# Patient Record
Sex: MALE | Race: WHITE | NOT HISPANIC OR LATINO | Employment: PART TIME | URBAN - METROPOLITAN AREA
[De-identification: names, ages, dates, MRNs, and addresses within clinical notes are randomized per-mention and may not be internally consistent; named-entity substitution may affect disease eponyms.]

---

## 2023-06-28 ENCOUNTER — ANESTHESIA (OUTPATIENT)
Dept: PERIOP | Facility: HOSPITAL | Age: 63
DRG: 853 | End: 2023-06-28
Payer: COMMERCIAL

## 2023-06-28 ENCOUNTER — ANESTHESIA EVENT (OUTPATIENT)
Dept: PERIOP | Facility: HOSPITAL | Age: 63
DRG: 853 | End: 2023-06-28
Payer: COMMERCIAL

## 2023-06-28 ENCOUNTER — HOSPITAL ENCOUNTER (INPATIENT)
Facility: HOSPITAL | Age: 63
LOS: 1 days | DRG: 853 | End: 2023-06-29
Attending: EMERGENCY MEDICINE | Admitting: STUDENT IN AN ORGANIZED HEALTH CARE EDUCATION/TRAINING PROGRAM
Payer: COMMERCIAL

## 2023-06-28 ENCOUNTER — APPOINTMENT (EMERGENCY)
Dept: CT IMAGING | Facility: HOSPITAL | Age: 63
DRG: 853 | End: 2023-06-28
Payer: COMMERCIAL

## 2023-06-28 ENCOUNTER — APPOINTMENT (EMERGENCY)
Dept: ULTRASOUND IMAGING | Facility: HOSPITAL | Age: 63
DRG: 853 | End: 2023-06-28
Payer: COMMERCIAL

## 2023-06-28 DIAGNOSIS — N49.3 FOURNIER'S GANGRENE: Primary | ICD-10-CM

## 2023-06-28 DIAGNOSIS — N49.3 FOURNIER'S GANGRENE OF SCROTUM: ICD-10-CM

## 2023-06-28 DIAGNOSIS — R65.20 SEVERE SEPSIS (HCC): ICD-10-CM

## 2023-06-28 DIAGNOSIS — A41.9 SEVERE SEPSIS (HCC): ICD-10-CM

## 2023-06-28 LAB
ALBUMIN SERPL BCP-MCNC: 3.6 G/DL (ref 3.5–5)
ALP SERPL-CCNC: 83 U/L (ref 34–104)
ALT SERPL W P-5'-P-CCNC: 18 U/L (ref 7–52)
ANION GAP SERPL CALCULATED.3IONS-SCNC: 16 MMOL/L
APTT PPP: 33 SECONDS (ref 23–37)
AST SERPL W P-5'-P-CCNC: 19 U/L (ref 13–39)
BASE EX.OXY STD BLDV CALC-SCNC: 77 % (ref 60–80)
BASE EXCESS BLDV CALC-SCNC: -3.6 MMOL/L
BASOPHILS # BLD MANUAL: 0 THOUSAND/UL (ref 0–0.1)
BASOPHILS NFR MAR MANUAL: 0 % (ref 0–1)
BETA-HYDROXYBUTYRATE: 1.1 MMOL/L
BILIRUB SERPL-MCNC: 1.21 MG/DL (ref 0.2–1)
BUN SERPL-MCNC: 27 MG/DL (ref 5–25)
CALCIUM SERPL-MCNC: 10.4 MG/DL (ref 8.4–10.2)
CHLORIDE SERPL-SCNC: 91 MMOL/L (ref 96–108)
CO2 SERPL-SCNC: 22 MMOL/L (ref 21–32)
CREAT SERPL-MCNC: 1.16 MG/DL (ref 0.6–1.3)
DIFFERENTIAL COMMENT: ABNORMAL
DOHLE BOD BLD QL SMEAR: PRESENT
EOSINOPHIL # BLD MANUAL: 0 THOUSAND/UL (ref 0–0.4)
EOSINOPHIL NFR BLD MANUAL: 0 % (ref 0–6)
ERYTHROCYTE [DISTWIDTH] IN BLOOD BY AUTOMATED COUNT: 12.5 % (ref 11.6–15.1)
GFR SERPL CREATININE-BSD FRML MDRD: 66 ML/MIN/1.73SQ M
GLUCOSE SERPL-MCNC: 256 MG/DL (ref 65–140)
GLUCOSE SERPL-MCNC: 292 MG/DL (ref 65–140)
GLUCOSE SERPL-MCNC: 424 MG/DL (ref 65–140)
GLUCOSE SERPL-MCNC: 441 MG/DL (ref 65–140)
HCO3 BLDV-SCNC: 20.4 MMOL/L (ref 24–30)
HCT VFR BLD AUTO: 40.3 % (ref 36.5–49.3)
HGB BLD-MCNC: 14.1 G/DL (ref 12–17)
INR PPP: 1.03 (ref 0.84–1.19)
LACTATE SERPL-SCNC: 4.8 MMOL/L (ref 0.5–2)
LYMPHOCYTES # BLD AUTO: 0.42 THOUSAND/UL (ref 0.6–4.47)
LYMPHOCYTES # BLD AUTO: 3 % (ref 14–44)
MCH RBC QN AUTO: 34.4 PG (ref 26.8–34.3)
MCHC RBC AUTO-ENTMCNC: 35 G/DL (ref 31.4–37.4)
MCV RBC AUTO: 98 FL (ref 82–98)
METAMYELOCYTES NFR BLD MANUAL: 5 % (ref 0–1)
MONOCYTES # BLD AUTO: 0.97 THOUSAND/UL (ref 0–1.22)
MONOCYTES NFR BLD: 7 % (ref 4–12)
MYELOCYTES NFR BLD MANUAL: 5 % (ref 0–1)
NEUTROPHILS # BLD MANUAL: 11.07 THOUSAND/UL (ref 1.85–7.62)
NEUTS BAND NFR BLD MANUAL: 20 % (ref 0–8)
NEUTS SEG NFR BLD AUTO: 60 % (ref 43–75)
O2 CT BLDV-SCNC: 14.6 ML/DL
PATHOLOGY REVIEW: YES
PCO2 BLDV: 33.8 MM HG (ref 42–50)
PH BLDV: 7.4 [PH] (ref 7.3–7.4)
PLATELET # BLD AUTO: 176 THOUSANDS/UL (ref 149–390)
PLATELET BLD QL SMEAR: ADEQUATE
PMV BLD AUTO: 9.7 FL (ref 8.9–12.7)
PO2 BLDV: 43.6 MM HG (ref 35–45)
POTASSIUM SERPL-SCNC: 4.1 MMOL/L (ref 3.5–5.3)
PROCALCITONIN SERPL-MCNC: 28.29 NG/ML
PROT SERPL-MCNC: 8.1 G/DL (ref 6.4–8.4)
PROTHROMBIN TIME: 13.3 SECONDS (ref 11.6–14.5)
RBC # BLD AUTO: 4.1 MILLION/UL (ref 3.88–5.62)
RBC MORPH BLD: NORMAL
SODIUM SERPL-SCNC: 129 MMOL/L (ref 135–147)
TOXIC GRANULES BLD QL SMEAR: PRESENT
WBC # BLD AUTO: 13.84 THOUSAND/UL (ref 4.31–10.16)
WBC TOXIC VACUOLES BLD QL SMEAR: PRESENT

## 2023-06-28 PROCEDURE — 93005 ELECTROCARDIOGRAM TRACING: CPT

## 2023-06-28 PROCEDURE — 99285 EMERGENCY DEPT VISIT HI MDM: CPT | Performed by: EMERGENCY MEDICINE

## 2023-06-28 PROCEDURE — 82948 REAGENT STRIP/BLOOD GLUCOSE: CPT

## 2023-06-28 PROCEDURE — 82805 BLOOD GASES W/O2 SATURATION: CPT | Performed by: EMERGENCY MEDICINE

## 2023-06-28 PROCEDURE — 87070 CULTURE OTHR SPECIMN AEROBIC: CPT | Performed by: UROLOGY

## 2023-06-28 PROCEDURE — 96365 THER/PROPH/DIAG IV INF INIT: CPT

## 2023-06-28 PROCEDURE — G1004 CDSM NDSC: HCPCS

## 2023-06-28 PROCEDURE — 94762 N-INVAS EAR/PLS OXIMTRY CONT: CPT

## 2023-06-28 PROCEDURE — 85610 PROTHROMBIN TIME: CPT | Performed by: EMERGENCY MEDICINE

## 2023-06-28 PROCEDURE — 99222 1ST HOSP IP/OBS MODERATE 55: CPT | Performed by: SURGERY

## 2023-06-28 PROCEDURE — 85007 BL SMEAR W/DIFF WBC COUNT: CPT | Performed by: EMERGENCY MEDICINE

## 2023-06-28 PROCEDURE — 82010 KETONE BODYS QUAN: CPT | Performed by: EMERGENCY MEDICINE

## 2023-06-28 PROCEDURE — 76870 US EXAM SCROTUM: CPT

## 2023-06-28 PROCEDURE — 96375 TX/PRO/DX INJ NEW DRUG ADDON: CPT

## 2023-06-28 PROCEDURE — 0JBB0ZZ EXCISION OF PERINEUM SUBCUTANEOUS TISSUE AND FASCIA, OPEN APPROACH: ICD-10-PCS | Performed by: UROLOGY

## 2023-06-28 PROCEDURE — 87147 CULTURE TYPE IMMUNOLOGIC: CPT | Performed by: UROLOGY

## 2023-06-28 PROCEDURE — 84145 PROCALCITONIN (PCT): CPT | Performed by: EMERGENCY MEDICINE

## 2023-06-28 PROCEDURE — 87075 CULTR BACTERIA EXCEPT BLOOD: CPT | Performed by: UROLOGY

## 2023-06-28 PROCEDURE — 83605 ASSAY OF LACTIC ACID: CPT | Performed by: UROLOGY

## 2023-06-28 PROCEDURE — 99285 EMERGENCY DEPT VISIT HI MDM: CPT

## 2023-06-28 PROCEDURE — 72192 CT PELVIS W/O DYE: CPT

## 2023-06-28 PROCEDURE — 87040 BLOOD CULTURE FOR BACTERIA: CPT | Performed by: EMERGENCY MEDICINE

## 2023-06-28 PROCEDURE — 83605 ASSAY OF LACTIC ACID: CPT | Performed by: EMERGENCY MEDICINE

## 2023-06-28 PROCEDURE — 85027 COMPLETE CBC AUTOMATED: CPT | Performed by: EMERGENCY MEDICINE

## 2023-06-28 PROCEDURE — 87205 SMEAR GRAM STAIN: CPT | Performed by: UROLOGY

## 2023-06-28 PROCEDURE — 36415 COLL VENOUS BLD VENIPUNCTURE: CPT | Performed by: EMERGENCY MEDICINE

## 2023-06-28 PROCEDURE — 87185 SC STD ENZYME DETCJ PER NZM: CPT | Performed by: UROLOGY

## 2023-06-28 PROCEDURE — 99222 1ST HOSP IP/OBS MODERATE 55: CPT | Performed by: UROLOGY

## 2023-06-28 PROCEDURE — 11004 DBRDMT SKIN XTRNL GENT&PER: CPT | Performed by: SURGERY

## 2023-06-28 PROCEDURE — 96361 HYDRATE IV INFUSION ADD-ON: CPT

## 2023-06-28 PROCEDURE — 80053 COMPREHEN METABOLIC PANEL: CPT | Performed by: EMERGENCY MEDICINE

## 2023-06-28 PROCEDURE — 85049 AUTOMATED PLATELET COUNT: CPT | Performed by: UROLOGY

## 2023-06-28 PROCEDURE — 85730 THROMBOPLASTIN TIME PARTIAL: CPT | Performed by: EMERGENCY MEDICINE

## 2023-06-28 PROCEDURE — 11004 DBRDMT SKIN XTRNL GENT&PER: CPT | Performed by: UROLOGY

## 2023-06-28 RX ORDER — MIDAZOLAM HYDROCHLORIDE 2 MG/2ML
INJECTION, SOLUTION INTRAMUSCULAR; INTRAVENOUS AS NEEDED
Status: DISCONTINUED | OUTPATIENT
Start: 2023-06-28 | End: 2023-06-28

## 2023-06-28 RX ORDER — SODIUM CHLORIDE, SODIUM LACTATE, POTASSIUM CHLORIDE, CALCIUM CHLORIDE 600; 310; 30; 20 MG/100ML; MG/100ML; MG/100ML; MG/100ML
INJECTION, SOLUTION INTRAVENOUS CONTINUOUS PRN
Status: DISCONTINUED | OUTPATIENT
Start: 2023-06-28 | End: 2023-06-28

## 2023-06-28 RX ORDER — ONDANSETRON 2 MG/ML
INJECTION INTRAMUSCULAR; INTRAVENOUS AS NEEDED
Status: DISCONTINUED | OUTPATIENT
Start: 2023-06-28 | End: 2023-06-28

## 2023-06-28 RX ORDER — FENTANYL CITRATE 50 UG/ML
INJECTION, SOLUTION INTRAMUSCULAR; INTRAVENOUS AS NEEDED
Status: DISCONTINUED | OUTPATIENT
Start: 2023-06-28 | End: 2023-06-28

## 2023-06-28 RX ORDER — SODIUM CHLORIDE, SODIUM LACTATE, POTASSIUM CHLORIDE, CALCIUM CHLORIDE 600; 310; 30; 20 MG/100ML; MG/100ML; MG/100ML; MG/100ML
125 INJECTION, SOLUTION INTRAVENOUS CONTINUOUS
Status: DISCONTINUED | OUTPATIENT
Start: 2023-06-28 | End: 2023-06-29 | Stop reason: HOSPADM

## 2023-06-28 RX ORDER — LIDOCAINE HYDROCHLORIDE 10 MG/ML
INJECTION, SOLUTION EPIDURAL; INFILTRATION; INTRACAUDAL; PERINEURAL AS NEEDED
Status: DISCONTINUED | OUTPATIENT
Start: 2023-06-28 | End: 2023-06-28

## 2023-06-28 RX ORDER — ALBUMIN, HUMAN INJ 5% 5 %
SOLUTION INTRAVENOUS CONTINUOUS PRN
Status: DISCONTINUED | OUTPATIENT
Start: 2023-06-28 | End: 2023-06-28

## 2023-06-28 RX ORDER — SODIUM CHLORIDE 9 MG/ML
INJECTION, SOLUTION INTRAVENOUS CONTINUOUS PRN
Status: DISCONTINUED | OUTPATIENT
Start: 2023-06-28 | End: 2023-06-28

## 2023-06-28 RX ORDER — CEFTRIAXONE 2 G/50ML
2000 INJECTION, SOLUTION INTRAVENOUS ONCE
Status: COMPLETED | OUTPATIENT
Start: 2023-06-28 | End: 2023-06-28

## 2023-06-28 RX ORDER — SUCCINYLCHOLINE/SOD CL,ISO/PF 100 MG/5ML
SYRINGE (ML) INTRAVENOUS AS NEEDED
Status: DISCONTINUED | OUTPATIENT
Start: 2023-06-28 | End: 2023-06-28

## 2023-06-28 RX ORDER — ASPIRIN 81 MG/1
81 TABLET ORAL DAILY
COMMUNITY

## 2023-06-28 RX ORDER — GLYCOPYRROLATE 0.2 MG/ML
INJECTION INTRAMUSCULAR; INTRAVENOUS AS NEEDED
Status: DISCONTINUED | OUTPATIENT
Start: 2023-06-28 | End: 2023-06-28

## 2023-06-28 RX ORDER — ONDANSETRON 2 MG/ML
4 INJECTION INTRAMUSCULAR; INTRAVENOUS ONCE AS NEEDED
Status: DISCONTINUED | OUTPATIENT
Start: 2023-06-28 | End: 2023-06-28 | Stop reason: HOSPADM

## 2023-06-28 RX ORDER — HYDROMORPHONE HCL/PF 1 MG/ML
0.2 SYRINGE (ML) INJECTION
Status: DISCONTINUED | OUTPATIENT
Start: 2023-06-28 | End: 2023-06-28 | Stop reason: HOSPADM

## 2023-06-28 RX ORDER — HYDROMORPHONE HCL/PF 1 MG/ML
0.4 SYRINGE (ML) INJECTION
Status: DISCONTINUED | OUTPATIENT
Start: 2023-06-28 | End: 2023-06-28 | Stop reason: HOSPADM

## 2023-06-28 RX ORDER — FENTANYL CITRATE/PF 50 MCG/ML
50 SYRINGE (ML) INJECTION
Status: DISCONTINUED | OUTPATIENT
Start: 2023-06-28 | End: 2023-06-28 | Stop reason: HOSPADM

## 2023-06-28 RX ORDER — ATORVASTATIN CALCIUM 20 MG/1
TABLET, FILM COATED ORAL
COMMUNITY
Start: 2023-05-10

## 2023-06-28 RX ORDER — LISINOPRIL 2.5 MG/1
TABLET ORAL
COMMUNITY
Start: 2023-05-10

## 2023-06-28 RX ORDER — DULAGLUTIDE 0.75 MG/.5ML
INJECTION, SOLUTION SUBCUTANEOUS
COMMUNITY
Start: 2023-05-16

## 2023-06-28 RX ORDER — MAGNESIUM HYDROXIDE 1200 MG/15ML
LIQUID ORAL AS NEEDED
Status: DISCONTINUED | OUTPATIENT
Start: 2023-06-28 | End: 2023-06-28 | Stop reason: HOSPADM

## 2023-06-28 RX ORDER — KETOROLAC TROMETHAMINE 30 MG/ML
15 INJECTION, SOLUTION INTRAMUSCULAR; INTRAVENOUS ONCE
Status: COMPLETED | OUTPATIENT
Start: 2023-06-28 | End: 2023-06-28

## 2023-06-28 RX ORDER — PHENYLEPHRINE HCL IN 0.9% NACL 1 MG/10 ML
SYRINGE (ML) INTRAVENOUS AS NEEDED
Status: DISCONTINUED | OUTPATIENT
Start: 2023-06-28 | End: 2023-06-28

## 2023-06-28 RX ORDER — ROCURONIUM BROMIDE 10 MG/ML
INJECTION, SOLUTION INTRAVENOUS AS NEEDED
Status: DISCONTINUED | OUTPATIENT
Start: 2023-06-28 | End: 2023-06-28

## 2023-06-28 RX ORDER — HEPARIN SODIUM 5000 [USP'U]/ML
5000 INJECTION, SOLUTION INTRAVENOUS; SUBCUTANEOUS EVERY 8 HOURS SCHEDULED
Status: DISCONTINUED | OUTPATIENT
Start: 2023-06-28 | End: 2023-06-29 | Stop reason: HOSPADM

## 2023-06-28 RX ORDER — HYDROMORPHONE HYDROCHLORIDE 2 MG/ML
INJECTION, SOLUTION INTRAMUSCULAR; INTRAVENOUS; SUBCUTANEOUS AS NEEDED
Status: DISCONTINUED | OUTPATIENT
Start: 2023-06-28 | End: 2023-06-28

## 2023-06-28 RX ORDER — PROPOFOL 10 MG/ML
INJECTION, EMULSION INTRAVENOUS AS NEEDED
Status: DISCONTINUED | OUTPATIENT
Start: 2023-06-28 | End: 2023-06-28

## 2023-06-28 RX ORDER — FENTANYL CITRATE/PF 50 MCG/ML
25 SYRINGE (ML) INJECTION
Status: DISCONTINUED | OUTPATIENT
Start: 2023-06-28 | End: 2023-06-28 | Stop reason: HOSPADM

## 2023-06-28 RX ADMIN — SUGAMMADEX 200 MG: 100 INJECTION, SOLUTION INTRAVENOUS at 21:45

## 2023-06-28 RX ADMIN — FENTANYL CITRATE 100 MCG: 50 INJECTION, SOLUTION INTRAMUSCULAR; INTRAVENOUS at 20:09

## 2023-06-28 RX ADMIN — FENTANYL CITRATE 50 MCG: 50 INJECTION INTRAMUSCULAR; INTRAVENOUS at 22:10

## 2023-06-28 RX ADMIN — SODIUM CHLORIDE: 0.9 INJECTION, SOLUTION INTRAVENOUS at 20:31

## 2023-06-28 RX ADMIN — CEFTRIAXONE 2000 MG: 2 INJECTION, SOLUTION INTRAVENOUS at 17:38

## 2023-06-28 RX ADMIN — HEPARIN SODIUM 5000 UNITS: 5000 INJECTION INTRAVENOUS; SUBCUTANEOUS at 23:46

## 2023-06-28 RX ADMIN — ROCURONIUM BROMIDE 20 MG: 10 INJECTION, SOLUTION INTRAVENOUS at 20:26

## 2023-06-28 RX ADMIN — HYDROMORPHONE HYDROCHLORIDE 0.2 MG: 2 INJECTION, SOLUTION INTRAMUSCULAR; INTRAVENOUS; SUBCUTANEOUS at 20:28

## 2023-06-28 RX ADMIN — SODIUM CHLORIDE: 9 INJECTION INTRAMUSCULAR; INTRAVENOUS; SUBCUTANEOUS at 22:58

## 2023-06-28 RX ADMIN — ROCURONIUM BROMIDE 10 MG: 10 INJECTION, SOLUTION INTRAVENOUS at 21:04

## 2023-06-28 RX ADMIN — ONDANSETRON 4 MG: 2 INJECTION INTRAMUSCULAR; INTRAVENOUS at 21:45

## 2023-06-28 RX ADMIN — INSULIN HUMAN 8 UNITS: 100 INJECTION, SOLUTION PARENTERAL at 17:59

## 2023-06-28 RX ADMIN — Medication 100 MG: at 20:09

## 2023-06-28 RX ADMIN — NOREPINEPHRINE BITARTRATE 3 MCG/MIN: 1 INJECTION INTRAVENOUS at 21:13

## 2023-06-28 RX ADMIN — SODIUM CHLORIDE 1000 ML: 0.9 INJECTION, SOLUTION INTRAVENOUS at 17:38

## 2023-06-28 RX ADMIN — MIDAZOLAM HYDROCHLORIDE 1 MG: 1 INJECTION, SOLUTION INTRAMUSCULAR; INTRAVENOUS at 20:06

## 2023-06-28 RX ADMIN — MIDAZOLAM HYDROCHLORIDE 1 MG: 1 INJECTION, SOLUTION INTRAMUSCULAR; INTRAVENOUS at 20:02

## 2023-06-28 RX ADMIN — PIPERACILLIN AND TAZOBACTAM 3.38 G: 36; 4.5 INJECTION, POWDER, FOR SOLUTION INTRAVENOUS at 20:23

## 2023-06-28 RX ADMIN — VANCOMYCIN HYDROCHLORIDE 2000 MG: 1 INJECTION, POWDER, LYOPHILIZED, FOR SOLUTION INTRAVENOUS at 19:56

## 2023-06-28 RX ADMIN — FENTANYL CITRATE 50 MCG: 50 INJECTION INTRAMUSCULAR; INTRAVENOUS at 22:17

## 2023-06-28 RX ADMIN — GLYCOPYRROLATE 0.1 MG: 0.2 INJECTION, SOLUTION INTRAMUSCULAR; INTRAVENOUS at 20:29

## 2023-06-28 RX ADMIN — HYDROMORPHONE HYDROCHLORIDE 0.2 MG: 1 INJECTION, SOLUTION INTRAMUSCULAR; INTRAVENOUS; SUBCUTANEOUS at 22:23

## 2023-06-28 RX ADMIN — HYDROMORPHONE HYDROCHLORIDE 0.2 MG: 2 INJECTION, SOLUTION INTRAMUSCULAR; INTRAVENOUS; SUBCUTANEOUS at 21:07

## 2023-06-28 RX ADMIN — HYDROMORPHONE HYDROCHLORIDE 0.2 MG: 2 INJECTION, SOLUTION INTRAMUSCULAR; INTRAVENOUS; SUBCUTANEOUS at 21:01

## 2023-06-28 RX ADMIN — ROCURONIUM BROMIDE 10 MG: 10 INJECTION, SOLUTION INTRAVENOUS at 21:21

## 2023-06-28 RX ADMIN — SODIUM CHLORIDE 1000 ML: 0.9 INJECTION, SOLUTION INTRAVENOUS at 18:36

## 2023-06-28 RX ADMIN — HYDROMORPHONE HYDROCHLORIDE 0.2 MG: 2 INJECTION, SOLUTION INTRAMUSCULAR; INTRAVENOUS; SUBCUTANEOUS at 20:56

## 2023-06-28 RX ADMIN — HYDROMORPHONE HYDROCHLORIDE 0.2 MG: 2 INJECTION, SOLUTION INTRAMUSCULAR; INTRAVENOUS; SUBCUTANEOUS at 20:40

## 2023-06-28 RX ADMIN — Medication 100 MCG: at 20:18

## 2023-06-28 RX ADMIN — Medication 100 MCG: at 20:09

## 2023-06-28 RX ADMIN — KETOROLAC TROMETHAMINE 15 MG: 30 INJECTION, SOLUTION INTRAMUSCULAR at 17:38

## 2023-06-28 RX ADMIN — PROPOFOL 150 MG: 10 INJECTION, EMULSION INTRAVENOUS at 20:09

## 2023-06-28 RX ADMIN — SODIUM CHLORIDE, SODIUM LACTATE, POTASSIUM CHLORIDE, AND CALCIUM CHLORIDE: .6; .31; .03; .02 INJECTION, SOLUTION INTRAVENOUS at 21:03

## 2023-06-28 RX ADMIN — SODIUM CHLORIDE, SODIUM LACTATE, POTASSIUM CHLORIDE, AND CALCIUM CHLORIDE 125 ML/HR: .6; .31; .03; .02 INJECTION, SOLUTION INTRAVENOUS at 23:46

## 2023-06-28 RX ADMIN — SODIUM CHLORIDE: 0.9 INJECTION, SOLUTION INTRAVENOUS at 21:10

## 2023-06-28 RX ADMIN — LIDOCAINE HYDROCHLORIDE 50 MG: 10 INJECTION, SOLUTION EPIDURAL; INFILTRATION; INTRACAUDAL; PERINEURAL at 20:09

## 2023-06-28 RX ADMIN — ALBUMIN (HUMAN): 12.5 INJECTION, SOLUTION INTRAVENOUS at 21:42

## 2023-06-28 RX ADMIN — Medication 100 MCG: at 20:24

## 2023-06-28 RX ADMIN — SODIUM CHLORIDE, SODIUM LACTATE, POTASSIUM CHLORIDE, AND CALCIUM CHLORIDE: .6; .31; .03; .02 INJECTION, SOLUTION INTRAVENOUS at 19:55

## 2023-06-28 NOTE — ANESTHESIA PREPROCEDURE EVALUATION
Procedure:  INCISION AND DRAINAGE (I&D) SCROTUM (Scrotum)    Relevant Problems   No relevant active problems      No prior anesthetic complications  Denies smoking and illicit drug use  Npo since yesterday  Sips of water at 5pm  denies cardiac hx, METS > 4     treated with 8 U insulin at 6pm  BS now 290  Will recheck and treat as appropriate  Physical Exam    Airway    Mallampati score: III  TM Distance: >3 FB  Neck ROM: full     Dental       Cardiovascular  Rhythm: regular, Rate: normal,     Pulmonary  Breath sounds clear to auscultation,     Other Findings       Lab Results   Component Value Date    WBC 13 84 (H) 06/28/2023    HGB 14 1 06/28/2023    HCT 40 3 06/28/2023    MCV 98 06/28/2023     06/28/2023     Lab Results   Component Value Date    SODIUM 129 (L) 06/28/2023    K 4 1 06/28/2023    CL 91 (L) 06/28/2023    CO2 22 06/28/2023    BUN 27 (H) 06/28/2023    CREATININE 1 16 06/28/2023    GLUC 441 (H) 06/28/2023    CALCIUM 10 4 (H) 06/28/2023         Anesthesia Plan  ASA Score- 2 Emergent    Anesthesia Type- general with ASA Monitors  Additional Monitors: arterial line  Airway Plan: ETT  Comment: Risks/benefits and alternatives discussed with patient including possible PONV, sore throat, damage to teeth/lips/gums/esophagus, and possibility of rare anesthetic and surgical emergencies including but not limited to heart attack, stroke, and/or death  All questions were answered          Plan Factors-Exercise tolerance (METS): >4 METS  Chart reviewed  Existing labs reviewed  Patient summary reviewed  Patient is not a current smoker  Induction- rapid sequence induction  Postoperative Plan- Plan for postoperative opioid use  Planned trial extubation    Informed Consent- Anesthetic plan and risks discussed with patient and spouse  I personally reviewed this patient with the CRNA  Discussed and agreed on the Anesthesia Plan with the CRNA  Joe Eubanks

## 2023-06-28 NOTE — CONSULTS
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 Physical exam demonstrates an eschar, focal area of tissue loss in the right hemiscrotum by the base. CT confirms necrotizing soft tissue infection involving the scrotum, extending posteriorly into the gluteal folds, into the deep pelvis as well as the anterior superior iliac spine. I personally met with Valerie Ling and his pema wife at the bedside. We reviewed the severity of his clinical situation. We discussed that surgical intervention is requisite. We discussed that the goals of surgery are for wide local debridement. I discussed the risk benefits and alternatives. I discussed the possibility requiring extended wound care postoperatively. We discussed the possibility requiring multiple interventions. patient understands that his wound will be left open and will not be able to be closed. He understands the possibility of requiring multiple operations, we discussed possibility requiring transfer to [facility] for additional interventions in the future, we discussed possibility of skin grafting        PLAN:     We will proceed to the operating room urgently for wide debridement of Rohan's gangrene involving the scrotum    Patient received Rocephin and vancomycin from the emergency department, blood cultures have been collected    I have contacted my colleague from general surgery asking them to be available for potential intraoperative assistance if needed given the extension to the posterior compartments. My preoperative physical exam suggest it may be possible to perform a sufficient debridement transscrotally.       History of Present Illness:     Brooklynn Banegas is a 57 y.o. old with a history of diabetes who reports initially developing a folliculitis involving the gluteal area 3 days ago, progressed to a red hot scrotum prompting presentation to the emergency department. Past Medical, Past Surgical History:     Past Medical History:   Diagnosis Date   • Diabetes mellitus ([address])    :    Past Surgical History:   Procedure Laterality Date   • ROTATOR CUFF REPAIR     :    Medications, Allergies:     Current Facility-Administered Medications:   •  fentaNYL (SUBLIMAZE) injection 50 mcg, 50 mcg, Intravenous, Q3 min PRN, Delfin Najjar, CRNA  •  HYDROmorphone (DILAUDID) injection 0.2 mg, 0.2 mg, Intravenous, Q10 Min PRN, Delfin Najjar, CRNA  •  ondansetron (ZOFRAN) injection 4 mg, 4 mg, Intravenous, Once PRN, Delfin Najjar, CRNA  •  piperacillin-tazobactam (ZOSYN) 3.375 g in sodium chloride 0.9 % 100 mL IVPB, 3.375 g, Intravenous, Once, Duy Austin MD  •  [COMPLETED] sodium chloride 0.9 % bolus 1,000 mL, 1,000 mL, Intravenous, Once, Stopped at 06/28/23 1836 **FOLLOWED BY** [COMPLETED] sodium chloride 0.9 % bolus 1,000 mL, 1,000 mL, Intravenous, Once, Last Rate: 2,000 mL/hr at 06/28/23 1836, 1,000 mL at 06/28/23 1836 **FOLLOWED BY** sodium chloride 0.9 % bolus 1,000 mL, 1,000 mL, Intravenous, Once, Duy Austin MD  •  vancomycin (VANCOCIN) 2,000 mg in sodium chloride 0.9 % 500 mL IVPB, 2,000 mg, Intravenous, Once, Duy German MD    Allergies:  No Known Allergies:    Social and Family History:   Social History:   Social History     Tobacco Use   • Smoking status: Never   • Smokeless tobacco: Never   Vaping Use   • Vaping Use: Every day   • Substances: Nicotine   Substance Use Topics   • Alcohol use: Yes     Comment: socially   • Drug use: Never   . Social History     Tobacco Use   Smoking Status Never   Smokeless Tobacco Never       Family History:  History reviewed. No pertinent family history.:     Review of Systems:     General: Fever, chills, or night sweats: Positive  Cardiac: Negative for chest pain. Pulmonary: Negative for shortness of breath.   Gastrointestinal: Abdominal pain negative  Nausea, vomiting, or diarrhea negative  Genitourinary: See HPI above. Patient does nothave hematuria. All other systems queried were negative. Scrotum is globally erythematous and indurated and enlarged. Erythema is extending to the suprapubic skin. There is an eschar present at the base of the right hemiscrotum with focal tissue loss here. I carefully examined the gluteal area and I do not visualize any extension of the internal infection to the level of the skin    Physical Exam:   General: Patient is pleasant and in NAD. Awake and alert  /70   Pulse (!) 118   Temp 98 °F (36.7 °C) (Temporal)   Resp 16   Wt 87.1 kg (192 lb 0.3 oz)   SpO2 96%   HEENT:  Normocephalic atraumatic  Cardiac:  Regular rate and rhythm, Peripheral edema: negative  Pulmonary: Non-labored breathing, CTAB  Abdomen: Soft, non-tender, non-distended. No surgical scars. No masses, tenderness, hernias noted. Genitourinary: negative CVA tenderness, neg suprapubic tenderness. Extremities: normal movement in all 4       Labs:     Lab Results   Component Value Date    HGB 14.1 06/28/2023    HCT 40.3 06/28/2023    WBC 13.84 (H) 06/28/2023     06/28/2023   ]    Lab Results   Component Value Date    K 4.1 06/28/2023    CL 91 (L) 06/28/2023    CO2 22 06/28/2023    BUN 27 (H) 06/28/2023    CREATININE 1.16 06/28/2023    CALCIUM 10.4 (H) 06/28/2023   ]    Imaging:   I personally reviewed the images and report of the following studies, and reviewed them with the patient:    Procedure: CT pelvis wo contrast    Result Date: 6/28/2023  Narrative: CT PELVIS WITHOUT IV CONTRAST INDICATION:   Scrotal swelling or edema. Please evaluate scrotum for r/o gas. COMPARISON:  None. TECHNIQUE: CT examination of the pelvis was performed without intravenous contrast. Multiplanar 2D reformatted images were created from the source data.  This examination, like all CT scans performed in the [facility], was performed utilizing techniques to minimize radiation dose exposure, including the use of iterative reconstruction and automated exposure control. Radiation dose length product (DLP) for this visit:  553 mGy-cm . Enteric contrast was not administered. FINDINGS: SCROTUM/PERINEAL REGION: There is edema of the subcutaneous tissues of the scrotum. There is extensive subcutaneous emphysema throughout the scrotum, extending into the perineal region, the right ischioanal fossa and the right aspect of the gluteal cleft  inferiorly. Some of the foci of air are seen in the right obturator internus muscle and in the right levator ani muscles. Some of the air also is seen extending into the presacral space. There are also several droplets of air within the subcutaneous tissues of the right thigh medially. There is a left-sided hydrocele. VISUALIZED KIDNEYS/URETERS:  No significant abnormality identified in the partially imaged kidneys and ureters. REPRODUCTIVE ORGANS:  The prostate gland is not enlarged. URINARY BLADDER: The urinary bladder is within normal limits. APPENDIX:  Normal appendix. VISUALIZED BOWEL:  No obstruction or convincing inflammation in the visualized bowel. ABDOMINOPELVIC CAVITY:  No ascites or pneumoperitoneum in the visualized pelvis. There are several mildly enlarged right-sided inguinal lymph nodes measuring up to 1.5 cm in short axis. There are several enlarged pelvic lymph nodes, most pronounced in the right external iliac chain. For instance there is a right external iliac lymph node measuring 1.7 cm in short axis (series 2 image 58). A distal right external iliac lymph node measures 2.7 x 2.3 cm (series 2 image 67). There is a distal left external iliac lymph node measuring 1.3 cm in short axis (series 2 image 69). VISUALIZED VESSELS:  Unremarkable for patient's age.  ABDOMINOPELVIC WALL/INGUINAL REGIONS: Subcutaneous air from the aforementioned process in the scrotum and perineum also extends into both inguinal regions and anterior abdominal wall more cephalad, mostly on the left. There is a small fat-containing umbilical hernia. There is also a small fat-containing right inguinal hernia. OSSEOUS STRUCTURES:  No acute fracture or destructive osseous lesion. Impression: Subcutaneous tissue edema in the scrotum and extensive subcutaneous emphysema throughout the scrotum, extending into the inguinal regions, anterior abdominal wall and the perineal region, compatible with Rohan's gangrene. Some of the aforementioned air extends into the presacral space, and is seen in the right levator ani muscle and the right obturator internus muscle there is also some air in the subcutaneous fat of the proximal right thigh medially. Small left-sided hydrocele. Mostly right-sided pelvic and inguinal lymphadenopathy measuring up to 2.7 x 2.3 cm, likely reactive. I personally discussed this study with JOYCE Gonzalez on 6/28/2023 at 7:36 PM who was already aware of the findings. Workstation performed: [id number]         Thank you for allowing me to participate in this patients’ care. Please do not hesitate to call with any additional questions.   Ashutosh Kenny MD

## 2023-06-28 NOTE — ED PROVIDER NOTES
History  Chief Complaint   Patient presents with   • Groin Swelling     Pt reports his scrotum has been swollen since Saturday, has an abscess on his buttock that recently burst, and was sick with chills and nausea since Friday. PMhx: DM  Ex smoker as of 6 months ago, + vapes , some ETOH    Initially patient had a gluteal abscess, that spontaneously drained. No fever or chills documented but felt achy and weak. Then over last 2-3 days has developed a constant and severe swelling of the scrotum. Tender to touch, warm. History provided by:  Patient   used: No        None       Past Medical History:   Diagnosis Date   • Diabetes mellitus ([address])        Past Surgical History:   Procedure Laterality Date   • ROTATOR CUFF REPAIR         History reviewed. No pertinent family history. I have reviewed and agree with the history as documented. E-Cigarette/Vaping   • E-Cigarette Use Current Every Day User      E-Cigarette/Vaping Substances   • Nicotine Yes      Social History     Tobacco Use   • Smoking status: Never   • Smokeless tobacco: Never   Vaping Use   • Vaping Use: Every day   • Substances: Nicotine   Substance Use Topics   • Alcohol use: Yes     Comment: socially   • Drug use: Never       Review of Systems   Constitutional: Negative for chills and fever. HENT: Negative for ear pain and sore throat. Eyes: Negative for pain and visual disturbance. Respiratory: Negative for cough and shortness of breath. Cardiovascular: Negative for chest pain and palpitations. Gastrointestinal: Negative for abdominal pain and vomiting. Genitourinary: Positive for scrotal swelling and testicular pain. Negative for decreased urine volume, difficulty urinating, dysuria, enuresis, flank pain, frequency, genital sores, hematuria, penile pain, penile swelling and urgency. Musculoskeletal: Negative for arthralgias and back pain. Skin: Positive for color change and wound.  Negative for rash.   Neurological: Negative for seizures and syncope. All other systems reviewed and are negative. Physical Exam  Physical Exam  Vitals (tach 116 ) and nursing note reviewed. Constitutional:       General: He is not in acute distress. Appearance: He is well-developed and normal weight. HENT:      Head: Normocephalic and atraumatic. Right Ear: External ear normal.      Left Ear: External ear normal.      Nose: Nose normal.      Mouth/Throat:      Mouth: Mucous membranes are moist.   Eyes:      Extraocular Movements: Extraocular movements intact. Conjunctiva/sclera: Conjunctivae normal.      Pupils: Pupils are equal, round, and reactive to light. Cardiovascular:      Rate and Rhythm: Normal rate and regular rhythm. Heart sounds: No murmur heard. Pulmonary:      Effort: Pulmonary effort is normal. No respiratory distress. Breath sounds: Normal breath sounds. Abdominal:      Palpations: Abdomen is soft. Tenderness: There is no abdominal tenderness. Musculoskeletal:         General: No swelling. Cervical back: Neck supple. Skin:     General: Skin is warm and dry. Capillary Refill: Capillary refill takes less than 2 seconds. Neurological:      General: No focal deficit present. Mental Status: He is alert and oriented to person, place, and time. Psychiatric:         Mood and Affect: Mood normal.         Thought Content:  Thought content normal.         Judgment: Judgment normal.         Vital Signs  ED Triage Vitals [06/28/23 1636]   Temperature Pulse Respirations Blood Pressure SpO2   (!) 97.3 °F (36.3 °C) (!) 116 18 119/70 96 %      Temp src Heart Rate Source Patient Position - Orthostatic VS BP Location FiO2 (%)   -- -- -- -- --      Pain Score       --           Vitals:    06/28/23 1636 06/28/23 1800 06/28/23 1845 06/28/23 1900   BP: 119/70 120/73 120/71 118/71   Pulse: (!) 116 101 103 100         Visual Acuity      ED Medications  Medications sodium chloride 0.9 % bolus 1,000 mL (0 mL Intravenous Stopped 6/28/23 1836)     Followed by   sodium chloride 0.9 % bolus 1,000 mL (1,000 mL Intravenous New Bag 6/28/23 1836)     Followed by   sodium chloride 0.9 % bolus 1,000 mL (has no administration in time range)   cefTRIAXone (ROCEPHIN) IVPB (premix in dextrose) 2,000 mg 50 mL (0 mg Intravenous Stopped 6/28/23 1831)   ketorolac (TORADOL) injection 15 mg (15 mg Intravenous Given 6/28/23 1738)   insulin regular (HumuLIN R,NovoLIN R) injection 8 Units (8 Units Intravenous Given 6/28/23 1759)       Diagnostic Studies  Results Reviewed     Procedure Component Value Units Date/Time    CBC and differential [[id number]]  (Abnormal) Collected: 06/28/23 1736    Lab Status: Final result Specimen: Blood from Arm, Right Updated: 06/28/23 1835     WBC 13.84 Thousand/uL      RBC 4.10 Million/uL      Hemoglobin 14.1 g/dL      Hematocrit 40.3 %      MCV 98 fL      MCH 34.4 pg      MCHC 35.0 g/dL      RDW 12.5 %      MPV 9.7 fL      Platelets 785 Thousands/uL     Narrative: This is an appended report. These results have been appended to a previously verified report.     Manual Differential(PHLEBS Do Not Order) [[id number]]  (Abnormal) Collected: 06/28/23 1736    Lab Status: Final result Specimen: Blood from Arm, Right Updated: 06/28/23 1834     Segmented % 60 %      Bands % 20 %      Lymphocytes % 3 %      Monocytes % 7 %      Eosinophils, % 0 %      Basophils % 0 %      Metamyelocytes% 5 %      Myelocytes % 5 %      Absolute Neutrophils 11.07 Thousand/uL      Lymphocytes Absolute 0.42 Thousand/uL      Monocytes Absolute 0.97 Thousand/uL      Eosinophils Absolute 0.00 Thousand/uL      Basophils Absolute 0.00 Thousand/uL      Total Counted --     Dohle Bodies Present     Toxic Granulation Present     Toxic Vacuolated Neutrophils Present     RBC Morphology Normal     Platelet Estimate Adequate     Pathology Review Yes     Differential Comment see note    Lactic acid [[id number]]  (Abnormal) Collected: 06/28/23 1736    Lab Status: Final result Specimen: Blood from Arm, Right Updated: 06/28/23 1826     LACTIC ACID 4.8 mmol/L     Narrative:      Result may be elevated if tourniquet was used during collection. Lactic acid 2 Hours [[id number]]     Lab Status: No result Specimen: Blood     Path Slide Review [[id number]] Collected: 06/28/23 1736    Lab Status:  In process Specimen: Blood from Arm, Right Updated: 06/28/23 1823    Procalcitonin [[id number]]  (Abnormal) Collected: 06/28/23 1736    Lab Status: Final result Specimen: Blood from Arm, Right Updated: 06/28/23 1820     Procalcitonin 28.29 ng/ml     Blood gas, Venous [[id number]]  (Abnormal) Collected: 06/28/23 1759    Lab Status: Final result Specimen: Blood from Arm, Right Updated: 06/28/23 1809     pH, Miguel Angel 7.398     pCO2, Miguel Angel 33.8 mm Hg      pO2, Miguel Angel 43.6 mm Hg      HCO3, Miguel Angel 20.4 mmol/L      Base Excess, Miguel Angel -3.6 mmol/L      O2 Content, Miguel Angel 14.6 ml/dL      O2 HGB, VENOUS 77.0 %     Comprehensive metabolic panel [[id number]]  (Abnormal) Collected: 06/28/23 1736    Lab Status: Final result Specimen: Blood from Arm, Right Updated: 06/28/23 1804     Sodium 129 mmol/L      Potassium 4.1 mmol/L      Chloride 91 mmol/L      CO2 22 mmol/L      ANION GAP 16 mmol/L      BUN 27 mg/dL      Creatinine 1.16 mg/dL      Glucose 441 mg/dL      Calcium 10.4 mg/dL      AST 19 U/L      ALT 18 U/L      Alkaline Phosphatase 83 U/L      Total Protein 8.1 g/dL      Albumin 3.6 g/dL      Total Bilirubin 1.21 mg/dL      eGFR 66 ml/min/1.73sq m     Narrative:      [place] guidelines for Chronic Kidney Disease (CKD):   •  Stage 1 with normal or high GFR (GFR > 90 mL/min/1.73 square meters)  •  Stage 2 Mild CKD (GFR = 60-89 mL/min/1.73 square meters)  •  Stage 3A Moderate CKD (GFR = 45-59 mL/min/1.73 square meters)  •  Stage 3B Moderate CKD (GFR = 30-44 mL/min/1.73 square meters)  •  Stage 4 Severe CKD (GFR = 15-29 mL/min/1.73 square meters)  •  Stage 5 End Stage CKD (GFR <15 mL/min/1.73 square meters)  Note: GFR calculation is accurate only with a steady state creatinine    Protime-INR [[id number]]  (Normal) Collected: 06/28/23 1736    Lab Status: Final result Specimen: Blood from Arm, Right Updated: 06/28/23 1757     Protime 13.3 seconds      INR 1.03    APTT [[id number]]  (Normal) Collected: 06/28/23 1736    Lab Status: Final result Specimen: Blood from Arm, Right Updated: 06/28/23 1757     PTT 33 seconds     Beta Hydroxybutyrate [[id number]]  (Abnormal) Collected: 06/28/23 1744    Lab Status: Final result Specimen: Blood from Arm, Right Updated: 06/28/23 1752     BETA-HYDROXYBUTYRATE 1.1 mmol/L     Blood culture #1 [[id number]] Collected: 06/28/23 1736    Lab Status: In process Specimen: Blood from Arm, Right Updated: 06/28/23 1741    Blood culture #2 [[id number]] Collected: 06/28/23 1736    Lab Status: In process Specimen: Blood from Arm, Right Updated: 06/28/23 1741    Fingerstick Glucose (POCT) [[id number]]  (Abnormal) Collected: 06/28/23 1736    Lab Status: Final result Updated: 06/28/23 1741     POC Glucose 424 mg/dl     UA w Reflex to Microscopic w Reflex to Culture [[id number]]     Lab Status: No result Specimen: Urine, Clean Catch                  US scrotum and testicles    (Results Pending)   CT pelvis wo contrast    (Results Pending)              Procedures  Procedures         ED Course  ED Course as of 06/28/23 1904 Wed Jun 28, 2023 1743 POC Glucose(!): 424   1800 WBC(!): 13.84   1800 BETA-HYDROXYBUTYRATE(!): 1.1                               SBIRT 22yo+    Flowsheet Row Most Recent Value   Initial Alcohol Screen: US AUDIT-C     1. How often do you have a drink containing alcohol? 0 Filed at: 06/28/2023 1709   2. How many drinks containing alcohol do you have on a typical day you are drinking? 0 Filed at: 06/28/2023 1709   3a. Male UNDER 65: How often do you have five or more drinks on one occasion?  0 Filed at: 06/28/2023 1709   3b. FEMALE Any Age, or MALE 65+: How often do you have 4 or more drinks on one occassion? 0 Filed at: 06/28/2023 1709   Audit-C Score 0 Filed at: 06/28/2023 1709   ROB: How many times in the past year have you. .. Used an illegal drug or used a prescription medication for non-medical reasons? Never Filed at: 06/28/2023 1709                    Medical Decision Making  Patient has presented to the Emergency Department with exacerbation of chronic condition / pt with new illness-injury that may poses a threat to life or body function. At least 3 different tests have been ordered on this patient and reviewed the results. Old laboratory data was reviewed from the medical records and compared to today's results. Discussion with patient and or family members of results (normal and abnormal) and the implications for immediate and long term treatment/management. 7:04 PM  I discussed the case with the hospitalist. We reviewed the HPI, pertinent PMH, ED course and workup. Hospitalist agreed with plan and will admit the patient to the hospital.          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 mild ketosis, high BS  Amount and/or Complexity of Data Reviewed  Labs: ordered. Decision-making details documented in ED Course. Radiology: ordered. Discussion of management or test interpretation with external provider(s): 40 minutes of critical care management, excluding procedures. Due to a high probability of clinically significant, life threatening deterioration, the patient required my highest level of preparedness to intervene emergently and I personally spent this critical care time directly and personally managing the patient.  This critical care time included obtaining a history; examining the patient; pulse oximetry; ordering and review of studies; arranging urgent treatment with development of a management plan; evaluation of patient's response to treatment; frequent reassessment; and, discussions with other providers. This critical care time was performed to assess and manage the high probability of imminent, life-threatening deterioration that could result in multi-organ failure. It was exclusive of separately billable procedures and treating other patients and teaching time. Consultation with urology. Aware of findings of the CT scan as well as blood work. Will take patient emergently to the operating room tonight. Risk  OTC drugs. Prescription drug management. Decision regarding hospitalization. Emergency major surgery. Disposition  Final diagnoses:   Rohan's gangrene of scrotum   Severe sepsis ([address])     Time reflects when diagnosis was documented in both MDM as applicable and the Disposition within this note     Time User Action Codes Description Comment    6/28/2023  6:36 PM Tan Ramsey Add [N49.3] Rohan's gangrene     6/28/2023  6:45 PM Duy Gramajo Add [N49.3] Rohan's gangrene of scrotum     6/28/2023  6:45 PM Duy German Add [A41.9,  R65.20] Severe sepsis [facility])       ED Disposition     ED Disposition   Admit    Condition   Stable    Date/Time   Wed Jun 28, 2023  7:04 PM    Comment   Case was discussed with ICU and the patient's admission status was agreed to be Admission Status: inpatient status to the service of Dr. Rebecca Camarillo . Follow-up Information    None         Patient's Medications    No medications on file       No discharge procedures on file.     PDMP Review     None          ED Provider  Electronically Signed by           Brenda Viveros MD  06/28/23 2804

## 2023-06-29 ENCOUNTER — ANESTHESIA EVENT (INPATIENT)
Dept: PERIOP | Facility: HOSPITAL | Age: 63
End: 2023-06-29
Payer: COMMERCIAL

## 2023-06-29 ENCOUNTER — HOSPITAL ENCOUNTER (INPATIENT)
Facility: HOSPITAL | Age: 63
LOS: 15 days | Discharge: HOME HEALTH | DRG: 709 | End: 2023-07-14
Attending: SURGERY | Admitting: SURGERY
Payer: COMMERCIAL

## 2023-06-29 ENCOUNTER — ANESTHESIA (INPATIENT)
Dept: PERIOP | Facility: HOSPITAL | Age: 63
End: 2023-06-29
Payer: COMMERCIAL

## 2023-06-29 VITALS
SYSTOLIC BLOOD PRESSURE: 102 MMHG | WEIGHT: 192.02 LBS | HEART RATE: 88 BPM | BODY MASS INDEX: 24.64 KG/M2 | HEIGHT: 74 IN | DIASTOLIC BLOOD PRESSURE: 65 MMHG | RESPIRATION RATE: 22 BRPM | OXYGEN SATURATION: 98 % | TEMPERATURE: 98.2 F

## 2023-06-29 DIAGNOSIS — N49.3 FOURNIER'S GANGRENE: Primary | ICD-10-CM

## 2023-06-29 PROBLEM — I10 HTN (HYPERTENSION): Status: ACTIVE | Noted: 2023-06-29

## 2023-06-29 PROBLEM — R65.21 SEPTIC SHOCK (HCC): Status: ACTIVE | Noted: 2023-06-29

## 2023-06-29 PROBLEM — A41.9 SEPTIC SHOCK (HCC): Status: ACTIVE | Noted: 2023-06-29

## 2023-06-29 PROBLEM — E11.9 DM (DIABETES MELLITUS) (HCC): Status: ACTIVE | Noted: 2023-06-29

## 2023-06-29 PROBLEM — R65.10 SIRS (SYSTEMIC INFLAMMATORY RESPONSE SYNDROME) (HCC): Status: ACTIVE | Noted: 2023-06-29

## 2023-06-29 PROBLEM — E87.20 LACTIC ACIDOSIS: Status: ACTIVE | Noted: 2023-06-29

## 2023-06-29 LAB
ABO GROUP BLD: NORMAL
ABO GROUP BLD: NORMAL
ALBUMIN SERPL BCP-MCNC: 1.9 G/DL (ref 3.5–5)
ALP SERPL-CCNC: 60 U/L (ref 46–116)
ALT SERPL W P-5'-P-CCNC: 19 U/L (ref 12–78)
AMORPH URATE CRY URNS QL MICRO: ABNORMAL
ANION GAP SERPL CALCULATED.3IONS-SCNC: 6 MMOL/L
ANION GAP SERPL CALCULATED.3IONS-SCNC: 8 MMOL/L
ARTERIAL PATENCY WRIST A: YES
AST SERPL W P-5'-P-CCNC: 23 U/L (ref 5–45)
ATRIAL RATE: 100 BPM
BACTERIA UR QL AUTO: ABNORMAL /HPF
BASE EXCESS BLDA CALC-SCNC: -1.2 MMOL/L
BILIRUB SERPL-MCNC: 0.51 MG/DL (ref 0.2–1)
BILIRUB UR QL STRIP: NEGATIVE
BLD GP AB SCN SERPL QL: NEGATIVE
BUN SERPL-MCNC: 23 MG/DL (ref 5–25)
BUN SERPL-MCNC: 29 MG/DL (ref 5–25)
CALCIUM ALBUM COR SERPL-MCNC: 9.9 MG/DL (ref 8.3–10.1)
CALCIUM SERPL-MCNC: 8.1 MG/DL (ref 8.3–10.1)
CALCIUM SERPL-MCNC: 8.2 MG/DL (ref 8.3–10.1)
CHLORIDE SERPL-SCNC: 109 MMOL/L (ref 96–108)
CHLORIDE SERPL-SCNC: 110 MMOL/L (ref 96–108)
CLARITY UR: ABNORMAL
CO2 SERPL-SCNC: 22 MMOL/L (ref 21–32)
CO2 SERPL-SCNC: 23 MMOL/L (ref 21–32)
COLOR UR: YELLOW
CREAT SERPL-MCNC: 0.69 MG/DL (ref 0.6–1.3)
CREAT SERPL-MCNC: 0.93 MG/DL (ref 0.6–1.3)
ERYTHROCYTE [DISTWIDTH] IN BLOOD BY AUTOMATED COUNT: 12.9 % (ref 11.6–15.1)
ERYTHROCYTE [DISTWIDTH] IN BLOOD BY AUTOMATED COUNT: 13.2 % (ref 11.6–15.1)
GFR SERPL CREATININE-BSD FRML MDRD: 100 ML/MIN/1.73SQ M
GFR SERPL CREATININE-BSD FRML MDRD: 86 ML/MIN/1.73SQ M
GLUCOSE SERPL-MCNC: 122 MG/DL (ref 65–140)
GLUCOSE SERPL-MCNC: 150 MG/DL (ref 65–140)
GLUCOSE SERPL-MCNC: 159 MG/DL (ref 65–140)
GLUCOSE SERPL-MCNC: 190 MG/DL (ref 65–140)
GLUCOSE SERPL-MCNC: 211 MG/DL (ref 65–140)
GLUCOSE SERPL-MCNC: 216 MG/DL (ref 65–140)
GLUCOSE SERPL-MCNC: 221 MG/DL (ref 65–140)
GLUCOSE SERPL-MCNC: 257 MG/DL (ref 65–140)
GLUCOSE SERPL-MCNC: 283 MG/DL (ref 65–140)
GLUCOSE UR STRIP-MCNC: ABNORMAL MG/DL
HCO3 BLDA-SCNC: 21.3 MMOL/L (ref 22–28)
HCT VFR BLD AUTO: 24.4 % (ref 36.5–49.3)
HCT VFR BLD AUTO: 30.1 % (ref 36.5–49.3)
HGB BLD-MCNC: 10.2 G/DL (ref 12–17)
HGB BLD-MCNC: 8.6 G/DL (ref 12–17)
HGB UR QL STRIP.AUTO: ABNORMAL
HYALINE CASTS #/AREA URNS LPF: ABNORMAL /LPF
KETONES UR STRIP-MCNC: ABNORMAL MG/DL
LACTATE SERPL-SCNC: 1.9 MMOL/L (ref 0.5–2)
LACTATE SERPL-SCNC: 2 MMOL/L (ref 0.5–2)
LACTATE SERPL-SCNC: 2.2 MMOL/L (ref 0.5–2)
LACTATE SERPL-SCNC: 2.4 MMOL/L (ref 0.5–2)
LEUKOCYTE ESTERASE UR QL STRIP: ABNORMAL
MAGNESIUM SERPL-MCNC: 1.9 MG/DL (ref 1.6–2.6)
MAGNESIUM SERPL-MCNC: 2.2 MG/DL (ref 1.6–2.6)
MCH RBC QN AUTO: 34.2 PG (ref 26.8–34.3)
MCH RBC QN AUTO: 35 PG (ref 26.8–34.3)
MCHC RBC AUTO-ENTMCNC: 33.9 G/DL (ref 31.4–37.4)
MCHC RBC AUTO-ENTMCNC: 35.2 G/DL (ref 31.4–37.4)
MCV RBC AUTO: 101 FL (ref 82–98)
MCV RBC AUTO: 99 FL (ref 82–98)
NASAL CANNULA: 2
NITRITE UR QL STRIP: NEGATIVE
NON-SQ EPI CELLS URNS QL MICRO: ABNORMAL /HPF
NRBC BLD AUTO-RTO: 0 /100 WBCS
O2 CT BLDA-SCNC: 13 ML/DL (ref 16–23)
OXYHGB MFR BLDA: 98 % (ref 94–97)
P AXIS: 64 DEGREES
PCO2 BLDA: 27.7 MM HG (ref 36–44)
PH BLDA: 7.5 [PH] (ref 7.35–7.45)
PH UR STRIP.AUTO: 6 [PH]
PHOSPHATE SERPL-MCNC: 2.7 MG/DL (ref 2.3–4.1)
PLATELET # BLD AUTO: 135 THOUSANDS/UL (ref 149–390)
PLATELET # BLD AUTO: 140 THOUSANDS/UL (ref 149–390)
PLATELET # BLD AUTO: 155 THOUSANDS/UL (ref 149–390)
PMV BLD AUTO: 9.6 FL (ref 8.9–12.7)
PMV BLD AUTO: 9.8 FL (ref 8.9–12.7)
PMV BLD AUTO: 9.9 FL (ref 8.9–12.7)
PO2 BLDA: 187.1 MM HG (ref 75–129)
POTASSIUM SERPL-SCNC: 3.5 MMOL/L (ref 3.5–5.3)
POTASSIUM SERPL-SCNC: 3.9 MMOL/L (ref 3.5–5.3)
PR INTERVAL: 144 MS
PROT SERPL-MCNC: 5.8 G/DL (ref 6.4–8.4)
PROT UR STRIP-MCNC: ABNORMAL MG/DL
QRS AXIS: 44 DEGREES
QRSD INTERVAL: 108 MS
QT INTERVAL: 344 MS
QTC INTERVAL: 443 MS
RBC # BLD AUTO: 2.46 MILLION/UL (ref 3.88–5.62)
RBC # BLD AUTO: 2.98 MILLION/UL (ref 3.88–5.62)
RBC #/AREA URNS AUTO: ABNORMAL /HPF
RH BLD: POSITIVE
RH BLD: POSITIVE
SODIUM SERPL-SCNC: 138 MMOL/L (ref 135–147)
SODIUM SERPL-SCNC: 140 MMOL/L (ref 135–147)
SP GR UR STRIP.AUTO: 1.03 (ref 1–1.03)
SPECIMEN EXPIRATION DATE: NORMAL
SPECIMEN SOURCE: ABNORMAL
T WAVE AXIS: 55 DEGREES
UROBILINOGEN UR STRIP-ACNC: 2 MG/DL
VENTRICULAR RATE: 100 BPM
WBC # BLD AUTO: 10.15 THOUSAND/UL (ref 4.31–10.16)
WBC # BLD AUTO: 11.41 THOUSAND/UL (ref 4.31–10.16)
WBC #/AREA URNS AUTO: ABNORMAL /HPF

## 2023-06-29 PROCEDURE — 93010 ELECTROCARDIOGRAM REPORT: CPT | Performed by: INTERNAL MEDICINE

## 2023-06-29 PROCEDURE — 99223 1ST HOSP IP/OBS HIGH 75: CPT | Performed by: SURGERY

## 2023-06-29 PROCEDURE — 99291 CRITICAL CARE FIRST HOUR: CPT | Performed by: STUDENT IN AN ORGANIZED HEALTH CARE EDUCATION/TRAINING PROGRAM

## 2023-06-29 PROCEDURE — 87077 CULTURE AEROBIC IDENTIFY: CPT | Performed by: SURGERY

## 2023-06-29 PROCEDURE — 85027 COMPLETE CBC AUTOMATED: CPT | Performed by: STUDENT IN AN ORGANIZED HEALTH CARE EDUCATION/TRAINING PROGRAM

## 2023-06-29 PROCEDURE — 87205 SMEAR GRAM STAIN: CPT | Performed by: SURGERY

## 2023-06-29 PROCEDURE — 99291 CRITICAL CARE FIRST HOUR: CPT | Performed by: SURGERY

## 2023-06-29 PROCEDURE — 84100 ASSAY OF PHOSPHORUS: CPT

## 2023-06-29 PROCEDURE — 86901 BLOOD TYPING SEROLOGIC RH(D): CPT

## 2023-06-29 PROCEDURE — 83735 ASSAY OF MAGNESIUM: CPT

## 2023-06-29 PROCEDURE — 86900 BLOOD TYPING SEROLOGIC ABO: CPT

## 2023-06-29 PROCEDURE — 82805 BLOOD GASES W/O2 SATURATION: CPT | Performed by: STUDENT IN AN ORGANIZED HEALTH CARE EDUCATION/TRAINING PROGRAM

## 2023-06-29 PROCEDURE — 0VBC0ZZ EXCISION OF BILATERAL TESTES, OPEN APPROACH: ICD-10-PCS | Performed by: SURGERY

## 2023-06-29 PROCEDURE — NC001 PR NO CHARGE: Performed by: STUDENT IN AN ORGANIZED HEALTH CARE EDUCATION/TRAINING PROGRAM

## 2023-06-29 PROCEDURE — 80053 COMPREHEN METABOLIC PANEL: CPT

## 2023-06-29 PROCEDURE — 82948 REAGENT STRIP/BLOOD GLUCOSE: CPT

## 2023-06-29 PROCEDURE — 85027 COMPLETE CBC AUTOMATED: CPT

## 2023-06-29 PROCEDURE — 83605 ASSAY OF LACTIC ACID: CPT | Performed by: STUDENT IN AN ORGANIZED HEALTH CARE EDUCATION/TRAINING PROGRAM

## 2023-06-29 PROCEDURE — 83605 ASSAY OF LACTIC ACID: CPT

## 2023-06-29 PROCEDURE — 87070 CULTURE OTHR SPECIMN AEROBIC: CPT | Performed by: SURGERY

## 2023-06-29 PROCEDURE — 11046 DBRDMT MUSC&/FSCA EA ADDL: CPT | Performed by: SURGERY

## 2023-06-29 PROCEDURE — 80048 BASIC METABOLIC PNL TOTAL CA: CPT | Performed by: SURGERY

## 2023-06-29 PROCEDURE — 11043 DBRDMT MUSC&/FSCA 1ST 20/<: CPT | Performed by: SURGERY

## 2023-06-29 PROCEDURE — 81001 URINALYSIS AUTO W/SCOPE: CPT

## 2023-06-29 PROCEDURE — 86850 RBC ANTIBODY SCREEN: CPT

## 2023-06-29 PROCEDURE — 83735 ASSAY OF MAGNESIUM: CPT | Performed by: SURGERY

## 2023-06-29 PROCEDURE — 0VB5XZZ EXCISION OF SCROTUM, EXTERNAL APPROACH: ICD-10-PCS | Performed by: SURGERY

## 2023-06-29 PROCEDURE — 0JBB0ZZ EXCISION OF PERINEUM SUBCUTANEOUS TISSUE AND FASCIA, OPEN APPROACH: ICD-10-PCS | Performed by: SURGERY

## 2023-06-29 PROCEDURE — 87176 TISSUE HOMOGENIZATION CULTR: CPT | Performed by: SURGERY

## 2023-06-29 PROCEDURE — 87147 CULTURE TYPE IMMUNOLOGIC: CPT | Performed by: SURGERY

## 2023-06-29 RX ORDER — SODIUM CHLORIDE, SODIUM LACTATE, POTASSIUM CHLORIDE, CALCIUM CHLORIDE 600; 310; 30; 20 MG/100ML; MG/100ML; MG/100ML; MG/100ML
75 INJECTION, SOLUTION INTRAVENOUS CONTINUOUS
Status: DISCONTINUED | OUTPATIENT
Start: 2023-06-29 | End: 2023-07-01

## 2023-06-29 RX ORDER — ACETAMINOPHEN 325 MG/1
650 TABLET ORAL EVERY 6 HOURS SCHEDULED
Status: DISCONTINUED | OUTPATIENT
Start: 2023-06-29 | End: 2023-07-04

## 2023-06-29 RX ORDER — LIDOCAINE HYDROCHLORIDE 10 MG/ML
INJECTION, SOLUTION EPIDURAL; INFILTRATION; INTRACAUDAL; PERINEURAL AS NEEDED
Status: DISCONTINUED | OUTPATIENT
Start: 2023-06-29 | End: 2023-06-29

## 2023-06-29 RX ORDER — ONDANSETRON 2 MG/ML
4 INJECTION INTRAMUSCULAR; INTRAVENOUS EVERY 6 HOURS PRN
Status: DISCONTINUED | OUTPATIENT
Start: 2023-06-29 | End: 2023-07-14 | Stop reason: HOSPADM

## 2023-06-29 RX ORDER — SODIUM CHLORIDE, SODIUM GLUCONATE, SODIUM ACETATE, POTASSIUM CHLORIDE, MAGNESIUM CHLORIDE, SODIUM PHOSPHATE, DIBASIC, AND POTASSIUM PHOSPHATE .53; .5; .37; .037; .03; .012; .00082 G/100ML; G/100ML; G/100ML; G/100ML; G/100ML; G/100ML; G/100ML
1000 INJECTION, SOLUTION INTRAVENOUS ONCE
Status: COMPLETED | OUTPATIENT
Start: 2023-06-29 | End: 2023-06-29

## 2023-06-29 RX ORDER — SODIUM CHLORIDE, SODIUM LACTATE, POTASSIUM CHLORIDE, CALCIUM CHLORIDE 600; 310; 30; 20 MG/100ML; MG/100ML; MG/100ML; MG/100ML
125 INJECTION, SOLUTION INTRAVENOUS CONTINUOUS
Status: CANCELLED | OUTPATIENT
Start: 2023-06-29

## 2023-06-29 RX ORDER — CLINDAMYCIN PHOSPHATE 600 MG/50ML
600 INJECTION INTRAVENOUS EVERY 8 HOURS
Status: DISCONTINUED | OUTPATIENT
Start: 2023-06-29 | End: 2023-06-30

## 2023-06-29 RX ORDER — HEPARIN SODIUM 5000 [USP'U]/ML
5000 INJECTION, SOLUTION INTRAVENOUS; SUBCUTANEOUS EVERY 8 HOURS SCHEDULED
Status: DISCONTINUED | OUTPATIENT
Start: 2023-06-29 | End: 2023-07-03

## 2023-06-29 RX ORDER — PROPOFOL 10 MG/ML
INJECTION, EMULSION INTRAVENOUS AS NEEDED
Status: DISCONTINUED | OUTPATIENT
Start: 2023-06-29 | End: 2023-06-29

## 2023-06-29 RX ORDER — OXYCODONE HYDROCHLORIDE 10 MG/1
10 TABLET ORAL EVERY 4 HOURS PRN
Status: DISCONTINUED | OUTPATIENT
Start: 2023-06-29 | End: 2023-07-14 | Stop reason: HOSPADM

## 2023-06-29 RX ORDER — INSULIN LISPRO 100 [IU]/ML
1-6 INJECTION, SOLUTION INTRAVENOUS; SUBCUTANEOUS EVERY 6 HOURS SCHEDULED
Status: DISCONTINUED | OUTPATIENT
Start: 2023-06-29 | End: 2023-06-29

## 2023-06-29 RX ORDER — HEPARIN SODIUM 5000 [USP'U]/ML
5000 INJECTION, SOLUTION INTRAVENOUS; SUBCUTANEOUS EVERY 8 HOURS SCHEDULED
Status: DISCONTINUED | OUTPATIENT
Start: 2023-06-29 | End: 2023-06-29

## 2023-06-29 RX ORDER — HYDROMORPHONE HCL/PF 1 MG/ML
0.5 SYRINGE (ML) INJECTION
Status: DISCONTINUED | OUTPATIENT
Start: 2023-06-29 | End: 2023-07-14 | Stop reason: HOSPADM

## 2023-06-29 RX ORDER — SODIUM CHLORIDE 9 MG/ML
INJECTION, SOLUTION INTRAVENOUS CONTINUOUS PRN
Status: DISCONTINUED | OUTPATIENT
Start: 2023-06-29 | End: 2023-06-29

## 2023-06-29 RX ORDER — SODIUM CHLORIDE, SODIUM GLUCONATE, SODIUM ACETATE, POTASSIUM CHLORIDE, MAGNESIUM CHLORIDE, SODIUM PHOSPHATE, DIBASIC, AND POTASSIUM PHOSPHATE .53; .5; .37; .037; .03; .012; .00082 G/100ML; G/100ML; G/100ML; G/100ML; G/100ML; G/100ML; G/100ML
125 INJECTION, SOLUTION INTRAVENOUS CONTINUOUS
Status: DISCONTINUED | OUTPATIENT
Start: 2023-06-29 | End: 2023-06-29

## 2023-06-29 RX ORDER — HEPARIN SODIUM 5000 [USP'U]/ML
5000 INJECTION, SOLUTION INTRAVENOUS; SUBCUTANEOUS EVERY 8 HOURS SCHEDULED
Status: CANCELLED | OUTPATIENT
Start: 2023-06-29

## 2023-06-29 RX ORDER — OXYCODONE HYDROCHLORIDE 5 MG/1
5 TABLET ORAL EVERY 4 HOURS PRN
Status: DISCONTINUED | OUTPATIENT
Start: 2023-06-29 | End: 2023-07-14 | Stop reason: HOSPADM

## 2023-06-29 RX ORDER — MAGNESIUM HYDROXIDE 1200 MG/15ML
LIQUID ORAL AS NEEDED
Status: DISCONTINUED | OUTPATIENT
Start: 2023-06-29 | End: 2023-06-29 | Stop reason: HOSPADM

## 2023-06-29 RX ORDER — INSULIN LISPRO 100 [IU]/ML
1-6 INJECTION, SOLUTION INTRAVENOUS; SUBCUTANEOUS EVERY 6 HOURS SCHEDULED
Status: CANCELLED | OUTPATIENT
Start: 2023-06-29

## 2023-06-29 RX ORDER — FENTANYL CITRATE 50 UG/ML
INJECTION, SOLUTION INTRAMUSCULAR; INTRAVENOUS AS NEEDED
Status: DISCONTINUED | OUTPATIENT
Start: 2023-06-29 | End: 2023-06-29

## 2023-06-29 RX ORDER — INSULIN LISPRO 100 [IU]/ML
1-6 INJECTION, SOLUTION INTRAVENOUS; SUBCUTANEOUS EVERY 6 HOURS SCHEDULED
Status: DISCONTINUED | OUTPATIENT
Start: 2023-06-29 | End: 2023-06-29 | Stop reason: HOSPADM

## 2023-06-29 RX ORDER — ROCURONIUM BROMIDE 10 MG/ML
INJECTION, SOLUTION INTRAVENOUS AS NEEDED
Status: DISCONTINUED | OUTPATIENT
Start: 2023-06-29 | End: 2023-06-29

## 2023-06-29 RX ORDER — ONDANSETRON 2 MG/ML
INJECTION INTRAMUSCULAR; INTRAVENOUS AS NEEDED
Status: DISCONTINUED | OUTPATIENT
Start: 2023-06-29 | End: 2023-06-29

## 2023-06-29 RX ADMIN — ONDANSETRON 4 MG: 2 INJECTION INTRAMUSCULAR; INTRAVENOUS at 09:46

## 2023-06-29 RX ADMIN — INSULIN LISPRO 3 UNITS: 100 INJECTION, SOLUTION INTRAVENOUS; SUBCUTANEOUS at 06:02

## 2023-06-29 RX ADMIN — SODIUM CHLORIDE, SODIUM LACTATE, POTASSIUM CHLORIDE, AND CALCIUM CHLORIDE 125 ML/HR: .6; .31; .03; .02 INJECTION, SOLUTION INTRAVENOUS at 03:08

## 2023-06-29 RX ADMIN — FENTANYL CITRATE 25 MCG: 50 INJECTION, SOLUTION INTRAMUSCULAR; INTRAVENOUS at 09:36

## 2023-06-29 RX ADMIN — PIPERACILLIN SODIUM AND TAZOBACTAM SODIUM 4.5 G: 36; 4.5 INJECTION, POWDER, LYOPHILIZED, FOR SOLUTION INTRAVENOUS at 04:07

## 2023-06-29 RX ADMIN — SODIUM CHLORIDE, SODIUM LACTATE, POTASSIUM CHLORIDE, AND CALCIUM CHLORIDE 1000 ML: .6; .31; .03; .02 INJECTION, SOLUTION INTRAVENOUS at 05:54

## 2023-06-29 RX ADMIN — PIPERACILLIN AND TAZOBACTAM 4.5 G: 4; .5 INJECTION, POWDER, FOR SOLUTION INTRAVENOUS at 09:35

## 2023-06-29 RX ADMIN — PIPERACILLIN SODIUM AND TAZOBACTAM SODIUM 4.5 G: 36; 4.5 INJECTION, POWDER, LYOPHILIZED, FOR SOLUTION INTRAVENOUS at 21:25

## 2023-06-29 RX ADMIN — PROPOFOL 100 MG: 10 INJECTION, EMULSION INTRAVENOUS at 08:39

## 2023-06-29 RX ADMIN — INSULIN LISPRO 2 UNITS: 100 INJECTION, SOLUTION INTRAVENOUS; SUBCUTANEOUS at 12:24

## 2023-06-29 RX ADMIN — SODIUM CHLORIDE 3 UNITS/HR: 9 INJECTION, SOLUTION INTRAVENOUS at 17:37

## 2023-06-29 RX ADMIN — ACETAMINOPHEN 650 MG: 325 TABLET, FILM COATED ORAL at 17:35

## 2023-06-29 RX ADMIN — FENTANYL CITRATE 50 MCG: 50 INJECTION, SOLUTION INTRAMUSCULAR; INTRAVENOUS at 08:41

## 2023-06-29 RX ADMIN — LIDOCAINE HYDROCHLORIDE 50 MG: 10 INJECTION, SOLUTION EPIDURAL; INFILTRATION; INTRACAUDAL; PERINEURAL at 08:39

## 2023-06-29 RX ADMIN — HEPARIN SODIUM 5000 UNITS: 5000 INJECTION INTRAVENOUS; SUBCUTANEOUS at 06:08

## 2023-06-29 RX ADMIN — CLINDAMYCIN IN 5 PERCENT DEXTROSE 600 MG: 12 INJECTION, SOLUTION INTRAVENOUS at 20:31

## 2023-06-29 RX ADMIN — SODIUM CHLORIDE, SODIUM LACTATE, POTASSIUM CHLORIDE, AND CALCIUM CHLORIDE 150 ML/HR: .6; .31; .03; .02 INJECTION, SOLUTION INTRAVENOUS at 17:35

## 2023-06-29 RX ADMIN — ACETAMINOPHEN 650 MG: 325 TABLET, FILM COATED ORAL at 06:05

## 2023-06-29 RX ADMIN — SODIUM CHLORIDE: 0.9 INJECTION, SOLUTION INTRAVENOUS at 08:44

## 2023-06-29 RX ADMIN — SODIUM CHLORIDE, SODIUM GLUCONATE, SODIUM ACETATE, POTASSIUM CHLORIDE AND MAGNESIUM CHLORIDE 1000 ML: 526; 502; 368; 37; 30 INJECTION, SOLUTION INTRAVENOUS at 16:31

## 2023-06-29 RX ADMIN — ACETAMINOPHEN 650 MG: 325 TABLET, FILM COATED ORAL at 12:11

## 2023-06-29 RX ADMIN — HEPARIN SODIUM 5000 UNITS: 5000 INJECTION INTRAVENOUS; SUBCUTANEOUS at 14:06

## 2023-06-29 RX ADMIN — VANCOMYCIN HYDROCHLORIDE 1500 MG: 1 INJECTION, POWDER, LYOPHILIZED, FOR SOLUTION INTRAVENOUS at 20:32

## 2023-06-29 RX ADMIN — PIPERACILLIN SODIUM AND TAZOBACTAM SODIUM 4.5 G: 36; 4.5 INJECTION, POWDER, LYOPHILIZED, FOR SOLUTION INTRAVENOUS at 15:25

## 2023-06-29 RX ADMIN — NOREPINEPHRINE BITARTRATE 2 MCG/MIN: 1 INJECTION INTRAVENOUS at 08:44

## 2023-06-29 RX ADMIN — HEPARIN SODIUM 5000 UNITS: 5000 INJECTION INTRAVENOUS; SUBCUTANEOUS at 21:25

## 2023-06-29 RX ADMIN — FENTANYL CITRATE 25 MCG: 50 INJECTION, SOLUTION INTRAMUSCULAR; INTRAVENOUS at 08:53

## 2023-06-29 RX ADMIN — CLINDAMYCIN IN 5 PERCENT DEXTROSE 600 MG: 12 INJECTION, SOLUTION INTRAVENOUS at 04:45

## 2023-06-29 RX ADMIN — ROCURONIUM BROMIDE 50 MG: 10 INJECTION, SOLUTION INTRAVENOUS at 08:41

## 2023-06-29 NOTE — CONSULTS
Consult - Critical Care   Katina Wu 57 y.o. male MRN: [id number]  Unit/Bed#: ICCU 208-01 Encounter: [id number]      -------------------------------------------------------------------------------------------------------------  Chief Complaint: Sasha's gangrene     History of Present Illness     Katina Wu is a 57 y.o. male with a history of diabetes who presented with sasha's gangrene. The patient initially presented to [facility] in septic shock  that was found to be secondary to sasha's gangrene. The patient was transferred to [facility] and taken to the OR where he had debridement of the perirectal, ischiorectal, and perineal necrotic tissue. History obtained from chart review. -------------------------------------------------------------------------------------------------------------  Assessment and Plan:    Neuro:   • Diagnosis: Pain  o Plan:   - Dilaudid 0.5 mg IV Q3H PRN for breakthrough pain  - Oxycodone 10 mg Q4H PRN for severe pain   - Oxycodone 5 mg Q4H PRN for moderate pain    - Tylenol 650mg Q6H scheduled for pain   • Diagnosis: Delirium precautions   o Plan:   - CAM-ICU  - Maintain sleep/wake cycle       CV:   • Diagnosis: Septic shock secondary to sasha's gangrene   o S/p 30cc/kg fluids in ED   o S/p debridement in OR on 6/29  o Lactate has cleared from 4.8  o Plan:   - Continue with Vancomycin, Zosyn, and Clindamycin  - Continue to monitor vitals   - Maintain MAP >65 mmHg  - Monitor fever/WBC trend         Pulm:  o No active issues   o Encourage IS       GI:   o No active issues         :   o Diagnosis: Sasha's Gangrene   o 6/28 CT pelvis: Subcutaneous tissue edema in the scrotum and extensive subcutaneous emphysema throughout the scrotum, extending into the inguinal regions, anterior abdominal wall and the perineal region, compatible with Sasha's gangrene.  Some of the aforementioned air extends into the presacral space, and is seen in the right levator ani muscle and the right obturator internus muscle there is also some air in the subcutaneous fat of the proximal right thigh medially. - S/p debridement 6/29 with 7 X-ray detectable kerlixes present   - Plan:   • Plan for OR tomorrow for debridement with general surgery   • Will plan to take back sooner if patient's hemodynamics start to decline  • Continue with Vancomycin, Zosyn, and Clindamycin   • Strict I's/O's   o Maintain ramírez   o Monitor UOP       F/E/N:   • Fluids:  mL/hr   • Electrolytes: Monitor and replete as needed   • Nutrition: Clear liquids      Heme/Onc:   o DVT ppx: Heparin Q8H       Endo:   o Diagnosis: DM  - Plan:   • SSI   • Maintain -180       ID:   • Diagnosis: Septic Shock secondary to sasha's gangrene   o Plan:   - S/p debridement in OR 6/29  - Continue with Zosyn and Vancomycin   - BCx and wound cx pending   - Monitor fever/WBC trend         MSK/Skin:   o PT/OT when able       Disposition: Admit to Stepdown Level 1  Code Status: Level 1 - Full Code  --------------------------------------------------------------------------------------------------------------  Review of Systems    A 12-point, complete review of systems was reviewed and negative except as stated above     Physical Exam  Vitals and nursing note reviewed. Constitutional:       General: He is not in acute distress. HENT:      Head: Normocephalic and atraumatic. Mouth/Throat:      Mouth: Mucous membranes are moist.   Eyes:      Extraocular Movements: Extraocular movements intact. Pupils: Pupils are equal, round, and reactive to light. Cardiovascular:      Rate and Rhythm: Normal rate and regular rhythm. Pulmonary:      Effort: Pulmonary effort is normal.      Breath sounds: Normal breath sounds. No wheezing. Abdominal:      Palpations: Abdomen is soft. Tenderness: There is no abdominal tenderness. Genitourinary:     Comments: Surgical site is clean, dry, and intact.   Musculoskeletal:      Right lower leg: No edema. Left lower leg: No edema. Skin:     General: Skin is warm and dry. Capillary Refill: Capillary refill takes less than 2 seconds. Neurological:      Mental Status: He is alert and oriented to person, place, and time. --------------------------------------------------------------------------------------------------------------  Vitals:   Vitals:    06/29/23 1120 06/29/23 1135 06/29/23 1205 06/29/23 1235   BP: 90/63 104/63 96/60 103/61   BP Location: Left arm Left arm Left arm Left arm   Pulse: 86 85 90 90   Resp: 19 (!) 24 16 20   Temp: 97.5 °F (36.4 °C) 97.9 °F (36.6 °C) 97.5 °F (36.4 °C) 98 °F (36.7 °C)   TempSrc: Oral Oral Oral Oral   SpO2: 97% 99%  100%   Weight:       Height:         Temp  Min: 97.3 °F (36.3 °C)  Max: 98.5 °F (36.9 °C)  IBW (Ideal Body Weight): 82.2 kg  Height: 6' 2" (188 cm)  Body mass index is 26.1 kg/m².   N/A    Laboratory and Diagnostics:  Results from last 7 days   Lab Units 06/29/23 0327 06/28/23  2347 06/28/23  1736   WBC Thousand/uL 11.41*  --  13.84*   HEMOGLOBIN g/dL 10.2*  --  14.1   HEMATOCRIT % 30.1*  --  40.3   PLATELETS Thousands/uL 155 135* 176   BANDS PCT %  --   --  20*   MONO PCT %  --   --  7   EOS PCT %  --   --  0     Results from last 7 days   Lab Units 06/29/23 0327 06/28/23  1736   SODIUM mmol/L 138 129*   POTASSIUM mmol/L 3.9 4.1   CHLORIDE mmol/L 109* 91*   CO2 mmol/L 23 22   ANION GAP mmol/L 6 16   BUN mg/dL 29* 27*   CREATININE mg/dL 0.93 1.16   CALCIUM mg/dL 8.2* 10.4*   GLUCOSE RANDOM mg/dL 283* 441*   ALT U/L 19 18   AST U/L 23 19   ALK PHOS U/L 60 83   ALBUMIN g/dL 1.9* 3.6   TOTAL BILIRUBIN mg/dL 0.51 1.21*     Results from last 7 days   Lab Units 06/29/23  0327   MAGNESIUM mg/dL 1.9   PHOSPHORUS mg/dL 2.7      Results from last 7 days   Lab Units 06/28/23  1736   INR  1.03   PTT seconds 33          Results from last 7 days   Lab Units 06/29/23  0600 06/29/23  0327 06/28/23  2347 06/28/23  1736   LACTIC ACID mmol/L 2.2* 2.4* 2.0 4.8*     ABG:    VBG:  Results from last 7 days   Lab Units 06/28/23  1759   PH REYNALDO  7.398   PCO2 REYNALDO mm Hg 33.8*   PO2 REYNALDO mm Hg 43.6   HCO3 REYNALDO mmol/L 20.4*   BASE EXC REYNALDO mmol/L -3.6     Results from last 7 days   Lab Units 06/28/23  1736   PROCALCITONIN ng/ml 28.29*       Micro:  Results from last 7 days   Lab Units 06/28/23  1736   BLOOD CULTURE  Received in Microbiology Lab. Culture in Progress. Received in Microbiology Lab. Culture in Progress. EKG:   Imaging: I have personally reviewed pertinent reports. Historical Information   Past Medical History:   Diagnosis Date   • Diabetes mellitus ([address])      Past Surgical History:   Procedure Laterality Date   • INCISION AND DRAINAGE OF WOUND N/A 6/28/2023    Procedure: 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 ;  Surgeon: Paco Pierre MD;  Location: [facility];  Service: Urology   • INCISION AND DRAINAGE OF WOUND N/A 6/28/2023    Procedure: 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;  Surgeon: Melisa Black MD;  Location: [facility];  Service: General   • ROTATOR CUFF REPAIR       Social History   Social History     Substance and Sexual Activity   Alcohol Use Yes    Comment: socially     Social History     Substance and Sexual Activity   Drug Use Never     Social History     Tobacco Use   Smoking Status Never   Smokeless Tobacco Never     Exercise History:   Family History:   History reviewed. No pertinent family history.   Family history unknown and I have reviewed this patient's family history and commented on sigificant items within the HPI      Medications:  Current Facility-Administered Medications   Medication Dose Route Frequency   • acetaminophen (TYLENOL) tablet 650 mg  650 mg Oral Q6H [address]   • clindamycin in dextrose 5% (Cleocin) IVPB (premix) 600 mg 50 mL  600 mg Intravenous Q8H   • heparin (porcine) subcutaneous injection 5,000 Units  5,000 Units Subcutaneous Q8H [address]   • HYDROmorphone (DILAUDID) injection 0.5 mg  0.5 mg Intravenous Q3H PRN   • insulin lispro (HumaLOG) 100 units/mL subcutaneous injection 1-6 Units  1-6 Units Subcutaneous Q6H [address]   • lactated ringers infusion  150 mL/hr Intravenous Continuous   • ondansetron (ZOFRAN) injection 4 mg  4 mg Intravenous Q6H PRN   • oxyCODONE (ROXICODONE) immediate release tablet 10 mg  10 mg Oral Q4H PRN   • oxyCODONE (ROXICODONE) IR tablet 5 mg  5 mg Oral Q4H PRN   • piperacillin-tazobactam (ZOSYN) 4.5 g in sodium chloride 0.9 % 100 mL IVPB  4.5 g Intravenous Q6H   • vancomycin (VANCOCIN) 1500 mg in sodium chloride 0.9% 250 mL IVPB  15 mg/kg Intravenous Q24H     Home medications:  Prior to Admission Medications   Prescriptions Last Dose Informant Patient Reported? Taking?   aspirin (ECOTRIN LOW STRENGTH) 81 mg EC tablet Past Week  Yes Yes   Sig: Take 81 mg by mouth daily   atorvastatin (LIPITOR) 20 mg tablet Past Week  Yes Yes   dulaglutide (Trulicity) 5.30 PN/2.4LP injection Past Week  Yes Yes   lisinopril (ZESTRIL) 2.5 mg tablet Past Week  Yes Yes   metFORMIN (GLUCOPHAGE) 500 mg tablet Past Week  Yes Yes      Facility-Administered Medications: None     Allergies:  No Known Allergies  ------------------------------------------------------------------------------------------------------------  Advance Directive and Living Will:      Power of :    POLST:    ------------------------------------------------------------------------------------------------------------  Anticipated Length of Stay is > 2 midnights    Care Time Delivered:   Upon my evaluation, this patient had a high probability of imminent or life-threatening deterioration due to Rohan's gangrene, which required my direct attention, intervention, and personal management. I have personally provided 30 minutes (1400 to 1430) of critical care time, exclusive of procedures, teaching, family meetings, and any prior time recorded by providers other than myself.        Jarrett García, MD        Portions of the record may have been created with voice recognition software. Occasional wrong word or "sound a like" substitutions may have occurred due to the inherent limitations of voice recognition software.   Read the chart carefully and recognize, using context, where substitutions have occurred

## 2023-06-29 NOTE — ANESTHESIA POSTPROCEDURE EVALUATION
Post-Op Assessment Note    CV Status:  Stable  Pain Score: 0    Pain management: adequate     Mental Status:  Alert and awake   Hydration Status:  Euvolemic   PONV Controlled:  Controlled   Airway Patency:  Patent      Post Op Vitals Reviewed: Yes      Staff: CRNA, Anesthesiologist         No notable events documented      BP   117/64   Temp   36C   Pulse 100   Resp   12   SpO2   100

## 2023-06-29 NOTE — ANESTHESIA PREPROCEDURE EVALUATION
Procedure:  DEBRIDEMENT WOUND [facility] OUT) (Groin)    Relevant Problems   CARDIO   (+) HTN (hypertension)   56yo M with sasha's gangrene, transferred from [facility] after index debridement     Afebrile, vitals stable on 2L NC  WBC 13 84  Lactic 2 (4 8)  Na 129  Glucose >250     6/28 CT pelvis: Subcutaneous tissue edema in the scrotum and extensive subcutaneous emphysema throughout the scrotum, extending into the inguinal regions, anterior abdominal wall and the perineal region, compatible with Sasha's gangrene  Some of the aforementioned air extends into the presacral space, and is seen in the right levator ani muscle and the right obturator internus muscle there is also some air in the subcutaneous fat of the proximal right thigh medially      Plan:  - admit to general surgery service, SD2  - NPO w/IVF  - cont vanc/zosyn, start clinda  - plan to narrow abx pending blood and OR cultures  - tentative plan for OR today for debridement and washout  - f/u labs  - glycemic control, currently on SSI, not on insulin at home  - maintain ramírez  - strict I/Os     No prior anesthetic complications  Denies smoking and illicit drug use  Npo since yesterday  Sips of water at 5pm  denies cardiac hx, METS > 4    Physical Exam    Airway    Mallampati score: III  TM Distance: >3 FB  Neck ROM: full     Dental       Cardiovascular  Rhythm: regular, Rate: normal,     Pulmonary  Breath sounds clear to auscultation,     Other Findings       Lab Results   Component Value Date    WBC 11 41 (H) 06/29/2023    HGB 10 2 (L) 06/29/2023    HCT 30 1 (L) 06/29/2023     (H) 06/29/2023     06/29/2023     Lab Results   Component Value Date    SODIUM 138 06/29/2023    K 3 9 06/29/2023     (H) 06/29/2023    CO2 23 06/29/2023    BUN 29 (H) 06/29/2023    CREATININE 0 93 06/29/2023    GLUC 283 (H) 06/29/2023    CALCIUM 8 2 (L) 06/29/2023         Anesthesia Plan  ASA Score- 3 Emergent    Anesthesia Type- general with ASA Monitors  Additional Monitors: arterial line  Airway Plan: ETT  Comment: Risks/benefits and alternatives discussed with patient including possible PONV, sore throat, damage to teeth/lips/gums/esophagus, and possibility of rare anesthetic and surgical emergencies including but not limited to heart attack, stroke, and/or death  All questions were answered          Plan Factors-    Chart reviewed  Existing labs reviewed  Patient summary reviewed  Patient is not a current smoker  Induction- intravenous  Postoperative Plan- Plan for postoperative opioid use  Planned trial extubation    Informed Consent- Anesthetic plan and risks discussed with patient and spouse  I personally reviewed this patient with the CRNA  Discussed and agreed on the Anesthesia Plan with the CRNA  Cristi Noguera

## 2023-06-29 NOTE — NURSING NOTE
Pt arrived to the floor from the PACU around 2300. Pt was A+Ox4, stating in the 90s on room air, VSS stable. Transport arrived to take pt to [facility] at [employer]. Per Critical Care AP, stop pt's PCA pump before transport. PCA pump stopped and unhooked from pt. Pt taken by EMS to [facility] at 0671. VSS before transport. Report called and given to ICU nurse at [facility].

## 2023-06-29 NOTE — ANESTHESIA PROCEDURE NOTES
"Arterial Line Insertion    Performed by: April Dacosta MD  Authorized by: April Dacosta MD  Consent: Verbal consent obtained  Risks and benefits: risks, benefits and alternatives were discussed  Consent given by: patient  Patient understanding: patient states understanding of the procedure being performed  Patient consent: the patient's understanding of the procedure matches consent given  Procedure consent: procedure consent matches procedure scheduled  Relevant documents: relevant documents present and verified  Test results: test results available and properly labeled  Required items: required blood products, implants, devices, and special equipment available  Patient identity confirmed: arm band  Time out: Immediately prior to procedure a \"time out\" was called to verify the correct patient, procedure, equipment, support staff and site/side marked as required  Preparation: Patient was prepped and draped in the usual sterile fashion    Indications: hemodynamic monitoring  Orientation:  Right  Location: radial artery  Sedation:  Patient sedated: GA     Procedure Details:  Needle gauge: 20  Number of attempts: 1    Post-procedure:  Waveform: good waveform          "

## 2023-06-29 NOTE — DISCHARGE SUMMARY
Discharge Summary - Tc Moss 57 y.o. 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 who presents with a past medical history of DM and HTN. He presents to the emergency department for evaluation of scrotal swelling and pain. He developed folliculitis 3 days prior and has progressive swelling and pain. In the emergency department, he met septic shock criteria secondary to tachycardia, tachypnea, lactemia > 4.0, and leukocytosis. CT of the abdomen and pelvis notable for extensive subcutaneous emphysema concerning for sasha's gangrene. Procedures Performed: No orders of the defined types were placed in this encounter. Summary of Hospital Course:  He was given 30 ml/kg of crystalloid resuscitation along with ceftriaxone and vancomycin. He was urgently taken to the OR with urology and general surgery for extensive debridement of the scrotum and perineum. He is stable post-operatively with adequate pain control. He is admitted to the medical surgical floor while awaiting immediate transfer to [facility] for further surgical intervention. Significant Findings, Care, Treatment and Services Provided:   US scrotum and testicles   Final Result      Extensive air throughout the scrotum as on the earlier CT. This obscures the testes and epididymides. Left testicle only partially imaged, however appears grossly within normal limits. 8.4 x 3.7 x 6.3 cm cystic structure in the left side of the scrotum adjacent to the left testicle, likely an epididymal cyst rather than a hydrocele.                Workstation performed: [id number] CT pelvis wo contrast   Final Result      Subcutaneous tissue edema in the scrotum and extensive subcutaneous emphysema throughout the scrotum, extending into the inguinal regions, anterior abdominal wall and the perineal region, compatible with Sasha's gangrene. Some of the aforementioned air extends into the presacral space, and is seen in the right levator ani muscle and the right obturator internus muscle there is also some air in the subcutaneous fat of the proximal right thigh medially. Small left-sided hydrocele. Mostly right-sided pelvic and inguinal lymphadenopathy measuring up to 2.7 x 2.3 cm, likely reactive. I personally discussed this study with JOYCE Gonzalez on 6/28/2023 at 7:36 PM who was already aware of the findings. Workstation performed: [id number]             Complications: none     Discharge Diagnosis: septic shock secondary to sasha's gangrene      Medical Problems        Condition at Discharge: fair         Discharge instructions/Information to patient and family:   See after visit summary for information provided to patient and family. Provisions for Follow-Up Care:  See after visit summary for information related to follow-up care and any pertinent home health orders. PCP: No primary care provider on file. Disposition: Transfer to [facility] for further surgical inteventions    Planned Readmission: Yes [facility] for further surgical interventions       Discharge Statement   I spent 25 minutes discharging the patient. This time was spent on the day of discharge. I had direct contact with the patient on the day of discharge. Additional documentation is required if more than 30 minutes were spent on discharge. Discharge Medications:  See after visit summary for reconciled discharge medications provided to patient and family.

## 2023-06-29 NOTE — UTILIZATION REVIEW
Initial Clinical Review    Admission: Date/Time/Statement:   Admission Orders (From admission, onward)     Ordered        06/28/23 [address]  Once                      Orders Placed This Encounter   Procedures   • INPATIENT ADMISSION     Standing Status:   Standing     Number of Occurrences:   1     Order Specific Question:   Level of Care     Answer:   Critical Care [15]     Order Specific Question:   Estimated length of stay     Answer:   More than 2 Midnights     Order Specific Question:   Certification     Answer:   I certify that inpatient services are medically necessary for this patient for a duration of greater than two midnights. See H&P and MD Progress Notes for additional information about the patient's course of treatment. ED Arrival Information     Expected   -    Arrival   6/28/2023 16:27    Acuity   Emergent            Means of arrival   Wheelchair    Escorted by   Spouse    Service   Hospitalist    Admission type   Emergency            Arrival complaint   Genital Swelling & Weakness             Chief Complaint   Patient presents with   • Groin Swelling     Pt reports his scrotum has been swollen since Saturday, has an abscess on his buttock that recently burst, and was sick with chills and nausea since Friday. Initial Presentation: 57 y.o. male for scrotal swelling and pain . He developed folliculitis 3 days prior and has progressive swelling and pain. In the emergency department, he met septic shock criteria secondary to tachycardia, tachypnea, lactemia > 4.0, and leukocytosis. CT of the abdomen and pelvis notable for extensive subcutaneous emphysema concerning for sasha's gangrene. Given IVF , IV abx and taken emergently to IR w/ urology and general surgery . Stable post operatively and plan for immediate transfer to B for surgical intervention . PMHX DM , HTN .  Admitted IP status to ICU plan for pain mngt w/ PCA , IV abx , ramírez , transfer to [facility]     6/28 OP Note Wide debridement of Rohan's gangrene    6/28 OP Note   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    6/28 Urology Consult   proceed to the operating room urgently for wide debridement of Rohan's gangrene involving the scrotum    6/28 Surgery Consult   Rohan's gangrene. recommended debridement of the perirectal, ischiorectal and perineal necrotic tissue.     ED Triage Vitals   Temperature Pulse Respirations Blood Pressure SpO2   06/28/23 1636 06/28/23 1636 06/28/23 1636 06/28/23 1636 06/28/23 1636   (!) 97.3 °F (36.3 °C) (!) 116 18 119/70 96 %      Temp Source Heart Rate Source Patient Position - Orthostatic VS BP Location FiO2 (%)   06/28/23 1943 06/28/23 1943 06/28/23 2323 06/28/23 2323 --   Temporal Monitor Lying Left arm       Pain Score       06/28/23 1943       No Pain          Wt Readings from Last 1 Encounters:   06/29/23 92.2 kg (203 lb 4.2 oz)     Additional Vital Signs:   06/28/23 2356 -- -- -- -- -- 96 % -- None (Room air) -- --   06/28/23 2340 -- -- -- 104/61 75 -- -- -- -- --   06/28/23 23:23:54 98 °F (36.7 °C) 94 22 102/58 73 96 % -- None (Room air) -- Lying   06/28/23 2300 -- 97 21 101/63 77 91 % -- None (Room air) WDL --   06/28/23 2258 -- -- 18 -- -- -- -- -- -- --   06/28/23 2245 -- 98 20 102/64 78 95 % -- None (Room air) -- --   06/28/23 2235 -- 97 20 103/64 78 95 % -- -- -- --   06/28/23 2230 -- 97 22 103/64 78 93 % -- None (Room air) -- --   06/28/23 2223 -- 97 16 -- -- 97 % -- None (Room air) -- --   06/28/23 2217 -- 98 21 105/64 79 94 % -- None (Room air) -- --   06/28/23 2215 -- 96 20 105/64 79 97 % -- None (Room air) -- --   06/28/23 2210 -- 97 22 117/64 85 97 % -- None (Room air) -- --   06/28/23 2200 97.8 °F (36.6 °C) 100 16 117/64 85 99 % 5 L/min Simple mask WDL --   06/28/23 1943 98 °F (36.7 °C) 118 Abnormal  16 118/70 -- 96 % -- None (Room air) -- --   06/28/23 19:29:55 98.3 °F (36.8 °C) 96 20 114/73 87 93 % -- -- -- --   06/28/23 1900 -- 100 17 118/71 90 96 % -- None (Room air) -- --   06/28/23 1845 -- 103 20 120/71 90 95 % -- None (Room air) -- --   06/28/23 1800 -- 101 26 Abnormal  120/73 92 97 % -- None (Room          Pertinent Labs/Diagnostic Test Results:   6/28 EKG Normal sinus rhythm  Normal ECG  US scrotum and testicles   Final Result by Michael Richter MD (06/28 1956)      Extensive air throughout the scrotum as on the earlier CT. This obscures the testes and epididymides. Left testicle only partially imaged, however appears grossly within normal limits. 8.4 x 3.7 x 6.3 cm cystic structure in the left side of the scrotum adjacent to the left testicle, likely an epididymal cyst rather than a hydrocele. Workstation performed: [id number]         CT pelvis wo contrast   Final Result by Michael Richter MD (06/28 1947)      Subcutaneous tissue edema in the scrotum and extensive subcutaneous emphysema throughout the scrotum, extending into the inguinal regions, anterior abdominal wall and the perineal region, compatible with Rohan's gangrene. Some of the aforementioned air extends into the presacral space, and is seen in the right levator ani muscle and the right obturator internus muscle there is also some air in the subcutaneous fat of the proximal right thigh medially. Small left-sided hydrocele. Mostly right-sided pelvic and inguinal lymphadenopathy measuring up to 2.7 x 2.3 cm, likely reactive. I personally discussed this study with JOYCE Gonzalez on 6/28/2023 at 7:36 PM who was already aware of the findings.                         Workstation performed: [id number]               Results from last 7 days   Lab Units 06/29/23  0327 06/28/23  2347 06/28/23  1736   WBC Thousand/uL 11.41*  --  13.84*   HEMOGLOBIN g/dL 10.2*  --  14.1   HEMATOCRIT % 30.1*  --  40.3   PLATELETS Thousands/uL 155 135* 176   BANDS PCT %  --   --  20*     Results from last 7 days   Lab Units 06/29/23  0327 06/28/23  1736   SODIUM mmol/L 138 129*   POTASSIUM mmol/L 3.9 4.1   CHLORIDE mmol/L 109* 91*   CO2 mmol/L 23 22   ANION GAP mmol/L 6 16   BUN mg/dL 29* 27*   CREATININE mg/dL 0.93 1.16   EGFR ml/min/1.73sq m 86 66   CALCIUM mg/dL 8.2* 10.4*   MAGNESIUM mg/dL 1.9  --    PHOSPHORUS mg/dL 2.7  --      Results from last 7 days   Lab Units 06/29/23  0327 06/28/23  1736   AST U/L 23 19   ALT U/L 19 18   ALK PHOS U/L 60 83   TOTAL PROTEIN g/dL 5.8* 8.1   ALBUMIN g/dL 1.9* 3.6   TOTAL BILIRUBIN mg/dL 0.51 1.21*     Results from last 7 days   Lab Units 06/29/23  0559 06/28/23  2206 06/28/23  1946 06/28/23  1736   POC GLUCOSE mg/dl 257* 256* 292* 424*     Results from last 7 days   Lab Units 06/29/23  0327 06/28/23  1736   GLUCOSE RANDOM mg/dL 283* 441*     BETA-HYDROXYBUTYRATE   Date Value Ref Range Status   06/28/2023 1.1 (H) <0.6 mmol/L Final      Results from last 7 days   Lab Units 06/28/23  1759   PH REYNALDO  7.398   PCO2 REYNALDO mm Hg 33.8*   PO2 REYNALDO mm Hg 43.6   HCO3 REYNALDO mmol/L 20.4*   BASE EXC REYNALDO mmol/L -3.6   O2 CONTENT REYNALDO ml/dL 14.6   O2 HGB, VENOUS % 77.0       Results from last 7 days   Lab Units 06/28/23  1736   PROTIME seconds 13.3   INR  1.03   PTT seconds 33     Results from last 7 days   Lab Units 06/28/23  1736   PROCALCITONIN ng/ml 28.29*     Results from last 7 days   Lab Units 06/29/23  0600 06/29/23  0327 06/28/23  2347 06/28/23  1736   LACTIC ACID mmol/L 2.2* 2.4* 2.0 4.8*       Results from last 7 days   Lab Units 06/29/23  0327   CLARITY UA  Turbid   COLOR UA  Yellow   SPEC GRAV UA  1.028   PH UA  6.0   GLUCOSE UA mg/dl >=1000 (1%)*   KETONES UA mg/dl Trace*   BLOOD UA  Large*   PROTEIN UA mg/dl 100 (2+)*   NITRITE UA  Negative   BILIRUBIN UA  Negative   UROBILINOGEN UA (BE) mg/dl 2.0*   LEUKOCYTES UA  Elevated glucose may cause decreased leukocyte values.  See urine microscopic for UWBC result*   WBC UA /hpf 2-4*   RBC UA /hpf 30-50*   BACTERIA UA /hpf Occasional   EPITHELIAL CELLS WET PREP /hpf None Seen     Results from last 7 days   Lab Units 06/28/23  1736   BLOOD CULTURE  Received in Microbiology Lab. Culture in Progress. Received in Microbiology Lab. Culture in Progress. ED Treatment:   Medication Administration from 06/28/2023 1626 to 06/28/2023 1928       Date/Time Order Dose Route Action     06/28/2023 1738 EDT sodium chloride 0.9 % bolus 1,000 mL 1,000 mL Intravenous New Bag     06/28/2023 1836 EDT sodium chloride 0.9 % bolus 1,000 mL 1,000 mL Intravenous New Bag     06/28/2023 1738 EDT cefTRIAXone (ROCEPHIN) IVPB (premix in dextrose) 2,000 mg 50 mL 2,000 mg Intravenous New Bag     06/28/2023 1738 EDT ketorolac (TORADOL) injection 15 mg 15 mg Intravenous Given     06/28/2023 1759 EDT insulin regular (HumuLIN R,NovoLIN R) injection 8 Units 8 Units Intravenous Given        Past Medical History:   Diagnosis Date   • Diabetes mellitus ([address])      Present on Admission:  **None**      Admitting Diagnosis: Rohan's gangrene [N49.3]  Weakness [R53.1]  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 male  Admission Orders:  Scheduled Medications:  Heparin sq 8 hr   Cont dilaudid   Albumin cont prn   Levophed cont prn   versed prn     Cont pulse ox  NPO         Network Utilization Review Department  ATTENTION: Please call with any questions or concerns to [phone] and carefully listen to the prompts so that you are directed to the right person. All voicemails are confidential.  Vidhya Simeon all requests for admission clinical reviews, approved or denied determinations and any other requests to dedicated fax number below belonging to the campus where the patient is receiving treatment.  List of dedicated fax numbers for the Facilities:  [place] DENIALS (Administrative/Medical Necessity) [phone] [address] [facility] (Maternity/NICU/Pediatrics) [phone]   [facility] [address] [phone] [facility] [address] [facility] [phone]   [address] [address] [facility] [address] [place] [address] [phone]

## 2023-06-29 NOTE — PROGRESS NOTES
Matt Fisher is a 57 y.o. male who is currently ordered Vancomycin IV with management by the Pharmacy Consult service. Relevant clinical data and objective / subjective history reviewed. Vancomycin Assessment:  Indication and Goal AUC/Trough: Soft tissue (goal -600, trough >10), -600, trough >10  Clinical Status:   Micro:   pending  Renal Function:  SCr: 1.16 mg/dL  CrCl: 73.9 mL/min  Renal replacement: Not on dialysis  Days of Therapy: 1  Current Dose: 2000mg initial dose followed by 1500mg iv q24  Vancomycin Plan:  New Dosing:    Estimated AUC: 445 mcg*hr/mL  Estimated Trough: 12.5 mcg/mL  Next Level: 07/01/23 0600 with AM labs  Renal Function Monitoring: Daily BMP and [place] will continue to follow closely for s/sx of nephrotoxicity, infusion reactions and appropriateness of therapy. BMP and CBC will be ordered per protocol. We will continue to follow the patient’s culture results and clinical progress daily.     Terrence Kaminski, Pharmacist

## 2023-06-29 NOTE — ASSESSMENT & PLAN NOTE
· Secondary to septic shock in the setting of sasha's gangrene   · Received 30 ml/kg crystalloid resuscitation   · Cleared post IVF

## 2023-06-29 NOTE — H&P
[address]  H&P  Name: Bonita Prieto 57 y.o. male I MRN: [id number]  Unit/Bed#: -01 I Date of Admission: 6/28/2023   Date of Service: 6/29/2023 I Hospital Day: 1      Assessment/Plan   * Sasha's gangrene  Assessment & Plan  · Presented for evaluation of inflammation and pain of this scrotum following three days of folliculitis  · CT of the abdomen and pelvis notable for "Subcutaneous tissue edema in the scrotum and extensive subcutaneous emphysema throughout the scrotum, extending into the inguinal regions, anterior abdominal wall and the perineal region, compatible with Sasha's gangrene. Some of the aforementioned air extends into the presacral space, and is seen in the right levator ani muscle and the right obturator internus muscle there is also some air in the subcutaneous fat of the proximal right thigh medially. Small left-sided hydrocele.   Mostly right-sided pelvic and inguinal lymphadenopathy measuring up to 2.7 x 2.3 cm, likely reactive."   · Taken to the operating room with urology and general surgery for extensive debridement of the scrotum and perineum  · Received ceftriaxone and vancomycin in the emergency department, will need continuation of broad spectrum abx post transfer   · Maintain ramírez catheter   · Post operatively, patient is hemodynamically stable    · Pain management with PCA   · Maintenance crystalloid   · Patient is pending immediate transfer to [address] [place] [address] for further surgical management    HTN (hypertension)  Assessment & Plan  · By history   · Hold all antihypertensives until hemodynamics declare themselves     Lactic acidosis  Assessment & Plan  · Secondary to septic shock in the setting of sasha's gangrene   · Received 30 ml/kg crystalloid resuscitation   · Cleared post IVF     Septic shock ([address])  Assessment & Plan  · Presented with tachycardia, tachypnea, leukocytosis and lactemia > 4.0, source sasha's gangre   · Received 30 ml/kg resuscitation in the emergency room  · Taken immediately to OR for debridement with urology and general surgery   · Lactate cleared with resuscitation effort  · Will need to continue with broad spectrum abx  · Immediate transfer to [address] [place] [address] for further surgical management      DM (diabetes mellitus) ([facility])  Assessment & Plan  No results found for: "HGBA1C"    Recent Labs     06/28/23  1736 06/28/23  1946 06/28/23  2206   POCGLU 424* 292* 256*       Blood Sugar Average: Last 72 hrs:  (P) 324     q 6 hour glucose checks            History of Present Illness     HPI: Kym Guerrero is a 57 y.o. who presents with a past medical history of DM and HTN. He presents to the emergency department for evaluation of scrotal swelling and pain. He developed folliculitis 3 days prior and has progressive swelling and pain. In the emergency department, he met septic shock criteria secondary to tachycardia, tachypnea, lactemia > 4.0, and leukocytosis. CT of the abdomen and pelvis notable for extensive subcutaneous emphysema concerning for sasha's gangrene. He was given 30 ml/kg of crystalloid resuscitation along with ceftriaxone and vancomycin. He was urgently taken to the OR with urology and general surgery for extensive debridement of the scrotum and perineum. He is stable post-operatively with adequate pain control. He is admitted to the medical surgical floor while awaiting immediate transfer to [address] [place] [address] for further surgical intervention. History obtained from chart review and the patient. Review of Systems   Genitourinary: Positive for scrotal swelling and testicular pain.          Historical Information   Past Medical History:  No date: Diabetes mellitus ([facility]) Past Surgical History:  No date: ROTATOR CUFF REPAIR   Current Outpatient Medications   Medication Instructions   • aspirin (ECOTRIN LOW STRENGTH) 81 mg, Oral, Daily   • atorvastatin (LIPITOR) 20 mg tablet No dose, route, or frequency recorded. • dulaglutide (Trulicity) 9.90 WW/2.1RZ injection No dose, route, or frequency recorded. • lisinopril (ZESTRIL) 2.5 mg tablet No dose, route, or frequency recorded. • metFORMIN (GLUCOPHAGE) 500 mg tablet No dose, route, or frequency recorded. No Known Allergies   Social History     Tobacco Use   • Smoking status: Never   • Smokeless tobacco: Never   Vaping Use   • Vaping Use: Every day   • Substances: Nicotine   Substance Use Topics   • Alcohol use: Yes     Comment: socially   • Drug use: Never    History reviewed. No pertinent family history. Objective                            Vitals I/O      Most Recent Min/Max in 24hrs   Temp 98 °F (36.7 °C) Temp  Min: 97.3 °F (36.3 °C)  Max: 98.3 °F (36.8 °C)   Pulse 94 Pulse  Min: 94  Max: 118   Resp 22 Resp  Min: 16  Max: 26   /58 BP  Min: 102/58  Max: 120/71   O2 Sat 96 % SpO2  Min: 93 %  Max: 99 %      Intake/Output Summary (Last 24 hours) at 6/29/2023 0044  Last data filed at 6/28/2023 2230  Gross per 24 hour   Intake 4450 ml   Output 900 ml   Net 3550 ml         Diet NPO     Invasive Monitoring Physical exam    Physical Exam  Constitutional:       General: He is awake. Appearance: He is well-developed and well-groomed. He is ill-appearing. Interventions: Nasal cannula in place. HENT:      Head: Normocephalic and atraumatic. Eyes:      General: Lids are normal.      Extraocular Movements: Extraocular movements intact. Conjunctiva/sclera: Conjunctivae normal.   Neck:      Trachea: Trachea normal.   Cardiovascular:      Rate and Rhythm: Normal rate. Pulses: Normal pulses. Pulmonary:      Effort: Pulmonary effort is normal.      Breath sounds: Decreased breath sounds present. Abdominal:      General: Abdomen is flat. Palpations: Abdomen is soft. Tenderness: There is no abdominal tenderness.    Genitourinary:     Comments: Surgical packing with serous drainage, surgical brief intact  Hodge catheter in place   Musculoskeletal:      Cervical back: Normal range of motion. Right lower leg: No edema. Left lower leg: No edema. Skin:     General: Skin is warm and dry. Neurological:      General: No focal deficit present. Mental Status: He is alert. GCS: GCS eye subscore is 4. GCS verbal subscore is 5. GCS motor subscore is 6. Sensory: Sensation is intact. Motor: Motor function is intact. Diagnostic Studies      EKG: NSR   Imaging:   US scrotum and testicles   Final Result      Extensive air throughout the scrotum as on the earlier CT. This obscures the testes and epididymides. Left testicle only partially imaged, however appears grossly within normal limits. 8.4 x 3.7 x 6.3 cm cystic structure in the left side of the scrotum adjacent to the left testicle, likely an epididymal cyst rather than a hydrocele. Workstation performed: [id number]         CT pelvis wo contrast   Final Result      Subcutaneous tissue edema in the scrotum and extensive subcutaneous emphysema throughout the scrotum, extending into the inguinal regions, anterior abdominal wall and the perineal region, compatible with Rohan's gangrene. Some of the aforementioned air extends into the presacral space, and is seen in the right levator ani muscle and the right obturator internus muscle there is also some air in the subcutaneous fat of the proximal right thigh medially. Small left-sided hydrocele. Mostly right-sided pelvic and inguinal lymphadenopathy measuring up to 2.7 x 2.3 cm, likely reactive. I personally discussed this study with JOYCE Gonzalez on 6/28/2023 at 7:36 PM who was already aware of the findings.                         Workstation performed: [id number]              Medications:  Scheduled PRN   heparin (porcine), 5,000 Units, Q8H [facility]  insulin lispro, 1-6 Units, Q6H [facility]  sodium chloride, 1,000 mL, Once          Continuous    HYDROmorphone,   lactated ringers, 125 mL/hr, Last Rate: 125 mL/hr (06/28/23 2346)         Labs:    CBC    Recent Labs     06/28/23  1736 06/28/23  2347   WBC 13.84*  --    HGB 14.1  --    HCT 40.3  --     135*   BANDSPCT 20*  --      BMP    Recent Labs     06/28/23  1736   SODIUM 129*   K 4.1   CL 91*   CO2 22   AGAP 16   BUN 27*   CREATININE 1.16   CALCIUM 10.4*       Coags    Recent Labs     06/28/23  1736   INR 1.03   PTT 33        Additional Electrolytes  No recent results       Blood Gas    No recent results  Recent Labs     06/28/23  1759   PHVEN 7.398   QLR3ISZ 33.8*   PO2VEN 43.6   BVQ5TGD 20.4*   BEVEN -3.6    LFTs  Recent Labs     06/28/23  1736   ALT 18   AST 19   ALKPHOS 83   ALB 3.6   TBILI 1.21*       Infectious  Recent Labs     06/28/23  1736   PROCALCITONI 28.29*     Glucose  Recent Labs     06/28/23  1736   GLUC 441*             Anticipated Length of Stay is > 2 midnights  Valentino Ditch, CRNP

## 2023-06-29 NOTE — OP NOTE
Operative Note     PATIENT:  Abdulaziz Carlson (MRN [id number])    DATE OF PROCEDURE:   6/28/2023    PRE-OP DIAGNOSIS:   1) Rohna's gangrene    POST-OP DIAGNOSIS:   1) Rohan's gangrene (sites of involvement: Scrotum, perineum, ischiorectal fossa)    PROCEDURES PERFORMED:  Wide debridement of Rohan's gangrene    SURGEON:  Jazz Sosa MD    ASSISTANTS:  None    NOTE:  There were no qualified teaching residents to assist with this case    Dr. Amilcar Graham of general surgery was consulted and performed the debridement of the perineum which extended deeply into the ischial rectal fossa on the right side. Please refer to his operative note    ANESTHESIA: General     COMPLICATIONS:   None    ANTIBIOTICS:  Rocephin, vancomycin    INTRAOPERATIVE THROMBOEMBOLISM PROPHYLAXIS:  Pneumatic compression stockings     INDICATIONS FOR PROCEDURE:  Abdulaziz Carlson is an 57 y.o. old male who presents acutely with Rohan's gangrene. Physical examination reveals an eschar at the base of the scrotum as well as in the perineum. CT scan demonstrates extensive necrotizing soft tissue infection involving the scrotum, perineum, gluteal region, extending into the pelvis, the ischial rectal fossa and towards the anterior superior iliac spine. I evaluated the patient and also consulted my colleague from general surgery. I recommended proceeding to the operating room urgently for debridement of Rohan's gangrene. I discussed the magnitude of the procedure with the patient and his wife and the likely requirement for total serial debridements and potential for extensive wound care and/or eventual complex plastics closure. We discussed the procedure in detail, the alternatives, and the risks, and they signed informed consent to proceed. PROCEDURE IN DETAIL:   The patient was identified and brought to the OR. Antibiotic prophylaxis and DVT prophylaxis were administered.   They were placed in the comfortable high lithotomy position with care to pad all pressure points. A surgical time out was performed with all in the room in agreement with the correct patient, procedure, indications, and laterality. I began with an exam under anesthesia. Patient was also examined prior to making incision by Dr. Ashley Kamara who later scrubbed in for the debridement of the perineum and ischial rectal fossa. Please refer to his separately dictated operative note    I began by placing a 20 Georgian Hodge catheter to marino the urethra. I began with an elliptical incision involving the right hemiscrotum. There was extensive "dishwater "liquefactive necrotizing findings involving the scrotal fat and the scrotal skin extensively. A wide debridement was performed down to viable tissue circumferentially. The infection extended into both the right and the left scrotal compartments and involve the median raphae as well. Both testicles were spared as is typically the case. There were some concerning fluctuant areas noted involving the corporal bodies of the penis I cannot determine if this was reactive or true. Necrotic changes and chose not to perform any debridement of this structure during this initial procedure    The debridement was carried out circumferentially down to viable tissue. Hemostasis was ensured    I next assisted Dr. Ashley Kamara by providing retraction during his portion of the case. Please refer to his separately dictated operative note    At the conclusion of the case together we packed the extensive wound with Betadine soaked Curlex and applied overlying compression dressing. Patient was extubated, stable and was ultimately transferred to the floor. All needle and instrument counts were correct. The patient was placed back supine, awakened from general anesthesia and brought to recovery room in stable condition. ESTIMATED BLOOD LOSS:  Minimal      DRAINS:   Urethral Catheter 20 Fr.  (Active)       SPECIMENS:   Order Name Source Comment Collection Info Order Time   CULTURE, TISSUE AND GRAM STAIN Skin, Other Scrotal Skin Culture Collected By: Boby Nichole MD 6/28/2023  8:45 PM     Release to patient through [facility]   Immediate        ANAEROBIC CULTURE AND GRAM STAIN Abscess Scrotal Abscess Collected By: Boby Nichole MD 6/28/2023  8:55 PM     Release to patient through [address] [place] [address]   Immediate        CULTURE, TISSUE AND GRAM STAIN Abscess Scrotal Abscess Collected By: Boby Nichole MD 6/28/2023  8:55 PM     Release to patient through [facility]   Immediate             COMPLICATIONS: None    DISPOSITION: PACU      PLAN:  Due to the extensive amount of tissue loss I expect the patient will require additional serial debridements and complex closure. As such I will contact the critical care service at [facility] for consideration of transfer to their facility for additional surgical management and complex wound care. Dr. Curtis Gaffney from general surgery agrees with this recommendation.

## 2023-06-29 NOTE — CONSULTS
Intraoperative Consultation - General Surgery   Tc Moss 57 y.o. male MRN: [id number]  Unit/Bed#: OR POOL Encounter: [id number]    Assessment/Plan     Assessment:  Rohan's gangrene  Plan:  In light of the above and the extensors of the disease I recommended debridement of the perirectal, ischiorectal and perineal necrotic tissue. History of Present Illness      HPI:  Tc Moss is a 57 y.o. male who presents to the emergency room because of a complaint of folliculitis of the gluteal area approximately 3 days ago, this progress to swelling and inflammation of the right scrotum causing pain, discomfort therefore he was brought to the emergency room. The patient was evaluated by the emergency room physicians and urology and recommended surgical intervention as soon as possible. I was called during the operation for the findings of perirectal necrotic areas on the right side.     Consults    Review of Systems  Unable to obtain since the patient is under general anesthesia    Historical Information   Past Medical History:   Diagnosis Date   • Diabetes mellitus ([address])      Past Surgical History:   Procedure Laterality Date   • ROTATOR CUFF REPAIR       Social History   Social History     Substance and Sexual Activity   Alcohol Use Yes    Comment: socially     Social History     Substance and Sexual Activity   Drug Use Never     E-Cigarette/Vaping   • E-Cigarette Use Current Every Day User      E-Cigarette/Vaping Substances   • Nicotine Yes      Social History     Tobacco Use   Smoking Status Never   Smokeless Tobacco Never     Family History: non-contributory    Meds/Allergies   all current active meds have been reviewed, current meds:   Current Facility-Administered Medications   Medication Dose Route Frequency   • fentaNYL (SUBLIMAZE) injection 50 mcg  50 mcg Intravenous Q3 min PRN   • HYDROmorphone (DILAUDID) injection 0.2 mg  0.2 mg Intravenous Q10 Min PRN   • ondansetron (ZOFRAN) injection 4 mg 4 mg Intravenous Once PRN   • [MAR Hold] sodium chloride 0.9 % bolus 1,000 mL  1,000 mL Intravenous Once     Facility-Administered Medications Ordered in Other Encounters   Medication Dose Route Frequency   • albumin human (FLEXBUMIN) 5 % injection   Intravenous Continuous PRN   • fentanyl citrate (PF) 100 MCG/2ML   Intravenous PRN   • glycopyrrolate (ROBINUL) injection   Intravenous PRN   • HYDROmorphone (DILAUDID) injection   Intravenous PRN   • lactated ringers infusion   Intravenous Continuous PRN   • lidocaine (PF) (XYLOCAINE-MPF) 1 % injection   Intravenous PRN   • midazolam (VERSED) injection   Intravenous PRN   • norepinephrine (LEVOPHED) 4 mg (STANDARD CONCENTRATION) IV in sodium chloride 0.9% 250 mL   Intravenous Continuous PRN   • ondansetron (ZOFRAN) injection   Intravenous PRN   • phenylephrine 1,000 mcg/10 mL prefilled syringe   Intravenous PRN   • phenylephrine bolus from bag   Intravenous PRN   • propofol (DIPRIVAN) 200 MG/20ML bolus injection   Intravenous PRN   • ROCuronium (ZEMURON) injection   Intravenous PRN   • sodium chloride 0.9 % infusion   Intravenous Continuous PRN   • Succinylcholine Chloride 100 mg/5 mL syringe   Intravenous PRN   • Sugammadex Sodium (BRIDION)   Intravenous PRN    and PTA meds:   Prior to Admission Medications   Prescriptions Last Dose Informant Patient Reported?  Taking?   aspirin (ECOTRIN LOW STRENGTH) 81 mg EC tablet 6/27/2023  Yes Yes   Sig: Take 81 mg by mouth daily   atorvastatin (LIPITOR) 20 mg tablet 6/27/2023  Yes Yes   dulaglutide (Trulicity) 9.53 HK/9.7RV injection 6/27/2023  Yes Yes   lisinopril (ZESTRIL) 2.5 mg tablet 6/27/2023  Yes Yes   metFORMIN (GLUCOPHAGE) 500 mg tablet 6/27/2023  Yes Yes      Facility-Administered Medications: None     No Known Allergies    Objective   First Vitals:   Blood Pressure: 119/70 (06/28/23 1636)  Pulse: (!) 116 (06/28/23 1636)  Temperature: (!) 97.3 °F (36.3 °C) (06/28/23 1636)  Temp Source: Temporal (06/28/23 1943)  Respirations: 18 (06/28/23 1636)  Height: 6' 2" (188 cm) (06/28/23 1943)  Weight - Scale: 87.1 kg (192 lb) (06/28/23 1636)  SpO2: 96 % (06/28/23 1636)    Current Vitals:   Blood Pressure: 118/70 (06/28/23 1943)  Pulse: (!) 118 (06/28/23 1943)  Temperature: 98 °F (36.7 °C) (06/28/23 1943)  Temp Source: Temporal (06/28/23 1943)  Respirations: 16 (06/28/23 1943)  Height: 6' 2" (188 cm) (06/28/23 1943)  Weight - Scale: 87.1 kg (192 lb 0.3 oz) (06/28/23 1943)  SpO2: 96 % (06/28/23 1943)      Intake/Output Summary (Last 24 hours) at 6/28/2023 2154  Last data filed at 6/28/2023 2153  Gross per 24 hour   Intake 3950 ml   Output 400 ml   Net 3550 ml       Invasive Devices     Peripheral Intravenous Line  Duration           Peripheral IV 06/28/23 Left;Dorsal (posterior) Hand <1 day    Peripheral IV 06/28/23 Right Antecubital <1 day          Drain  Duration           Urethral Catheter 20 Fr. <1 day                Physical Exam  Vitals and nursing note reviewed. Genitourinary:     Comments: Inspection of the perirectal area reveals 2 necrotic areas on the right side of the rectum, moderate amount of  fluid draining through those wounds. The openings were small. Crepitation throughout this area. Lab Results:   I have personally reviewed pertinent lab results.   , CBC:   Lab Results   Component Value Date    WBC 13.84 (H) 06/28/2023    HGB 14.1 06/28/2023    HCT 40.3 06/28/2023    MCV 98 06/28/2023     06/28/2023    RBC 4.10 06/28/2023    MCH 34.4 (H) 06/28/2023    MCHC 35.0 06/28/2023    RDW 12.5 06/28/2023    MPV 9.7 06/28/2023   , CMP:   Lab Results   Component Value Date    SODIUM 129 (L) 06/28/2023    K 4.1 06/28/2023    CL 91 (L) 06/28/2023    CO2 22 06/28/2023    BUN 27 (H) 06/28/2023    CREATININE 1.16 06/28/2023    CALCIUM 10.4 (H) 06/28/2023    AST 19 06/28/2023    ALT 18 06/28/2023    ALKPHOS 83 06/28/2023    EGFR 66 06/28/2023   , Coagulation:   Lab Results   Component Value Date    INR 1.03 06/28/2023   , Urinalysis: No results found for: "COLORU", "CLARITYU", "Yeison Fridge", "PHUR", "LEUKOCYTESUR", "NITRITE", "PROTEINUA", "GLUCOSEU", "Kam Arsalan", "BILIRUBINUR", "BLOODU", Amylase: No results found for: "AMYLASE", Lipase: No results found for: "LIPASE"  Imaging: I have personally reviewed pertinent reports.    and I have personally reviewed pertinent films in PACS

## 2023-06-29 NOTE — ASSESSMENT & PLAN NOTE
· Presented with tachycardia, tachypnea, leukocytosis and lactemia > 4.0, source sasha's gangre   · Received 30 ml/kg resuscitation in the emergency room  · Taken immediately to OR for debridement with urology and general surgery   · Lactate cleared with resuscitation effort  · Will need to continue with broad spectrum abx  · Immediate transfer to [facility] for further surgical management

## 2023-06-29 NOTE — ANESTHESIA POSTPROCEDURE EVALUATION
Post-Op Assessment Note    CV Status:  Stable  Pain Score: 0    Pain management: adequate     Mental Status:  Alert and awake   Hydration Status:  Euvolemic   PONV Controlled:  None   Airway Patency:  Patent      Post Op Vitals Reviewed: Yes      Staff: Anesthesiologist, CRNA   Comments: report given to RN; VSS; 2lmin nc        No notable events documented      /67 (06/29/23 1036)    Temp 97 7 °F (36 5 °C) (06/29/23 1034)    Pulse 86 (06/29/23 1036)   Resp   18   SpO2 97 % (06/29/23 1036)

## 2023-06-29 NOTE — OP NOTE
OPERATIVE REPORT  PATIENT NAME: Matt Fisher    :  1960  MRN: [id number]  Pt Location: [facility] OR ROOM 01    SURGERY DATE: 2023    Surgeon(s) and Role:  Panel 1:     * Prasad Roach MD - Primary  Panel 2:     * Mari Snow MD - Primary    Preop Diagnosis:  Rohan's gangrene [N49.3]    Post-Op Diagnosis Codes:     * Rohan's gangrene [N49.3]    Procedure(s):  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    Specimen(s):  ID Type Source Tests Collected by Time Destination   A : Scrotal Skin Culture Tissue Skin, Other CULTURE, TISSUE AND GRAM STAIN Prasad Roach MD 2023    B : Scrotal Abscess Tissue Abscess ANAEROBIC CULTURE AND GRAM STAIN, CULTURE, TISSUE AND GRAM STAIN Prasad Roach MD 2023        Estimated Blood Loss:   400 mL    Drains:  Urethral Catheter 20 Fr. (Active)   Site Assessment Clean;Skin intact 23   Collection Container Standard drainage bag 23   Securement Method Securing device (Describe) 23   Number of days: 0       Anesthesia Type:   General    Operative Indications:  Rohan's gangrene [N49.3]    Operative Findings:  Extensive gas gangrene in scrotal, perineum, perirectal and ischiorectal areas. Complications:   None    Procedure and Technique:  The patient was identified, then after Dr Khai Vega was done with the scrotal area I proceeded to identified 2 necrotic areas in the right perirectal areas, they were fluctuant and crepitant. A generous incision was made with cautery over the 2 necrotic areas, subcutaneous tissue was necrotic with dish water fluid and bubbles consistent with necrotizing soft tissue infection of the perineum.       Extensive debridement was carried out with cautery communicating the scrotal incision and the perineum with the perirectal area, with further dissection the necrotic area extended into the right ischiorectal and right pararectal areas. There was no communication to the left ischiorectal area. Extensive crepitation was noted in the suprapubic and flank areas as well but no evidence of necrotic tissue noted. Once most of the necrotic tissue was excise with cautery and blunt dissection the right ischiorectal and perirectal areas were packed open with Kerlix soak in betadine. The case was handed back to Dr Kevin Hanna   I was present for the entire procedure.     Patient Disposition:  Critical Care Unit and intubated and critically ill        SIGNATURE: Africa Taveras MD  DATE: June 28, 2023  TIME: 10:50 PM

## 2023-06-29 NOTE — ASSESSMENT & PLAN NOTE
· Presented for evaluation of inflammation and pain of this scrotum following three days of folliculitis  · CT of the abdomen and pelvis notable for "Subcutaneous tissue edema in the scrotum and extensive subcutaneous emphysema throughout the scrotum, extending into the inguinal regions, anterior abdominal wall and the perineal region, compatible with Rohan's gangrene. Some of the aforementioned air extends into the presacral space, and is seen in the right levator ani muscle and the right obturator internus muscle there is also some air in the subcutaneous fat of the proximal right thigh medially. Small left-sided hydrocele.   Mostly right-sided pelvic and inguinal lymphadenopathy measuring up to 2.7 x 2.3 cm, likely reactive."   · Taken to the operating room with urology and general surgery for extensive debridement of the scrotum and perineum  · Received ceftriaxone and vancomycin in the emergency department, will need continuation of broad spectrum abx post transfer   · Maintain ramírez catheter   · Post operatively, patient is hemodynamically stable    · Pain management with PCA   · Maintenance crystalloid   · Patient is pending immediate transfer to [facility] for further surgical management

## 2023-06-29 NOTE — UTILIZATION REVIEW
NOTIFICATION OF INPATIENT ADMISSION   AUTHORIZATION REQUEST   SERVICING FACILITY:   [address] [place] [address]  Address: [address] [facility], [address] [place] [address]  Tax ID: [id number]  NPI: [id number] ATTENDING PROVIDER:  Attending Name and NPI#: Faby Fletcher Md [[id number]]  Address: [address] [place] [address]  Phone: [phone]   ADMISSION INFORMATION:  [facility] [address] [facility]   Place of Service Code: 21  Inpatient Admission Date/Time: 6/29/23  2:00 AM  Discharge Date/Time: No discharge date for patient encounter. 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  Contact for approvals/pending authorizations, clinical reviews, and discharge. PHYSICIAN ADVISORY SERVICES:  Medical Necessity Denial & [employer] Review  Phone: [phone]  Fax: [phone]  Email: [email or web address]

## 2023-06-29 NOTE — PLAN OF CARE
Problem: MOBILITY - ADULT  Goal: Maintain or return to baseline ADL function  Description: INTERVENTIONS:  -  Assess patient's ability to carry out ADLs; assess patient's baseline for ADL function and identify physical deficits which impact ability to perform ADLs (bathing, care of mouth/teeth, toileting, grooming, dressing, etc.)  - Assess/evaluate cause of self-care deficits   - Assess range of motion  - Assess patient's mobility; develop plan if impaired  - Assess patient's need for assistive devices and provide as appropriate  - Encourage maximum independence but intervene and supervise when necessary  - Involve family in performance of ADLs  - Assess for home care needs following discharge   - Consider OT consult to assist with ADL evaluation and planning for discharge  - Provide patient education as appropriate  Outcome: Progressing  Goal: Maintains/Returns to pre admission functional level  Description: INTERVENTIONS:  - Perform BMAT or MOVE assessment daily.   - Set and communicate daily mobility goal to care team and patient/family/caregiver. - Collaborate with rehabilitation services on mobility goals if consulted  - Perform Range of Motion   times a day. - Reposition patient every   hours.   - Dangle patient   times a day  - Stand patient   times a day  - Ambulate patient   times a day  - Out of bed to chair   times a day   - Out of bed for meals   times a day  - Out of bed for toileting  - Record patient progress and toleration of activity level   Outcome: Progressing     Problem: SKIN/TISSUE INTEGRITY - ADULT  Goal: Skin Integrity remains intact(Skin Breakdown Prevention)  Description: Assess:  -Perform Vinny assessment every    -Clean and moisturize skin every    -Inspect skin when repositioning, toileting, and assisting with ADLS  -Assess under medical devices such as   every    -Assess extremities for adequate circulation and sensation     Bed Management:  -Have minimal linens on bed & keep smooth, unwrinkled  -Change linens as needed when moist or perspiring  -Avoid sitting or lying in one position for more than   hours while in bed  -Keep HOB at  degrees     Toileting:  -Offer bedside commode  -Assess for incontinence every    -Use incontinent care products after each incontinent episode such as      Activity:  -Mobilize patient   times a day  -Encourage activity and walks on unit  -Encourage or provide ROM exercises   -Turn and reposition patient every   Hours  -Use appropriate equipment to lift or move patient in bed  -Instruct/ Assist with weight shifting every   when out of bed in chair  -Consider limitation of chair time   hour intervals    Skin Care:  -Avoid use of baby powder, tape, friction and shearing, hot water or constrictive clothing  -Relieve pressure over bony prominences using    -Do not massage red bony areas    Next Steps:  -Teach patient strategies to minimize risks such as     -Consider consults to  interdisciplinary teams such as    Outcome: Progressing  Goal: Incision(s), wounds(s) or drain site(s) healing without S/S of infection  Description: INTERVENTIONS  - Assess and document dressing, incision, wound bed, drain sites and surrounding tissue  - Provide patient and family education  - Perform skin care/dressing changes every    Outcome: Progressing  Goal: Pressure injury heals and does not worsen  Description: Interventions:  - Implement low air loss mattress or specialty surface (Criteria met)  - Apply silicone foam dressing  - Instruct/assist with weight shifting every   minutes when in chair   - Limit chair time to   hour intervals  - Use special pressure reducing interventions such as   when in chair   - Apply fecal or urinary incontinence containment device   - Perform passive or active ROM every    - Turn and reposition patient & offload bony prominences every   hours   - Utilize friction reducing device or surface for transfers   - Consider consults to interdisciplinary teams such as    - Use incontinent care products after each incontinent episode such as      - Consider nutrition services referral as needed  Outcome: Progressing     Problem: Prexisting or High Potential for Compromised Skin Integrity  Goal: Skin integrity is maintained or improved  Description: INTERVENTIONS:  - Identify patients at risk for skin breakdown  - Assess and monitor skin integrity  - Assess and monitor nutrition and hydration status  - Monitor labs   - Assess for incontinence   - Turn and reposition patient  - Assist with mobility/ambulation  - Relieve pressure over bony prominences  - Avoid friction and shearing  - Provide appropriate hygiene as needed including keeping skin clean and dry  - Evaluate need for skin moisturizer/barrier cream  - Collaborate with interdisciplinary team   - Patient/family teaching  - Consider wound care consult   Outcome: Progressing

## 2023-06-29 NOTE — ANESTHESIA POSTPROCEDURE EVALUATION
Post-Op Assessment Note    CV Status:  Stable  Pain Score: 0    Pain management: adequate     Mental Status:  Alert   Hydration Status:  Stable   PONV Controlled:  None   Airway Patency:  Patent      Post Op Vitals Reviewed: Yes      Staff: CRNA         No notable events documented      /64 (06/28/23 2200)    Temp 97 8 °F (36 6 °C) (06/28/23 2200)    Pulse 100 (06/28/23 2200)   Resp 16 (06/28/23 2200)    SpO2 99 % (06/28/23 2200)

## 2023-06-29 NOTE — ASSESSMENT & PLAN NOTE
No results found for: "HGBA1C"    Recent Labs     06/28/23  1736 06/28/23  1946 06/28/23  2206   POCGLU 424* 292* 256*       Blood Sugar Average: Last 72 hrs:  (P) 324     q 6 hour glucose checks

## 2023-06-29 NOTE — NURSING NOTE
Per Critical Care AP, d/c PCA pump for transport to [facility]. Pt had 0 attempts with PCA pump and 0 doses were given. Waste completed with hospital supervisor Lakeisha Moon and charge RN Lu Terry. 49 mL of dilaudid accounted for and wasted. Per hospital supervisor, document waste via note.

## 2023-06-29 NOTE — PLAN OF CARE
Problem: SKIN/TISSUE INTEGRITY - ADULT  Goal: Skin Integrity remains intact(Skin Breakdown Prevention)  Description: Assess:  -Perform Vinny assessment every 2 hrs  -Clean and moisturize skin every 2 hrs  -Inspect skin when repositioning, toileting, and assisting with ADLS  -Assess under medical devices such as ramírez every 2 hrs  -Assess extremities for adequate circulation and sensation     Bed Management:  -Have minimal linens on bed & keep smooth, unwrinkled  -Change linens as needed when moist or perspiring  -Avoid sitting or lying in one position for more than 2 hours while in bed  -Keep HOB at 30 degrees or for comfort    Toileting:  -Offer bedside commode  -Assess for incontinence every 2 hrs  -Use incontinent care products after each incontinent episode such as moisture barrier    Activity:  -Mobilize patient 4 times a day  -Encourage activity and walks on unit  -Encourage or provide ROM exercises   -Turn and reposition patient every 2 Hours  -Use appropriate equipment to lift or move patient in bed  -Instruct/ Assist with weight shifting every 2 when out of bed in chair  -Consider limitation of chair time 2  hour intervals    Skin Care:  -Avoid use of baby powder, tape, friction and shearing, hot water or constrictive clothing  -Relieve pressure over bony prominences using wedges  -Do not massage red bony areas    Outcome: Progressing

## 2023-06-29 NOTE — QUICK NOTE
Postop Note - General Surgery  Nathalie Ro 57 y.o. male MRN: [id number]  Unit/Bed#: [facility] 208-01 Encounter: [id number]    Assessment:  57 y.o. male with Rohan's gangrene, is now s/p second Procedure(s) (LRB):  DEBRIDEMENT WOUND ([address] OUT) (N/A). Patient's condition is significantly improved, however he is mildly hypotensive    Plan:  - Diet Surgical; Diabetic CL Liquids  Diet NPO; Sips with meds  - Pain and Nausea control PRN  - Incentive spirometry  - OOB, ambulate  -Recommend fluid bolus  - Hodge catheter  - Strict I's and O's  - Plan for third takeback and readmit tomorrow 6/30  - Continue IV antibiotics  - Additional care per ICU    Subjective/Objective     Subjective: Patient feels comfortable and states that his pain is well controlled at this time. Objective:   Vitals: Blood pressure (!) 97/48, pulse 90, temperature 98.1 °F (36.7 °C), temperature source Oral, resp. rate 21, height 6' 2" (1.88 m), weight 92.2 kg (203 lb 4.2 oz), SpO2 100 %. ,Body mass index is 26.1 kg/m². I/O       06/27 0701  06/28 0700 06/28 0701 06/29 0700 06/29 0701  06/30 0700    P. O.   222    I.V. (mL/kg)   600 (6.5)    IV Piggyback  1100     Total Intake(mL/kg)  1100 (11.9) 822 (8.9)    Urine (mL/kg/hr)  450 625 (0.8)    Blood   100    Total Output  450 725    Net  +650 +97                 Physical Exam:  Gen: NAD, Aox3, Comfortable in bed  Chest: Normal work of breathing, no respiratory distress  Abd: Soft, ND, lower abdomen and mons pubis tenderness  : Remaining scrotal tissue is mildly hyperemic. No foul odor or drainage appreciated on dressings. Area is appropriately tender. Ext: No Edema  Skin: Warm, Dry, Intact      Lab, Imaging and other studies:   I have personally reviewed pertinent reports.   , CBC with diff:   Lab Results   Component Value Date    WBC 10.15 06/29/2023    HGB 8.6 (L) 06/29/2023    HCT 24.4 (L) 06/29/2023    MCV 99 (H) 06/29/2023     (L) 06/29/2023    RBC 2.46 (L) 06/29/2023    MCH 35.0 (H) 06/29/2023    MCHC 35.2 06/29/2023    RDW 13.2 06/29/2023    MPV 9.8 06/29/2023    NRBC 0 06/29/2023   , BMP/CMP:   Lab Results   Component Value Date    SODIUM 138 06/29/2023    K 3.9 06/29/2023     (H) 06/29/2023    CO2 23 06/29/2023    BUN 29 (H) 06/29/2023    CREATININE 0.93 06/29/2023    CALCIUM 8.2 (L) 06/29/2023    AST 23 06/29/2023    ALT 19 06/29/2023    ALKPHOS 60 06/29/2023    EGFR 86 06/29/2023     VTE Pharmacologic Prophylaxis: Heparin  VTE Mechanical Prophylaxis: sequential compression device        Jessa Palacios MD  6/29/2023  3:45 PM

## 2023-06-29 NOTE — UTILIZATION REVIEW
NOTIFICATION OF INPATIENT ADMISSION   AUTHORIZATION REQUEST   SERVICING FACILITY:   [facility] [address]  Tax ID: [id number]  NPI: [id number] ATTENDING PROVIDER:  Attending Name and NPI#: Matthias Cunha [[id number]]  Address: Marcell Fragoso[address]  Phone: [phone]     ADMISSION INFORMATION:  Place of Service: [address] [place] [address] of Service Code: 21  Inpatient Admission Date/Time: 6/28/23  7:54 PM  Discharge Date/Time: 6/29/2023  1:46 AM  Admitting Diagnosis Code/Description:  Rohan's gangrene [N49.3]  Weakness [R53.1]  Rohan's gangrene of scrotum [N49.3]  Severe sepsis ([address] [facility]) [A41.9, R65.20]     UTILIZATION REVIEW CONTACT:  Venu Duarte, Utilization   Network Utilization Review Department  Phone: [phone]  Fax [phone]  Email: [email or web address]  Contact for approvals/pending authorizations, clinical reviews, and discharge. PHYSICIAN ADVISORY SERVICES:  Medical Necessity Denial & [employer] Review  Phone: [phone]  Fax: [phone]  Email: [email or web address]

## 2023-06-30 ENCOUNTER — ANESTHESIA EVENT (INPATIENT)
Dept: PERIOP | Facility: HOSPITAL | Age: 63
End: 2023-06-30
Payer: COMMERCIAL

## 2023-06-30 ENCOUNTER — ANESTHESIA (INPATIENT)
Dept: PERIOP | Facility: HOSPITAL | Age: 63
End: 2023-06-30
Payer: COMMERCIAL

## 2023-06-30 LAB
ANION GAP SERPL CALCULATED.3IONS-SCNC: 7 MMOL/L
ANION GAP SERPL CALCULATED.3IONS-SCNC: 8 MMOL/L
BASE EXCESS BLDA CALC-SCNC: -0.5 MMOL/L
BUN SERPL-MCNC: 13 MG/DL (ref 5–25)
BUN SERPL-MCNC: 17 MG/DL (ref 5–25)
CALCIUM SERPL-MCNC: 7.5 MG/DL (ref 8.3–10.1)
CALCIUM SERPL-MCNC: 7.8 MG/DL (ref 8.3–10.1)
CHLORIDE SERPL-SCNC: 109 MMOL/L (ref 96–108)
CHLORIDE SERPL-SCNC: 109 MMOL/L (ref 96–108)
CO2 SERPL-SCNC: 23 MMOL/L (ref 21–32)
CO2 SERPL-SCNC: 23 MMOL/L (ref 21–32)
CREAT SERPL-MCNC: 0.53 MG/DL (ref 0.6–1.3)
CREAT SERPL-MCNC: 0.59 MG/DL (ref 0.6–1.3)
ERYTHROCYTE [DISTWIDTH] IN BLOOD BY AUTOMATED COUNT: 13.2 % (ref 11.6–15.1)
ERYTHROCYTE [DISTWIDTH] IN BLOOD BY AUTOMATED COUNT: 13.4 % (ref 11.6–15.1)
EST. AVERAGE GLUCOSE BLD GHB EST-MCNC: 163 MG/DL
GFR SERPL CREATININE-BSD FRML MDRD: 106 ML/MIN/1.73SQ M
GFR SERPL CREATININE-BSD FRML MDRD: 111 ML/MIN/1.73SQ M
GLUCOSE SERPL-MCNC: 101 MG/DL (ref 65–140)
GLUCOSE SERPL-MCNC: 111 MG/DL (ref 65–140)
GLUCOSE SERPL-MCNC: 117 MG/DL (ref 65–140)
GLUCOSE SERPL-MCNC: 130 MG/DL (ref 65–140)
GLUCOSE SERPL-MCNC: 138 MG/DL (ref 65–140)
GLUCOSE SERPL-MCNC: 145 MG/DL (ref 65–140)
GLUCOSE SERPL-MCNC: 156 MG/DL (ref 65–140)
GLUCOSE SERPL-MCNC: 156 MG/DL (ref 65–140)
GLUCOSE SERPL-MCNC: 163 MG/DL (ref 65–140)
GLUCOSE SERPL-MCNC: 167 MG/DL (ref 65–140)
GLUCOSE SERPL-MCNC: 168 MG/DL (ref 65–140)
GLUCOSE SERPL-MCNC: 168 MG/DL (ref 65–140)
HBA1C MFR BLD: 7.3 %
HCO3 BLDA-SCNC: 22.1 MMOL/L (ref 22–28)
HCT VFR BLD AUTO: 21.9 % (ref 36.5–49.3)
HCT VFR BLD AUTO: 23.4 % (ref 36.5–49.3)
HGB BLD-MCNC: 7.7 G/DL (ref 12–17)
HGB BLD-MCNC: 8 G/DL (ref 12–17)
LACTATE SERPL-SCNC: 1.2 MMOL/L (ref 0.5–2)
LACTATE SERPL-SCNC: 1.3 MMOL/L (ref 0.5–2)
MAGNESIUM SERPL-MCNC: 2.1 MG/DL (ref 1.6–2.6)
MCH RBC QN AUTO: 33.3 PG (ref 26.8–34.3)
MCH RBC QN AUTO: 35 PG (ref 26.8–34.3)
MCHC RBC AUTO-ENTMCNC: 34.2 G/DL (ref 31.4–37.4)
MCHC RBC AUTO-ENTMCNC: 35.2 G/DL (ref 31.4–37.4)
MCV RBC AUTO: 100 FL (ref 82–98)
MCV RBC AUTO: 98 FL (ref 82–98)
O2 CT BLDA-SCNC: 10.7 ML/DL (ref 16–23)
OXYHGB MFR BLDA: 93.4 % (ref 94–97)
PCO2 BLDA: 28.4 MM HG (ref 36–44)
PH BLDA: 7.51 [PH] (ref 7.35–7.45)
PHOSPHATE SERPL-MCNC: 1 MG/DL (ref 2.3–4.1)
PLATELET # BLD AUTO: 134 THOUSANDS/UL (ref 149–390)
PLATELET # BLD AUTO: 191 THOUSANDS/UL (ref 149–390)
PMV BLD AUTO: 9.8 FL (ref 8.9–12.7)
PMV BLD AUTO: 9.9 FL (ref 8.9–12.7)
PO2 BLDA: 68.5 MM HG (ref 75–129)
POTASSIUM SERPL-SCNC: 3.3 MMOL/L (ref 3.5–5.3)
POTASSIUM SERPL-SCNC: 4.1 MMOL/L (ref 3.5–5.3)
RBC # BLD AUTO: 2.2 MILLION/UL (ref 3.88–5.62)
RBC # BLD AUTO: 2.4 MILLION/UL (ref 3.88–5.62)
SODIUM SERPL-SCNC: 139 MMOL/L (ref 135–147)
SODIUM SERPL-SCNC: 140 MMOL/L (ref 135–147)
SPECIMEN SOURCE: ABNORMAL
VANCOMYCIN SERPL-MCNC: 8.4 UG/ML (ref 10–20)
WBC # BLD AUTO: 15.75 THOUSAND/UL (ref 4.31–10.16)
WBC # BLD AUTO: 8.99 THOUSAND/UL (ref 4.31–10.16)

## 2023-06-30 PROCEDURE — 83605 ASSAY OF LACTIC ACID: CPT

## 2023-06-30 PROCEDURE — 83605 ASSAY OF LACTIC ACID: CPT | Performed by: SURGERY

## 2023-06-30 PROCEDURE — 11004 DBRDMT SKIN XTRNL GENT&PER: CPT | Performed by: SURGERY

## 2023-06-30 PROCEDURE — 85027 COMPLETE CBC AUTOMATED: CPT | Performed by: SURGERY

## 2023-06-30 PROCEDURE — 99232 SBSQ HOSP IP/OBS MODERATE 35: CPT | Performed by: STUDENT IN AN ORGANIZED HEALTH CARE EDUCATION/TRAINING PROGRAM

## 2023-06-30 PROCEDURE — 82805 BLOOD GASES W/O2 SATURATION: CPT

## 2023-06-30 PROCEDURE — 80048 BASIC METABOLIC PNL TOTAL CA: CPT

## 2023-06-30 PROCEDURE — 84100 ASSAY OF PHOSPHORUS: CPT

## 2023-06-30 PROCEDURE — 99233 SBSQ HOSP IP/OBS HIGH 50: CPT | Performed by: SURGERY

## 2023-06-30 PROCEDURE — 83735 ASSAY OF MAGNESIUM: CPT

## 2023-06-30 PROCEDURE — 83036 HEMOGLOBIN GLYCOSYLATED A1C: CPT

## 2023-06-30 PROCEDURE — 0HDAXZZ EXTRACTION OF INGUINAL SKIN, EXTERNAL APPROACH: ICD-10-PCS | Performed by: SURGERY

## 2023-06-30 PROCEDURE — 80048 BASIC METABOLIC PNL TOTAL CA: CPT | Performed by: SURGERY

## 2023-06-30 PROCEDURE — 82948 REAGENT STRIP/BLOOD GLUCOSE: CPT

## 2023-06-30 PROCEDURE — 99223 1ST HOSP IP/OBS HIGH 75: CPT | Performed by: STUDENT IN AN ORGANIZED HEALTH CARE EDUCATION/TRAINING PROGRAM

## 2023-06-30 PROCEDURE — 80202 ASSAY OF VANCOMYCIN: CPT | Performed by: SURGERY

## 2023-06-30 PROCEDURE — 85027 COMPLETE CBC AUTOMATED: CPT

## 2023-06-30 PROCEDURE — 0JBB0ZZ EXCISION OF PERINEUM SUBCUTANEOUS TISSUE AND FASCIA, OPEN APPROACH: ICD-10-PCS | Performed by: SURGERY

## 2023-06-30 PROCEDURE — 99291 CRITICAL CARE FIRST HOUR: CPT | Performed by: SURGERY

## 2023-06-30 RX ORDER — PROPOFOL 10 MG/ML
INJECTION, EMULSION INTRAVENOUS AS NEEDED
Status: DISCONTINUED | OUTPATIENT
Start: 2023-06-30 | End: 2023-06-30

## 2023-06-30 RX ORDER — FENTANYL CITRATE 50 UG/ML
INJECTION, SOLUTION INTRAMUSCULAR; INTRAVENOUS AS NEEDED
Status: DISCONTINUED | OUTPATIENT
Start: 2023-06-30 | End: 2023-06-30

## 2023-06-30 RX ORDER — POTASSIUM CHLORIDE 20 MEQ/1
40 TABLET, EXTENDED RELEASE ORAL ONCE
Status: COMPLETED | OUTPATIENT
Start: 2023-06-30 | End: 2023-06-30

## 2023-06-30 RX ORDER — ONDANSETRON 2 MG/ML
INJECTION INTRAMUSCULAR; INTRAVENOUS AS NEEDED
Status: DISCONTINUED | OUTPATIENT
Start: 2023-06-30 | End: 2023-06-30

## 2023-06-30 RX ORDER — LIDOCAINE HYDROCHLORIDE 20 MG/ML
INJECTION, SOLUTION EPIDURAL; INFILTRATION; INTRACAUDAL; PERINEURAL AS NEEDED
Status: DISCONTINUED | OUTPATIENT
Start: 2023-06-30 | End: 2023-06-30

## 2023-06-30 RX ORDER — MAGNESIUM HYDROXIDE 1200 MG/15ML
LIQUID ORAL AS NEEDED
Status: DISCONTINUED | OUTPATIENT
Start: 2023-06-30 | End: 2023-06-30 | Stop reason: HOSPADM

## 2023-06-30 RX ORDER — POTASSIUM CHLORIDE 29.8 MG/ML
40 INJECTION INTRAVENOUS EVERY 4 HOURS
Status: DISCONTINUED | OUTPATIENT
Start: 2023-06-30 | End: 2023-06-30

## 2023-06-30 RX ORDER — POTASSIUM CHLORIDE 14.9 MG/ML
20 INJECTION INTRAVENOUS
Status: DISCONTINUED | OUTPATIENT
Start: 2023-06-30 | End: 2023-06-30

## 2023-06-30 RX ORDER — LINEZOLID 2 MG/ML
600 INJECTION, SOLUTION INTRAVENOUS EVERY 12 HOURS
Status: DISCONTINUED | OUTPATIENT
Start: 2023-06-30 | End: 2023-07-05

## 2023-06-30 RX ORDER — HYDROMORPHONE HYDROCHLORIDE 1 MG/ML
INJECTION, SOLUTION INTRAMUSCULAR; INTRAVENOUS; SUBCUTANEOUS AS NEEDED
Status: DISCONTINUED | OUTPATIENT
Start: 2023-06-30 | End: 2023-06-30

## 2023-06-30 RX ADMIN — LIDOCAINE HYDROCHLORIDE 100 MG: 20 INJECTION, SOLUTION EPIDURAL; INFILTRATION; INTRACAUDAL; PERINEURAL at 14:22

## 2023-06-30 RX ADMIN — PIPERACILLIN SODIUM AND TAZOBACTAM SODIUM 4.5 G: 36; 4.5 INJECTION, POWDER, LYOPHILIZED, FOR SOLUTION INTRAVENOUS at 02:44

## 2023-06-30 RX ADMIN — POTASSIUM CHLORIDE 40 MEQ: 1500 TABLET, EXTENDED RELEASE ORAL at 06:45

## 2023-06-30 RX ADMIN — HEPARIN SODIUM 5000 UNITS: 5000 INJECTION INTRAVENOUS; SUBCUTANEOUS at 05:28

## 2023-06-30 RX ADMIN — ACETAMINOPHEN 650 MG: 325 TABLET, FILM COATED ORAL at 05:28

## 2023-06-30 RX ADMIN — HEPARIN SODIUM 5000 UNITS: 5000 INJECTION INTRAVENOUS; SUBCUTANEOUS at 21:05

## 2023-06-30 RX ADMIN — FENTANYL CITRATE 50 MCG: 50 INJECTION, SOLUTION INTRAMUSCULAR; INTRAVENOUS at 14:29

## 2023-06-30 RX ADMIN — PIPERACILLIN SODIUM AND TAZOBACTAM SODIUM 4.5 G: 36; 4.5 INJECTION, POWDER, LYOPHILIZED, FOR SOLUTION INTRAVENOUS at 08:41

## 2023-06-30 RX ADMIN — ONDANSETRON 4 MG: 2 INJECTION INTRAMUSCULAR; INTRAVENOUS at 15:20

## 2023-06-30 RX ADMIN — PIPERACILLIN SODIUM AND TAZOBACTAM SODIUM 4.5 G: 36; 4.5 INJECTION, POWDER, LYOPHILIZED, FOR SOLUTION INTRAVENOUS at 17:01

## 2023-06-30 RX ADMIN — ACETAMINOPHEN 650 MG: 325 TABLET, FILM COATED ORAL at 00:37

## 2023-06-30 RX ADMIN — LINEZOLID 600 MG: 600 INJECTION, SOLUTION INTRAVENOUS at 08:40

## 2023-06-30 RX ADMIN — CLINDAMYCIN IN 5 PERCENT DEXTROSE 600 MG: 12 INJECTION, SOLUTION INTRAVENOUS at 02:43

## 2023-06-30 RX ADMIN — LINEZOLID 600 MG: 600 INJECTION, SOLUTION INTRAVENOUS at 20:29

## 2023-06-30 RX ADMIN — POTASSIUM CHLORIDE 20 MEQ: 14.9 INJECTION, SOLUTION INTRAVENOUS at 06:45

## 2023-06-30 RX ADMIN — SODIUM PHOSPHATE, MONOBASIC, MONOHYDRATE AND SODIUM PHOSPHATE, DIBASIC, ANHYDROUS 30 MMOL: 142; 276 INJECTION, SOLUTION INTRAVENOUS at 09:34

## 2023-06-30 RX ADMIN — HYDROMORPHONE HYDROCHLORIDE 0.5 MG: 1 INJECTION, SOLUTION INTRAMUSCULAR; INTRAVENOUS; SUBCUTANEOUS at 15:19

## 2023-06-30 RX ADMIN — FENTANYL CITRATE 25 MCG: 50 INJECTION, SOLUTION INTRAMUSCULAR; INTRAVENOUS at 15:12

## 2023-06-30 RX ADMIN — SODIUM CHLORIDE, SODIUM LACTATE, POTASSIUM CHLORIDE, AND CALCIUM CHLORIDE 150 ML/HR: .6; .31; .03; .02 INJECTION, SOLUTION INTRAVENOUS at 21:43

## 2023-06-30 RX ADMIN — POTASSIUM CHLORIDE 20 MEQ: 14.9 INJECTION, SOLUTION INTRAVENOUS at 05:29

## 2023-06-30 RX ADMIN — OXYCODONE HYDROCHLORIDE 10 MG: 10 TABLET ORAL at 05:55

## 2023-06-30 RX ADMIN — FENTANYL CITRATE 25 MCG: 50 INJECTION, SOLUTION INTRAMUSCULAR; INTRAVENOUS at 15:00

## 2023-06-30 RX ADMIN — OXYCODONE HYDROCHLORIDE 10 MG: 10 TABLET ORAL at 17:56

## 2023-06-30 RX ADMIN — FENTANYL CITRATE 50 MCG: 50 INJECTION, SOLUTION INTRAMUSCULAR; INTRAVENOUS at 14:16

## 2023-06-30 RX ADMIN — PIPERACILLIN SODIUM AND TAZOBACTAM SODIUM 4.5 G: 36; 4.5 INJECTION, POWDER, LYOPHILIZED, FOR SOLUTION INTRAVENOUS at 21:39

## 2023-06-30 RX ADMIN — FENTANYL CITRATE 50 MCG: 50 INJECTION, SOLUTION INTRAMUSCULAR; INTRAVENOUS at 14:40

## 2023-06-30 RX ADMIN — ACETAMINOPHEN 650 MG: 325 TABLET, FILM COATED ORAL at 17:56

## 2023-06-30 RX ADMIN — PROPOFOL 150 MG: 10 INJECTION, EMULSION INTRAVENOUS at 14:22

## 2023-06-30 NOTE — PHYSICAL THERAPY NOTE
Physical Therapy Cancellation Note    PT orders received chart review completed. Pt is currently off the floor at OR and not appropriate to participate in skilled PT at this time. PT will follow and eval as medically appropriate.      06/30/23 1300   Note Type   Note type Cancelled Session   Cancel Reasons Patient to operating room       Laurie Penn PT

## 2023-06-30 NOTE — CONSULTS
Vancomycin IV Pharmacy-to-Dose Consultation    Kelley Denny is a 57 y.o. male who was receiving Vancomycin IV with management by the Pharmacy Consult service for treatment of Soft tissue (goal -600, trough >10). The patient’s Vancomycin therapy has been discontinued. Thank you for allowing us to take part in this patient's care. Pharmacy will sign-off now; please call or re-consult if there are any questions.         Tahir Cook, PharmD, [address] [place] [address] Clinical Pharmacist  [phone]

## 2023-06-30 NOTE — ANESTHESIA POSTPROCEDURE EVALUATION
Post-Op Assessment Note    CV Status:  Stable  Pain Score: 0    Pain management: adequate     Mental Status:  Alert and awake   Hydration Status:  Euvolemic   PONV Controlled:  Controlled   Airway Patency:  Patent      Post Op Vitals Reviewed: Yes      Staff: CRNA         No notable events documented      BP   156/68   Temp   97 9   Pulse  92   Resp  19   SpO2   98%

## 2023-06-30 NOTE — CONSULTS
Consultation - Infectious Disease   Rian Harvey 57 y.o. male MRN: [id number]  Unit/Bed#: OR POOL Encounter: [id number]      IMPRESSION & RECOMMENDATIONS:     1. Severe sepsis, present on admission with tachycardia, tachypnea, lactic acidosis, leukocytosis with bandemia. Due to #2 below. Blood cultures show no growth. The patient has had mild hypotension but is not requiring pressors.   -Continue IV Zosyn 4.5 g every 6 hours and linezolid 600 mg every 12 hours (linezolid also provides antitoxin effect)   -Follow-up blood cultures    -Follow-up OR tissue cultures and adjust antibiotics as able; if no growth recommend narrowing to IV Unasyn and p.o. linezolid   -Linezolid can be discontinued if no growth of MRSA and after all surgical intervention completed   -Monitor fever curve, white blood cell count    2. Rohan's gangrene. Status post debridement of extensive perirectal, ischiorectal, and perineal necrotic tissue 6/28/2023. Repeat debridements performed 6/29/23 today and today 6/30/2023.   -Surgical follow-up ongoing   -Follow-up after OR today   -antibiotics as above   -follow up tissue cultures to adjust antibiotics     3. Type 2 diabetes mellitus with hyperglycemia. Blood sugar over 400 on admission. Hemoglobin A1c 7.3%. This is a risk factor for severe infection as above. Patient is currently requiring an insulin drip.   -Glycemic management per primary team    I have discussed the above management plan in detail with the primary service    I have performed an extensive review of the medical records in Epic including review of the notes, radiographs, and laboratory results     HISTORY OF PRESENT ILLNESS:  Reason for Consult: Rohan's gangrene  HPI: Rian Harvey is a 57 y.o. man with a history of type 2 diabetes mellitus and hypertension who initially presented to the [address] [place] [address] on 6/28/2023 with several days of progressive swelling and pain in the scrotum.   He does report prior boil over the buttocks and states that he occasionally gets these but they drain spontaneously. The patient also reports flulike symptoms including chills over this time. In the ED, the patient was tachycardic and tachypneic. Labs were significant for hyperglycemia, lactic acidosis, leukocytosis with bandemia. CT of the pelvis showed extensive subcutaneous tissue edema and emphysema throughout the scrotum, perineum and inguinal regions concerning for Rohan's gangrene. He was started on broad-spectrum antibiotics with vancomycin and Zosyn. The patient was taken to the OR emergently with general surgery and urology for I&D of extensive gas gangrene. The patient was transferred to [address] [place] [address] for higher level of care. The patient was taken to the OR on 6/29/2023 for repeat debridement. The patient was admitted to the surgical critical care unit due to hypotension after surgery. Antibiotics have been transitioned to IV linezolid and Zosyn. Tissue cultures appear polymicrobial on Gram stain but cultures show no growth thus far. ID is consulted for further antibiotic management. The patient is being taken back to the OR today for repeat washout. REVIEW OF SYSTEMS:  A complete review of systems is negative other than that noted in the HPI.     PAST MEDICAL HISTORY:  Past Medical History:   Diagnosis Date   • Diabetes mellitus ([address])      Past Surgical History:   Procedure Laterality Date   • INCISION AND DRAINAGE OF WOUND N/A 6/28/2023    Procedure: 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 ;  Surgeon: Asha Sorenson MD;  Location: [facility];  Service: Urology   • INCISION AND DRAINAGE OF WOUND N/A 6/28/2023    Procedure: 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;  Surgeon: Noe Obando MD;  Location: [facility];  Service: General   • ROTATOR CUFF REPAIR     • WOUND DEBRIDEMENT N/A 6/29/2023    Procedure: DEBRIDEMENT WOUND [facility]); Surgeon: Catherine Farrell MD;  Location: [facility];  Service: General       FAMILY HISTORY:  Non-contributory    SOCIAL HISTORY:  Social History   Social History     Substance and Sexual Activity   Alcohol Use Yes    Comment: socially     Social History     Substance and Sexual Activity   Drug Use Never     Social History     Tobacco Use   Smoking Status Never   Smokeless Tobacco Never       ALLERGIES:  No Known Allergies    MEDICATIONS:  All current active medications have been reviewed. PHYSICAL EXAM:  Temp:  [97.7 °F (36.5 °C)-100.5 °F (38.1 °C)] 97.7 °F (36.5 °C)  HR:  [80-91] 82  Resp:  [16-28] 28  BP: ()/(47-61) 104/60  SpO2:  [95 %-100 %] 98 %  Temp (24hrs), Av.5 °F (36.9 °C), Min:97.7 °F (36.5 °C), Max:100.5 °F (38.1 °C)  Current: Temperature: 97.7 °F (36.5 °C)    Intake/Output Summary (Last 24 hours) at 2023 1607  Last data filed at 2023 1545  Gross per 24 hour   Intake 4192.94 ml   Output 1955 ml   Net 2237.94 ml       General Appearance:   Ill-appearing but in no distress   Head:  Normocephalic, without obvious abnormality, atraumatic   Eyes:  Conjunctiva pink and sclera anicteric, both eyes   Nose: Nares normal, mucosa normal, no drainage   Throat: Oropharynx moist without lesions   Neck: Supple, symmetrical, no adenopathy, no tenderness/mass/nodules   Back:   Symmetric, no curvature, ROM normal, no CVA tenderness   Lungs:   Clear to auscultation bilaterally, respirations unlabored   Chest Wall:  No tenderness or deformity   Heart:  RRR; no murmur, rub or gallop   Abdomen/:    Abdomen soft and nontender.   There is scrotal swelling and induration, packing and surgical dressings in place   Extremities: No cyanosis, clubbing or edema   Skin: No rashes or lesions   Lymph nodes: Cervical, supraclavicular nodes normal   Neurologic: Alert and oriented times 3, extremity strength 5/5 and symmetric       LABS, IMAGING, & OTHER STUDIES:  Lab Results:  I have personally reviewed pertinent labs. Results from last 7 days   Lab Units 06/30/23  0257 06/29/23  1354 06/29/23  0327   WBC Thousand/uL 8.99 10.15 11.41*   HEMOGLOBIN g/dL 7.7* 8.6* 10.2*   PLATELETS Thousands/uL 134* 140* 155     Results from last 7 days   Lab Units 06/30/23  0323 06/29/23  1524 06/29/23  0327 06/28/23  1736   SODIUM mmol/L 139 140 138 129*   POTASSIUM mmol/L 3.3* 3.5 3.9 4.1   CHLORIDE mmol/L 109* 110* 109* 91*   CO2 mmol/L 23 22 23 22   BUN mg/dL 17 23 29* 27*   CREATININE mg/dL 0.59* 0.69 0.93 1.16   EGFR ml/min/1.73sq m 106 100 86 66   CALCIUM mg/dL 7.8* 8.1* 8.2* 10.4*   AST U/L  --   --  23 19   ALT U/L  --   --  19 18   ALK PHOS U/L  --   --  60 83     Results from last 7 days   Lab Units 06/29/23  0924 06/28/23 2053 06/28/23 2042 06/28/23  1736   BLOOD CULTURE   --   --   --  No Growth at 24 hrs. No Growth at 24 hrs. GRAM STAIN RESULT  Rare Polys*  2+ Gram positive cocci in pairs*  1+ Gram positive rods* 4+ Gram negative rods*  Rare Epithelial Cells*  1+ Gram positive cocci in pairs* Rare Gram positive rods*  1+ Gram positive cocci in pairs and chains*  No polys seen*  --      Results from last 7 days   Lab Units 06/28/23  1736   PROCALCITONIN ng/ml 28.29*                   Imaging Studies:   I have personally reviewed pertinent imaging study reports and images in PACS.     CT pelvis: Subcutaneous tissue edema in the scrotum and extensive subcutaneous emphysema throughout the scrotum and perineum, compatible with Rohan's gangrene

## 2023-06-30 NOTE — OP NOTE
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     Sharp debridement with scissors, knife and cautery including skin, subcutaneous tissue, and fascia     Final wound dimensions [id number]     Total of 6 radiopaque Kerlix sponges placed in wound as packing     Complications:   None    Procedure and Technique:  Patient was identified in the critical care unit, consent for procedure was tagged as serial and reviewed and he was taken to the operating room. There he was placed under anesthesia, a laryngeal mask airway was placed, and he was placed in lithotomy position with stirrups.   Patient was receiving scheduled antibiotics at the time of operation. Perineum was prepped with Betadine and the previously noted 7 radiopaque sponges were removed and accounted for in their entirety. Prior to commencement of the operation, a timeout checklist was satisfactorily completed and agreed upon by all members of the surgical team.    Perineum was inspected, and scrotal skin was noted to be markedly devitalized. This was debrided sharply with cautery and scissors. Additionally the dartos fascia overlying the erectile tissue of the penis appeared necrotic with overlying slough, this was taken sharply with Metzenbaum scissors. Section was carried out along the right inguinal canal where some purulence was encountered. A counterincision on the right mons pubis was made however this revealed healthy bleeding subcutaneous tissue and no further exploration in this area was proceeded with. Posterior perineum, notably the right ischial anal region, there was markedly devitalized and stool stained subcutaneous tissue. This was debrided back to healthy bleeding tissue. Urology was present in the room and performed a separate brief exam under anesthesia. Following interdisciplinary discussion with their team, the decision was made to return to the operating room at a later date so that they may proceed with urinary diversion and suprapubic tube placement at the time of future debridement. The wound was copiously irrigated with 3 L of saline and a Pulsavac. Hemostasis of the wound bed was achieved with cautery and where necessary 3-0 Vicryl suture ligature. The final wound dimensions measured 26 x 12 x 10 cm and fascial debridement was included    The wound was repacked with a total of 6 saline moistened radiopaque Kerlix sponges all tied together with their locating strings. A dressing consisting of 3 abdominal pads as well as gauze over the counterincision was applied and mesh underwear were applied.   A Flexi-Seal rectal tube was also inserted intraoperatively. At the conclusion of the procedure, all sponge and instrument counts were correct. The patient was awakened on table, his airway was removed, and he was transferred to his hospital bed to be taken back to the ICU in stable condition. Will return to the operating room for serial debridement on 7/1. At that time, urology will also perform their portion of the procedure and a separately dictated panel.   Dr. Rina Sal was present throughout the procedure     Patient Disposition:  Critical Care Unit and extubated and stable        SIGNATURE: Lemuel Nunez MD  DATE: June 30, 2023  TIME: 4:26 PM

## 2023-06-30 NOTE — ANESTHESIA PREPROCEDURE EVALUATION
Procedure:  DEBRIDEMENT WOUND [facility] OUT) (Perineum)    Relevant Problems   CARDIO   (+) HTN (hypertension)      Endocrine   (+) DM (diabetes mellitus) ([address] )      Musculoskeletal and Integument   (+) Rohan's gangrene      Other   (+) Lactic acidosis   (+) Septic shock (HCC)      Insulin infusion 1 U/hr    Linezolid 600 mg q12hr last 6/30/2023 @ 0840  Zosyn 4 5 g q6hr last 6/30/2023 @ 0841  Vancomycin 1500 mg q24hr last 6/30/2023 @ 2032    EKG 6/28/2023:  Normal sinus rhythm  Normal ECG  No previous ECGs available    US Scrotum 6/28/2023:  Extensive air throughout the scrotum as on the earlier CT  This obscures the testes and epididymides      Left testicle only partially imaged, however appears grossly within normal limits      8 4 x 3 7 x 6 3 cm cystic structure in the left side of the scrotum adjacent to the left testicle, likely an epididymal cyst rather than a hydrocele  Ct Pelvis 6/28/2023:  Subcutaneous tissue edema in the scrotum and extensive subcutaneous emphysema throughout the scrotum, extending into the inguinal regions, anterior abdominal wall and the perineal region, compatible with Rohan's gangrene      Some of the aforementioned air extends into the presacral space, and is seen in the right levator ani muscle and the right obturator internus muscle there is also some air in the subcutaneous fat of the proximal right thigh medially      Small left-sided hydrocele      Mostly right-sided pelvic and inguinal lymphadenopathy measuring up to 2 7 x 2 3 cm, likely reactive      Lab Results   Component Value Date    WBC 8 99 06/30/2023    HGB 7 7 (L) 06/30/2023    HCT 21 9 (L) 06/30/2023     (H) 06/30/2023     (L) 06/30/2023     Lab Results   Component Value Date    SODIUM 139 06/30/2023    K 3 3 (L) 06/30/2023     (H) 06/30/2023    CO2 23 06/30/2023    BUN 17 06/30/2023    CREATININE 0 59 (L) 06/30/2023    GLUC 111 06/30/2023    CALCIUM 7 8 (L) 06/30/2023     Lab Results Component Value Date    INR 1 03 06/28/2023    PROTIME 13 3 06/28/2023     Lab Results   Component Value Date    HGBA1C 7 3 (H) 06/30/2023          Physical Exam    Airway    Mallampati score: II         Dental   No notable dental hx     Cardiovascular      Pulmonary      Other Findings        Anesthesia Plan  ASA Score- 3     Anesthesia Type- general with ASA Monitors  Additional Monitors:   Airway Plan: LMA  Comment: I, Dr Cayla Sanders, the attending physician, have personally seen and evaluated the patient prior to anesthetic care  I have reviewed the pre-anesthetic record, and other medical records if appropriate to the anesthetic care  If a CRNA is involved in the case, I have reviewed the CRNA assessment, if present, and agree  The patient is in a suitable condition to proceed with my formulated anesthetic plan          Plan Factors-    Chart reviewed  Induction- intravenous  Postoperative Plan-     Informed Consent- Anesthetic plan and risks discussed with patient  I personally reviewed this patient with the CRNA  Discussed and agreed on the Anesthesia Plan with the CRNA  Carl Martines

## 2023-06-30 NOTE — CASE MANAGEMENT
Case Management Assessment & Discharge Planning Note    Patient name Glynn Jeronimo  Location MICU 11/MICU 11 MRN [id number]  : 1960 Date 2023       Current Admission Date: 2023  Current Admission Diagnosis:Rohan's gangrene   Patient Active Problem List    Diagnosis Date Noted   • DM (diabetes mellitus) ([address]) 2023   • Septic shock ([address]) 2023   • Lactic acidosis 2023   • HTN (hypertension) 2023   • Rohan's gangrene 2023      LOS (days): 1  Geometric Mean LOS (GMLOS) (days):   Days to GMLOS:     OBJECTIVE:    Risk of Unplanned Readmission Score: 12.94         Current admission status: Inpatient       Preferred Pharmacy:   PATIENT/FAMILY REPORTS NO PREFERRED PHARMACY  No address on file      [facility] [address] [place] [address] [place] [address]  Phone: [phone] Fax: [phone]    Primary Care Provider: No primary care provider on file. Primary Insurance: BLUE CROSS  Secondary Insurance:     ASSESSMENT:  Active Health Care Proxies    There are no active Health Care Proxies on file. Readmission Root Cause  30 Day Readmission: No    Patient Information  Admitted from[de-identified] Home  Mental Status: Alert  During Assessment patient was accompanied by: Spouse  Assessment information provided by[de-identified] Patient, Spouse  Primary Caregiver: Self  Support Systems: Self, Spouse/significant other  [place] of Residence: Other (specify in comment box) Berta Dash)  What city do you live in?: [address] entry access options.  Select all that apply.: Stairs  Number of steps to enter home.: 3  Type of Current Residence: [place]  In the last 12 months, was there a time when you were not able to pay the mortgage or rent on time?: No  In the last 12 months, was there a time when you did not have a steady place to sleep or slept in a shelter (including now)?: No  Homeless/housing insecurity resource given?: N/A  Living Arrangements: Lives w/ Spouse/significant other  Is patient a ?: No    Activities of Daily Living Prior to Admission  Functional Status: Independent  Completes ADLs independently?: Yes  Ambulates independently?: Yes  Does patient use assisted devices?: No  Does patient currently own DME?: No  Does patient have a history of Outpatient Therapy (PT/OT)?: Yes (shoulder)  Does the patient have a history of Short-Term Rehab?: No  Does patient have a history of HHC?: No  Does patient currently have [facility]?: No         Patient Information Continued  Income Source: Employed  Within the past 12 months, you worried that your food would run out before you got the money to buy more.: Never true  Within the past 12 months, the food you bought just didn't last and you didn't have money to get more.: Never true  Food insecurity resource given?: N/A  Does patient receive dialysis treatments?: No  Does patient have a history of substance abuse?: No  Does patient have a history of Mental Health Diagnosis?: No         Means of Transportation  Means of Transport to Appts[de-identified] Drives Self  In the past 12 months, has lack of transportation kept you from medical appointments or from getting medications?: No  In the past 12 months, has lack of transportation kept you from meetings, work, or from getting things needed for daily living?: No  Was application for public transport provided?: N/A        DISCHARGE DETAILS:    Discharge planning discussed with[de-identified] patient, wife at bedside  Freedom of Choice: Yes  Comments - Freedom of Choice: OK with blanket referral for [facility]  CM contacted family/caregiver?: Yes  Were Treatment Team discharge recommendations reviewed with patient/caregiver?: Yes  Did patient/caregiver verbalize understanding of patient care needs?: Yes  Were patient/caregiver advised of the risks associated with not following Treatment Team discharge recommendations?: Yes    Contacts  Patient Contacts: Ag Edmonds, wife  Relationship to Patient[de-identified] Family  Contact Method: Phone, In Person  Phone Number: [phone]  Reason/Outcome: Continuity of Care, Emergency Contact, Discharge Planning                        Treatment Team Recommendation: Other (TBD)  Discharge Destination Plan[de-identified] Other (TBD -- likely home with home health)  Transport at Discharge : Family                Patient/caregiver received discharge checklist.  Content reviewed. Patient/caregiver encouraged to participate in discharge plan of care prior to discharge home. CM reviewed d/c planning process including the following: identifying help at home, patient preference for d/c planning needs, Discharge Lounge, [facility] Meds to Bed program, availability of treatment team to discuss questions or concerns patient and/or family may have regarding understanding medications and recognizing signs and symptoms once discharged. CM also encouraged patient to follow up with all recommended appointments after discharge. Patient advised of importance for patient and family to participate in managing patient’s medical well being. Additional Comments: Introduced self and role to patient and wife at bedside. Patient independent at baseline, drives, employed. CM will follow for d/c needs and place referrals as appropriate.

## 2023-06-30 NOTE — PROGRESS NOTES
Daily Progress Note - Critical Care   Demetri Gregory 57 y.o. male MRN: [id number]  Unit/Bed#: MICU 11 Encounter: [id number]        ----------------------------------------------------------------------------------------  HPI/24hr events: Pt is a 57 y.o. male who presents as a transfer from [place] for management of rohan's gangrene of the scrotum/perineum. History of T2DM not on home insulin and groin abscesses that have drained spontaneously. S/p debridement/washout 6/28 and 6/29. No acute events overnight.  ---------------------------------------------------------------------------------------  SUBJECTIVE  Pt reports he is doing well. Pain is well managed. Reports passing flatus. Not ambulating. Urinating with Hodge. Review of systems was reviewed and negative unless stated above in HPI/24-hour events   ---------------------------------------------------------------------------------------  Assessment and Plan:    Neuro:   • Diagnosis: Pain  ? Plan:   - Dilaudid 0.5 mg IV Q3H PRN for breakthrough pain  - Oxycodone 10 mg Q4H PRN for severe pain   - Oxycodone 5 mg Q4H PRN for moderate pain    - Tylenol 650mg Q6H scheduled for pain   • Diagnosis: Delirium precautions   ? Plan:   - CAM-ICU  - Maintain sleep/wake cycle         CV:   • Diagnosis: Septic shock secondary to rohan's gangrene   ? S/p debridement in OR on 6/28, 6/29  ? Lactate has cleared from 4.8  ? Plan:   - Switch clindamycin/vancomycin to Linezolid due to critical shortage  - Consult ID  - Continue to monitor vitals   - Maintain MAP >65 mmHg  - Monitor fever/WBC trend            Pulm:  ? No active issues   ? Encourage IS         GI:   ? No active issues          :   ? Diagnosis: Rohan's Gangrene   ?  6/28 CT pelvis: Subcutaneous tissue edema in the scrotum and extensive subcutaneous emphysema throughout the scrotum, extending into the inguinal regions, anterior abdominal wall and the perineal region, compatible with Rohan's gangrene. Some of the aforementioned air extends into the presacral space, and is seen in the right levator ani muscle and the right obturator internus muscle there is also some air in the subcutaneous fat of the proximal right thigh medially. - S/p debridement 6/28, 6/29   - Plan:   - Plan for OR today  - Strict I's/O's   ? Maintain ramírez   ? Monitor UOP         F/E/N:   • Fluids:  mL/hr   • Electrolytes: Monitor and replete as needed   • Phos and K today  • Nutrition: Clear liquids        Heme/Onc:   ? DVT ppx: Heparin        Endo:   ? Diagnosis: DM  - Plan:   - Insulin gtt  - Received 20 units continuous in last 24 h  - Maintain -180         ID:   • Diagnosis: Septic Shock secondary to sasha's gangrene   ?  Plan:   - S/p debridement in OR 6/28, 6/29  - Switch Zosyn and Vancomycin to Linezolid d/t critical shortage  - Consult ID  - BCx and wound cx pending   - 6/28 BC x2 no growth at 24  - Gram stain 6/29 2+ GPC in pairs & 1+ GPR  - Gram stain 6/28 4+ GNR & 1+ GPC in pairs  - Monitor fever/WBC trend            MSK/Skin:   ? PT/OT when able     Patient appropriate for transfer out of the ICU today?: No  Disposition: Continue Critical Care   Code Status: Level 1 - Full Code  ---------------------------------------------------------------------------------------  ICU CORE MEASURES    Prophylaxis   VTE Pharmacologic Prophylaxis: Heparin  VTE Mechanical Prophylaxis: sequential compression device  Stress Ulcer Prophylaxis: Prophylaxis Not Indicated     Invasive Devices Review  Invasive Devices     Peripheral Intravenous Line  Duration           Peripheral IV 06/28/23 Left;Dorsal (posterior) Hand 1 day    Peripheral IV 06/28/23 Right Antecubital 1 day          Arterial Line  Duration           Arterial Line 06/29/23 Right Radial <1 day          Drain  Duration           Urethral Catheter 20 Fr. 1 day ---------------------------------------------------------------------------------------  OBJECTIVE    Vitals   Vitals:    23 0300 23 0400 23 0500 23 0600   BP: 92/53 102/60 104/58 105/59   Pulse: 80 86 86 86   Resp: 22 (!) 26 (!) 24 (!) 28   Temp: 98.3 °F (36.8 °C)      TempSrc: Oral      SpO2: 97% 96% 97% 96%   Weight:       Height:         Temp (24hrs), Av.1 °F (36.7 °C), Min:97.5 °F (36.4 °C), Max:100.5 °F (38.1 °C)  Current: Temperature: 98.3 °F (36.8 °C)  HR: 86  BP: 105/59  RR: 28  SpO2: 96    Respiratory:  Nasal Cannula O2 Flow Rate (L/min): 2 L/min    Invasive/non-invasive ventilation settings   Respiratory    Lab Data (Last 4 hours)       0240            pH, Arterial       7.509             pCO2, Arterial       28.4             pO2, Arterial       68.5             HCO3, Arterial       22.1             Base Excess, Arterial       -0.5                  O2/Vent Data     None                Physical Exam  Vitals and nursing note reviewed. Constitutional:       General: He is not in acute distress. HENT:      Head: Normocephalic and atraumatic. Mouth/Throat:      Mouth: Mucous membranes are moist.   Eyes:      General: No scleral icterus. Right eye: No discharge. Left eye: No discharge. Cardiovascular:      Rate and Rhythm: Normal rate and regular rhythm. Pulmonary:      Effort: Pulmonary effort is normal.      Breath sounds: Normal breath sounds. No wheezing. Abdominal:      General: Abdomen is flat. There is no distension. Palpations: Abdomen is soft. Tenderness: There is no abdominal tenderness. There is no guarding or rebound. Genitourinary:     Comments: Groin wound covered under sterile gauze and mesh underwear. Appears clean without strikethrough. Musculoskeletal:      Cervical back: Neck supple. Right lower leg: No edema. Left lower leg: No edema. Skin:     General: Skin is warm and dry.       Capillary Refill: Capillary refill takes less than 2 seconds. Neurological:      Mental Status: He is alert and oriented to person, place, and time.    Psychiatric:         Mood and Affect: Mood normal.         Behavior: Behavior normal.           Laboratory and Diagnostics:  Results from last 7 days   Lab Units 06/30/23  0257 06/29/23  1354 06/29/23  0327 06/28/23  2347 06/28/23  1736   WBC Thousand/uL 8.99 10.15 11.41*  --  13.84*   HEMOGLOBIN g/dL 7.7* 8.6* 10.2*  --  14.1   HEMATOCRIT % 21.9* 24.4* 30.1*  --  40.3   PLATELETS Thousands/uL 134* 140* 155 135* 176   BANDS PCT %  --   --   --   --  20*   MONO PCT %  --   --   --   --  7   EOS PCT %  --   --   --   --  0     Results from last 7 days   Lab Units 06/30/23 0323 06/29/23  1524 06/29/23 0327 06/28/23  1736   SODIUM mmol/L 139 140 138 129*   POTASSIUM mmol/L 3.3* 3.5 3.9 4.1   CHLORIDE mmol/L 109* 110* 109* 91*   CO2 mmol/L 23 22 23 22   ANION GAP mmol/L 7 8 6 16   BUN mg/dL 17 23 29* 27*   CREATININE mg/dL 0.59* 0.69 0.93 1.16   CALCIUM mg/dL 7.8* 8.1* 8.2* 10.4*   GLUCOSE RANDOM mg/dL 111 221* 283* 441*   ALT U/L  --   --  19 18   AST U/L  --   --  23 19   ALK PHOS U/L  --   --  60 83   ALBUMIN g/dL  --   --  1.9* 3.6   TOTAL BILIRUBIN mg/dL  --   --  0.51 1.21*     Results from last 7 days   Lab Units 06/30/23 0323 06/29/23  1524 06/29/23  0327   MAGNESIUM mg/dL 2.1 2.2 1.9   PHOSPHORUS mg/dL 1.0*  --  2.7      Results from last 7 days   Lab Units 06/28/23  1736   INR  1.03   PTT seconds 33          Results from last 7 days   Lab Units 06/30/23  0240 06/29/23  1354 06/29/23  0600 06/29/23  0327 06/28/23  2347 06/28/23  1736   LACTIC ACID mmol/L 1.3 1.9 2.2* 2.4* 2.0 4.8*     ABG:  Results from last 7 days   Lab Units 06/30/23  0240   PH ART  7.509*   PCO2 ART mm Hg 28.4*   PO2 ART mm Hg 68.5*   HCO3 ART mmol/L 22.1   BASE EXC ART mmol/L -0.5   ABG SOURCE  Line, Arterial     VBG:  Results from last 7 days   Lab Units 06/30/23  0240 06/29/23  1401 06/28/23  1759   PH REYNALDO   --   --  7.398   PCO2 REYNALDO mm Hg  --   --  33.8*   PO2 REYNALDO mm Hg  --   --  43.6   HCO3 REYNALDO mmol/L  --   --  20.4*   BASE EXC REYNALDO mmol/L  --   --  -3.6   ABG SOURCE  Line, Arterial   < >  --     < > = values in this interval not displayed. Results from last 7 days   Lab Units 06/28/23  1736   PROCALCITONIN ng/ml 28.29*       Micro  Results from last 7 days   Lab Units 06/28/23 2053 06/28/23 2042 06/28/23  1736   BLOOD CULTURE   --   --  No Growth at 24 hrs. No Growth at 24 hrs. GRAM STAIN RESULT  4+ Gram negative rods*  Rare Epithelial Cells*  1+ Gram positive cocci in pairs* Rare Gram positive rods*  1+ Gram positive cocci in pairs and chains*  No polys seen*  --      Imaging: I have personally reviewed pertinent reports. Intake and Output  I/O       06/28 0701  06/29 0700 06/29 0701  06/30 0700    P. O.  222    I.V. (mL/kg)  2584.8 (28)    IV Piggyback 1100 700    Total Intake(mL/kg) 1100 (11.9) 3506.8 (38)    Urine (mL/kg/hr) 450 1790 (0.8)    Blood  100    Total Output 450 1890    Net +650 +1616.8              UOP: .8 ml/kg/hr     Height and Weights   Height: 6' 2" (188 cm)  IBW (Ideal Body Weight): 82.2 kg  Body mass index is 26.1 kg/m². Weight (last 2 days)     Date/Time Weight    06/29/23 0209 92.2 (203.26)            Nutrition       Diet Orders   (From admission, onward)             Start     Ordered    06/30/23 0001  Diet NPO; Sips with meds  Diet effective midnight        References:    Adult Nutrition Support Algorithm    RD Therapeutic Diet Order Protocol   Question Answer Comment   Diet Type NPO    NPO Except: Sips with meds    RD to adjust diet per protocol?  Yes        06/29/23 1047                Active Medications  Scheduled Meds:  Current Facility-Administered Medications   Medication Dose Route Frequency Provider Last Rate   • acetaminophen  650 mg Oral Q6H [address], DO     • heparin (porcine)  5,000 Units Subcutaneous Q8H [address], DO     • HYDROmorphone  0.5 mg Intravenous Q3H PRN Pamalee Hoguet, DO     • insulin regular (HumuLIN R,NovoLIN R) 1 Units/mL in sodium chloride 0.9 % 100 mL infusion  0.3-21 Units/hr Intravenous Titrated Rian Ashraf MD Stopped (06/30/23 0395)   • lactated ringers  150 mL/hr Intravenous Continuous Pamalee Hoguet,  mL/hr (06/30/23 0529)   • linezolid  600 mg Intravenous Q12H Saskia Clayton PA-C     • ondansetron  4 mg Intravenous Q6H PRN Pamalee Hoguet, DO     • oxyCODONE  10 mg Oral Q4H PRN Pamalee Hoguet, DO     • oxyCODONE  5 mg Oral Q4H PRN Pamalee Hoguet, DO     • piperacillin-tazobactam  4.5 g Intravenous Q6H Cesar Sommer,  mL/hr at 06/30/23 1053   • potassium chloride  40 mEq Oral Once Saskia Clayton PA-C     • potassium chloride  20 mEq Intravenous Q2H Sergio Bullock MD 20 mEq (06/30/23 0529)   • potassium phosphate  30 mmol Intravenous Once Saskia Clayton PA-C       Continuous Infusions:  insulin regular (HumuLIN R,NovoLIN R) 1 Units/mL in sodium chloride 0.9 % 100 mL infusion, 0.3-21 Units/hr, Last Rate: Stopped (06/30/23 0528)  lactated ringers, 150 mL/hr, Last Rate: 150 mL/hr (06/30/23 0529)      PRN Meds:   HYDROmorphone, 0.5 mg, Q3H PRN  ondansetron, 4 mg, Q6H PRN  oxyCODONE, 10 mg, Q4H PRN  oxyCODONE, 5 mg, Q4H PRN        Allergies   No Known Allergies  ---------------------------------------------------------------------------------------  Care Time Delivered:   Upon my evaluation, this patient had a high probability of imminent or life-threatening deterioration due to septic shock, which required my direct attention, intervention, and personal management. I have personally provided 30 minutes of critical care time, exclusive of procedures, teaching, family meetings, and any prior time recorded by providers other than myself. Loretta Pilling      Portions of the record may have been created with voice recognition software.   Occasional wrong word or "sound a like" substitutions may have occurred due to the inherent limitations of voice recognition software.   Read the chart carefully and recognize, using context, where substitutions have occurred

## 2023-06-30 NOTE — OCCUPATIONAL THERAPY NOTE
Occupational Therapy Cancel Note       06/30/23 1628   OT Last Visit   OT Visit Date 06/30/23   Note Type   Note type Cancelled Session   Cancel Reasons Patient to operating room         Valentine Patel OT

## 2023-06-30 NOTE — PROGRESS NOTES
Progress Note -General surgery  Sukhdev Sorto 57 y.o. male MRN: [id number]  Unit/Bed#: MICU 11 Encounter: [id number]    Assessment:  57 y.o. male who presented with Rohan's gangrene, is now 1 Day Post-Op s/p his second debridement and washout on 6/29. Patient was transition point transition to MICU yesterday due to hypotension. He received 1 L bolus and responded. He continues to [facility] SERV appropriately. Plan:  - Diet NPO; Sips with meds  - Pain and Nausea control PRN  - Incentive spirometry  - Plan for operative takeback today for subsequent excision and debridement  -Continue IV antibiotics, currently on Vanco, Zosyn, clinda  - Currently on insulin drip  - Will require continued ICU care following his procedure  - Phosphate repletion to 3, currently at 1  - Continue strict I's and O's    Subjective/Objective     Subjective: Patient states that he did have some difficulty with pain this morning. Objective:   Vitals: Blood pressure 105/59, pulse 86, temperature 98.3 °F (36.8 °C), temperature source Oral, resp. rate (!) 28, height 6' 2" (1.88 m), weight 92.2 kg (203 lb 4.2 oz), SpO2 96 %. ,Body mass index is 26.1 kg/m². I/O       06/28 0701  06/29 0700 06/29 0701  06/30 0700    P. O.  222    I.V. (mL/kg)  2584.8 (28)    IV Piggyback 1100 700    Total Intake(mL/kg) 1100 (11.9) 3506.8 (38)    Urine (mL/kg/hr) 450 1790 (0.8)    Blood  100    Total Output 450 1890    Net +650 +1616.8                Physical Exam:  Gen: NAD, Aox3, Comfortable in bed  Chest: Normal work of breathing, no respiratory distress  Abd: Soft, ND, lower abdominal tenderness, and tenderness over the right portion of the mons pubis. Area has some slight erythema without crepitus. : Scrotum and penis is indurated. The removed tissue at the distal border does appear to be necrotic. Ext: No Edema        Lab, Imaging and other studies:   I have personally reviewed pertinent reports.   , CBC with diff:   Lab Results   Component Value Date    WBC 8.99 06/30/2023    HGB 7.7 (L) 06/30/2023    HCT 21.9 (L) 06/30/2023     (H) 06/30/2023     (L) 06/30/2023    RBC 2.20 (L) 06/30/2023    MCH 35.0 (H) 06/30/2023    MCHC 35.2 06/30/2023    RDW 13.2 06/30/2023    MPV 9.9 06/30/2023   , BMP/CMP:   Lab Results   Component Value Date    SODIUM 139 06/30/2023    K 3.3 (L) 06/30/2023     (H) 06/30/2023    CO2 23 06/30/2023    BUN 17 06/30/2023    CREATININE 0.59 (L) 06/30/2023    CALCIUM 7.8 (L) 06/30/2023    EGFR 106 06/30/2023   , Magnesium: No components found for: "MAG", Coags: No results found for: "PT", "PTT", "INR"  VTE Pharmacologic Prophylaxis: Heparin  VTE Mechanical Prophylaxis: sequential compression device        Darby Salcido MD  6/30/2023  6:30 AM

## 2023-06-30 NOTE — UTILIZATION REVIEW
Initial Clinical Review    Admission: Date/Time/Statement:   Admission Orders (From admission, onward)     Ordered        06/29/23 0303  Inpatient Admission  Once                      Orders Placed This Encounter   Procedures   • Inpatient Admission     Standing Status:   Standing     Number of Occurrences:   1     Order Specific Question:   Level of Care     Answer:   Level 2 Stepdown / HOT [14]     Order Specific Question:   Estimated length of stay     Answer:   More than 2 Midnights     Order Specific Question:   Certification     Answer:   I certify that inpatient services are medically necessary for this patient for a duration of greater than two midnights. See H&P and MD Progress Notes for additional information about the patient's course of treatment. ED Arrival Information     Patient not seen in ED                     No chief complaint on file. Initial Presentation: 57 y.o. male with PMH of HTN and DM was transferred from [facility] to [facility] on 06/29 for a higher level of care. Pt initially presented to [facility] ED on 06/28 w/ progressive scrotal swelling and pain x 3 days. 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 Started on IV abx. Underwent extensive debridement of the scrotum and perineum w/ gen surg and urology. He was transferred to [facility] for further surg procedure. On arrival to [facility], pt alert and oriented, ramírez in place, edematous scrotum, wounds dressed with WTD, wound bases without evidence of pricila necrosis, no active bleeding from the wound bed. On 2L NC. Admitted as Inpatient for sasha's gangrene. Plan: NPO w/IVF. cont vanc/zosyn, start clinda. plan to narrow abx pending blood and OR cultures. tentative plan for OR today for debridement and washout. f/u labs. glycemic control, SSI. maintain ramírez. strict I/Os.     OP Note:   SURGERY DATE: 6/29/2023  Procedure(s): DEBRIDEMENT WOUND [facility] OUT)  Anesthesia Type: General  Operative Findings:  Size of the wound:   Post debridement wound dimensions: 16 x 16 x 4 cm, undermines posteriorly 16 cm and superiorly 10 cm. Prior wound dimensions: 14 cm x 14 cm x 4 cm      Wound description: Necrotic appearing fat and subcutaneous tissue throughout wound, debrided down to bleeding tissue     Indicate if the procedure extended to the wound margins? _x_ Yes  ___ No  Indicate if the procedure extended to bleeding tissue? _x_ Yes  ___ No     06/29 Critical Care Consult: Pt was transferred to MICU s/p debridement in OR today. Cont sched and prn pain control. Cont Vanco, Zosyn and clinda. Mon fever/WBC trend. Cont to mon vitals. Maintain MAP >65. Plan for OR tomorrow for debridement with general surgery. Strict I/Os. IVF. Bld and wound cxs pending. DVT ppx. SSI. Date: 06/30   Day 2:   Gen Surg Notes: POD #1 s/p  second debridement and washout. He was transitioned to MICU yesterday due to hypotension. He received 1 L bolus and responded. He continues to [facility] SERV appropriately. He was having some difficulty with pain this morning. On exam, abdomen soft, ND, lower abdominal tenderness, and tenderness over the right portion of the mons pubis. Area has some slight erythema without crepitus. Scrotum and penis is indurated. The removed tissue at the distal border does appear to be necrotic. Plan: NPO; Sips with meds. Pain and Nausea control PRN. IS. Plan for operative takeback today for subsequent excision and debridement. Continue IV antibiotics, currently on Vanco, Zosyn, clinda. Will require continued ICU care following his procedure. Phosphate repletion to 3, currently at 1. Continue strict I's and O's. Critical Care Notes: Pt passing flatus. Not ambulating. Urinating with Hodge. 6/28 BC x2 no growth at 24. Gram stain 6/29 2+ GPC in pairs & 1+ GPR. Gram stain 6/28 4+ GNR & 1+ GPC in pairs. Switch Zosyn and Vancomycin to Linezolid d/t critical shortage. ID consulted.  On Insulin gtt, received 20 units continuous in last 24 h. Maintain -180.      ED Triage Vitals   Temperature Pulse Respirations Blood Pressure SpO2   06/29/23 0159 06/29/23 0159 06/29/23 0159 06/29/23 0159 06/29/23 0159   99.3 °F (37.4 °C) 90 20 95/65 96 %      Temp Source Heart Rate Source Patient Position - Orthostatic VS BP Location FiO2 (%)   06/29/23 0159 06/29/23 0159 06/29/23 0159 06/29/23 0159 --   Axillary Monitor Lying Left arm       Pain Score       06/29/23 0209       No Pain          Wt Readings from Last 1 Encounters:   06/29/23 92.2 kg (203 lb 4.2 oz)     Additional Vital Signs:   Date/Time Temp Pulse Resp BP MAP (mmHg) Arterial Line BP MAP SpO2 Calculated FIO2 (%) - Nasal Cannula O2 Flow Rate (L/min) Nasal Cannula O2 Flow Rate (L/min) O2 Device Cardiac (WDL) Patient Position - Orthostatic VS   06/30/23 0700 -- 84 22 103/57 69 98/44 62 mmHg Abnormal  97 % -- -- -- -- -- --   06/30/23 0600 -- 86 28 Abnormal  105/59 73 118/50 70 mmHg 96 % -- -- -- -- -- --   06/30/23 0500 -- 86 24 Abnormal  104/58 71 112/46 66 mmHg 97 % -- -- -- -- -- --   06/30/23 0400 -- 86 26 Abnormal  102/60 72 106/46 66 mmHg 96 % -- -- -- -- -- --   06/30/23 0300 98.3 °F (36.8 °C) 80 22 92/53 65 98/44 62 mmHg Abnormal  97 % -- -- -- -- -- --   06/30/23 0200 -- 84 23 Abnormal  91/55 65 104/44 64 mmHg Abnormal  99 % -- -- -- -- -- --   06/30/23 0100 -- 90 22 88/47 Abnormal  58 Abnormal  90/48 62 mmHg Abnormal  98 % -- -- -- -- -- --   06/30/23 0000 98.5 °F (36.9 °C) 88 23 Abnormal  103/58 70 114/50 70 mmHg 99 % -- -- -- -- -- --   06/29/23 2200 -- 86 23 Abnormal  86/51 Abnormal  60 Abnormal  100/50 68 mmHg 99 % -- -- -- -- -- --   06/29/23 2100 -- 86 24 Abnormal  96/58 70 102/54 70 mmHg 99 % -- -- -- -- -- --   06/29/23 1912 -- 90 20 94/58 69 93/36 58 mmHg Abnormal  100 % -- -- -- -- -- Lying   06/29/23 1840 100.5 °F (38.1 °C) 90 22 96/55 68 -- -- 100 % -- -- -- -- -- --   06/29/23 1822 98.1 °F (36.7 °C) 91 20 102/58 71 90/42 60 mmHg Abnormal  100 % -- -- -- -- -- Lying   06/29/23 1705 98 °F (36.7 °C) -- 22 102/61 71 102/43 63 mmHg Abnormal  100 % 28 2 L/min 2 L/min Nasal cannula -- Lying   06/29/23 1605 98 °F (36.7 °C) 91 18 105/60 72 102/42 60 mmHg Abnormal  100 % 28 2 L/min 2 L/min Nasal cannula -- Lying   06/29/23 1510 98.1 °F (36.7 °C) 90 21 97/48 Abnormal  73 -- -- 100 % -- -- -- -- -- Lying   06/29/23 1405 -- 90 20 91/58 68 105/43 60 mmHg Abnormal  -- -- -- -- -- -- Lying   06/29/23 1335 97.6 °F (36.4 °C) 93 16 90/54 62 Abnormal  108/46 64 mmHg Abnormal  -- -- -- -- -- -- Lying   06/29/23 1305 97.9 °F (36.6 °C) 92 16 106/62 75 103/48 63 mmHg Abnormal  -- -- -- -- -- -- Lying   06/29/23 1235 98 °F (36.7 °C) 90 20 103/61 75 120/45 65 mmHg 100 % 28 2 L/min 2 L/min Nasal cannula -- Lying   06/29/23 1205 97.5 °F (36.4 °C) 90 16 96/60 72 112/51 69 mmHg -- -- -- -- -- -- Lying   06/29/23 1135 97.9 °F (36.6 °C) 85 24 Abnormal  104/63 74 122/55 73 mmHg 99 % 28 2 L/min 2 L/min Nasal cannula -- Lying   06/29/23 1120 97.5 °F (36.4 °C) 86 19 90/63 72 113/52 71 mmHg 97 % 28 2 L/min 2 L/min Nasal cannula -- Lying   06/29/23 1105 97.9 °F (36.6 °C) 86 20 95/64 74 114/50 69 mmHg 99 % 28 2 L/min 2 L/min Nasal cannula WDL Lying   06/29/23 1036 97.6 °F (36.4 °C) 86 16 110/67 81 118/52 72 mmHg 97 % 28 2 L/min 2 L/min Nasal cannula -- --   06/29/23 1034 97.7 °F (36.5 °C) 86 18 110/67 81 -- -- 97 % 28 -- 2 L/min Nasal cannula WDL --   06/29/23 0710 -- 94 -- 90/58 66 -- -- 97 % -- -- -- -- -- --   06/29/23 0709 98.5 °F (36.9 °C) -- -- -- -- -- -- -- -- -- -- -- -- --   06/29/23 0344 -- -- -- 97/66 76 -- -- -- -- -- -- -- -- --   06/29/23 0304 -- -- -- -- -- -- -- 96 % -- 2 L/min -- Nasal cannula -- --   06/29/23 0209 98.1 °F (36.7 °C) -- -- -- -- -- -- -- -- -- -- -- -- --       Pertinent Labs/Diagnostic Test Results:   06/28  CT pelvis:  Subcutaneous tissue edema in the scrotum and extensive subcutaneous emphysema throughout the scrotum, extending into the inguinal regions, anterior abdominal wall and the perineal region, compatible with Rohan's gangrene.     Some of the aforementioned air extends into the presacral space, and is seen in the right levator ani muscle and the right obturator internus muscle there is also some air in the subcutaneous fat of the proximal right thigh medially.     Small left-sided hydrocele.     Mostly right-sided pelvic and inguinal lymphadenopathy measuring up to 2.7 x 2.3 cm, likely reactive    US scrotum and testicles:  Extensive air throughout the scrotum as on the earlier CT. This obscures the testes and epididymides.     Left testicle only partially imaged, however appears grossly within normal limits.     8.4 x 3.7 x 6.3 cm cystic structure in the left side of the scrotum adjacent to the left testicle, likely an epididymal cyst rather than a hydrocele. EKG result: NSR      Results from last 7 days   Lab Units 06/30/23  0257 06/29/23  1354 06/29/23  0327 06/28/23  2347 06/28/23  1736   WBC Thousand/uL 8.99 10.15 11.41*  --  13.84*   HEMOGLOBIN g/dL 7.7* 8.6* 10.2*  --  14.1   HEMATOCRIT % 21.9* 24.4* 30.1*  --  40.3   PLATELETS Thousands/uL 134* 140* 155   < > 176   BANDS PCT %  --   --   --   --  20*    < > = values in this interval not displayed.          Results from last 7 days   Lab Units 06/30/23  0323 06/29/23  1524 06/29/23  0327 06/28/23  1736   SODIUM mmol/L 139 140 138 129*   POTASSIUM mmol/L 3.3* 3.5 3.9 4.1   CHLORIDE mmol/L 109* 110* 109* 91*   CO2 mmol/L 23 22 23 22   ANION GAP mmol/L 7 8 6 16   BUN mg/dL 17 23 29* 27*   CREATININE mg/dL 0.59* 0.69 0.93 1.16   EGFR ml/min/1.73sq m 106 100 86 66   CALCIUM mg/dL 7.8* 8.1* 8.2* 10.4*   MAGNESIUM mg/dL 2.1 2.2 1.9  --    PHOSPHORUS mg/dL 1.0*  --  2.7  --      Results from last 7 days   Lab Units 06/29/23  0327 06/28/23  1736   AST U/L 23 19   ALT U/L 19 18   ALK PHOS U/L 60 83   TOTAL PROTEIN g/dL 5.8* 8.1   ALBUMIN g/dL 1.9* 3.6   TOTAL BILIRUBIN mg/dL 0.51 1.21*     Results from last 7 days Lab Units 06/30/23  0816 06/30/23  0527 06/30/23  0133 06/29/23  2349 06/29/23  2200 06/29/23  2041 06/29/23  1843 06/29/23  1720 06/29/23  1219 06/29/23  0559 06/28/23  2206 06/28/23  1946   POC GLUCOSE mg/dl 117 101 130 122 150* 159* 190* 216* 211* 257* 256* 292*     Results from last 7 days   Lab Units 06/30/23  0323 06/29/23  1524 06/29/23  0327 06/28/23  1736   GLUCOSE RANDOM mg/dL 111 221* 283* 441*         Results from last 7 days   Lab Units 06/30/23  0553   HEMOGLOBIN A1C % 7.3*   EAG mg/dl 163     BETA-HYDROXYBUTYRATE   Date Value Ref Range Status   06/28/2023 1.1 (H) <0.6 mmol/L Final      Results from last 7 days   Lab Units 06/30/23  0240 06/29/23  1401   PH ART  7.509* 7.504*   PCO2 ART mm Hg 28.4* 27.7*   PO2 ART mm Hg 68.5* 187.1*   HCO3 ART mmol/L 22.1 21.3*   BASE EXC ART mmol/L -0.5 -1.2   O2 CONTENT ART mL/dL 10.7* 13.0*   O2 HGB, ARTERIAL % 93.4* 98.0*   ABG SOURCE  Line, Arterial Line, Arterial     Results from last 7 days   Lab Units 06/28/23  1759   PH REYNALDO  7.398   PCO2 REYNALDO mm Hg 33.8*   PO2 REYNALDO mm Hg 43.6   HCO3 REYNALDO mmol/L 20.4*   BASE EXC REYNALDO mmol/L -3.6   O2 CONTENT REYNALDO ml/dL 14.6   O2 HGB, VENOUS % 77.0                     Results from last 7 days   Lab Units 06/28/23  1736   PROTIME seconds 13.3   INR  1.03   PTT seconds 33         Results from last 7 days   Lab Units 06/28/23  1736   PROCALCITONIN ng/ml 28.29*     Results from last 7 days   Lab Units 06/30/23  0240 06/29/23  1354 06/29/23  0600 06/29/23  0327 06/28/23  2347 06/28/23  1736   LACTIC ACID mmol/L 1.3 1.9 2.2* 2.4* 2.0 4.8*               Results from last 7 days   Lab Units 06/29/23  0327   CLARITY UA  Turbid   COLOR UA  Yellow   SPEC GRAV UA  1.028   PH UA  6.0   GLUCOSE UA mg/dl >=1000 (1%)*   KETONES UA mg/dl Trace*   BLOOD UA  Large*   PROTEIN UA mg/dl 100 (2+)*   NITRITE UA  Negative   BILIRUBIN UA  Negative   UROBILINOGEN UA (BE) mg/dl 2.0*   LEUKOCYTES UA  Elevated glucose may cause decreased leukocyte values.  See urine microscopic for UWBC result*   WBC UA /hpf 2-4*   RBC UA /hpf 30-50*   BACTERIA UA /hpf Occasional   EPITHELIAL CELLS WET PREP /hpf None Seen           Results from last 7 days   Lab Units 06/29/23  0924 06/28/23 2053 06/28/23 2042 06/28/23  1736   BLOOD CULTURE   --   --   --  No Growth at 24 hrs. No Growth at 24 hrs.    GRAM STAIN RESULT  Rare Polys*  2+ Gram positive cocci in pairs*  1+ Gram positive rods* 4+ Gram negative rods*  Rare Epithelial Cells*  1+ Gram positive cocci in pairs* Rare Gram positive rods*  1+ Gram positive cocci in pairs and chains*  No polys seen*  --              Results from last 7 days   Lab Units 06/30/23  0615   VANCOMYCIN RM ug/mL 8.4*       ED Treatment:   Medication Administration - No Administrations Displayed (No Start Event Found)     None        Past Medical History:   Diagnosis Date   • Diabetes mellitus ([address])      Present on Admission:  • Rohan's gangrene      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 male  Admission Orders:  SCD  PT/OT  Continuous pulse ox    Scheduled Medications:  acetaminophen, 650 mg, Oral, Q6H GEN  heparin (porcine), 5,000 Units, Subcutaneous, Q8H GEN  linezolid, 600 mg, Intravenous, Q12H  piperacillin-tazobactam, 4.5 g, Intravenous, Q6H  sodium phosphate, 30 mmol, Intravenous, Once      Continuous IV Infusions:  insulin regular (HumuLIN R,NovoLIN R) 1 Units/mL in sodium chloride 0.9 % 100 mL infusion, 0.3-21 Units/hr, Intravenous, Titrated  lactated ringers, 150 mL/hr, Intravenous, Continuous      PRN Meds:  HYDROmorphone, 0.5 mg, Intravenous, Q3H PRN  ondansetron, 4 mg, Intravenous, Q6H PRN  oxyCODONE, 10 mg, Oral, Q4H PRN 06/30 x 1  oxyCODONE, 5 mg, Oral, Q4H PRN        IP CONSULT TO PHARMACY  IP CONSULT TO SURGICAL CRITICAL CARE  IP CONSULT TO INFECTIOUS DISEASES    Network Utilization Review Department  ATTENTION: Please call with any questions or concerns to [phone] and carefully listen to the prompts so that you are directed to the right person. All voicemails are confidential.  Laney Saha all requests for admission clinical reviews, approved or denied determinations and any other requests to dedicated fax number below belonging to the campus where the patient is receiving treatment.  List of dedicated fax numbers for the Facilities:  [place] DENIALS (Administrative/Medical Necessity) [phone] [facility] [facility] [phone]   [address] [place] [address] Drive [phone]   [facility] [phone] [address] [place] [address] [phone]   [address] [address] [phone]

## 2023-06-30 NOTE — QUICK NOTE
Patient seen and examined. Mentating well, no complaints. Normotensive on arterial line with good tracing. Good urine output, approximately 300 cc in the bag, last charted around 7 PM, grossly greater than 0.5cc/kg/h. Continue current management, if worsens clinically will reexamine and check full set of labs and endpoints. Please do not hesitate to reach out to [facility] AND [facility] surgical ICU resident [facility] text roll with any questions.

## 2023-07-01 ENCOUNTER — ANESTHESIA EVENT (INPATIENT)
Dept: PERIOP | Facility: HOSPITAL | Age: 63
End: 2023-07-01
Payer: COMMERCIAL

## 2023-07-01 ENCOUNTER — ANESTHESIA (INPATIENT)
Dept: PERIOP | Facility: HOSPITAL | Age: 63
End: 2023-07-01
Payer: COMMERCIAL

## 2023-07-01 LAB
ALBUMIN SERPL BCP-MCNC: 1.3 G/DL (ref 3.5–5)
ALP SERPL-CCNC: 70 U/L (ref 46–116)
ALT SERPL W P-5'-P-CCNC: 19 U/L (ref 12–78)
ANION GAP SERPL CALCULATED.3IONS-SCNC: 5 MMOL/L
AST SERPL W P-5'-P-CCNC: 19 U/L (ref 5–45)
BACTERIA TISS AEROBE CULT: ABNORMAL
BILIRUB SERPL-MCNC: 0.48 MG/DL (ref 0.2–1)
BUN SERPL-MCNC: 9 MG/DL (ref 5–25)
CA-I BLD-SCNC: 1.03 MMOL/L (ref 1.12–1.32)
CALCIUM ALBUM COR SERPL-MCNC: 10.1 MG/DL (ref 8.3–10.1)
CALCIUM SERPL-MCNC: 7.9 MG/DL (ref 8.3–10.1)
CHLORIDE SERPL-SCNC: 109 MMOL/L (ref 96–108)
CO2 SERPL-SCNC: 26 MMOL/L (ref 21–32)
CREAT SERPL-MCNC: 0.54 MG/DL (ref 0.6–1.3)
ERYTHROCYTE [DISTWIDTH] IN BLOOD BY AUTOMATED COUNT: 13.7 % (ref 11.6–15.1)
GFR SERPL CREATININE-BSD FRML MDRD: 110 ML/MIN/1.73SQ M
GLUCOSE SERPL-MCNC: 102 MG/DL (ref 65–140)
GLUCOSE SERPL-MCNC: 108 MG/DL (ref 65–140)
GLUCOSE SERPL-MCNC: 141 MG/DL (ref 65–140)
GLUCOSE SERPL-MCNC: 148 MG/DL (ref 65–140)
GLUCOSE SERPL-MCNC: 177 MG/DL (ref 65–140)
GLUCOSE SERPL-MCNC: 217 MG/DL (ref 65–140)
GRAM STN SPEC: ABNORMAL
HCT VFR BLD AUTO: 23.8 % (ref 36.5–49.3)
HGB BLD-MCNC: 8 G/DL (ref 12–17)
MAGNESIUM SERPL-MCNC: 2.1 MG/DL (ref 1.6–2.6)
MCH RBC QN AUTO: 34.2 PG (ref 26.8–34.3)
MCHC RBC AUTO-ENTMCNC: 33.6 G/DL (ref 31.4–37.4)
MCV RBC AUTO: 102 FL (ref 82–98)
PHOSPHATE SERPL-MCNC: 1.3 MG/DL (ref 2.3–4.1)
PLATELET # BLD AUTO: 204 THOUSANDS/UL (ref 149–390)
PMV BLD AUTO: 9.7 FL (ref 8.9–12.7)
POTASSIUM SERPL-SCNC: 3.5 MMOL/L (ref 3.5–5.3)
PROT SERPL-MCNC: 5.3 G/DL (ref 6.4–8.4)
RBC # BLD AUTO: 2.34 MILLION/UL (ref 3.88–5.62)
SODIUM SERPL-SCNC: 140 MMOL/L (ref 135–147)
WBC # BLD AUTO: 12.52 THOUSAND/UL (ref 4.31–10.16)

## 2023-07-01 PROCEDURE — 0YB60ZZ EXCISION OF LEFT INGUINAL REGION, OPEN APPROACH: ICD-10-PCS | Performed by: SURGERY

## 2023-07-01 PROCEDURE — 0YB50ZZ EXCISION OF RIGHT INGUINAL REGION, OPEN APPROACH: ICD-10-PCS | Performed by: SURGERY

## 2023-07-01 PROCEDURE — 83735 ASSAY OF MAGNESIUM: CPT | Performed by: SURGERY

## 2023-07-01 PROCEDURE — 51102 DRAIN BL W/CATH INSERTION: CPT | Performed by: STUDENT IN AN ORGANIZED HEALTH CARE EDUCATION/TRAINING PROGRAM

## 2023-07-01 PROCEDURE — 0VBS0ZZ EXCISION OF PENIS, OPEN APPROACH: ICD-10-PCS | Performed by: SURGERY

## 2023-07-01 PROCEDURE — 99232 SBSQ HOSP IP/OBS MODERATE 35: CPT | Performed by: STUDENT IN AN ORGANIZED HEALTH CARE EDUCATION/TRAINING PROGRAM

## 2023-07-01 PROCEDURE — 80053 COMPREHEN METABOLIC PANEL: CPT | Performed by: SURGERY

## 2023-07-01 PROCEDURE — 99291 CRITICAL CARE FIRST HOUR: CPT | Performed by: SURGERY

## 2023-07-01 PROCEDURE — 11004 DBRDMT SKIN XTRNL GENT&PER: CPT | Performed by: SURGERY

## 2023-07-01 PROCEDURE — 99232 SBSQ HOSP IP/OBS MODERATE 35: CPT | Performed by: SURGERY

## 2023-07-01 PROCEDURE — 82330 ASSAY OF CALCIUM: CPT | Performed by: SURGERY

## 2023-07-01 PROCEDURE — 0YB10ZZ EXCISION OF LEFT BUTTOCK, OPEN APPROACH: ICD-10-PCS | Performed by: SURGERY

## 2023-07-01 PROCEDURE — 0YB00ZZ EXCISION OF RIGHT BUTTOCK, OPEN APPROACH: ICD-10-PCS | Performed by: SURGERY

## 2023-07-01 PROCEDURE — 82948 REAGENT STRIP/BLOOD GLUCOSE: CPT

## 2023-07-01 PROCEDURE — 84100 ASSAY OF PHOSPHORUS: CPT | Performed by: SURGERY

## 2023-07-01 PROCEDURE — 0T9B30Z DRAINAGE OF BLADDER WITH DRAINAGE DEVICE, PERCUTANEOUS APPROACH: ICD-10-PCS | Performed by: STUDENT IN AN ORGANIZED HEALTH CARE EDUCATION/TRAINING PROGRAM

## 2023-07-01 PROCEDURE — 2W16X6Z COMPRESSION OF RIGHT INGUINAL REGION USING PRESSURE DRESSING: ICD-10-PCS | Performed by: SURGERY

## 2023-07-01 PROCEDURE — 85027 COMPLETE CBC AUTOMATED: CPT | Performed by: SURGERY

## 2023-07-01 PROCEDURE — C2627 CATH, SUPRAPUBIC/CYSTOSCOPIC: HCPCS | Performed by: SURGERY

## 2023-07-01 RX ORDER — ONDANSETRON 2 MG/ML
INJECTION INTRAMUSCULAR; INTRAVENOUS AS NEEDED
Status: DISCONTINUED | OUTPATIENT
Start: 2023-07-01 | End: 2023-07-01

## 2023-07-01 RX ORDER — MIDAZOLAM HYDROCHLORIDE 2 MG/2ML
INJECTION, SOLUTION INTRAMUSCULAR; INTRAVENOUS AS NEEDED
Status: DISCONTINUED | OUTPATIENT
Start: 2023-07-01 | End: 2023-07-01

## 2023-07-01 RX ORDER — HYDROMORPHONE HCL/PF 1 MG/ML
SYRINGE (ML) INJECTION AS NEEDED
Status: DISCONTINUED | OUTPATIENT
Start: 2023-07-01 | End: 2023-07-01

## 2023-07-01 RX ORDER — PROPOFOL 10 MG/ML
INJECTION, EMULSION INTRAVENOUS AS NEEDED
Status: DISCONTINUED | OUTPATIENT
Start: 2023-07-01 | End: 2023-07-01

## 2023-07-01 RX ORDER — MAGNESIUM HYDROXIDE 1200 MG/15ML
LIQUID ORAL AS NEEDED
Status: DISCONTINUED | OUTPATIENT
Start: 2023-07-01 | End: 2023-07-01 | Stop reason: HOSPADM

## 2023-07-01 RX ORDER — FENTANYL CITRATE 50 UG/ML
INJECTION, SOLUTION INTRAMUSCULAR; INTRAVENOUS AS NEEDED
Status: DISCONTINUED | OUTPATIENT
Start: 2023-07-01 | End: 2023-07-01

## 2023-07-01 RX ORDER — EPHEDRINE SULFATE 50 MG/ML
INJECTION INTRAVENOUS AS NEEDED
Status: DISCONTINUED | OUTPATIENT
Start: 2023-07-01 | End: 2023-07-01

## 2023-07-01 RX ORDER — INSULIN LISPRO 100 [IU]/ML
1-5 INJECTION, SOLUTION INTRAVENOUS; SUBCUTANEOUS
Status: DISCONTINUED | OUTPATIENT
Start: 2023-07-01 | End: 2023-07-02

## 2023-07-01 RX ORDER — DEXAMETHASONE SODIUM PHOSPHATE 10 MG/ML
INJECTION, SOLUTION INTRAMUSCULAR; INTRAVENOUS AS NEEDED
Status: DISCONTINUED | OUTPATIENT
Start: 2023-07-01 | End: 2023-07-01

## 2023-07-01 RX ORDER — LIDOCAINE HYDROCHLORIDE 10 MG/ML
INJECTION, SOLUTION EPIDURAL; INFILTRATION; INTRACAUDAL; PERINEURAL AS NEEDED
Status: DISCONTINUED | OUTPATIENT
Start: 2023-07-01 | End: 2023-07-01

## 2023-07-01 RX ORDER — INSULIN LISPRO 100 [IU]/ML
1-5 INJECTION, SOLUTION INTRAVENOUS; SUBCUTANEOUS EVERY 6 HOURS SCHEDULED
Status: DISCONTINUED | OUTPATIENT
Start: 2023-07-01 | End: 2023-07-01

## 2023-07-01 RX ORDER — CALCIUM GLUCONATE 20 MG/ML
2 INJECTION, SOLUTION INTRAVENOUS ONCE
Status: COMPLETED | OUTPATIENT
Start: 2023-07-01 | End: 2023-07-01

## 2023-07-01 RX ADMIN — EPHEDRINE SULFATE 5 MG: 50 INJECTION INTRAVENOUS at 15:10

## 2023-07-01 RX ADMIN — PIPERACILLIN SODIUM AND TAZOBACTAM SODIUM 4.5 G: 36; 4.5 INJECTION, POWDER, LYOPHILIZED, FOR SOLUTION INTRAVENOUS at 21:07

## 2023-07-01 RX ADMIN — LINEZOLID 600 MG: 600 INJECTION, SOLUTION INTRAVENOUS at 05:58

## 2023-07-01 RX ADMIN — CALCIUM GLUCONATE 2 G: 20 INJECTION, SOLUTION INTRAVENOUS at 09:25

## 2023-07-01 RX ADMIN — HYDROMORPHONE HYDROCHLORIDE 0.5 MG: 1 INJECTION, SOLUTION INTRAMUSCULAR; INTRAVENOUS; SUBCUTANEOUS at 15:35

## 2023-07-01 RX ADMIN — FENTANYL CITRATE 50 MCG: 50 INJECTION, SOLUTION INTRAMUSCULAR; INTRAVENOUS at 15:15

## 2023-07-01 RX ADMIN — PIPERACILLIN SODIUM AND TAZOBACTAM SODIUM 4.5 G: 36; 4.5 INJECTION, POWDER, LYOPHILIZED, FOR SOLUTION INTRAVENOUS at 09:25

## 2023-07-01 RX ADMIN — PROPOFOL 200 MG: 10 INJECTION, EMULSION INTRAVENOUS at 15:02

## 2023-07-01 RX ADMIN — FENTANYL CITRATE 50 MCG: 50 INJECTION, SOLUTION INTRAMUSCULAR; INTRAVENOUS at 15:35

## 2023-07-01 RX ADMIN — INSULIN LISPRO 1 UNITS: 100 INJECTION, SOLUTION INTRAVENOUS; SUBCUTANEOUS at 17:58

## 2023-07-01 RX ADMIN — DEXAMETHASONE SODIUM PHOSPHATE 10 MG: 10 INJECTION, SOLUTION INTRAMUSCULAR; INTRAVENOUS at 15:04

## 2023-07-01 RX ADMIN — POTASSIUM PHOSPHATE, MONOBASIC AND POTASSIUM PHOSPHATE, DIBASIC 30 MMOL: 224; 236 INJECTION, SOLUTION, CONCENTRATE INTRAVENOUS at 10:55

## 2023-07-01 RX ADMIN — LINEZOLID 600 MG: 600 INJECTION, SOLUTION INTRAVENOUS at 18:49

## 2023-07-01 RX ADMIN — HEPARIN SODIUM 5000 UNITS: 5000 INJECTION INTRAVENOUS; SUBCUTANEOUS at 14:12

## 2023-07-01 RX ADMIN — MIDAZOLAM 2 MG: 1 INJECTION INTRAMUSCULAR; INTRAVENOUS at 14:55

## 2023-07-01 RX ADMIN — HYDROMORPHONE HYDROCHLORIDE 0.5 MG: 1 INJECTION, SOLUTION INTRAMUSCULAR; INTRAVENOUS; SUBCUTANEOUS at 16:31

## 2023-07-01 RX ADMIN — INSULIN LISPRO 1 UNITS: 100 INJECTION, SOLUTION INTRAVENOUS; SUBCUTANEOUS at 21:11

## 2023-07-01 RX ADMIN — PIPERACILLIN SODIUM AND TAZOBACTAM SODIUM 4.5 G: 36; 4.5 INJECTION, POWDER, LYOPHILIZED, FOR SOLUTION INTRAVENOUS at 03:57

## 2023-07-01 RX ADMIN — HEPARIN SODIUM 5000 UNITS: 5000 INJECTION INTRAVENOUS; SUBCUTANEOUS at 06:01

## 2023-07-01 RX ADMIN — SODIUM CHLORIDE, SODIUM LACTATE, POTASSIUM CHLORIDE, AND CALCIUM CHLORIDE 150 ML/HR: .6; .31; .03; .02 INJECTION, SOLUTION INTRAVENOUS at 04:15

## 2023-07-01 RX ADMIN — ACETAMINOPHEN 650 MG: 325 TABLET, FILM COATED ORAL at 12:24

## 2023-07-01 RX ADMIN — PIPERACILLIN SODIUM AND TAZOBACTAM SODIUM 4.5 G: 36; 4.5 INJECTION, POWDER, LYOPHILIZED, FOR SOLUTION INTRAVENOUS at 17:55

## 2023-07-01 RX ADMIN — HEPARIN SODIUM 5000 UNITS: 5000 INJECTION INTRAVENOUS; SUBCUTANEOUS at 21:07

## 2023-07-01 RX ADMIN — ONDANSETRON 4 MG: 2 INJECTION INTRAMUSCULAR; INTRAVENOUS at 15:15

## 2023-07-01 RX ADMIN — ACETAMINOPHEN 650 MG: 325 TABLET, FILM COATED ORAL at 00:19

## 2023-07-01 RX ADMIN — LIDOCAINE HYDROCHLORIDE 100 MG: 10 INJECTION, SOLUTION EPIDURAL; INFILTRATION; INTRACAUDAL; PERINEURAL at 15:02

## 2023-07-01 NOTE — OP NOTE
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 (Active)   Number of days: 0       [REMOVED] Urethral Catheter 20 Fr. (Removed)   Reasons to continue Urinary Catheter  Accurate I&O assessment in critically ill patients (48 hr. 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 Sharp debridement with scissors, knife and cautery including skin, subcutaneous tissue, and fascia      Final wound dimensions [id number]      Total of 6 radiopaque Kerlix sponges placed in wound as packing     Complications:   None    Procedure and Technique:  The patient was identified in the critical care unit, consent for procedure was flagged as serial and reviewed,  and he was taken to the operating room.   There he was placed under anesthesia and a laryngeal mask airway was placed. He was subsequently placed in lithotomy position with stirrups. Hewas receiving scheduled antibiotics at the time of operation. The Perineum was prepped with Betadine and the previously noted 6 radiopaque sponges were removed and accounted for in their entirety. Additionally 4 pieces of adaptic were removed. The lower abdomen in the suprapubic region was prepped and draped with chloraprep which was allowed to dry completely, and Ioban film. Prior to commencement of the operation, a timeout checklist was satisfactorily completed and agreed upon by all members of the surgical team.    In a separately dictated panel by Dr. Sanam Bucio: A 15 Lithuanian suprapubic cystostomy tube was placed with ultrasound and cystoscopic guidance and a diagnostic cystoscopy was performed showing no evidence of full-thickness urethral injury or necrosis. Following completion of this procedure and decompression of the bladder via the new suprapubic tube with removal of the patient's prior Hodge catheter, attention was turned to the debridement of his perineal wound for his Rohan's gangrene. Skin necrosis and purulence of the skin and subcutaneous tissue were noted tracking bilaterally into the inguinal creases retrotesticularly as well as into the gluteal aspect in the right ischioanal region and the left ischial/anal region. These areas were debrided sharply back to healthy bleeding tissue and subsequently hemostasis of the wound bed was achieved with cautery and suture ligature with 3-0 Vicryl where necessary. Final wound measured 28 x 14 x 10 cm in length width and depth respectively to the level of the musculature and deep fascia and penile structures.   The wound was copiously irrigated with 3 L of saline solution using a Pulsavac     Total of 6 radiopaque Kerlix sponges were placed into the wound for packing, these were saline moistened prior to placement and were tied together with their strained ends. Additionally 4 pieces of Adaptic were used to wrap of the bilateral testicles prior to placement of the sponges. Patient was awakened on table without issue, airway removed, new urinary tube maintained to gravity drainage, and he was transferred back to the critical care unit on a hospital bed in stable condition. He will require ongoing debridement   Dr. Dilip Scruggs was present for the entire procedure.     Patient Disposition:  Critical Care Unit and extubated and stable        SIGNATURE: Melinda Sams MD  DATE: July 1, 2023  TIME: 5:25 PM

## 2023-07-01 NOTE — PROGRESS NOTES
Progress Note - Urology  Ebony Edwards 57 y.o. male MRN: [id number]  Unit/Bed#: [facility] Encounter: [id number]    Assessment:  58yoM with Rohan's gangrene with initial debridement 6/28/2023 and takeback (6/30/2023). He continues to clinically improve and remains hemodynamically stable. I discussed Rohan's gangrene with the patient and his wife in detail and my proposed approach to managing this. I recommend suprapubic tube placement and removal of his urethral catheter. We will perform cystourethroscopy to evaluate for mucosal involvement. I anticipate the need for delayed urethral reconstruction. We discussed the implications of urethral excision and that the urethra often cannot be salvaged after this especially if a long segment is removed. I discussed when/why a temporary diverting colostomy would be indicated. I alerted him that we likely would be talking more seriously about this in the coming days. Informed consent was obtained for cystoscopy, suprapubic tube placement, wound debridement, urethral catheter removal, wound VAC placement(when appropriate)    Plan:  • Proceed to the OR  • Patient is on scheduled Zyvox and Zosyn    Subjective: Patient is tired and expresses frustration with going back to the OR so many times in a short window. He understands why is necessary but needs a break. Objective:     Blood pressure 119/64, pulse 82, temperature 97.6 °F (36.4 °C), temperature source Oral, resp. rate 20, height 6' 2" (1.88 m), weight 92.2 kg (203 lb 4.2 oz), SpO2 93 %. ,Body mass index is 26.1 kg/m².       Intake/Output Summary (Last 24 hours) at 7/1/2023 1451  Last data filed at 7/1/2023 1200  Gross per 24 hour   Intake 4052.26 ml   Output 1735 ml   Net 2317.26 ml       Invasive Devices     Peripheral Intravenous Line  Duration           Peripheral IV 06/28/23 Right Antecubital 2 days    Peripheral IV 06/30/23 Left;Ventral (anterior) Forearm <1 day          Drain  Duration Urethral Catheter 20 Fr. 2 days    Rectal Tube With balloon <1 day                Physical Exam:     General:  Healthy appearing male in no acute distress. Cardiovascular:  Regular rate  Respiratory:  Patient has unlabored respirations. Abdomen:  Abdomen nondistended  Extremities: No deformities  Genitourinary: Hodge catheter in place to drainage, urine clear yellow. Wound evaluation deferred to OR    Lab, Imaging and other studies:  CBC:   Lab Results   Component Value Date    WBC 12.52 (H) 07/01/2023    HGB 8.0 (L) 07/01/2023    HCT 23.8 (L) 07/01/2023     (H) 07/01/2023     07/01/2023    RBC 2.34 (L) 07/01/2023    MCH 34.2 07/01/2023    MCHC 33.6 07/01/2023    RDW 13.7 07/01/2023    MPV 9.7 07/01/2023   , CMP:   Lab Results   Component Value Date    SODIUM 140 07/01/2023    K 3.5 07/01/2023     (H) 07/01/2023    CO2 26 07/01/2023    BUN 9 07/01/2023    CREATININE 0.54 (L) 07/01/2023    CALCIUM 7.9 (L) 07/01/2023    AST 19 07/01/2023    ALT 19 07/01/2023    ALKPHOS 70 07/01/2023    EGFR 110 07/01/2023      VTE Pharmacologic Prophylaxis: Heparin  VTE Mechanical Prophylaxis: sequential compression device    Portions of the above record have been created with voice recognition software. Occasional wrong word or "sound alike" substitution may have occurred due to the inherent limitations of voice recognition software. Please read the chart carefully and recognize, using context, where substitution may have occurred. For further clarification, please contact me directly.

## 2023-07-01 NOTE — PROGRESS NOTES
Progress Note - Urology  Tammy Morillo 57 y.o. 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 I discussed the case with Dr. Addison Rivers who performed the initial debridement of the scrotum and periurethral region. The bulbar urethra did have a necrotic appearance on initial examination and per description does not appear to have progressed significantly. The left corpora cavernosum was indeterminate during initial debridement and now appears viable with unhealthy overlying dartos. The patient has a necrotic appearance of the corpora spongiosum at the level of the bulbar urethra. Unfortunately if a significant portion of the urethra is excised and often cannot be salvaged and the patient would have to transition to a diversion. I discussed this case with Dr. Pardeep Villagomez at [address] [place] [address] to facilitate appropriate set up for delayed reconstruction. In some cases the urethra can be preserved even with a necrotic appearance if the patient is hemodynamically stable and clinically improving. Once completely recovered, there are several techniques that can be implemented to allow for urethral reconstruction. I will discuss with the patient/family regarding the options. My tentative plan is for cystourethroscopy, suprapubic tube placement, and urethral catheter removal tomorrow during takeback. Plan:  • Patient repacked by general surgery today. Plan for takeback to the OR tomorrow for reexamination, debridement, cystourethroscopy, suprapubic tube placement, removal of urethral catheter. It is my hope that with the patient no longer requires debridement a wound VAC can be placed.   Given the proximity of his wound to the rectum he will likely require a diverting colostomy    Subjective: Patient examined under anesthesia during his takeback today    Objective:     Blood pressure 119/62, pulse 90, temperature 98 °F (36.7 °C), resp. rate 19, height 6' 2" (1.88 m), weight 92.2 kg (203 lb 4.2 oz), SpO2 98 %. ,Body mass index is 26.1 kg/m². Intake/Output Summary (Last 24 hours) at 6/30/2023 2013  Last data filed at 6/30/2023 1801  Gross per 24 hour   Intake 4192.94 ml   Output 1780 ml   Net 2412.94 ml       Invasive Devices     Peripheral Intravenous Line  Duration           Peripheral IV 06/28/23 Right Antecubital 2 days    Peripheral IV 06/28/23 Left;Dorsal (posterior) Hand 1 day    Peripheral IV 06/30/23 Left;Ventral (anterior) Forearm <1 day          Arterial Line  Duration           Arterial Line 06/29/23 Right Radial 1 day          Drain  Duration           Urethral Catheter 20 Fr. 1 day    Rectal Tube With balloon <1 day                Physical Exam:     General: Intubated and sedated  Respiratory: Mechanically vented  Abdomen:  Abdomen nondistended  Genitourinary: Large perineal wound involving the perirectal space, periurethral and suprapubic region. The corpora spongiosum overlying the bulbar urethra has a pale gray necrotic appearance that does not appear to be progressive based on reports of previous exam.  Bilateral corpora cavernosa however viable appearance. The testicles are healthy bilaterally. Lab, Imaging and other studies:  CBC:   Lab Results   Component Value Date    WBC 15.75 (H) 06/30/2023    HGB 8.0 (L) 06/30/2023    HCT 23.4 (L) 06/30/2023    MCV 98 06/30/2023     06/30/2023    RBC 2.40 (L) 06/30/2023    MCH 33.3 06/30/2023    MCHC 34.2 06/30/2023    RDW 13.4 06/30/2023    MPV 9.8 06/30/2023   , CMP:   Lab Results   Component Value Date    SODIUM 140 06/30/2023    K 4.1 06/30/2023     (H) 06/30/2023    CO2 23 06/30/2023    BUN 13 06/30/2023    CREATININE 0.53 (L) 06/30/2023    CALCIUM 7.5 (L) 06/30/2023    EGFR 111 06/30/2023       Portions of the above record have been created with voice recognition software.  Occasional wrong word or "sound alike" substitution may have occurred due to the inherent limitations of voice recognition software. Please read the chart carefully and recognize, using context, where substitution may have occurred. For further clarification, please contact me directly.

## 2023-07-01 NOTE — ANESTHESIA POSTPROCEDURE EVALUATION
Post-Op Assessment Note    CV Status:  Stable  Pain Score: 0    Pain management: adequate     Mental Status:  Alert and awake   Hydration Status:  Euvolemic   PONV Controlled:  Controlled   Airway Patency:  Patent      Post Op Vitals Reviewed: Yes      Staff: CRNA         No notable events documented      BP   119/78   Temp   97 8   Pulse  87   Resp   18   SpO2   95

## 2023-07-01 NOTE — CASE MANAGEMENT
Case Management Discharge Planning Note    Patient name Ebony Edwards  Location OR POOL/OR POOL MRN [id number]  : 1960 Date 2023       Current Admission Date: 2023  Current Admission Diagnosis:Rohan's gangrene   Patient Active Problem List    Diagnosis Date Noted   • DM (diabetes mellitus) ([address]) 2023   • Septic shock ([address]) 2023   • Lactic acidosis 2023   • HTN (hypertension) 2023   • Rohan's gangrene 2023      LOS (days): 2  Geometric Mean LOS (GMLOS) (days):   Days to GMLOS:     OBJECTIVE:  Risk of Unplanned Readmission Score: 13.14         Current admission status: Inpatient   Preferred Pharmacy:   PATIENT/FAMILY REPORTS NO PREFERRED PHARMACY  No address on file      [facility]  [address] [address]  Phone: [phone] Fax: [phone]    Primary Care Provider: No primary care provider on file.     Primary Insurance: BLUE CROSS  Secondary Insurance:     DISCHARGE DETAILS:

## 2023-07-01 NOTE — ANESTHESIA PREPROCEDURE EVALUATION
Procedure:  DEBRIDEMENT WOUND AND DRESSING CHANGE ([address] OUT) (Perineum)  CYSTOSTOMY SUPRAPUBIC OPEN (Bladder)     #Rohan's gangrene s/p multiple debridements - procedure above  #Septic shock - resolved  -no pressors, remains HD stable    Lab Results   Component Value Date    WBC 12 52 (H) 07/01/2023    HGB 8 0 (L) 07/01/2023    HCT 23 8 (L) 07/01/2023     (H) 07/01/2023     07/01/2023     Ionized calcium 1 03    Relevant Problems   CARDIO   (+) HTN (hypertension)      EKG (06/2023): NSR    LMA Placement Date: 06/30/23; Placement Time: 1424; Mask Ventilation: Mask ventilation not attempted (0); Brand: LMA; Size: 4; Insertion Attempts: 1; Placement Verify: Auscultation, End tidal CO2; Removal Date: 06/30/23; Removal Time: 1558         Physical Exam    Airway    Mallampati score: III  TM Distance: >3 FB  Neck ROM: full     Dental   No notable dental hx     Cardiovascular  Rhythm: regular, Rate: normal,     Pulmonary  Breath sounds clear to auscultation,     Other Findings  Intercisor Distance > 3cm          Anesthesia Plan  ASA Score- 3     Anesthesia Type- general with ASA Monitors  Additional Monitors:   Airway Plan: LMA  Comment: Discussed benefits/risks of general anesthesia including possibility of mouth/throat pain, injury to lips/teeth, nausea/vomiting, and surgical pain along with more rare complications such as stroke, MI, pneumonia, aspiration, and injury to blood vessels  Patient understands and wishes to proceed  All questions answered          Plan Factors-Exercise tolerance (METS): >4 METS  Chart reviewed  EKG reviewed  Existing labs reviewed  Induction- intravenous  Postoperative Plan- Plan for postoperative opioid use  Planned trial extubation    Informed Consent- Anesthetic plan and risks discussed with patient  I personally reviewed this patient with the CRNA  Discussed and agreed on the Anesthesia Plan with the CRNA  Jevon Guillen

## 2023-07-01 NOTE — PROGRESS NOTES
Progress Note -General surgery  Javier Villanueva 57 y.o. male MRN: [id number]  Unit/Bed#: MICU 11 Encounter: [id number]    Assessment:  57 y.o. male with Rohan's gangrene, is now s/p his second debridement and washout on 6/29. He continues his broad-spectrum antibiotics and his pain is better controlled overnight. Patient remained hemodynamically stable overnight. Plan:  - Diet NPO; Sips with meds  - Pain and Nausea control PRN  - Incentive spirometry  - OOB, ambulate  -Plan for operative takeback today for subsequent debridement and washout, and possible suprapubic catheterization with urology  - Continue Zosyn and linezolid  - ID following    Subjective/Objective     Subjective: Patient remained stable with no complaints overnight. Objective:   Vitals: Blood pressure 107/63, pulse 82, temperature 98 °F (36.7 °C), temperature source Oral, resp. rate (!) 30, height 6' 2" (1.88 m), weight 92.2 kg (203 lb 4.2 oz), SpO2 96 %. ,Body mass index is 26.1 kg/m². I/O       06/29 0701  06/30 0700 06/30 0701  07/01 0700 07/01 0701  07/02 0700    P. O. 222      I.V. (mL/kg) 2584.8 (28) 3723.4 (40.4)     IV Piggyback 700 950     Total Intake(mL/kg) 3506.8 (38) 4673.4 (50.7)     Urine (mL/kg/hr) 1790 (0.8) 1725 (0.8)     Stool  0     Blood 100      Total Output 1890 1725     Net +1616.8 +2948. 4                  Physical Exam:  Gen: NAD, Aox3, Comfortable in bed  Chest: Normal work of breathing, no respiratory distress  Abd: Soft, ND,  lower abdominal tenderness. Patient is tender in the right mons pubis where a counterincision was made. : Scrotum and testicles are tender with area of induration. Surgical site is dressed with no obvious strikethrough  Ext: No Edema  Skin: Warm, Dry, Intact      Lab, Imaging and other studies:   I have personally reviewed pertinent reports.   , CBC with diff:   Lab Results   Component Value Date    WBC 12.52 (H) 07/01/2023    HGB 8.0 (L) 07/01/2023    HCT 23.8 (L) 07/01/2023    MCV 102 (H) 07/01/2023     07/01/2023    RBC 2.34 (L) 07/01/2023    MCH 34.2 07/01/2023    MCHC 33.6 07/01/2023    RDW 13.7 07/01/2023    MPV 9.7 07/01/2023   , BMP/CMP:   Lab Results   Component Value Date    SODIUM 140 07/01/2023    K 3.5 07/01/2023     (H) 07/01/2023    CO2 26 07/01/2023    BUN 9 07/01/2023    CREATININE 0.54 (L) 07/01/2023    CALCIUM 7.9 (L) 07/01/2023    AST 19 07/01/2023    ALT 19 07/01/2023    ALKPHOS 70 07/01/2023    EGFR 110 07/01/2023   , Magnesium: No components found for: "MAG"  VTE Pharmacologic Prophylaxis: Heparin  VTE Mechanical Prophylaxis: sequential compression device        Theresa Redding MD  7/1/2023  9:18 AM

## 2023-07-01 NOTE — PROGRESS NOTES
Daily Progress Note - Critical Care   Nighat Willingham 57 y.o. 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 6/28 washout. Transferred to [facility].     ---------------------------------------------------------------------------------------  SUBJECTIVE  No acute events overnight. Pt seen and evaluated at bedside. No complaints    Review of Systems  Review of systems was reviewed and negative unless stated above in HPI/24-hour events   ---------------------------------------------------------------------------------------  Assessment and Plan:      Neuro:   • Diagnosis: Pain  ? Plan:   - Dilaudid 0.5 mg IV Q3H PRN for breakthrough pain  - Oxycodone 10 mg Q4H PRN for severe pain   - Oxycodone 5 mg Q4H PRN for moderate pain    - Tylenol 650mg Q6H scheduled for pain   • Diagnosis: Delirium precautions   ? Plan:   - CAM-ICU  - Maintain sleep/wake cycle         CV:   • Diagnosis: Septic shock secondary to rohan's gangrene   ? S/p debridement in OR on 6/28, 6/29  ? Lactate has cleared from 4.8  ? Plan:   - Switch clindamycin/vancomycin to Linezolid due to critical shortage  - Consult ID  - Continue to monitor vitals   - Maintain MAP >65 mmHg  - Monitor fever/WBC trend            Pulm:  ? No active issues   ? Encourage IS         GI:   ? No active issues          :   ? Diagnosis: Rohan's Gangrene   ? 6/28 CT pelvis: Subcutaneous tissue edema in the scrotum and extensive subcutaneous emphysema throughout the scrotum, extending into the inguinal regions, anterior abdominal wall and the perineal region, compatible with Rohan's gangrene.  Some of the aforementioned air extends into the presacral space, and is seen in the right levator ani muscle and the right obturator internus muscle there is also some air in the subcutaneous fat of the proximal right thigh medially. - S/p debridement 6/28, 6/29, 6/30   - Plan:   - Plan for OR today 7/1  - Strict I's/O's   ? Maintain ramírez   ? Monitor UOP         F/E/N:   • Fluids:  mL/hr   • Electrolytes: Monitor and replete as needed   • Nutrition: Clear liquids. NPO today for OR        Heme/Onc:   ? DVT ppx: Heparin        Endo:   ? Diagnosis: DM  - Plan:   - Insulin gtt  - Maintain -180         ID:   • Diagnosis: Septic Shock secondary to sasha's gangrene   ? Plan:   - S/p debridement in OR 6/28, 6/29, 6/30  - Plan OR today 7/1 for washout/debridement  - Switch Zosyn and Vancomycin to Linezolid d/t critical shortage  - Consult ID  - BCx and wound cx pending   - 6/28 BC x2 no growth at 24  - Gram stain 6/29 2+ GPC in pairs & 1+ GPR  - Gram stain 6/28 4+ GNR & 1+ GPC in pairs  - Monitor fever/WBC trend          Patient appropriate for transfer out of the ICU today?: No  Disposition: Continue Critical Care   Code Status: Level 1 - Full Code  ---------------------------------------------------------------------------------------  ICU CORE MEASURES    Prophylaxis   VTE Pharmacologic Prophylaxis: Heparin  VTE Mechanical Prophylaxis: sequential compression device  Stress Ulcer Prophylaxis: Prophylaxis Not Indicated     ABCDE Protocol (if indicated)  Plan to perform spontaneous awakening trial today? Yes No  Plan to perform spontaneous breathing trial today? Yes  Obvious barriers to extubation?  Not applicable  CAM-ICU: Negative    Invasive Devices Review  Invasive Devices     Peripheral Intravenous Line  Duration           Peripheral IV 06/28/23 Left;Dorsal (posterior) Hand 2 days    Peripheral IV 06/28/23 Right Antecubital 2 days    Peripheral IV 06/30/23 Left;Ventral (anterior) Forearm <1 day          Arterial Line  Duration           Arterial Line 06/29/23 Right Radial 1 day          Drain  Duration           Urethral Catheter 20 Fr. 2 days    Rectal Tube With balloon <1 day Can any invasive devices be discontinued today? Not applicable  ---------------------------------------------------------------------------------------  OBJECTIVE    Vitals   Vitals:    23 0100 23 0200 23 0400 23 0412   BP: 106/52 99/54 106/57    BP Location:       Pulse: 80 76 76 76   Resp: 17 16 19 21   Temp:    98.5 °F (36.9 °C)   TempSrc:    Oral   SpO2: 97% 95% 95% 93%   Weight:       Height:         Temp (24hrs), Av.2 °F (36.8 °C), Min:97.7 °F (36.5 °C), Max:98.6 °F (37 °C)  Current: Temperature: 98.5 °F (36.9 °C)  HR: 76  BP: 80/68 A line MAP 74  RR: 21  SpO2: 93%    Respiratory:  SpO2: SpO2: 93 %  Nasal Cannula O2 Flow Rate (L/min): 2 L/min    Invasive/non-invasive ventilation settings   Respiratory    Lab Data (Last 4 hours)    None         O2/Vent Data (Last 4 hours)    None                Physical Exam  Vitals and nursing note reviewed. Constitutional:       General: He is not in acute distress. Appearance: Normal appearance. He is not ill-appearing. HENT:      Head: Normocephalic and atraumatic. Mouth/Throat:      Mouth: Mucous membranes are moist.   Eyes:      Extraocular Movements: Extraocular movements intact. Cardiovascular:      Rate and Rhythm: Normal rate and regular rhythm. Pulmonary:      Effort: Pulmonary effort is normal.      Breath sounds: Normal breath sounds. Abdominal:      Palpations: Abdomen is soft. Tenderness: There is no abdominal tenderness. Genitourinary:     Comments: Surgical dressings in place  Musculoskeletal:         General: Normal range of motion. Cervical back: Normal range of motion. Skin:     General: Skin is warm and dry. Neurological:      General: No focal deficit present. Mental Status: He is alert and oriented to person, place, and time.                Laboratory and Diagnostics:  Results from last 7 days   Lab Units 23  0608 23  1654 23  0257 23  1354 06/29/23  0327 06/28/23  2347 06/28/23  1736   WBC Thousand/uL 12.52* 15.75* 8.99 10.15 11.41*  --  13.84*   HEMOGLOBIN g/dL 8.0* 8.0* 7.7* 8.6* 10.2*  --  14.1   HEMATOCRIT % 23.8* 23.4* 21.9* 24.4* 30.1*  --  40.3   PLATELETS Thousands/uL 204 191 134* 140* 155 135* 176   BANDS PCT %  --   --   --   --   --   --  20*   MONO PCT %  --   --   --   --   --   --  7   EOS PCT %  --   --   --   --   --   --  0     Results from last 7 days   Lab Units 06/30/23  1654 06/30/23 0323 06/29/23  1524 06/29/23 0327 06/28/23  1736   SODIUM mmol/L 140 139 140 138 129*   POTASSIUM mmol/L 4.1 3.3* 3.5 3.9 4.1   CHLORIDE mmol/L 109* 109* 110* 109* 91*   CO2 mmol/L 23 23 22 23 22   ANION GAP mmol/L 8 7 8 6 16   BUN mg/dL 13 17 23 29* 27*   CREATININE mg/dL 0.53* 0.59* 0.69 0.93 1.16   CALCIUM mg/dL 7.5* 7.8* 8.1* 8.2* 10.4*   GLUCOSE RANDOM mg/dL 168* 111 221* 283* 441*   ALT U/L  --   --   --  19 18   AST U/L  --   --   --  23 19   ALK PHOS U/L  --   --   --  60 83   ALBUMIN g/dL  --   --   --  1.9* 3.6   TOTAL BILIRUBIN mg/dL  --   --   --  0.51 1.21*     Results from last 7 days   Lab Units 07/01/23  0608 06/30/23 0323 06/29/23  1524 06/29/23  0327   MAGNESIUM mg/dL 2.1 2.1 2.2 1.9   PHOSPHORUS mg/dL  --  1.0*  --  2.7      Results from last 7 days   Lab Units 06/28/23  1736   INR  1.03   PTT seconds 33          Results from last 7 days   Lab Units 06/30/23  1654 06/30/23  0240 06/29/23  1354 06/29/23  0600 06/29/23  0327 06/28/23  2347 06/28/23  1736   LACTIC ACID mmol/L 1.2 1.3 1.9 2.2* 2.4* 2.0 4.8*     ABG:  Results from last 7 days   Lab Units 06/30/23  0240   PH ART  7.509*   PCO2 ART mm Hg 28.4*   PO2 ART mm Hg 68.5*   HCO3 ART mmol/L 22.1   BASE EXC ART mmol/L -0.5   ABG SOURCE  Line, Arterial     VBG:  Results from last 7 days   Lab Units 06/30/23  0240 06/29/23  1401 06/28/23  1759   PH REYNALDO   --   --  7.398   PCO2 REYNALDO mm Hg  --   --  33.8*   PO2 REYNALDO mm Hg  --   --  43.6   HCO3 REYNALDO mmol/L  --   --  20.4*   BASE EXC REYNALDO mmol/L  --   --  -3.6   ABG SOURCE  Line, Arterial   < >  --     < > = values in this interval not displayed. Results from last 7 days   Lab Units 06/28/23  1736   PROCALCITONIN ng/ml 28.29*       Micro  Results from last 7 days   Lab Units 06/29/23  0924 06/28/23 2053 06/28/23 2042 06/28/23  1736   BLOOD CULTURE   --   --   --  No Growth at 48 hrs. No Growth at 48 hrs. GRAM STAIN RESULT  Rare Polys*  2+ Gram positive cocci in pairs*  1+ Gram positive rods* 4+ Gram negative rods*  Rare Epithelial Cells*  1+ Gram positive cocci in pairs* Rare Gram positive rods*  1+ Gram positive cocci in pairs and chains*  No polys seen*  --        EKG: n/a  Imaging: n/a I have personally reviewed pertinent reports. Intake and Output  I/O       06/29 0701  06/30 0700 06/30 0701  07/01 0700    P. O. 222     I.V. (mL/kg) 2584.8 (28) 3458.4 (37.5)    IV Piggyback 700 850    Total Intake(mL/kg) 3506.8 (38) 4308.4 (46.7)    Urine (mL/kg/hr) 1790 (0.8) 1600 (0.7)    Stool  0    Blood 100     Total Output 1890 1600    Net +1616.8 +2708.4              UOP: 1.6L 24 hr     Height and Weights   Height: 6' 2" (188 cm)  IBW (Ideal Body Weight): 82.2 kg  Body mass index is 26.1 kg/m². Weight (last 2 days)     Date/Time Weight    06/29/23 0209 92.2 (203.26)            Nutrition       Diet Orders   (From admission, onward)             Start     Ordered    07/01/23 0001  Diet NPO; Sips with meds  Diet effective midnight        References:    Adult Nutrition Support Algorithm    RD Therapeutic Diet Order Protocol   Question Answer Comment   Diet Type NPO    NPO Except: Sips with meds    RD to adjust diet per protocol?  Yes        06/30/23 1624                  Active Medications  Scheduled Meds:  Current Facility-Administered Medications   Medication Dose Route Frequency Provider Last Rate   • acetaminophen  650 mg Oral [facility] Wilfrid Cuff, PA-C     • heparin (porcine)  5,000 Units Subcutaneous [facility] Wilfrid Contreras PA-C     • HYDROmorphone  0.5 mg Intravenous Q3H PRN Sonu Norman PA-C     • insulin regular (HumuLIN R,NovoLIN R) 1 Units/mL in sodium chloride 0.9 % 100 mL infusion  0.3-21 Units/hr Intravenous Titrated Sonu Norman PA-C Stopped (07/01/23 0354)   • lactated ringers  150 mL/hr Intravenous Continuous Sonu Norman PA-C 150 mL/hr (07/01/23 0415)   • linezolid  600 mg Intravenous Q12H Abel Garcia MD 0 mg (07/01/23 0414)   • ondansetron  4 mg Intravenous Q6H PRN Sonu Norman PA-C     • oxyCODONE  10 mg Oral Q4H PRN Sonu Norman PA-C     • oxyCODONE  5 mg Oral Q4H PRN Sonu Norman PA-C     • piperacillin-tazobactam  4.5 g Intravenous Q6H Sonu Norman PA-C 4.5 g (07/01/23 0357)     Continuous Infusions:  insulin regular (HumuLIN R,NovoLIN R) 1 Units/mL in sodium chloride 0.9 % 100 mL infusion, 0.3-21 Units/hr, Last Rate: Stopped (07/01/23 0354)  lactated ringers, 150 mL/hr, Last Rate: 150 mL/hr (07/01/23 0415)      PRN Meds:   HYDROmorphone, 0.5 mg, Q3H PRN  ondansetron, 4 mg, Q6H PRN  oxyCODONE, 10 mg, Q4H PRN  oxyCODONE, 5 mg, Q4H PRN        Allergies   No Known Allergies  ---------------------------------------------------------------------------------------  Advance Directive and Living Will:      Power of :    POLST:    ---------------------------------------------------------------------------------------  Care Time Delivered:   n/a    Yuniel Males, DO      Portions of the record may have been created with voice recognition software. Occasional wrong word or "sound a like" substitutions may have occurred due to the inherent limitations of voice recognition software.   Read the chart carefully and recognize, using context, where substitutions have occurred

## 2023-07-02 ENCOUNTER — ANESTHESIA EVENT (INPATIENT)
Dept: PERIOP | Facility: HOSPITAL | Age: 63
DRG: 709 | End: 2023-07-02
Payer: COMMERCIAL

## 2023-07-02 ENCOUNTER — ANESTHESIA (INPATIENT)
Dept: PERIOP | Facility: HOSPITAL | Age: 63
DRG: 709 | End: 2023-07-02
Payer: COMMERCIAL

## 2023-07-02 PROBLEM — N36.9 URETHRAL DISORDER: Status: ACTIVE | Noted: 2023-07-02

## 2023-07-02 PROBLEM — Z93.59 SUPRAPUBIC CATHETER (HCC): Status: ACTIVE | Noted: 2023-07-02

## 2023-07-02 PROBLEM — R65.21 SEPTIC SHOCK (HCC): Status: RESOLVED | Noted: 2023-06-29 | Resolved: 2023-07-02

## 2023-07-02 PROBLEM — R73.9 HYPERGLYCEMIA: Status: ACTIVE | Noted: 2023-07-02

## 2023-07-02 PROBLEM — A41.9 SEPTIC SHOCK (HCC): Status: RESOLVED | Noted: 2023-06-29 | Resolved: 2023-07-02

## 2023-07-02 LAB
ANION GAP SERPL CALCULATED.3IONS-SCNC: 6 MMOL/L
BACTERIA SPEC ANAEROBE CULT: ABNORMAL
BACTERIA SPEC ANAEROBE CULT: ABNORMAL
BUN SERPL-MCNC: 14 MG/DL (ref 5–25)
CA-I BLD-SCNC: 1.06 MMOL/L (ref 1.12–1.32)
CALCIUM SERPL-MCNC: 7.9 MG/DL (ref 8.3–10.1)
CHLORIDE SERPL-SCNC: 108 MMOL/L (ref 96–108)
CO2 SERPL-SCNC: 24 MMOL/L (ref 21–32)
CREAT SERPL-MCNC: 0.58 MG/DL (ref 0.6–1.3)
ERYTHROCYTE [DISTWIDTH] IN BLOOD BY AUTOMATED COUNT: 13.8 % (ref 11.6–15.1)
GFR SERPL CREATININE-BSD FRML MDRD: 107 ML/MIN/1.73SQ M
GLUCOSE SERPL-MCNC: 211 MG/DL (ref 65–140)
GLUCOSE SERPL-MCNC: 211 MG/DL (ref 65–140)
GLUCOSE SERPL-MCNC: 216 MG/DL (ref 65–140)
GLUCOSE SERPL-MCNC: 221 MG/DL (ref 65–140)
GLUCOSE SERPL-MCNC: 232 MG/DL (ref 65–140)
GLUCOSE SERPL-MCNC: 268 MG/DL (ref 65–140)
HCT VFR BLD AUTO: 25.6 % (ref 36.5–49.3)
HGB BLD-MCNC: 8.3 G/DL (ref 12–17)
MCH RBC QN AUTO: 33.3 PG (ref 26.8–34.3)
MCHC RBC AUTO-ENTMCNC: 32.4 G/DL (ref 31.4–37.4)
MCV RBC AUTO: 103 FL (ref 82–98)
PHOSPHATE SERPL-MCNC: 3.1 MG/DL (ref 2.3–4.1)
PLATELET # BLD AUTO: 287 THOUSANDS/UL (ref 149–390)
PMV BLD AUTO: 10.1 FL (ref 8.9–12.7)
POTASSIUM SERPL-SCNC: 4.1 MMOL/L (ref 3.5–5.3)
RBC # BLD AUTO: 2.49 MILLION/UL (ref 3.88–5.62)
SODIUM SERPL-SCNC: 138 MMOL/L (ref 135–147)
WBC # BLD AUTO: 15.72 THOUSAND/UL (ref 4.31–10.16)

## 2023-07-02 PROCEDURE — 80048 BASIC METABOLIC PNL TOTAL CA: CPT | Performed by: SURGERY

## 2023-07-02 PROCEDURE — 0T2BX0Z CHANGE DRAINAGE DEVICE IN BLADDER, EXTERNAL APPROACH: ICD-10-PCS | Performed by: STUDENT IN AN ORGANIZED HEALTH CARE EDUCATION/TRAINING PROGRAM

## 2023-07-02 PROCEDURE — 85027 COMPLETE CBC AUTOMATED: CPT | Performed by: SURGERY

## 2023-07-02 PROCEDURE — 2W17X6Z COMPRESSION OF LEFT INGUINAL REGION USING PRESSURE DRESSING: ICD-10-PCS | Performed by: SURGERY

## 2023-07-02 PROCEDURE — 99232 SBSQ HOSP IP/OBS MODERATE 35: CPT | Performed by: SURGERY

## 2023-07-02 PROCEDURE — 82948 REAGENT STRIP/BLOOD GLUCOSE: CPT

## 2023-07-02 PROCEDURE — 99291 CRITICAL CARE FIRST HOUR: CPT | Performed by: SURGERY

## 2023-07-02 PROCEDURE — 82330 ASSAY OF CALCIUM: CPT | Performed by: SURGERY

## 2023-07-02 PROCEDURE — 0JB90ZZ EXCISION OF BUTTOCK SUBCUTANEOUS TISSUE AND FASCIA, OPEN APPROACH: ICD-10-PCS | Performed by: SURGERY

## 2023-07-02 PROCEDURE — 84100 ASSAY OF PHOSPHORUS: CPT | Performed by: SURGERY

## 2023-07-02 PROCEDURE — 11004 DBRDMT SKIN XTRNL GENT&PER: CPT | Performed by: SURGERY

## 2023-07-02 RX ORDER — PROMETHAZINE HYDROCHLORIDE 25 MG/ML
6.25 INJECTION, SOLUTION INTRAMUSCULAR; INTRAVENOUS ONCE AS NEEDED
Status: DISCONTINUED | OUTPATIENT
Start: 2023-07-02 | End: 2023-07-02 | Stop reason: HOSPADM

## 2023-07-02 RX ORDER — FENTANYL CITRATE/PF 50 MCG/ML
25 SYRINGE (ML) INJECTION
Status: DISCONTINUED | OUTPATIENT
Start: 2023-07-02 | End: 2023-07-02 | Stop reason: HOSPADM

## 2023-07-02 RX ORDER — HYDROMORPHONE HCL/PF 1 MG/ML
0.25 SYRINGE (ML) INJECTION
Status: DISCONTINUED | OUTPATIENT
Start: 2023-07-02 | End: 2023-07-02 | Stop reason: HOSPADM

## 2023-07-02 RX ORDER — SODIUM CHLORIDE, SODIUM LACTATE, POTASSIUM CHLORIDE, CALCIUM CHLORIDE 600; 310; 30; 20 MG/100ML; MG/100ML; MG/100ML; MG/100ML
75 INJECTION, SOLUTION INTRAVENOUS CONTINUOUS
Status: DISCONTINUED | OUTPATIENT
Start: 2023-07-02 | End: 2023-07-04

## 2023-07-02 RX ORDER — INSULIN LISPRO 100 [IU]/ML
1-6 INJECTION, SOLUTION INTRAVENOUS; SUBCUTANEOUS EVERY 6 HOURS SCHEDULED
Status: DISCONTINUED | OUTPATIENT
Start: 2023-07-02 | End: 2023-07-02

## 2023-07-02 RX ORDER — HYDROMORPHONE HCL/PF 1 MG/ML
SYRINGE (ML) INJECTION AS NEEDED
Status: DISCONTINUED | OUTPATIENT
Start: 2023-07-02 | End: 2023-07-02

## 2023-07-02 RX ORDER — MAGNESIUM HYDROXIDE 1200 MG/15ML
LIQUID ORAL AS NEEDED
Status: DISCONTINUED | OUTPATIENT
Start: 2023-07-02 | End: 2023-07-02 | Stop reason: HOSPADM

## 2023-07-02 RX ORDER — INSULIN LISPRO 100 [IU]/ML
4-20 INJECTION, SOLUTION INTRAVENOUS; SUBCUTANEOUS EVERY 6 HOURS SCHEDULED
Status: DISCONTINUED | OUTPATIENT
Start: 2023-07-02 | End: 2023-07-04

## 2023-07-02 RX ORDER — PROPOFOL 10 MG/ML
INJECTION, EMULSION INTRAVENOUS AS NEEDED
Status: DISCONTINUED | OUTPATIENT
Start: 2023-07-02 | End: 2023-07-02

## 2023-07-02 RX ORDER — GLYCOPYRROLATE 0.2 MG/ML
INJECTION INTRAMUSCULAR; INTRAVENOUS AS NEEDED
Status: DISCONTINUED | OUTPATIENT
Start: 2023-07-02 | End: 2023-07-02

## 2023-07-02 RX ORDER — ONDANSETRON 2 MG/ML
4 INJECTION INTRAMUSCULAR; INTRAVENOUS ONCE AS NEEDED
Status: DISCONTINUED | OUTPATIENT
Start: 2023-07-02 | End: 2023-07-02 | Stop reason: HOSPADM

## 2023-07-02 RX ORDER — ALBUTEROL SULFATE 2.5 MG/3ML
2.5 SOLUTION RESPIRATORY (INHALATION) ONCE AS NEEDED
Status: DISCONTINUED | OUTPATIENT
Start: 2023-07-02 | End: 2023-07-02 | Stop reason: HOSPADM

## 2023-07-02 RX ORDER — CALCIUM GLUCONATE 20 MG/ML
2 INJECTION, SOLUTION INTRAVENOUS ONCE
Status: COMPLETED | OUTPATIENT
Start: 2023-07-02 | End: 2023-07-02

## 2023-07-02 RX ORDER — PROPOFOL 10 MG/ML
INJECTION, EMULSION INTRAVENOUS CONTINUOUS PRN
Status: DISCONTINUED | OUTPATIENT
Start: 2023-07-02 | End: 2023-07-02

## 2023-07-02 RX ORDER — SODIUM CHLORIDE, SODIUM GLUCONATE, SODIUM ACETATE, POTASSIUM CHLORIDE, MAGNESIUM CHLORIDE, SODIUM PHOSPHATE, DIBASIC, AND POTASSIUM PHOSPHATE .53; .5; .37; .037; .03; .012; .00082 G/100ML; G/100ML; G/100ML; G/100ML; G/100ML; G/100ML; G/100ML
1000 INJECTION, SOLUTION INTRAVENOUS ONCE
Status: COMPLETED | OUTPATIENT
Start: 2023-07-02 | End: 2023-07-02

## 2023-07-02 RX ORDER — SODIUM CHLORIDE, SODIUM LACTATE, POTASSIUM CHLORIDE, CALCIUM CHLORIDE 600; 310; 30; 20 MG/100ML; MG/100ML; MG/100ML; MG/100ML
INJECTION, SOLUTION INTRAVENOUS CONTINUOUS PRN
Status: DISCONTINUED | OUTPATIENT
Start: 2023-07-02 | End: 2023-07-02

## 2023-07-02 RX ORDER — AMOXICILLIN 250 MG
1 CAPSULE ORAL 2 TIMES DAILY
Status: DISCONTINUED | OUTPATIENT
Start: 2023-07-02 | End: 2023-07-14 | Stop reason: HOSPADM

## 2023-07-02 RX ORDER — NEOSTIGMINE METHYLSULFATE 1 MG/ML
INJECTION INTRAVENOUS AS NEEDED
Status: DISCONTINUED | OUTPATIENT
Start: 2023-07-02 | End: 2023-07-02

## 2023-07-02 RX ORDER — FENTANYL CITRATE 50 UG/ML
INJECTION, SOLUTION INTRAMUSCULAR; INTRAVENOUS AS NEEDED
Status: DISCONTINUED | OUTPATIENT
Start: 2023-07-02 | End: 2023-07-02

## 2023-07-02 RX ORDER — POLYETHYLENE GLYCOL 3350 17 G/17G
17 POWDER, FOR SOLUTION ORAL DAILY PRN
Status: DISCONTINUED | OUTPATIENT
Start: 2023-07-02 | End: 2023-07-14 | Stop reason: HOSPADM

## 2023-07-02 RX ORDER — ONDANSETRON 2 MG/ML
INJECTION INTRAMUSCULAR; INTRAVENOUS AS NEEDED
Status: DISCONTINUED | OUTPATIENT
Start: 2023-07-02 | End: 2023-07-02

## 2023-07-02 RX ORDER — ROCURONIUM BROMIDE 10 MG/ML
INJECTION, SOLUTION INTRAVENOUS AS NEEDED
Status: DISCONTINUED | OUTPATIENT
Start: 2023-07-02 | End: 2023-07-02

## 2023-07-02 RX ORDER — INSULIN LISPRO 100 [IU]/ML
1-5 INJECTION, SOLUTION INTRAVENOUS; SUBCUTANEOUS EVERY 6 HOURS
Status: DISCONTINUED | OUTPATIENT
Start: 2023-07-02 | End: 2023-07-02

## 2023-07-02 RX ADMIN — CALCIUM GLUCONATE 2 G: 20 INJECTION, SOLUTION INTRAVENOUS at 09:00

## 2023-07-02 RX ADMIN — HYDROMORPHONE HYDROCHLORIDE 0.5 MG: 1 INJECTION, SOLUTION INTRAMUSCULAR; INTRAVENOUS; SUBCUTANEOUS at 12:56

## 2023-07-02 RX ADMIN — PROPOFOL 40 MG: 10 INJECTION, EMULSION INTRAVENOUS at 12:57

## 2023-07-02 RX ADMIN — ACETAMINOPHEN 650 MG: 325 TABLET, FILM COATED ORAL at 17:44

## 2023-07-02 RX ADMIN — HEPARIN SODIUM 5000 UNITS: 5000 INJECTION INTRAVENOUS; SUBCUTANEOUS at 06:10

## 2023-07-02 RX ADMIN — ACETAMINOPHEN 650 MG: 325 TABLET, FILM COATED ORAL at 23:58

## 2023-07-02 RX ADMIN — PROPOFOL 200 MG: 10 INJECTION, EMULSION INTRAVENOUS at 12:39

## 2023-07-02 RX ADMIN — LINEZOLID 600 MG: 600 INJECTION, SOLUTION INTRAVENOUS at 06:10

## 2023-07-02 RX ADMIN — OXYCODONE HYDROCHLORIDE 5 MG: 5 TABLET ORAL at 23:59

## 2023-07-02 RX ADMIN — PROPOFOL 20 MG: 10 INJECTION, EMULSION INTRAVENOUS at 13:07

## 2023-07-02 RX ADMIN — ROCURONIUM BROMIDE 50 MG: 10 INJECTION, SOLUTION INTRAVENOUS at 12:39

## 2023-07-02 RX ADMIN — PIPERACILLIN SODIUM AND TAZOBACTAM SODIUM 4.5 G: 36; 4.5 INJECTION, POWDER, LYOPHILIZED, FOR SOLUTION INTRAVENOUS at 20:43

## 2023-07-02 RX ADMIN — PIPERACILLIN SODIUM AND TAZOBACTAM SODIUM 4.5 G: 36; 4.5 INJECTION, POWDER, LYOPHILIZED, FOR SOLUTION INTRAVENOUS at 16:00

## 2023-07-02 RX ADMIN — INSULIN LISPRO 3 UNITS: 100 INJECTION, SOLUTION INTRAVENOUS; SUBCUTANEOUS at 06:10

## 2023-07-02 RX ADMIN — HEPARIN SODIUM 5000 UNITS: 5000 INJECTION INTRAVENOUS; SUBCUTANEOUS at 16:00

## 2023-07-02 RX ADMIN — OXYCODONE HYDROCHLORIDE 5 MG: 5 TABLET ORAL at 19:41

## 2023-07-02 RX ADMIN — PIPERACILLIN SODIUM AND TAZOBACTAM SODIUM 4.5 G: 36; 4.5 INJECTION, POWDER, LYOPHILIZED, FOR SOLUTION INTRAVENOUS at 04:29

## 2023-07-02 RX ADMIN — HEPARIN SODIUM 5000 UNITS: 5000 INJECTION INTRAVENOUS; SUBCUTANEOUS at 20:22

## 2023-07-02 RX ADMIN — PIPERACILLIN SODIUM AND TAZOBACTAM SODIUM 4.5 G: 36; 4.5 INJECTION, POWDER, LYOPHILIZED, FOR SOLUTION INTRAVENOUS at 09:00

## 2023-07-02 RX ADMIN — NEOSTIGMINE METHYLSULFATE 3 MG: 1 INJECTION INTRAVENOUS at 13:34

## 2023-07-02 RX ADMIN — ONDANSETRON 4 MG: 2 INJECTION INTRAMUSCULAR; INTRAVENOUS at 13:31

## 2023-07-02 RX ADMIN — POLYETHYLENE GLYCOL 3350, SODIUM SULFATE ANHYDROUS, SODIUM BICARBONATE, SODIUM CHLORIDE, POTASSIUM CHLORIDE 4000 ML: 236; 22.74; 6.74; 5.86; 2.97 POWDER, FOR SOLUTION ORAL at 20:29

## 2023-07-02 RX ADMIN — PROPOFOL 100 MCG/KG/MIN: 10 INJECTION, EMULSION INTRAVENOUS at 12:39

## 2023-07-02 RX ADMIN — FENTANYL CITRATE 50 MCG: 50 INJECTION, SOLUTION INTRAMUSCULAR; INTRAVENOUS at 12:31

## 2023-07-02 RX ADMIN — LINEZOLID 600 MG: 600 INJECTION, SOLUTION INTRAVENOUS at 17:44

## 2023-07-02 RX ADMIN — PROPOFOL 20 MG: 10 INJECTION, EMULSION INTRAVENOUS at 13:20

## 2023-07-02 RX ADMIN — SODIUM CHLORIDE, SODIUM LACTATE, POTASSIUM CHLORIDE, AND CALCIUM CHLORIDE: .6; .31; .03; .02 INJECTION, SOLUTION INTRAVENOUS at 12:33

## 2023-07-02 RX ADMIN — GLYCOPYRROLATE 0.4 MG: 0.2 INJECTION, SOLUTION INTRAMUSCULAR; INTRAVENOUS at 13:34

## 2023-07-02 RX ADMIN — SENNOSIDES AND DOCUSATE SODIUM 1 TABLET: 50; 8.6 TABLET ORAL at 09:57

## 2023-07-02 RX ADMIN — INSULIN LISPRO 8 UNITS: 100 INJECTION, SOLUTION INTRAVENOUS; SUBCUTANEOUS at 18:04

## 2023-07-02 RX ADMIN — INSULIN LISPRO 3 UNITS: 100 INJECTION, SOLUTION INTRAVENOUS; SUBCUTANEOUS at 00:42

## 2023-07-02 RX ADMIN — INSULIN LISPRO 8 UNITS: 100 INJECTION, SOLUTION INTRAVENOUS; SUBCUTANEOUS at 11:44

## 2023-07-02 RX ADMIN — FENTANYL CITRATE 50 MCG: 50 INJECTION, SOLUTION INTRAMUSCULAR; INTRAVENOUS at 12:45

## 2023-07-02 RX ADMIN — SODIUM CHLORIDE, SODIUM GLUCONATE, SODIUM ACETATE, POTASSIUM CHLORIDE, MAGNESIUM CHLORIDE, SODIUM PHOSPHATE, DIBASIC, AND POTASSIUM PHOSPHATE 1000 ML: .53; .5; .37; .037; .03; .012; .00082 INJECTION, SOLUTION INTRAVENOUS at 20:23

## 2023-07-02 NOTE — QUICK NOTE
Post op check: General surgery    Assessment   Patient is a 57 y.o. male who presented with Rohan's gangrene; now status post 4 washouts and debridements most recent of which was today. Plan:  - Diet; n.p.o. at midnight  - IVF  - Suprapubic catheter; Monitor urine output  - Continue antibiotics  - Plan for takeback tomorrow    Subjective: Pt feeling well although he is slightly confused; he remains alert and oriented x3. Pain is well controlled. Denies fevers/chills, chest pain, sob. Objective:  Blood pressure 100/58, pulse 80, temperature 98.2 °F (36.8 °C), temperature source Oral, resp. rate 19, height 6' 2" (1.88 m), weight 92.2 kg (203 lb 4.2 oz), SpO2 95 %. I/O this shift:  In: 250 [IV Piggyback:250]  Out: 425 [Urine:425]    Scheduled Meds:  Current Facility-Administered Medications   Medication Dose Route Frequency Provider Last Rate   • acetaminophen  650 mg Oral Q6H [facility] Ani Adams MD     • heparin (porcine)  5,000 Units Subcutaneous [facility] Ani Adams MD     • HYDROmorphone  0.5 mg Intravenous Q3H PRN Ani Adams MD     • insulin lispro  1-6 Units Subcutaneous Q6H [facility] Anibal Somers PA-C     • linezolid  600 mg Intravenous Q12H Ani Adams MD 0 mg (07/01/23 7389)   • ondansetron  4 mg Intravenous Q6H PRN Ani Adams MD     • oxyCODONE  10 mg Oral Q4H PRN Ani Adams MD     • oxyCODONE  5 mg Oral Q4H PRN Ani Adams MD     • piperacillin-tazobactam  4.5 g Intravenous Q6H Ani Adams MD 4.5 g (07/01/23 2107)     Continuous Infusions:   PRN Meds:.•  HYDROmorphone  •  ondansetron  •  oxyCODONE  •  oxyCODONE      Physical Exam   Gen: NAD, mildly confused  HEENT: EOMI  CV: RRR,   Lungs: Normal respiratory effort  Abd: soft, nontender, nondistended,   Skin/MSK: Perianal dressing with minimal serosanguineous saturation  : Suprapubic catheter in place  Neuro:  AxO x3      Kev Fernandez MD

## 2023-07-02 NOTE — ANESTHESIA PREPROCEDURE EVALUATION
Procedure:  DEBRIDEMENT WOUND AND DRESSING CHANGE ([address] OUT) (Perineum)    Rohan gangrene    IDDM2 A1C 7.2    Relevant Problems   CARDIO   (+) HTN (hypertension)      Endocrine   (+) DM (diabetes mellitus) (HCC)        Physical Exam    Airway    Mallampati score: III  TM Distance: >3 FB  Neck ROM: full     Dental       Cardiovascular  Rhythm: regular, Rate: normal, Cardiovascular exam normal    Pulmonary  Pulmonary exam normal Breath sounds clear to auscultation,     Other Findings        Anesthesia Plan  ASA Score- 4     Anesthesia Type- general with ASA Monitors. Additional Monitors:   Airway Plan: ETT. Comment: Risks/benefits and alternatives discussed including likely possibility of PONV and sore throat, as well as the rare possibilities of aspiration, dental/oropharyngeal/ocular injuries, or grave/life threatening anesthetic and surgical emergencies. .       Plan Factors-Exercise tolerance (METS): >4 METS. Patient summary reviewed. Patient instructed to abstain from smoking on day of procedure. Patient did not smoke on day of surgery. Induction- intravenous. Postoperative Plan- Plan for postoperative opioid use. Planned trial extubation    Informed Consent- Anesthetic plan and risks discussed with patient. I personally reviewed this patient with the CRNA. Discussed and agreed on the Anesthesia Plan with the CRNA. Db Mike

## 2023-07-02 NOTE — QUICK NOTE
Postop Check    Procedure: DEBRIDEMENT PERINEAL WOUND AND DRESSING CHANGE [facility] OUT)       Subjective:  No acute distress. Resting comfortably in bed. Objective  Vitals:    07/02/23 1400 07/02/23 1413 07/02/23 1500 07/02/23 1600   BP: 118/62 118/62 107/64 114/70   BP Location:       Pulse: 76 80 70 72   Resp: 19 20 16 19   Temp:  97.8 °F (36.6 °C)     TempSrc:       SpO2: 98% 98% 98% 96%   Weight:       Height:             GENERAL: No acute distress, resting in bed   CV: Regular rate and rhythm  LUNGS: Nonlabored respirations, CTA B/L  ABDOMEN: Abdomen soft, non-tender, nondistended. : Surgical packing in place with ABDs and mash underwear.  Suprapubic cathter in place     Assessment and Plan:  Status post DEBRIDEMENT PERINEAL WOUND AND DRESSING CHANGE ([address] [place] [address] Street OUT)     Wound management per red surgery   Clear liquid diet and bowel prep for diverting ostomy this week   Continue Zosyn and Linezolid   SSI increased today, monitor BG   Rest of plan per daily progress note         Garrick Ramon PA-C

## 2023-07-02 NOTE — OP NOTE
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 Successful suprapubic tube placement at the dome of the bladder. The appearance of the corpora spongiosum at the level of the bulbar urethra is largely unchanged with persistent evidence of necrosis. Complications:   None    Procedure and Technique:  The patient was brought to the operating room and general anesthesia was initiated. He was prepped and draped in dorsolithotomy position. The lower abdominal region was prepped with ChloraPrep and draped separately with Ioban. The groin was prepped with Betadine with a standard drape. An operative timeout was performed to confirm the patient identity and procedure. The bladder was filled with 400 cc of normal saline through the existing urethral catheter. The catheter was clamped with a hemostat. Transabdominal ultrasound was performed demonstrating no structures anterior to the bladder which was distended with the balloon intact.   The catheter was removed and a flexible cystoscope was driven per urethra into the bladder. No mucosal abnormalities or defects were noted. Under direct vision a spinal needle was passed approximately 2 fingerbreadths above the pubic symphysis into the dome of the bladder. A 2 cm incision was created to include the needle. The inner stylette was removed and a wire was passed through the lumen of the sheath. The sheath was removed leaving the wire in place. A 20 [place] peel-away sheath with trocar was passed over the wire into the bladder under direct vision. The inner obturator was removed and a 14 French silicone catheter was passed into the bladder under direct vision. The balloon was inflated with 10 cc of sterile fluid. The peel-away sheath was removed. The catheter was secured to the lower abdomen with 0 silk suture. The catheter was connected to drainage. Attention was then shifted to debridement of the genital wound. Multiple areas of necrotic tissue were noted in the right groin tracking adjacent to the spermatic cord as well as in the left groin. The necrotic tissue was debrided sharply with Bolivar scissors. Bleeding was controlled with 3-0 Vicryl interrupted suture as needed. The adipose tissue in the suprapubic region between the pubic bone and penile shaft did not appear healthy however debridement will require skeletonizing the bone. Elected to hold off on additional debridement. The perineum and perirectal aspects of the wound were additionally debrided. Please see separate dictated operative note for details. Wound packing was performed by trauma surgery also as noted in a separate dictation. I was present for the entire procedure. Patient Disposition:  PACU      PLAN:   · Maintain suprapubic catheter to gravity drainage. · Plan for initial exchange in about 6 weeks with myself or other MD in office per patient preference given distance from our offices.   · We will reassess the wound every 24-48 hours for ability to place wound VAC  · Anticipate ultimate referral to reconstructive urologist for urethral reconstruction  · Urology will follow    SIGNATURE: Karen Llamas MD  DATE: July 1, 2023  TIME: 8:24 PM

## 2023-07-02 NOTE — OP NOTE
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 (Active)   Dressing Status New drainage; Old drainage; Intact 23 043   Dressing Type Split gauze 23 043   Collection Container Standard drainage bag 23 0431   Securement Method Sutured 23 0431   Reason for Continuing Urinary Catheterization past POD 1 Physician order 23 0431   Output (mL) 275 mL 23 0954   Number of days: 1       [REMOVED] Urethral Catheter 20 Fr.  (Removed)   Reasons to continue Urinary Catheter  Accurate I&O assessment in critically ill patients (48 hr. 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 On arrival to the OR, the patient was examined for anal tone and had no meaningful voluntary anal sphincter contraction. A rectal tube was subsequently replaced.      Then he was placed under anesthesia and a laryngeal mask airway was placed. He was subsequently placed in lithotomy position with stirrups.  He was receiving scheduled antibiotics at the time of operation. The Perineum was prepped with Betadine and the previously noted 6 radiopaque sponges were removed and accounted for in their entirety. Additionally 4 pieces of adaptic were removed. Skin necrosis at the penile base and purulence of the skin and subcutaneous tissue were noted tracking bilaterally into the inguinal creases retrotesticularly as well as into the gluteal aspect in the right ischioanal region. These areas were debrided sharply back to healthy bleeding tissue and subsequently hemostasis of the wound bed was achieved with cautery  where necessary. Final wound measured 29 x 14.5 x 11cm in length width and depth respectively to the level of the musculature and deep fascia and penile structures. The wound was copiously irrigated with 3 L of saline solution using a Pulsavac      A total of 6 radiopaque Kerlix sponges were placed into the wound for packing, these were saline moistened prior to placement and were tied together with their strained ends. Additionally 4 pieces of Adaptic were used to wrap of the bilateral testicles prior to placement of the sponges. Dr. Kim Martinez was present for all critical portions of the procedure.     Patient Disposition:  PACU  and extubated and stable        SIGNATURE: Millicent Sloan MD  DATE: July 2, 2023  TIME: 1:48 PM

## 2023-07-02 NOTE — PROGRESS NOTES
Progress Note -General surgery  Karen Pruett 57 y.o. male MRN: [id number]  Unit/Bed#: MICU 11 Encounter: [id number]    Assessment:  57 y.o. male with Rohan's gangrene, is now s/p his second debridement and washout on 6/29. He continues his broad-spectrum antibiotics and his pain is better controlled overnight. Patient remained hemodynamically stable overnight. Plan:  - Diet NPO; Sips with meds   - IVF  - Pain and Nausea control PRN  - Incentive spirometry  - OOB, ambulate  - Plan for operative takeback today for subsequent debridement and washout, and possible suprapubic catheterization with urology  - Continue Zosyn and linezolid  - ID following  -Return to the OR today for further review    Subjective/Objective     Subjective: Patient remained stable with no complaints overnight. Objective:   Vitals: Blood pressure 106/61, pulse 80, temperature 98.2 °F (36.8 °C), temperature source Oral, resp. rate (!) 24, height 6' 2" (1.88 m), weight 92.2 kg (203 lb 4.2 oz), SpO2 95 %. ,Body mass index is 26.1 kg/m². I/O       06/29 0701  06/30 0700 06/30 0701  07/01 0700 07/01 0701  07/02 0700    P. O. 222      I.V. (mL/kg) 2584.8 (28) 3723.4 (40.4)     IV Piggyback 700 950     Total Intake(mL/kg) 3506.8 (38) 4673.4 (50.7)     Urine (mL/kg/hr) 1790 (0.8) 1725 (0.8)     Stool  0     Blood 100      Total Output 1890 1725     Net +1616.8 +2948. 4                  Physical Exam:  Gen: NAD, Aox3, Comfortable in bed  Chest: Normal work of breathing, no respiratory distress  Abd: Soft, ND,  lower abdominal tenderness. Patient is tender in the right mons pubis where a counterincision was made. :  Surgical site is dressed with minimal saturation  Ext: No Edema  Skin: Warm, Dry, Intact      Lab, Imaging and other studies:   I have personally reviewed pertinent reports.   , CBC with diff:   Lab Results   Component Value Date    WBC 15.72 (H) 07/02/2023    HGB 8.3 (L) 07/02/2023    HCT 25.6 (L) 07/02/2023     (H) 07/02/2023     07/02/2023    RBC 2.49 (L) 07/02/2023    MCH 33.3 07/02/2023    MCHC 32.4 07/02/2023    RDW 13.8 07/02/2023    MPV 10.1 07/02/2023   , BMP/CMP:   Lab Results   Component Value Date    SODIUM 138 07/02/2023    K 4.1 07/02/2023     07/02/2023    CO2 24 07/02/2023    BUN 14 07/02/2023    CREATININE 0.58 (L) 07/02/2023    CALCIUM 7.9 (L) 07/02/2023    EGFR 107 07/02/2023   , Magnesium: No components found for: "MAG"  VTE Pharmacologic Prophylaxis: Heparin  VTE Mechanical Prophylaxis: sequential compression device        Alisa Sanchez MD  7/2/2023  9:59 AM

## 2023-07-02 NOTE — PROGRESS NOTES
Daily Progress Note - Critical Care   Tammy Morillo 57 y.o. 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 6/28 washout. Transferred to [facility].    ---------------------------------------------------------------------------------------  SUBJECTIVE  No overnight events. Pt seen and evaluated at bedside. Denying any complaints. Review of Systems  Review of systems was reviewed and negative unless stated above in HPI/24-hour events   ---------------------------------------------------------------------------------------  Assessment and Plan:      Neuro:   • Diagnosis: Pain  ? Plan:   - Dilaudid 0.5 mg IV Q3H PRN for breakthrough pain  - Oxycodone 10 mg Q4H PRN for severe pain   - Oxycodone 5 mg Q4H PRN for moderate pain    - Tylenol 650mg Q6H scheduled for pain   • Diagnosis: Delirium precautions   ? 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 S/p debridement in OR on 6/28, 6/29  ? Lactate has cleared from 4.8  ? Plan:   - Switch clindamycin/vancomycin to Linezolid due to critical shortage  - Consult ID  - Continue to monitor vitals   - Maintain MAP >65 mmHg  - Monitor fever/WBC trend            Pulm:  ? No active issues   ? Encourage IS         GI:   ? No active issues          :   ? Diagnosis: Sasha's Gangrene   ? 6/28 CT pelvis: Subcutaneous tissue edema in the scrotum and extensive subcutaneous emphysema throughout the scrotum, extending into the inguinal regions, anterior abdominal wall and the perineal region, compatible with Sasha's gangrene.  Some of the aforementioned air extends into the presacral space, and is seen in the right levator ani muscle and the right obturator internus muscle there is also some air in the subcutaneous fat of the proximal right thigh medially. - S/p debridement 6/28, 6/29, 6/30, 7/1   - Plan:   - Plan for OR today 7/2  - Strict I's/O's   ? 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 Urology consulted   ? Monitor UOP         F/E/N:   • Fluids:  mL/hr   • Electrolytes: Monitor and replete as needed   • Nutrition: Clear liquids. NPO today for OR        Heme/Onc:   ? DVT ppx: Heparin        Endo:   ? Diagnosis: DM  - Plan:   - Insulin gtt  - Maintain -180         ID:   • Diagnosis: Septic Shock secondary to sasha's gangrene   ? Plan:   - S/p debridement in OR 6/28, 6/29, 6/30, 7/1  - Plan OR today 7/2 for washout/debridement  - Switch Zosyn and Vancomycin to Linezolid d/t critical shortage  - Consult ID  - BCx and wound cx pending   - 6/28 BC x2 no growth at 24  - Gram stain 6/29 2+ GPC in pairs & 1+ GPR  - Gram stain 6/28 4+ GNR & 1+ GPC in pairs  - Monitor fever/WBC trend       Patient appropriate for transfer out of the ICU today?: No  Disposition: Continue Critical Care   Code Status: Level 1 - Full Code  ---------------------------------------------------------------------------------------  ICU CORE MEASURES    Prophylaxis   VTE Pharmacologic Prophylaxis: Heparin  VTE Mechanical Prophylaxis: sequential compression device  Stress Ulcer Prophylaxis: Prophylaxis Not Indicated     ABCDE Protocol (if indicated)  Plan to perform spontaneous awakening trial today? Not applicable  Plan to perform spontaneous breathing trial today? Not applicable  Obvious barriers to extubation? Not applicable  CAM-ICU: Negative    Invasive Devices Review  Invasive Devices     Peripheral Intravenous Line  Duration           Peripheral IV 06/28/23 Right Antecubital 3 days    Peripheral IV 06/30/23 Left;Ventral (anterior) Forearm 1 day          Drain  Duration           Rectal Tube With balloon 1 day    Suprapubic Catheter 14 Fr. <1 day              Can any invasive devices be discontinued today? Not applicable  ---------------------------------------------------------------------------------------  OBJECTIVE    Vitals   Vitals:    23 0200 23 0300 23 0400 23 0500   BP: 110/67 95/66 102/61 108/62   BP Location:   Right arm    Pulse: 80 76 80 76   Resp: 19 17 18 20   Temp:   98.2 °F (36.8 °C)    TempSrc:   Oral    SpO2: 95% 95% 94% 96%   Weight:       Height:         Temp (24hrs), Av °F (36.7 °C), Min:97.6 °F (36.4 °C), Max:98.2 °F (36.8 °C)  Current: Temperature: 98.2 °F (36.8 °C)      Respiratory:  SpO2: SpO2: 97 %  Nasal Cannula O2 Flow Rate (L/min): 2 L/min    Invasive/non-invasive ventilation settings   Respiratory    Lab Data (Last 4 hours)    None         O2/Vent Data (Last 4 hours)    None                Physical Exam  Vitals and nursing note reviewed. Constitutional:       General: He is not in acute distress. Appearance: Normal appearance. He is not ill-appearing, toxic-appearing or diaphoretic. HENT:      Head: Normocephalic and atraumatic. Mouth/Throat:      Mouth: Mucous membranes are moist.   Eyes:      Extraocular Movements: Extraocular movements intact. Cardiovascular:      Rate and Rhythm: Normal rate and regular rhythm. Pulmonary:      Effort: Pulmonary effort is normal. No respiratory distress. Breath sounds: No stridor. Rhonchi present. No wheezing or rales. Abdominal:      Palpations: Abdomen is soft. Tenderness: There is no abdominal tenderness. Genitourinary:     Comments: Surgical dressings in place  Musculoskeletal:         General: Normal range of motion. Cervical back: Normal range of motion. Skin:     General: Skin is warm and dry. Neurological:      General: No focal deficit present. Mental Status: He is alert and oriented to person, place, and time.                Laboratory and Diagnostics:  Results from last 7 days   Lab Units 23  0428 23  4880 23  1654 23  0257 23  1354 06/29/23  0327 06/28/23  2347 06/28/23  1736   WBC Thousand/uL 15.72* 12.52* 15.75* 8.99 10.15 11.41*  --  13.84*   HEMOGLOBIN g/dL 8.3* 8.0* 8.0* 7.7* 8.6* 10.2*  --  14.1   HEMATOCRIT % 25.6* 23.8* 23.4* 21.9* 24.4* 30.1*  --  40.3   PLATELETS Thousands/uL 287 204 191 134* 140* 155 135* 176   BANDS PCT %  --   --   --   --   --   --   --  20*   MONO PCT %  --   --   --   --   --   --   --  7   EOS PCT %  --   --   --   --   --   --   --  0     Results from last 7 days   Lab Units 07/02/23  0428 07/01/23  0608 06/30/23  1654 06/30/23  0323 06/29/23  1524 06/29/23  0327 06/28/23  1736   SODIUM mmol/L 138 140 140 139 140 138 129*   POTASSIUM mmol/L 4.1 3.5 4.1 3.3* 3.5 3.9 4.1   CHLORIDE mmol/L 108 109* 109* 109* 110* 109* 91*   CO2 mmol/L 24 26 23 23 22 23 22   ANION GAP mmol/L 6 5 8 7 8 6 16   BUN mg/dL 14 9 13 17 23 29* 27*   CREATININE mg/dL 0.58* 0.54* 0.53* 0.59* 0.69 0.93 1.16   CALCIUM mg/dL 7.9* 7.9* 7.5* 7.8* 8.1* 8.2* 10.4*   GLUCOSE RANDOM mg/dL 232* 108 168* 111 221* 283* 441*   ALT U/L  --  19  --   --   --  19 18   AST U/L  --  19  --   --   --  23 19   ALK PHOS U/L  --  70  --   --   --  60 83   ALBUMIN g/dL  --  1.3*  --   --   --  1.9* 3.6   TOTAL BILIRUBIN mg/dL  --  0.48  --   --   --  0.51 1.21*     Results from last 7 days   Lab Units 07/02/23  0428 07/01/23  0608 06/30/23  0323 06/29/23  1524 06/29/23  0327   MAGNESIUM mg/dL  --  2.1 2.1 2.2 1.9   PHOSPHORUS mg/dL 3.1 1.3* 1.0*  --  2.7      Results from last 7 days   Lab Units 06/28/23  1736   INR  1.03   PTT seconds 33          Results from last 7 days   Lab Units 06/30/23  1654 06/30/23  0240 06/29/23  1354 06/29/23  0600 06/29/23  0327 06/28/23  2347 06/28/23  1736   LACTIC ACID mmol/L 1.2 1.3 1.9 2.2* 2.4* 2.0 4.8*     ABG:  Results from last 7 days   Lab Units 06/30/23  0240   PH ART  7.509*   PCO2 ART mm Hg 28.4*   PO2 ART mm Hg 68.5*   HCO3 ART mmol/L 22.1   BASE EXC ART mmol/L -0.5   ABG SOURCE  Line, Arterial     VBG:  Results from last 7 days   Lab Units 06/30/23  0240 06/29/23  1401 06/28/23  1759   PH REYNALDO   --   --  7.398   PCO2 REYNALDO mm Hg  --   --  33.8*   PO2 REYNALDO mm Hg  --   --  43.6   HCO3 REYNALDO mmol/L  --   --  20.4*   BASE EXC REYNALDO mmol/L  --   --  -3.6   ABG SOURCE  Line, Arterial   < >  --     < > = values in this interval not displayed. Results from last 7 days   Lab Units 06/28/23  1736   PROCALCITONIN ng/ml 28.29*       Micro  Results from last 7 days   Lab Units 06/29/23  0924 06/28/23 2053 06/28/23 2042 06/28/23  1736   BLOOD CULTURE   --   --   --  No Growth at 72 hrs. No Growth at 72 hrs. GRAM STAIN RESULT  Rare Polys*  2+ Gram positive cocci in pairs*  1+ Gram positive rods* 4+ Gram negative rods*  Rare Epithelial Cells*  1+ Gram positive cocci in pairs* Rare Gram positive rods*  1+ Gram positive cocci in pairs and chains*  No polys seen*  --        EKG: n/a  Imaging: n/a I have personally reviewed pertinent reports. and I have personally reviewed pertinent films in PACS    Intake and Output  I/O       06/30 0701  07/01 0700 07/01 0701  07/02 0700    P. O.  0    I.V. (mL/kg) 3723.4 (40.4) 1287 (14)    IV Piggyback 950 450    Total Intake(mL/kg) 4673.4 (50.7) 1737 (18.8)    Urine (mL/kg/hr) 1725 (0.8) 1575 (0.7)    Stool 0 0    Blood  5    Total Output 1725 1580    Net +2948.4 +157              UOP: 1.5L 24 hr     Height and Weights   Height: 6' 2" (188 cm)  IBW (Ideal Body Weight): 82.2 kg  Body mass index is 26.1 kg/m². Weight (last 2 days)     None            Nutrition       Diet Orders   (From admission, onward)             Start     Ordered    07/02/23 0001  Diet NPO; Sips with meds  Diet effective midnight        References:    Adult Nutrition Support Algorithm    RD Therapeutic Diet Order Protocol   Question Answer Comment   Diet Type NPO    NPO Except: Sips with meds    RD to adjust diet per protocol?  Yes        07/01/23 2689                    Active Medications  Scheduled Meds:  Current Facility-Administered Medications   Medication Dose Route Frequency Provider Last Rate   • acetaminophen  650 mg Oral Q6H Vinay Pérez MD     • heparin (porcine)  5,000 Units Subcutaneous [facility] Jr Meyer MD     • HYDROmorphone  0.5 mg Intravenous Q3H PRN Jr Meyer MD     • insulin lispro  1-6 Units Subcutaneous Q6H [facility] Yuly Hernandez PA-C     • linezolid  600 mg Intravenous Q12H Jr Meyer MD 0 mg (07/01/23 0414)   • ondansetron  4 mg Intravenous Q6H PRN Jr Meyer MD     • oxyCODONE  10 mg Oral Q4H PRN Jr Meyer MD     • oxyCODONE  5 mg Oral Q4H PRN Jr Meyer MD     • piperacillin-tazobactam  4.5 g Intravenous Q6H Jr Meyer MD 4.5 g (07/02/23 4231)     Continuous Infusions:     PRN Meds:   HYDROmorphone, 0.5 mg, Q3H PRN  ondansetron, 4 mg, Q6H PRN  oxyCODONE, 10 mg, Q4H PRN  oxyCODONE, 5 mg, Q4H PRN        Allergies   No Known Allergies  ---------------------------------------------------------------------------------------  Advance Directive and Living Will:      Power of :    POLST:    ---------------------------------------------------------------------------------------  Care Time Delivered:   n/a    Sherri Lopez DO      Portions of the record may have been created with voice recognition software. Occasional wrong word or "sound a like" substitutions may have occurred due to the inherent limitations of voice recognition software.   Read the chart carefully and recognize, using context, where substitutions have occurred

## 2023-07-03 ENCOUNTER — TELEPHONE (OUTPATIENT)
Dept: UROLOGY | Facility: MEDICAL CENTER | Age: 63
End: 2023-07-03

## 2023-07-03 LAB
ANION GAP SERPL CALCULATED.3IONS-SCNC: 0 MMOL/L
BACTERIA BLD CULT: NORMAL
BACTERIA BLD CULT: NORMAL
BACTERIA TISS AEROBE CULT: ABNORMAL
BUN SERPL-MCNC: 10 MG/DL (ref 5–25)
CALCIUM SERPL-MCNC: 7.5 MG/DL (ref 8.3–10.1)
CHLORIDE SERPL-SCNC: 110 MMOL/L (ref 96–108)
CO2 SERPL-SCNC: 29 MMOL/L (ref 21–32)
CREAT SERPL-MCNC: 0.52 MG/DL (ref 0.6–1.3)
ERYTHROCYTE [DISTWIDTH] IN BLOOD BY AUTOMATED COUNT: 14 % (ref 11.6–15.1)
GFR SERPL CREATININE-BSD FRML MDRD: 112 ML/MIN/1.73SQ M
GLUCOSE SERPL-MCNC: 151 MG/DL (ref 65–140)
GLUCOSE SERPL-MCNC: 161 MG/DL (ref 65–140)
GLUCOSE SERPL-MCNC: 189 MG/DL (ref 65–140)
GLUCOSE SERPL-MCNC: 192 MG/DL (ref 65–140)
GLUCOSE SERPL-MCNC: 216 MG/DL (ref 65–140)
GLUCOSE SERPL-MCNC: 256 MG/DL (ref 65–140)
GRAM STN SPEC: ABNORMAL
HCT VFR BLD AUTO: 22.5 % (ref 36.5–49.3)
HGB BLD-MCNC: 7.5 G/DL (ref 12–17)
MCH RBC QN AUTO: 34.1 PG (ref 26.8–34.3)
MCHC RBC AUTO-ENTMCNC: 33.3 G/DL (ref 31.4–37.4)
MCV RBC AUTO: 102 FL (ref 82–98)
PHOSPHATE SERPL-MCNC: 2.3 MG/DL (ref 2.3–4.1)
PLATELET # BLD AUTO: 297 THOUSANDS/UL (ref 149–390)
PMV BLD AUTO: 9.8 FL (ref 8.9–12.7)
POTASSIUM SERPL-SCNC: 3.6 MMOL/L (ref 3.5–5.3)
RBC # BLD AUTO: 2.2 MILLION/UL (ref 3.88–5.62)
SODIUM SERPL-SCNC: 139 MMOL/L (ref 135–147)
WBC # BLD AUTO: 13.84 THOUSAND/UL (ref 4.31–10.16)

## 2023-07-03 PROCEDURE — 85027 COMPLETE CBC AUTOMATED: CPT | Performed by: STUDENT IN AN ORGANIZED HEALTH CARE EDUCATION/TRAINING PROGRAM

## 2023-07-03 PROCEDURE — 99232 SBSQ HOSP IP/OBS MODERATE 35: CPT | Performed by: STUDENT IN AN ORGANIZED HEALTH CARE EDUCATION/TRAINING PROGRAM

## 2023-07-03 PROCEDURE — 80048 BASIC METABOLIC PNL TOTAL CA: CPT | Performed by: STUDENT IN AN ORGANIZED HEALTH CARE EDUCATION/TRAINING PROGRAM

## 2023-07-03 PROCEDURE — 82948 REAGENT STRIP/BLOOD GLUCOSE: CPT

## 2023-07-03 PROCEDURE — 99291 CRITICAL CARE FIRST HOUR: CPT | Performed by: EMERGENCY MEDICINE

## 2023-07-03 PROCEDURE — 99232 SBSQ HOSP IP/OBS MODERATE 35: CPT | Performed by: SURGERY

## 2023-07-03 PROCEDURE — 84100 ASSAY OF PHOSPHORUS: CPT

## 2023-07-03 RX ORDER — POLYETHYLENE GLYCOL 3350 17 G/17G
238 POWDER, FOR SOLUTION ORAL ONCE
Status: DISCONTINUED | OUTPATIENT
Start: 2023-07-03 | End: 2023-07-03

## 2023-07-03 RX ORDER — POLYETHYLENE GLYCOL 3350 17 G/17G
238 POWDER, FOR SOLUTION ORAL ONCE
Status: COMPLETED | OUTPATIENT
Start: 2023-07-03 | End: 2023-07-03

## 2023-07-03 RX ORDER — LANOLIN ALCOHOL/MO/W.PET/CERES
3 CREAM (GRAM) TOPICAL
Status: DISCONTINUED | OUTPATIENT
Start: 2023-07-03 | End: 2023-07-14 | Stop reason: HOSPADM

## 2023-07-03 RX ORDER — BISACODYL 5 MG/1
10 TABLET, DELAYED RELEASE ORAL ONCE
Status: COMPLETED | OUTPATIENT
Start: 2023-07-03 | End: 2023-07-03

## 2023-07-03 RX ORDER — CEFAZOLIN SODIUM 2 G/50ML
2000 SOLUTION INTRAVENOUS
Status: COMPLETED | OUTPATIENT
Start: 2023-07-04 | End: 2023-07-04

## 2023-07-03 RX ORDER — METRONIDAZOLE 500 MG/100ML
500 INJECTION, SOLUTION INTRAVENOUS
Status: COMPLETED | OUTPATIENT
Start: 2023-07-04 | End: 2023-07-04

## 2023-07-03 RX ORDER — POTASSIUM CHLORIDE 20MEQ/15ML
40 LIQUID (ML) ORAL ONCE
Status: DISCONTINUED | OUTPATIENT
Start: 2023-07-03 | End: 2023-07-03

## 2023-07-03 RX ORDER — ENOXAPARIN SODIUM 100 MG/ML
40 INJECTION SUBCUTANEOUS EVERY 12 HOURS SCHEDULED
Status: DISCONTINUED | OUTPATIENT
Start: 2023-07-03 | End: 2023-07-03

## 2023-07-03 RX ORDER — ENOXAPARIN SODIUM 100 MG/ML
40 INJECTION SUBCUTANEOUS
Status: DISCONTINUED | OUTPATIENT
Start: 2023-07-04 | End: 2023-07-14 | Stop reason: HOSPADM

## 2023-07-03 RX ADMIN — BISACODYL 10 MG: 5 TABLET, COATED ORAL at 10:30

## 2023-07-03 RX ADMIN — PIPERACILLIN SODIUM AND TAZOBACTAM SODIUM 4.5 G: 36; 4.5 INJECTION, POWDER, LYOPHILIZED, FOR SOLUTION INTRAVENOUS at 10:56

## 2023-07-03 RX ADMIN — HYDROMORPHONE HYDROCHLORIDE 0.5 MG: 1 INJECTION, SOLUTION INTRAMUSCULAR; INTRAVENOUS; SUBCUTANEOUS at 15:27

## 2023-07-03 RX ADMIN — INSULIN LISPRO 4 UNITS: 100 INJECTION, SOLUTION INTRAVENOUS; SUBCUTANEOUS at 13:12

## 2023-07-03 RX ADMIN — HYDROMORPHONE HYDROCHLORIDE 0.5 MG: 1 INJECTION, SOLUTION INTRAMUSCULAR; INTRAVENOUS; SUBCUTANEOUS at 15:53

## 2023-07-03 RX ADMIN — HEPARIN SODIUM 5000 UNITS: 5000 INJECTION INTRAVENOUS; SUBCUTANEOUS at 05:45

## 2023-07-03 RX ADMIN — LINEZOLID 600 MG: 600 INJECTION, SOLUTION INTRAVENOUS at 05:48

## 2023-07-03 RX ADMIN — INSULIN LISPRO 8 UNITS: 100 INJECTION, SOLUTION INTRAVENOUS; SUBCUTANEOUS at 23:47

## 2023-07-03 RX ADMIN — ACETAMINOPHEN 650 MG: 325 TABLET, FILM COATED ORAL at 18:41

## 2023-07-03 RX ADMIN — SENNOSIDES AND DOCUSATE SODIUM 1 TABLET: 50; 8.6 TABLET ORAL at 18:41

## 2023-07-03 RX ADMIN — SODIUM CHLORIDE, SODIUM LACTATE, POTASSIUM CHLORIDE, AND CALCIUM CHLORIDE 125 ML/HR: .6; .31; .03; .02 INJECTION, SOLUTION INTRAVENOUS at 12:38

## 2023-07-03 RX ADMIN — INSULIN LISPRO 8 UNITS: 100 INJECTION, SOLUTION INTRAVENOUS; SUBCUTANEOUS at 00:00

## 2023-07-03 RX ADMIN — MELATONIN TAB 3 MG 3 MG: 3 TAB at 23:33

## 2023-07-03 RX ADMIN — SENNOSIDES AND DOCUSATE SODIUM 1 TABLET: 50; 8.6 TABLET ORAL at 10:22

## 2023-07-03 RX ADMIN — PIPERACILLIN SODIUM AND TAZOBACTAM SODIUM 4.5 G: 36; 4.5 INJECTION, POWDER, LYOPHILIZED, FOR SOLUTION INTRAVENOUS at 03:00

## 2023-07-03 RX ADMIN — LINEZOLID 600 MG: 600 INJECTION, SOLUTION INTRAVENOUS at 21:27

## 2023-07-03 RX ADMIN — INSULIN LISPRO 4 UNITS: 100 INJECTION, SOLUTION INTRAVENOUS; SUBCUTANEOUS at 18:42

## 2023-07-03 RX ADMIN — POTASSIUM PHOSPHATE, MONOBASIC AND POTASSIUM PHOSPHATE, DIBASIC 30 MMOL: 224; 236 INJECTION, SOLUTION, CONCENTRATE INTRAVENOUS at 14:12

## 2023-07-03 RX ADMIN — POLYETHYLENE GLYCOL 3350 238 G: 17 POWDER, FOR SOLUTION ORAL at 11:11

## 2023-07-03 RX ADMIN — INSULIN LISPRO 4 UNITS: 100 INJECTION, SOLUTION INTRAVENOUS; SUBCUTANEOUS at 05:45

## 2023-07-03 RX ADMIN — ACETAMINOPHEN 650 MG: 325 TABLET, FILM COATED ORAL at 11:09

## 2023-07-03 RX ADMIN — ACETAMINOPHEN 650 MG: 325 TABLET, FILM COATED ORAL at 23:33

## 2023-07-03 RX ADMIN — SODIUM CHLORIDE 3 G: 9 INJECTION, SOLUTION INTRAVENOUS at 21:27

## 2023-07-03 RX ADMIN — POTASSIUM CHLORIDE 40 MEQ: 1.5 SOLUTION ORAL at 10:22

## 2023-07-03 RX ADMIN — ACETAMINOPHEN 650 MG: 325 TABLET, FILM COATED ORAL at 05:45

## 2023-07-03 RX ADMIN — ENOXAPARIN SODIUM 40 MG: 40 INJECTION SUBCUTANEOUS at 11:09

## 2023-07-03 RX ADMIN — HYDROMORPHONE HYDROCHLORIDE 0.5 MG: 1 INJECTION, SOLUTION INTRAMUSCULAR; INTRAVENOUS; SUBCUTANEOUS at 10:29

## 2023-07-03 NOTE — PROGRESS NOTES
Daily Progress Note - Critical Care   Tammy Morillo 57 y.o. male MRN: [id number]  Unit/Bed#: [facility] 202-01 Encounter: [id number]        ----------------------------------------------------------------------------------------  HPI/24hr events: s/p washout/debridement 7/2    ---------------------------------------------------------------------------------------  SUBJECTIVE  No acute events overnight. Pt seen and evaluated at bedside. No complaints. Endorses frustration with his situation    Review of Systems  Review of systems was reviewed and negative unless stated above in HPI/24-hour events   ---------------------------------------------------------------------------------------  Assessment and Plan:    Neuro:   • Diagnosis: Pain  ? Plan:   - Dilaudid 0.5 mg IV Q3H PRN for breakthrough pain  - Oxycodone 10 mg Q4H PRN for severe pain   - Oxycodone 5 mg Q4H PRN for moderate pain    - Tylenol 650mg Q6H scheduled for pain   • Diagnosis: Delirium precautions   ? 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 S/p debridement in OR on 6/28, 6/29  ? Lactate has cleared from 4.8  ? Plan:   - Switch clindamycin/vancomycin to Linezolid due to critical shortage  - Consult ID  - Abx: zosyn and linezolid  - Continue to monitor vitals   - Maintain MAP >65 mmHg  - Monitor fever/WBC trend            Pulm:  ? No active issues   ? Encourage IS         GI:   ? No active issues          :   ? Diagnosis: Sasha's Gangrene   ? 6/28 CT pelvis: Subcutaneous tissue edema in the scrotum and extensive subcutaneous emphysema throughout the scrotum, extending into the inguinal regions, anterior abdominal wall and the perineal region, compatible with Sasha's gangrene.  Some of the aforementioned air extends into the presacral space, and is seen in the right levator ani muscle and the right obturator internus muscle there is also some air in the subcutaneous fat of the proximal right thigh medially. - S/p debridement 6/28, 6/29, 6/30, 7/1, 7/2  - Plan:   - Plan OR 7/4 for colonic diversion and washout/debridement, testicular fixation  - On yani prep today 7/3, NPO at midnight  - Strict I's/O's   ? 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 Urology consulted   ? Monitor UOP         F/E/N:   • Fluids:  mL/hr   • Electrolytes: Monitor and replete as needed   • Nutrition: Clear liquids, NPO at midnight        Heme/Onc:   ? DVT ppx: Heparin        Endo:   ? Diagnosis: DM  - Plan:   - Insulin SSI        ID:   • Diagnosis: Septic Shock secondary to sasha's gangrene   ? Plan:   - S/p debridement in OR 6/28, 6/29, 6/30, 7/1, 7/2  - Plan OR 7/4 for washout/debridement  - Switch Zosyn and Vancomycin to Linezolid d/t critical shortage  - Consult ID  - BCx and wound cx pending   - 6/28 BC x2 no growth at 24  - Gram stain 6/29 2+ GPC in pairs & 1+ GPR  - Gram stain 6/28 4+ GNR & 1+ GPC in pairs  - Monitor fever/WBC trend             Patient appropriate for transfer out of the ICU today?: No  Disposition: Continue Critical Care   Code Status: Level 1 - Full Code  ---------------------------------------------------------------------------------------  ICU CORE MEASURES    Prophylaxis   VTE Pharmacologic Prophylaxis: Heparin  VTE Mechanical Prophylaxis: sequential compression device  Stress Ulcer Prophylaxis: Prophylaxis Not Indicated     ABCDE Protocol (if indicated)  Plan to perform spontaneous awakening trial today? Not applicable  Plan to perform spontaneous breathing trial today? Not applicable  Obvious barriers to extubation?  Not applicable  CAM-ICU: Negative    Invasive Devices Review  Invasive Devices     Peripheral Intravenous Line  Duration           Peripheral IV 06/30/23 Left;Ventral (anterior) Forearm 2 days    Peripheral IV 07/02/23 Distal;Left;Upper;Ventral (anterior) Arm <1 day          Drain  Duration           Rectal Tube With balloon 2 days Suprapubic Catheter 14 Fr. 1 day              Can any invasive devices be discontinued today? Not applicable  ---------------------------------------------------------------------------------------  OBJECTIVE    Vitals   Vitals:    23 2204 235 23 0250   BP: 97/55 105/66 100/53 103/57   BP Location:       Pulse:   78 74   Resp:   15 15   Temp:   97.6 °F (36.4 °C) 97.5 °F (36.4 °C)   TempSrc:   Oral Oral   SpO2:   95% 93%   Weight:       Height:         Temp (24hrs), Av.7 °F (36.5 °C), Min:97.5 °F (36.4 °C), Max:98.1 °F (36.7 °C)  Current: Temperature: 97.5 °F (36.4 °C)      Respiratory:  SpO2: SpO2: 93 %  Nasal Cannula O2 Flow Rate (L/min): 2 L/min    Invasive/non-invasive ventilation settings   Respiratory    Lab Data (Last 4 hours)    None         O2/Vent Data (Last 4 hours)    None                Physical Exam  Vitals and nursing note reviewed. Constitutional:       General: He is not in acute distress. Appearance: Normal appearance. He is not ill-appearing. HENT:      Head: Normocephalic and atraumatic. Mouth/Throat:      Mouth: Mucous membranes are moist.   Eyes:      Extraocular Movements: Extraocular movements intact. Cardiovascular:      Rate and Rhythm: Normal rate and regular rhythm. Pulmonary:      Effort: Pulmonary effort is normal.      Breath sounds: Normal breath sounds. Abdominal:      Palpations: Abdomen is soft. Tenderness: There is no abdominal tenderness. Genitourinary:     Comments: Dressings in place   Musculoskeletal:         General: Normal range of motion. Skin:     General: Skin is warm and dry. Neurological:      General: No focal deficit present. Mental Status: He is alert and oriented to person, place, and time.                Laboratory and Diagnostics:  Results from last 7 days   Lab Units 23  0455 23  7790 23  7758 23  1654 23  0257 23  1354 23  0327 23  2347 06/28/23  1736   WBC Thousand/uL 13.84* 15.72* 12.52* 15.75* 8.99 10.15 11.41*  --  13.84*   HEMOGLOBIN g/dL 7.5* 8.3* 8.0* 8.0* 7.7* 8.6* 10.2*  --  14.1   HEMATOCRIT % 22.5* 25.6* 23.8* 23.4* 21.9* 24.4* 30.1*  --  40.3   PLATELETS Thousands/uL 297 287 204 191 134* 140* 155   < > 176   BANDS PCT %  --   --   --   --   --   --   --   --  20*   MONO PCT %  --   --   --   --   --   --   --   --  7   EOS PCT %  --   --   --   --   --   --   --   --  0    < > = values in this interval not displayed.      Results from last 7 days   Lab Units 07/03/23  0455 07/02/23  0428 07/01/23  5662 06/30/23  1654 06/30/23  0323 06/29/23  1524 06/29/23  0327 06/28/23  1736   SODIUM mmol/L 139 138 140 140 139 140 138 129*   POTASSIUM mmol/L 3.6 4.1 3.5 4.1 3.3* 3.5 3.9 4.1   CHLORIDE mmol/L 110* 108 109* 109* 109* 110* 109* 91*   CO2 mmol/L 29 24 26 23 23 22 23 22   ANION GAP mmol/L 0 6 5 8 7 8 6 16   BUN mg/dL 10 14 9 13 17 23 29* 27*   CREATININE mg/dL 0.52* 0.58* 0.54* 0.53* 0.59* 0.69 0.93 1.16   CALCIUM mg/dL 7.5* 7.9* 7.9* 7.5* 7.8* 8.1* 8.2* 10.4*   GLUCOSE RANDOM mg/dL 151* 232* 108 168* 111 221* 283* 441*   ALT U/L  --   --  19  --   --   --  19 18   AST U/L  --   --  19  --   --   --  23 19   ALK PHOS U/L  --   --  70  --   --   --  60 83   ALBUMIN g/dL  --   --  1.3*  --   --   --  1.9* 3.6   TOTAL BILIRUBIN mg/dL  --   --  0.48  --   --   --  0.51 1.21*     Results from last 7 days   Lab Units 07/02/23  0428 07/01/23  0608 06/30/23  0323 06/29/23  1524 06/29/23  0327   MAGNESIUM mg/dL  --  2.1 2.1 2.2 1.9   PHOSPHORUS mg/dL 3.1 1.3* 1.0*  --  2.7      Results from last 7 days   Lab Units 06/28/23  1736   INR  1.03   PTT seconds 33          Results from last 7 days   Lab Units 06/30/23  1654 06/30/23  0240 06/29/23  1354 06/29/23  0600 06/29/23  0327 06/28/23  2347 06/28/23  1736   LACTIC ACID mmol/L 1.2 1.3 1.9 2.2* 2.4* 2.0 4.8*     ABG:  Results from last 7 days   Lab Units 06/30/23  0240   PH ART  7.509*   PCO2 ART mm Hg 28.4*   PO2 ART mm Hg 68.5*   HCO3 ART mmol/L 22.1   BASE EXC ART mmol/L -0.5   ABG SOURCE  Line, Arterial     VBG:  Results from last 7 days   Lab Units 06/30/23  0240 06/29/23  1401 06/28/23  1759   PH REYNALDO   --   --  7.398   PCO2 REYNALDO mm Hg  --   --  33.8*   PO2 REYNALDO mm Hg  --   --  43.6   HCO3 REYNALDO mmol/L  --   --  20.4*   BASE EXC REYNALDO mmol/L  --   --  -3.6   ABG SOURCE  Line, Arterial   < >  --     < > = values in this interval not displayed. Results from last 7 days   Lab Units 06/28/23  1736   PROCALCITONIN ng/ml 28.29*       Micro  Results from last 7 days   Lab Units 06/29/23  0924 06/28/23 2053 06/28/23 2042 06/28/23  1736   BLOOD CULTURE   --   --   --  No Growth After 4 Days. No Growth After 4 Days. GRAM STAIN RESULT  Rare Polys*  2+ Gram positive cocci in pairs*  1+ Gram positive rods* 4+ Gram negative rods*  Rare Epithelial Cells*  1+ Gram positive cocci in pairs* Rare Gram positive rods*  1+ Gram positive cocci in pairs and chains*  No polys seen*  --        EKG: n/a  Imaging: n/a I have personally reviewed pertinent reports. Intake and Output  I/O       07/01 0701  07/02 0700 07/02 0701  07/03 0700    P. O. 0 0    I.V. (mL/kg) 1287 (14) 3523 (38.2)    IV Piggyback 450     Total Intake(mL/kg) 1737 (18.8) 3523 (38.2)    Urine (mL/kg/hr) 1725 (0.8) 1050 (0.5)    Stool 0     Blood 5     Total Output 1730 1050    Net +7 +2473              UOP: 1L 24 hrs     Height and Weights   Height: 6' 2" (188 cm)  IBW (Ideal Body Weight): 82.2 kg  Body mass index is 26.1 kg/m². Weight (last 2 days)     None            Nutrition       Diet Orders   (From admission, onward)             Start     Ordered    07/04/23 0000  Diet NPO  Diet effective now        References:    Adult Nutrition Support Algorithm    RD Therapeutic Diet Order Protocol   Question Answer Comment   Diet Type NPO    RD to adjust diet per protocol?  Yes        07/02/23 1407    07/02/23 1353  Diet Clear Liquid  Diet effective midnight References:    Adult Nutrition Support Algorithm    RD Therapeutic Diet Order Protocol   Question Answer Comment   Diet Type Clear Liquid    RD to adjust diet per protocol? Yes        07/02/23 1354                  Active Medications  Scheduled Meds:  Current Facility-Administered Medications   Medication Dose Route Frequency Provider Last Rate   • acetaminophen  650 mg Oral Q6H [address] Josephine Barksdale PA-C     • heparin (porcine)  5,000 Units Subcutaneous [facility] Josephnie Barksdale [place]     • HYDROmorphone  0.5 mg Intravenous Q3H PRN Josephine Barksdale PA-C     • insulin lispro  4-20 Units Subcutaneous Q6H [address] Angie Welch MD     • lactated ringers  125 mL/hr Intravenous Continuous Karenl Sicard,  mL/hr (07/02/23 1350)   • linezolid  600 mg Intravenous Q12H Josephine Barksdale PA-C 0 mg (07/01/23 0414)   • ondansetron  4 mg Intravenous Q6H PRN Josephine Barksdale PA-C     • oxyCODONE  10 mg Oral Q4H PRN Josephine Barksdale PA-C     • oxyCODONE  5 mg Oral Q4H PRN Josephine Barksdale PA-C     • piperacillin-tazobactam  4.5 g Intravenous Q6H Josephine Barksdale PA-C Stopped (07/03/23 0445)   • polyethylene glycol  17 g Oral Daily PRN Angie Welch MD     • senna-docusate sodium  1 tablet Oral BID Angie Welch MD       Continuous Infusions:  lactated ringers, 125 mL/hr, Last Rate: 125 mL/hr (07/02/23 1350)      PRN Meds:   HYDROmorphone, 0.5 mg, Q3H PRN  ondansetron, 4 mg, Q6H PRN  oxyCODONE, 10 mg, Q4H PRN  oxyCODONE, 5 mg, Q4H PRN  polyethylene glycol, 17 g, Daily PRN        Allergies   No Known Allergies  ---------------------------------------------------------------------------------------  Advance Directive and Living Will:      Power of :    POLST:    ---------------------------------------------------------------------------------------  Care Time Delivered:   n/a    Romelia Fairly, DO      Portions of the record may have been created with voice recognition software.   Occasional wrong word or "sound a like" substitutions may have occurred due to the inherent limitations of voice recognition software.   Read the chart carefully and recognize, using context, where substitutions have occurred

## 2023-07-03 NOTE — QUICK NOTE
Postop note -General surgery  Nata Jones 57 y.o. male MRN: [id number]  [facility]: [id number]    Assessment:  57 y.o. 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 Objective:   Vitals: Blood pressure 97/55, pulse 76, temperature 97.5 °F (36.4 °C), temperature source Oral, resp. rate 19, height 6' 2" (1.88 m), weight 92.2 kg (203 lb 4.2 oz), SpO2 97 %. ,Body mass index is 26.1 kg/m². I/O       07/01 0701  07/02 0700 07/02 0701  07/03 0700    P. O. 0 0    I.V. (mL/kg) 1287 (14) 1075.1 (11.7)    IV Piggyback 450     Total Intake(mL/kg) 1737 (18.8) 1075.1 (11.7)    Urine (mL/kg/hr) 1725 (0.8) 600 (0.5)    Stool 0     Blood 5     Total Output 1730 600    Net +7 +475.1                Physical Exam:  Gen: NAD, Aox3, Comfortable in bed  Chest: Normal work of breathing, no respiratory distress  Abd: Soft, ND, minimal tenderness to the lower abdomen and mons pubis  : Scrotum and perineum appropriately tender. Dressings in place are dry and intact.   Ext: No Edema  Skin: Warm, Dry, Intact      Bernard Tadeo MD  7/2/2023  9:16 PM

## 2023-07-03 NOTE — PROGRESS NOTES
Progress Note - Infectious Disease   Ebony Edwards 57 y.o. male MRN: [id number]  Unit/Bed#: [facility]  Encounter: [id number]      Impression/Plan:    1. Severe sepsis, present on admission with tachycardia, tachypnea, lactic acidosis, leukocytosis with bandemia. Due to #2 below. Blood cultures show no growth. The patient has had mild hypotension but is not requiring pressors.              -Based on OR cultures, stop Zosyn and start IV Unasyn 3g every 6 hours   -continue linezolid 600 mg every 12 hours for antitoxin effect until no longer requiring surgical intervention              -Follow-up OR tissue cultures              -Monitor fever curve, white blood cell count     2. Rohan's gangrene. Status post debridement of extensive perirectal, ischiorectal, and perineal necrotic tissue 2023. Repeat debridements performed , , ,  with significant necrotic tissue. Plan for OR tomorrow for washout, diverting colostomy, and testicular plication              -Surgical, urology follow-up ongoing              -antibiotics as above              -follow up tissue cultures to adjust antibiotics      3. Type 2 diabetes mellitus with hyperglycemia. Blood sugar over 400 on admission. Hemoglobin A1c 7.3%. This is a risk factor for severe infection as above. Patient is currently requiring an insulin drip.              -Glycemic management per primary team    Above management plan discussed in detail with the patient and primary service. ID will follow. Antibiotics:  Zosyn day 6  Linezolid day 6    Subjective: The patient is frustrated with need for multiple operations, hospital course. States he is not sleeping well at night. He denies fever, chills. Pain is relatively controlled.     Objective:  Vitals:  Temp:  [97.5 °F (36.4 °C)-99.2 °F (37.3 °C)] 99 °F (37.2 °C)  HR:  [70-88] 76  Resp:  [13-23] 17  BP: ()/(48-70) 118/60  SpO2:  [93 %-98 %] 95 %  Temp (24hrs), Av °F (36.7 °C), Min:97.5 °F (36.4 °C), Max:99.2 °F (37.3 °C)  Current: Temperature: 99 °F (37.2 °C)    Physical Exam:   General Appearance:  Ill-appearing but in no distress   Throat: Oropharynx moist without lesions. Lungs:   Clear to auscultation bilaterally; no wheezes, rhonchi or rales; respirations unlabored   Heart:  RRR; no murmur, rub or gallop   Abdomen:   Soft, non-tender, non-distended, positive bowel sounds. Extremities: No clubbing, cyanosis or edema   Skin: Perineal, groin dressings in place       Labs: All pertinent labs and imaging studies were personally reviewed  Results from last 7 days   Lab Units 07/03/23  0455 07/02/23  0428 07/01/23  0608   WBC Thousand/uL 13.84* 15.72* 12.52*   HEMOGLOBIN g/dL 7.5* 8.3* 8.0*   PLATELETS Thousands/uL 297 287 204     Results from last 7 days   Lab Units 07/03/23  0455 07/02/23  0428 07/01/23  0608 06/29/23  1524 06/29/23  0327 06/28/23  1736   SODIUM mmol/L 139 138 140   < > 138 129*   POTASSIUM mmol/L 3.6 4.1 3.5   < > 3.9 4.1   CHLORIDE mmol/L 110* 108 109*   < > 109* 91*   CO2 mmol/L 29 24 26   < > 23 22   BUN mg/dL 10 14 9   < > 29* 27*   CREATININE mg/dL 0.52* 0.58* 0.54*   < > 0.93 1.16   EGFR ml/min/1.73sq m 112 107 110   < > 86 66   CALCIUM mg/dL 7.5* 7.9* 7.9*   < > 8.2* 10.4*   AST U/L  --   --  19  --  23 19   ALT U/L  --   --  19  --  19 18   ALK PHOS U/L  --   --  70  --  60 83    < > = values in this interval not displayed. Results from last 7 days   Lab Units 06/28/23  1736   PROCALCITONIN ng/ml 28.29*                   Micro:  Results from last 7 days   Lab Units 06/29/23  0924 06/28/23 2053 06/28/23 2042 06/28/23  1736   BLOOD CULTURE   --   --   --  No Growth After 4 Days. No Growth After 4 Days.    GRAM STAIN RESULT  Rare Polys*  2+ Gram positive cocci in pairs*  1+ Gram positive rods* 4+ Gram negative rods*  Rare Epithelial Cells*  1+ Gram positive cocci in pairs* Rare Gram positive rods*  1+ Gram positive cocci in pairs and chains* No polys seen*  --        Imaging:  I have personally reviewed pertinent imaging study reports and images in PACS.     CT pelvis: Subcutaneous tissue edema in the scrotum and extensive subcutaneous emphysema throughout the scrotum and perineum, compatible with Rohan's gangrene

## 2023-07-03 NOTE — PROGRESS NOTES
Progress Note -General surgery  Rian Spearing 57 y.o. male MRN: [id number]  Unit/Bed#: [facility] 202-01 Encounter: [id number]    Assessment:  57 y.o. male with Rohan's gangrene, s/p debridement x3    AVSS    WBC 13 (15)  Hgb 7.5 (8.3)  Plan:  - [email or web address]  -DC IVF  -Plan for OR tomorrow for washout, diverting colostomy, and testicular plication  -Plan for bedside dressing change today  -Continue IV abx per ID, appreciate recommendations. -F/u final wound culturues  -OOB to chair  -DVT ppx    Subjective/Objective     Subjective:  No acute events overnight. Doing well, pain is controlled. Uncomfortable in bed. No nausea or vomiting. No fevers/chills. Objective:   Vitals: Blood pressure 103/57, pulse 74, temperature 97.5 °F (36.4 °C), temperature source Oral, resp. rate 15, height 6' 2" (1.88 m), weight 92.2 kg (203 lb 4.2 oz), SpO2 93 %. ,Body mass index is 26.1 kg/m². I/O       06/29 0701  06/30 0700 06/30 0701  07/01 0700 07/01 0701  07/02 0700    P. O. 222      I.V. (mL/kg) 2584.8 (28) 3723.4 (40.4)     IV Piggyback 700 950     Total Intake(mL/kg) 3506.8 (38) 4673.4 (50.7)     Urine (mL/kg/hr) 1790 (0.8) 1725 (0.8)     Stool  0     Blood 100      Total Output 1890 1725     Net +1616.8 +2948. 4                  Physical Exam:  General: NAD  HENT: NCAT MMM  Neck: supple, no JVD  CV: nl rate  Lungs: nl wob. No resp distress  ABD: Soft, nontender, nondistended  :suprapubic tube in place. Dressing is c/d/i  Extrem: No CCE  Neuro: AAOx3        Lab, Imaging and other studies:   I have personally reviewed pertinent reports.   , CBC with diff:   Lab Results   Component Value Date    WBC 13.84 (H) 07/03/2023    HGB 7.5 (L) 07/03/2023    HCT 22.5 (L) 07/03/2023     (H) 07/03/2023     07/03/2023    RBC 2.20 (L) 07/03/2023    MCH 34.1 07/03/2023    MCHC 33.3 07/03/2023    RDW 14.0 07/03/2023    MPV 9.8 07/03/2023   , BMP/CMP:   Lab Results   Component Value Date    SODIUM 139 07/03/2023    K 3.6 07/03/2023  (H) 07/03/2023    CO2 29 07/03/2023    BUN 10 07/03/2023    CREATININE 0.52 (L) 07/03/2023    CALCIUM 7.5 (L) 07/03/2023    EGFR 112 07/03/2023   , Magnesium: No components found for: "MAG"  VTE Pharmacologic Prophylaxis: Heparin  VTE Mechanical Prophylaxis: sequential compression device        Chris Ward DO  7/3/2023  6:30 AM

## 2023-07-03 NOTE — TELEPHONE ENCOUNTER
Pt was seen by Dr. Kiran Irene at the [facility] while on call and underwent   Procedure(s):  • Wound debridement, washout, dressing change  • Percutaneous suprapubic tube placement  Cystoscopy for Rohan's Gangrene. Pt remains inpatient in [facility]U at this time. Jean lmonitor for discharge. PLAN:   • Maintain suprapubic catheter to gravity drainage. ? Plan for initial exchange in about 6 weeks with myself or other MD in office per patient preference given distance from our offices.   • We will reassess the wound every 24-48 hours for ability to place wound VAC  • Anticipate ultimate referral to reconstructive urologist for urethral reconstruction  • Urology will follow     SIGNATURE: Karel Gay MD  DATE: July 1, 2023  TIME: 8:24 PM

## 2023-07-03 NOTE — QUICK NOTE
Wound check:    Patient was administered 1mg of dilaudid. Wound examined at bedside, patient had soiled into wound. Wound was copiously irrigated with sterile saline. The wound base was w/ overall healthy appearing tissue. Some duskiness noted at the penile base. The wound was redressed using adaptic around the testicle and 1 saline soaked kerlix.     Ning Centeno, DO  Surgery PGY-IV

## 2023-07-03 NOTE — OCCUPATIONAL THERAPY NOTE
OT CANCEL NOTE    OT orders received. Chart reviewed. Pt is pending OR. Will hold initial OT evaluation. Will continue to follow pt on caseload and see pt when medically stable and as clinically appropriate. 07/03/23 1511   OT Last Visit   OT Visit Date 07/03/23   Note Type   Note type Evaluation; Cancelled Session   Cancel Reasons Patient to operating room       Virginie Hernandez MS, OTR/L

## 2023-07-04 ENCOUNTER — APPOINTMENT (INPATIENT)
Dept: RADIOLOGY | Facility: HOSPITAL | Age: 63
DRG: 709 | End: 2023-07-04
Payer: COMMERCIAL

## 2023-07-04 ENCOUNTER — ANESTHESIA EVENT (INPATIENT)
Dept: PERIOP | Facility: HOSPITAL | Age: 63
DRG: 709 | End: 2023-07-04
Payer: COMMERCIAL

## 2023-07-04 ENCOUNTER — ANESTHESIA (INPATIENT)
Dept: PERIOP | Facility: HOSPITAL | Age: 63
DRG: 709 | End: 2023-07-04
Payer: COMMERCIAL

## 2023-07-04 LAB
ABO GROUP BLD: NORMAL
ANION GAP SERPL CALCULATED.3IONS-SCNC: 4 MMOL/L
ANISOCYTOSIS BLD QL SMEAR: PRESENT
BACTERIA TISS AEROBE CULT: ABNORMAL
BACTERIA TISS AEROBE CULT: ABNORMAL
BASOPHILS # BLD MANUAL: 0 THOUSAND/UL (ref 0–0.1)
BASOPHILS NFR MAR MANUAL: 0 % (ref 0–1)
BLD GP AB SCN SERPL QL: NEGATIVE
BUN SERPL-MCNC: 6 MG/DL (ref 5–25)
CA-I BLD-SCNC: 1.09 MMOL/L (ref 1.12–1.32)
CALCIUM SERPL-MCNC: 7.8 MG/DL (ref 8.3–10.1)
CHLORIDE SERPL-SCNC: 112 MMOL/L (ref 96–108)
CO2 SERPL-SCNC: 26 MMOL/L (ref 21–32)
CREAT SERPL-MCNC: 0.5 MG/DL (ref 0.6–1.3)
EOSINOPHIL # BLD MANUAL: 0 THOUSAND/UL (ref 0–0.4)
EOSINOPHIL NFR BLD MANUAL: 0 % (ref 0–6)
ERYTHROCYTE [DISTWIDTH] IN BLOOD BY AUTOMATED COUNT: 14.2 % (ref 11.6–15.1)
GFR SERPL CREATININE-BSD FRML MDRD: 114 ML/MIN/1.73SQ M
GLUCOSE SERPL-MCNC: 131 MG/DL (ref 65–140)
GLUCOSE SERPL-MCNC: 151 MG/DL (ref 65–140)
GLUCOSE SERPL-MCNC: 197 MG/DL (ref 65–140)
GLUCOSE SERPL-MCNC: 225 MG/DL (ref 65–140)
GLUCOSE SERPL-MCNC: 271 MG/DL (ref 65–140)
GRAM STN SPEC: ABNORMAL
HCT VFR BLD AUTO: 22.9 % (ref 36.5–49.3)
HCT VFR BLD AUTO: 27.7 % (ref 36.5–49.3)
HGB BLD-MCNC: 7.3 G/DL (ref 12–17)
HGB BLD-MCNC: 9 G/DL (ref 12–17)
HYPERCHROMIA BLD QL SMEAR: PRESENT
LYMPHOCYTES # BLD AUTO: 1.08 THOUSAND/UL (ref 0.6–4.47)
LYMPHOCYTES # BLD AUTO: 8 % (ref 14–44)
MACROCYTES BLD QL AUTO: PRESENT
MAGNESIUM SERPL-MCNC: 2 MG/DL (ref 1.6–2.6)
MCH RBC QN AUTO: 33.5 PG (ref 26.8–34.3)
MCHC RBC AUTO-ENTMCNC: 31.9 G/DL (ref 31.4–37.4)
MCV RBC AUTO: 105 FL (ref 82–98)
METAMYELOCYTES NFR BLD MANUAL: 3 % (ref 0–1)
MONOCYTES # BLD AUTO: 0.81 THOUSAND/UL (ref 0–1.22)
MONOCYTES NFR BLD: 6 % (ref 4–12)
MYELOCYTES NFR BLD MANUAL: 1 % (ref 0–1)
NEUTROPHILS # BLD MANUAL: 11.05 THOUSAND/UL (ref 1.85–7.62)
NEUTS BAND NFR BLD MANUAL: 3 % (ref 0–8)
NEUTS SEG NFR BLD AUTO: 79 % (ref 43–75)
PHOSPHATE SERPL-MCNC: 2.9 MG/DL (ref 2.3–4.1)
PLATELET # BLD AUTO: 329 THOUSANDS/UL (ref 149–390)
PLATELET BLD QL SMEAR: ADEQUATE
PMV BLD AUTO: 9.7 FL (ref 8.9–12.7)
POTASSIUM SERPL-SCNC: 3.9 MMOL/L (ref 3.5–5.3)
RBC # BLD AUTO: 2.18 MILLION/UL (ref 3.88–5.62)
RBC MORPH BLD: PRESENT
RH BLD: POSITIVE
SODIUM SERPL-SCNC: 142 MMOL/L (ref 135–147)
SPECIMEN EXPIRATION DATE: NORMAL
TARGETS BLD QL SMEAR: PRESENT
WBC # BLD AUTO: 13.47 THOUSAND/UL (ref 4.31–10.16)

## 2023-07-04 PROCEDURE — C1769 GUIDE WIRE: HCPCS | Performed by: SURGERY

## 2023-07-04 PROCEDURE — 85018 HEMOGLOBIN: CPT | Performed by: INTERNAL MEDICINE

## 2023-07-04 PROCEDURE — P9016 RBC LEUKOCYTES REDUCED: HCPCS

## 2023-07-04 PROCEDURE — 82948 REAGENT STRIP/BLOOD GLUCOSE: CPT

## 2023-07-04 PROCEDURE — 85027 COMPLETE CBC AUTOMATED: CPT | Performed by: NURSE PRACTITIONER

## 2023-07-04 PROCEDURE — 97606 NEG PRS WND THER DME>50 SQCM: CPT | Performed by: SURGERY

## 2023-07-04 PROCEDURE — 99233 SBSQ HOSP IP/OBS HIGH 50: CPT | Performed by: SURGERY

## 2023-07-04 PROCEDURE — 86900 BLOOD TYPING SEROLOGIC ABO: CPT | Performed by: SURGERY

## 2023-07-04 PROCEDURE — 99291 CRITICAL CARE FIRST HOUR: CPT | Performed by: PHYSICIAN ASSISTANT

## 2023-07-04 PROCEDURE — 80048 BASIC METABOLIC PNL TOTAL CA: CPT | Performed by: NURSE PRACTITIONER

## 2023-07-04 PROCEDURE — 82330 ASSAY OF CALCIUM: CPT | Performed by: NURSE PRACTITIONER

## 2023-07-04 PROCEDURE — 83735 ASSAY OF MAGNESIUM: CPT | Performed by: NURSE PRACTITIONER

## 2023-07-04 PROCEDURE — NC001 PR NO CHARGE: Performed by: NURSE PRACTITIONER

## 2023-07-04 PROCEDURE — 11004 DBRDMT SKIN XTRNL GENT&PER: CPT | Performed by: SURGERY

## 2023-07-04 PROCEDURE — 84100 ASSAY OF PHOSPHORUS: CPT | Performed by: NURSE PRACTITIONER

## 2023-07-04 PROCEDURE — 54512 EXCISE LESION TESTIS: CPT | Performed by: STUDENT IN AN ORGANIZED HEALTH CARE EDUCATION/TRAINING PROGRAM

## 2023-07-04 PROCEDURE — 0JBB0ZZ EXCISION OF PERINEUM SUBCUTANEOUS TISSUE AND FASCIA, OPEN APPROACH: ICD-10-PCS | Performed by: SURGERY

## 2023-07-04 PROCEDURE — 85014 HEMATOCRIT: CPT | Performed by: INTERNAL MEDICINE

## 2023-07-04 PROCEDURE — 86850 RBC ANTIBODY SCREEN: CPT | Performed by: SURGERY

## 2023-07-04 PROCEDURE — 86901 BLOOD TYPING SEROLOGIC RH(D): CPT | Performed by: SURGERY

## 2023-07-04 PROCEDURE — 51705 CHANGE OF BLADDER TUBE: CPT | Performed by: STUDENT IN AN ORGANIZED HEALTH CARE EDUCATION/TRAINING PROGRAM

## 2023-07-04 PROCEDURE — 99232 SBSQ HOSP IP/OBS MODERATE 35: CPT | Performed by: STUDENT IN AN ORGANIZED HEALTH CARE EDUCATION/TRAINING PROGRAM

## 2023-07-04 PROCEDURE — 85007 BL SMEAR W/DIFF WBC COUNT: CPT | Performed by: NURSE PRACTITIONER

## 2023-07-04 PROCEDURE — 44188 LAP COLOSTOMY: CPT | Performed by: SURGERY

## 2023-07-04 PROCEDURE — 0D1N4Z4 BYPASS SIGMOID COLON TO CUTANEOUS, PERCUTANEOUS ENDOSCOPIC APPROACH: ICD-10-PCS | Performed by: SURGERY

## 2023-07-04 PROCEDURE — 0VSC0ZZ REPOSITION BILATERAL TESTES, OPEN APPROACH: ICD-10-PCS | Performed by: STUDENT IN AN ORGANIZED HEALTH CARE EDUCATION/TRAINING PROGRAM

## 2023-07-04 PROCEDURE — 86923 COMPATIBILITY TEST ELECTRIC: CPT

## 2023-07-04 RX ORDER — PROMETHAZINE HYDROCHLORIDE 25 MG/ML
25 INJECTION, SOLUTION INTRAMUSCULAR; INTRAVENOUS ONCE AS NEEDED
Status: DISCONTINUED | OUTPATIENT
Start: 2023-07-04 | End: 2023-07-04 | Stop reason: HOSPADM

## 2023-07-04 RX ORDER — SODIUM CHLORIDE 9 MG/ML
INJECTION, SOLUTION INTRAVENOUS CONTINUOUS PRN
Status: DISCONTINUED | OUTPATIENT
Start: 2023-07-04 | End: 2023-07-04

## 2023-07-04 RX ORDER — PANTOPRAZOLE SODIUM 40 MG/1
40 TABLET, DELAYED RELEASE ORAL
Status: DISCONTINUED | OUTPATIENT
Start: 2023-07-04 | End: 2023-07-05

## 2023-07-04 RX ORDER — DEXAMETHASONE SODIUM PHOSPHATE 10 MG/ML
INJECTION, SOLUTION INTRAMUSCULAR; INTRAVENOUS AS NEEDED
Status: DISCONTINUED | OUTPATIENT
Start: 2023-07-04 | End: 2023-07-04

## 2023-07-04 RX ORDER — MAGNESIUM HYDROXIDE 1200 MG/15ML
LIQUID ORAL AS NEEDED
Status: DISCONTINUED | OUTPATIENT
Start: 2023-07-04 | End: 2023-07-04 | Stop reason: HOSPADM

## 2023-07-04 RX ORDER — HYDROMORPHONE HYDROCHLORIDE 2 MG/ML
INJECTION, SOLUTION INTRAMUSCULAR; INTRAVENOUS; SUBCUTANEOUS AS NEEDED
Status: DISCONTINUED | OUTPATIENT
Start: 2023-07-04 | End: 2023-07-04

## 2023-07-04 RX ORDER — BUPIVACAINE HYDROCHLORIDE 5 MG/ML
INJECTION, SOLUTION EPIDURAL; INTRACAUDAL AS NEEDED
Status: DISCONTINUED | OUTPATIENT
Start: 2023-07-04 | End: 2023-07-04 | Stop reason: HOSPADM

## 2023-07-04 RX ORDER — INSULIN LISPRO 100 [IU]/ML
2-12 INJECTION, SOLUTION INTRAVENOUS; SUBCUTANEOUS
Status: DISCONTINUED | OUTPATIENT
Start: 2023-07-04 | End: 2023-07-14 | Stop reason: HOSPADM

## 2023-07-04 RX ORDER — PROPOFOL 10 MG/ML
INJECTION, EMULSION INTRAVENOUS AS NEEDED
Status: DISCONTINUED | OUTPATIENT
Start: 2023-07-04 | End: 2023-07-04

## 2023-07-04 RX ORDER — FENTANYL CITRATE 50 UG/ML
INJECTION, SOLUTION INTRAMUSCULAR; INTRAVENOUS AS NEEDED
Status: DISCONTINUED | OUTPATIENT
Start: 2023-07-04 | End: 2023-07-04

## 2023-07-04 RX ORDER — LIDOCAINE HYDROCHLORIDE 10 MG/ML
INJECTION, SOLUTION EPIDURAL; INFILTRATION; INTRACAUDAL; PERINEURAL AS NEEDED
Status: DISCONTINUED | OUTPATIENT
Start: 2023-07-04 | End: 2023-07-04

## 2023-07-04 RX ORDER — HYDROMORPHONE HCL/PF 1 MG/ML
0.5 SYRINGE (ML) INJECTION
Status: DISCONTINUED | OUTPATIENT
Start: 2023-07-04 | End: 2023-07-04 | Stop reason: HOSPADM

## 2023-07-04 RX ORDER — ACETAMINOPHEN 325 MG/1
975 TABLET ORAL EVERY 8 HOURS SCHEDULED
Status: DISCONTINUED | OUTPATIENT
Start: 2023-07-04 | End: 2023-07-14 | Stop reason: HOSPADM

## 2023-07-04 RX ORDER — INSULIN GLARGINE 100 [IU]/ML
8 INJECTION, SOLUTION SUBCUTANEOUS
Status: DISCONTINUED | OUTPATIENT
Start: 2023-07-04 | End: 2023-07-14 | Stop reason: HOSPADM

## 2023-07-04 RX ORDER — ONDANSETRON 2 MG/ML
4 INJECTION INTRAMUSCULAR; INTRAVENOUS ONCE AS NEEDED
Status: DISCONTINUED | OUTPATIENT
Start: 2023-07-04 | End: 2023-07-04 | Stop reason: HOSPADM

## 2023-07-04 RX ORDER — CALCIUM GLUCONATE 20 MG/ML
2 INJECTION, SOLUTION INTRAVENOUS ONCE
Status: COMPLETED | OUTPATIENT
Start: 2023-07-04 | End: 2023-07-04

## 2023-07-04 RX ORDER — FENTANYL CITRATE/PF 50 MCG/ML
50 SYRINGE (ML) INJECTION
Status: DISCONTINUED | OUTPATIENT
Start: 2023-07-04 | End: 2023-07-04 | Stop reason: HOSPADM

## 2023-07-04 RX ORDER — ONDANSETRON 2 MG/ML
INJECTION INTRAMUSCULAR; INTRAVENOUS AS NEEDED
Status: DISCONTINUED | OUTPATIENT
Start: 2023-07-04 | End: 2023-07-04

## 2023-07-04 RX ORDER — FENTANYL CITRATE/PF 50 MCG/ML
25 SYRINGE (ML) INJECTION
Status: DISCONTINUED | OUTPATIENT
Start: 2023-07-04 | End: 2023-07-04 | Stop reason: HOSPADM

## 2023-07-04 RX ORDER — HYDROMORPHONE HCL IN WATER/PF 6 MG/30 ML
0.2 PATIENT CONTROLLED ANALGESIA SYRINGE INTRAVENOUS
Status: DISCONTINUED | OUTPATIENT
Start: 2023-07-04 | End: 2023-07-04 | Stop reason: HOSPADM

## 2023-07-04 RX ORDER — ROCURONIUM BROMIDE 10 MG/ML
INJECTION, SOLUTION INTRAVENOUS AS NEEDED
Status: DISCONTINUED | OUTPATIENT
Start: 2023-07-04 | End: 2023-07-04

## 2023-07-04 RX ORDER — INSULIN LISPRO 100 [IU]/ML
4-20 INJECTION, SOLUTION INTRAVENOUS; SUBCUTANEOUS
Status: DISCONTINUED | OUTPATIENT
Start: 2023-07-04 | End: 2023-07-09

## 2023-07-04 RX ORDER — NOREPINEPHRINE BITARTRATE 1 MG/ML
INJECTION, SOLUTION INTRAVENOUS AS NEEDED
Status: DISCONTINUED | OUTPATIENT
Start: 2023-07-04 | End: 2023-07-04

## 2023-07-04 RX ORDER — SODIUM CHLORIDE, SODIUM GLUCONATE, SODIUM ACETATE, POTASSIUM CHLORIDE, MAGNESIUM CHLORIDE, SODIUM PHOSPHATE, DIBASIC, AND POTASSIUM PHOSPHATE .53; .5; .37; .037; .03; .012; .00082 G/100ML; G/100ML; G/100ML; G/100ML; G/100ML; G/100ML; G/100ML
500 INJECTION, SOLUTION INTRAVENOUS ONCE
Status: DISCONTINUED | OUTPATIENT
Start: 2023-07-04 | End: 2023-07-04

## 2023-07-04 RX ADMIN — SODIUM CHLORIDE, SODIUM LACTATE, POTASSIUM CHLORIDE, AND CALCIUM CHLORIDE: .6; .31; .03; .02 INJECTION, SOLUTION INTRAVENOUS at 11:53

## 2023-07-04 RX ADMIN — LINEZOLID 600 MG: 600 INJECTION, SOLUTION INTRAVENOUS at 06:06

## 2023-07-04 RX ADMIN — FENTANYL CITRATE 50 MCG: 50 INJECTION INTRAMUSCULAR; INTRAVENOUS at 10:46

## 2023-07-04 RX ADMIN — MELATONIN TAB 3 MG 3 MG: 3 TAB at 21:14

## 2023-07-04 RX ADMIN — ONDANSETRON 4 MG: 2 INJECTION INTRAMUSCULAR; INTRAVENOUS at 11:36

## 2023-07-04 RX ADMIN — OXYCODONE HYDROCHLORIDE 5 MG: 5 TABLET ORAL at 21:13

## 2023-07-04 RX ADMIN — SODIUM CHLORIDE 3 G: 9 INJECTION, SOLUTION INTRAVENOUS at 03:41

## 2023-07-04 RX ADMIN — ACETAMINOPHEN 650 MG: 325 TABLET, FILM COATED ORAL at 06:06

## 2023-07-04 RX ADMIN — FENTANYL CITRATE 50 MCG: 50 INJECTION INTRAMUSCULAR; INTRAVENOUS at 10:57

## 2023-07-04 RX ADMIN — HYDROMORPHONE HYDROCHLORIDE 0.5 MG: 2 INJECTION, SOLUTION INTRAMUSCULAR; INTRAVENOUS; SUBCUTANEOUS at 11:08

## 2023-07-04 RX ADMIN — HYDROMORPHONE HYDROCHLORIDE 0.5 MG: 1 INJECTION, SOLUTION INTRAMUSCULAR; INTRAVENOUS; SUBCUTANEOUS at 07:16

## 2023-07-04 RX ADMIN — CEFAZOLIN SODIUM 2000 MG: 2 SOLUTION INTRAVENOUS at 09:35

## 2023-07-04 RX ADMIN — ACETAMINOPHEN 975 MG: 325 TABLET, FILM COATED ORAL at 21:13

## 2023-07-04 RX ADMIN — PROPOFOL 200 MG: 10 INJECTION, EMULSION INTRAVENOUS at 09:27

## 2023-07-04 RX ADMIN — HYDROMORPHONE HYDROCHLORIDE 0.5 MG: 2 INJECTION, SOLUTION INTRAMUSCULAR; INTRAVENOUS; SUBCUTANEOUS at 13:33

## 2023-07-04 RX ADMIN — SUGAMMADEX 200 MG: 100 INJECTION, SOLUTION INTRAVENOUS at 14:04

## 2023-07-04 RX ADMIN — LIDOCAINE HYDROCHLORIDE 50 MG: 10 INJECTION, SOLUTION EPIDURAL; INFILTRATION; INTRACAUDAL; PERINEURAL at 09:26

## 2023-07-04 RX ADMIN — LINEZOLID 600 MG: 600 INJECTION, SOLUTION INTRAVENOUS at 19:27

## 2023-07-04 RX ADMIN — METRONIDAZOLE: 500 SOLUTION INTRAVENOUS at 09:40

## 2023-07-04 RX ADMIN — INSULIN LISPRO 6 UNITS: 100 INJECTION, SOLUTION INTRAVENOUS; SUBCUTANEOUS at 21:12

## 2023-07-04 RX ADMIN — SODIUM CHLORIDE 3 G: 9 INJECTION, SOLUTION INTRAVENOUS at 08:41

## 2023-07-04 RX ADMIN — INSULIN GLARGINE 8 UNITS: 100 INJECTION, SOLUTION SUBCUTANEOUS at 21:13

## 2023-07-04 RX ADMIN — CEFAZOLIN SODIUM 2000 MG: 2 SOLUTION INTRAVENOUS at 13:35

## 2023-07-04 RX ADMIN — PANTOPRAZOLE SODIUM 40 MG: 40 TABLET, DELAYED RELEASE ORAL at 16:58

## 2023-07-04 RX ADMIN — SODIUM CHLORIDE 3 G: 9 INJECTION, SOLUTION INTRAVENOUS at 21:14

## 2023-07-04 RX ADMIN — ROCURONIUM BROMIDE 20 MG: 10 INJECTION, SOLUTION INTRAVENOUS at 10:56

## 2023-07-04 RX ADMIN — HYDROMORPHONE HYDROCHLORIDE 0.5 MG: 2 INJECTION, SOLUTION INTRAMUSCULAR; INTRAVENOUS; SUBCUTANEOUS at 13:09

## 2023-07-04 RX ADMIN — SENNOSIDES AND DOCUSATE SODIUM 1 TABLET: 50; 8.6 TABLET ORAL at 17:57

## 2023-07-04 RX ADMIN — FENTANYL CITRATE 50 MCG: 50 INJECTION INTRAMUSCULAR; INTRAVENOUS at 09:21

## 2023-07-04 RX ADMIN — ROCURONIUM BROMIDE 50 MG: 10 INJECTION, SOLUTION INTRAVENOUS at 09:28

## 2023-07-04 RX ADMIN — SODIUM CHLORIDE: 0.9 INJECTION, SOLUTION INTRAVENOUS at 09:40

## 2023-07-04 RX ADMIN — DEXAMETHASONE SODIUM PHOSPHATE 10 MG: 10 INJECTION, SOLUTION INTRAMUSCULAR; INTRAVENOUS at 09:51

## 2023-07-04 RX ADMIN — INSULIN LISPRO 8 UNITS: 100 INJECTION, SOLUTION INTRAVENOUS; SUBCUTANEOUS at 18:00

## 2023-07-04 RX ADMIN — HYDROMORPHONE HYDROCHLORIDE 0.5 MG: 2 INJECTION, SOLUTION INTRAMUSCULAR; INTRAVENOUS; SUBCUTANEOUS at 12:49

## 2023-07-04 RX ADMIN — SODIUM CHLORIDE, SODIUM LACTATE, POTASSIUM CHLORIDE, AND CALCIUM CHLORIDE 125 ML/HR: .6; .31; .03; .02 INJECTION, SOLUTION INTRAVENOUS at 03:40

## 2023-07-04 RX ADMIN — NOREPINEPHRINE BITARTRATE 8 MCG: 1 INJECTION, SOLUTION, CONCENTRATE INTRAVENOUS at 10:28

## 2023-07-04 RX ADMIN — FENTANYL CITRATE 50 MCG: 50 INJECTION INTRAMUSCULAR; INTRAVENOUS at 09:27

## 2023-07-04 RX ADMIN — ROCURONIUM BROMIDE 20 MG: 10 INJECTION, SOLUTION INTRAVENOUS at 10:20

## 2023-07-04 RX ADMIN — PROPOFOL 100 MG: 10 INJECTION, EMULSION INTRAVENOUS at 13:40

## 2023-07-04 RX ADMIN — CALCIUM GLUCONATE 2 G: 20 INJECTION, SOLUTION INTRAVENOUS at 07:47

## 2023-07-04 NOTE — PHYSICAL THERAPY NOTE
Physical Therapy Cancellation Note         07/04/23 1229   PT Last Visit   PT Visit Date 07/04/23   Note Type   Note type Evaluation; Cancelled Session   Cancel Reasons Patient to operating room  (for washout, diverting colostomy, and testicular fixation)       Vincent Campbell, PT, DPT

## 2023-07-04 NOTE — QUICK NOTE
Post-op check - General Surgery  James Soto 57 y.o. male MRN: [id number]  Unit/Bed#: [facility] 202-01 Encounter: [id number]    Assessment:  57 y.o. male with sasha's gangrene s/p laparoscopic loop diverting colostomy creation, perineal debridement, cystoscopy with suprapubic tube exchange, EUA, testicular fixation, vac placement 7/4 (shalini/tom)    Plan:  - Diet Luis Carlos/CHO Controlled; Consistent Carbohydrate Diet Level 2 (5 carb servings/75 grams CHO/meal)  Diet NPO; Sips with meds   - maintain wound vac to 125 suction  - if patient soils into vac, plan for WTD dressing, not planning to replace vac at bedside  - Pain and Nausea control PRN  - Incentive spirometry  - strict I/Os    Subjective/Objective     Subjective: Went to evaluate the patient after arrival to the floor. The patient's pain is well-controlled and the patient denies any nausea. He is tolerating a diet. Objective:   Vitals: Blood pressure 120/80, pulse 86, temperature (!) 96.6 °F (35.9 °C), temperature source Axillary, resp. rate 17, height 6' 2" (1.88 m), weight 92.2 kg (203 lb 4.2 oz), SpO2 91 %. ,Body mass index is 26.1 kg/m². I/O       07/03 0701  07/04 0700 07/04 0701  07/05 0700    P. O.      I.V. (mL/kg) 3000 (32.5) 2500 (27.1)    Blood  350    IV Piggyback 450 300    Total Intake(mL/kg) 3450 (37.4) 3150 (34.2)    Urine (mL/kg/hr) 1280 (0.6) 600 (0.5)    Emesis/NG output  50    Stool 0     Blood  50    Total Output 1280 700    Net +2170 +2450          Unmeasured Stool Occurrence 1 x           Physical Exam:  General: No acute distress  Neuro: alert and oriented  HEENT: moist mucous membranes  CV: Well perfused, regular rate and rhythm  Lungs: Normal work of breathing, no increased respiratory effort  Abdomen: Soft, non-tender, non-distended.   : suprapubic catheter in place with good urine output, vac in place with good seal  Extremities: No edema, clubbing or cyanosis  Skin: Warm, dry

## 2023-07-04 NOTE — PROGRESS NOTES
Progress Note - Urology  Rian Harvey 57 y.o. 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 Suprapubic tube placed on 7/1/2023 and urethral catheter removed. SP tube inadvertently cut overnight and will need to be replaced today. We will evaluate the wound and attempt to place a wound VAC if possible today. We will perform testicular fixation prior to wound VAC placement. Plan:  • Informed consent obtained for cystoscopy, SP tube change, testicular fixation, possible wound debridement and wound VAC placement. Subjective: Suprapubic tube was inadvertently cut overnight while mesh panties were being cut/exchange by nursing. Otherwise no significant events overnight. Objective:     Blood pressure 111/58, pulse 70, temperature 97.7 °F (36.5 °C), temperature source Oral, resp. rate 13, height 6' 2" (1.88 m), weight 92.2 kg (203 lb 4.2 oz), SpO2 91 %. ,Body mass index is 26.1 kg/m². Intake/Output Summary (Last 24 hours) at 7/4/2023 1027  Last data filed at 7/4/2023 6713  Gross per 24 hour   Intake 3950 ml   Output 1280 ml   Net 2670 ml       Invasive Devices     Peripheral Intravenous Line  Duration           Peripheral IV 06/30/23 Left;Ventral (anterior) Forearm 3 days    Peripheral IV 07/03/23 Distal;Right;Upper;Ventral (anterior) Arm <1 day          Drain  Duration           Rectal Tube With balloon 3 days    Suprapubic Catheter 14 Fr. 2 days          Airway  Duration           ETT  Cuffed;Oral;Hi-Lo 8 mm <1 day                Physical Exam:     General:  Healthy appearing male in no acute distress. Cardiovascular:  Regular rate  Respiratory:  Patient has unlabored respirations.    Abdomen:  Abdomen nondistended  Extremities: No deformities  Genitourinary: SP tube noted to be transected and attached to extension tubing    Lab, Imaging and other studies:  CBC:   Lab Results   Component Value Date    WBC 13.47 (H) 07/04/2023    HGB 7.3 (L) 07/04/2023    HCT 22.9 (L) 07/04/2023     (H) 07/04/2023     07/04/2023    RBC 2.18 (L) 07/04/2023    MCH 33.5 07/04/2023    MCHC 31.9 07/04/2023    RDW 14.2 07/04/2023    MPV 9.7 07/04/2023   , CMP:   Lab Results   Component Value Date    SODIUM 142 07/04/2023    K 3.9 07/04/2023     (H) 07/04/2023    CO2 26 07/04/2023    BUN 6 07/04/2023    CREATININE 0.50 (L) 07/04/2023    CALCIUM 7.8 (L) 07/04/2023    EGFR 114 07/04/2023     Portions of the above record have been created with voice recognition software. Occasional wrong word or "sound alike" substitution may have occurred due to the inherent limitations of voice recognition software. Please read the chart carefully and recognize, using context, where substitution may have occurred. For further clarification, please contact me directly.

## 2023-07-04 NOTE — PROGRESS NOTES
Critical Care Interval Progress Note - Post-Op Check     Giuliano Martin 57 y.o. male MRN: [id number]  Unit/Bed#: [facility] 202 Encounter: [id number]    Subjective and Objective:  S/p perineal washout, creation of loop sigmoid colostomy, cystoscopy and suprapubic catheter exchange, and testicular fixation with VAC application     Physical Exam  Vitals reviewed. Constitutional:       General: He is awake. He is not in acute distress. Appearance: Normal appearance. HENT:      Head: Normocephalic and atraumatic. Mouth/Throat:      Lips: Pink. Mouth: Mucous membranes are moist.   Eyes:      Conjunctiva/sclera: Conjunctivae normal.   Cardiovascular:      Rate and Rhythm: Normal rate and regular rhythm. Pulses:           Radial pulses are 2+ on the right side and 2+ on the left side. Dorsalis pedis pulses are 2+ on the right side and 2+ on the left side. Heart sounds: Normal heart sounds. Comments: BUE edema   Pulmonary:      Effort: Pulmonary effort is normal.      Breath sounds: Examination of the right-lower field reveals decreased breath sounds. Examination of the left-lower field reveals decreased breath sounds. Decreased breath sounds present. Abdominal:      General: Bowel sounds are normal.      Palpations: Abdomen is soft. Tenderness: There is no abdominal tenderness. Comments: Ostomy site moist, beefy red. No output   Genitourinary:     Comments: Suprapubic catheter. Light, clear yellow urine   Perineum covered in VAC dressing with minimal output   Musculoskeletal:      Right lower le+ Pitting Edema present. Left lower le+ Pitting Edema present. Skin:     General: Skin is warm and dry. Capillary Refill: Capillary refill takes less than 2 seconds. Neurological:      General: No focal deficit present. Mental Status: He is alert. GCS: GCS eye subscore is 4. GCS verbal subscore is 5. GCS motor subscore is 6.       Motor: Motor function is intact. Psychiatric:         Behavior: Behavior is cooperative. Assessment and Plan:  Principal Problem:    Rohan's gangrene  Active Problems:    DM (diabetes mellitus) ([address])    HTN (hypertension)    Suprapubic catheter ([address])    Urethral disorder    Hyperglycemia  Resolved Problems:    Septic shock ([address])      Neuro: Continue multimodal analgesia. Sleep hygiene, melatonin. Routine neuro checks  CV: Routine vital signs, holding home antihypertensives   Pulm: No acute issues. Pulm toilet, IS. SpO2 > 90%  GI: Resume diet. Monitor ostomy site and output. Bowel regimen   : Maintain suprapubic catheter and monitor I/O. D/C IVF BMP in AM. Ongoing urology management and follow up   Heme: Monitor for signs of acute bleeding. Lovenox for VTE prophylaxis. SCD's. CBC in AM  ID: ID following. Continue Unasyn and linezolid. Monitor WBC and fever curve. Endo: AC/HS SSI. Start low-dose lantus tonight for persistently high BGL.   MSK/Skin: Monitor VAC output amount and character. Plan for OR Thursday or Friday     DAMIAN Marrero      Portions of the record may have been created with voice recognition software. Occasional wrong word or "sound a like" substitutions may have occurred due to the inherent limitations of voice recognition software.   Read the chart carefully and recognize, using context, where substitutions have occurred

## 2023-07-04 NOTE — OP NOTE
OPERATIVE REPORT  PATIENT NAME: Gaby Rehman    :  1960  MRN: [id number]  Pt Location: [facility] OR ROOM 07    SURGERY DATE: 2023    Surgeon(s) and Role:  Panel 1:     * Natalia Persaud MD - Primary     * Hussein Liz DO - Assisting     * Rosa Elena Claros MD - Abdulaziz Wilson MD  Panel 2:     * Cris Ann MD - Primary    Preop Diagnosis:  Rohan's gangrene [N49.3]    Post-Op Diagnosis Codes:     * Rohan's gangrene [N49.3]    Procedure(s):  DEBRIDEMENT OF PERINEAL WOUND ([address])  LAPAROSCOPY DIAGNOSTIC WITH CREATION OF OSTOMY  CYSTOSCOPY and suprapubic tube exchange  EUA. testicular fiation  APPLICATION VAC DRESSING    Specimen(s):  * No specimens in log *    Estimated Blood Loss:   Minimal    Drains:  Ileostomy Loop LUQ (Active)   Number of days: 0       Rectal Tube With balloon (Active)   Rectal Tube Output 0 mL 23 1200   Number of days: 4       Suprapubic Catheter 18 Fr. (Active)   Number of days: 0       Anesthesia Type:   Choice    Operative Indications:  Rohan's gangrene [N49.3]    Operative Findings:  - Redundant sigmoid with creation of loop sigmoid colostomy in left mid abdomen       - Fibrinous exudate throughout perineal wound requiring minimal sharp debridement with 15 blade scalpel + curette debridement - wound measurement 27 cm x 14 cm x 6 cm   - Wound vac placed - 4 sheets of adaptic around the testicles, 4 white sponges, three black sponges. Method:  _x__ Excisional  __x_ Non-excisional debridement  Instrument:  ___  Blade  _x__ Scalpel  ___ Scissors  ___Lavage  ___Other, please specify: curette   Depth:  _x__ Subcutaneous  ___ Fascia  ___ Muscle  ___ Bone    Size of the wound: 27 cm x 14 cm x 6 cm post-debridement. 27 cm x 13 cm x 6 cm pre-debridement   Wound description: Fibrinous exudate at base of wound. Healthy bleeding skin edges. Indicate if the procedure extended to the wound margins?  _x_ Yes  ___ No  Indicate if the procedure extended to bleeding tissue? _x_ Yes  ___ No      Complications:   None    Procedure and Technique:  The patient was brought to the operating room and identified verbally and via wristband. He was transferred to the operating room table and positioned supine. General endotracheal anesthesia was induced successfully. He was then positioned in the lithotomy position. The patient's abdomen was prepped and draped in the usual sterile fashion. Pre-operative antibiotics were administered. A time out was performed confirming the patient, procedure, and site. All parties were in agreement. Attention was turned to the abdomen where a 5 mm supraumbilical incision was made with an 11 blade scalpel. The abdominal fascia was grasped with Kochers and incised with an 11 blade scalpel. A 5mm port was then placed. CO2 insufflation was initiated which the patient tolerated well. The laparoscope was inserted and the abdomen was inspected upon entry and no injuries were appreciated. An additional 5 mm trocar was placed in the R mid abdomen under direct visualization. The LLQ was examined and the sigmoid was noted to be redundant. The sigmoid colon was grasped with an atraumatic grasper and lifted towards the left mid abdomen. An additional 5 mm L mid abdominal trocar was placed under laparoscopic visualization. The L mid abdominal incision was widened with bovie electrocautery. A radha was placed through this incision and the sigmoid colon was grasped under laparoscopic visualization. The sigmoid colon was then externalized through this incision, taking care to orient the colon appropriately and not twist the mesentery. The insufflation was ceased. A loop sigmoid colostomy was then created in standard fashion, utilizing an ostomy bar which was fastened to the skin. The loop colostomy was brooked with 3-0 vicryl suture. An ostomy appliance was then placed over the colostomy.  The skin incisions were closed with 4-0 monocryl suture and covered with exofin skin glue. Attention was then turned to the perineum. The perineum was prepped and draped in the usual sterile fashion. At this point, urology had re-joined the case, and the details regarding testicular fixation will be dictated in a separate report. Please refer to Dr. aMthew Medina operative note for details of this portion of the procedure. Once urology had finished their portion of the procedure, the perineal wound was closely examined. There was fibrinous exudate overlying the majority of the wound. This was scraped with a laparotomy pad and irrigated extensively with a pulsavac using normal saline. A curette was used to debride the entire wound surface. A 15 blade scalpel was then used to sharply debride the wound edges that were unhealthy appearing. There were two areas along the R groin that had small areas or purulence remaining. These were opened up with a 15 blade scalpel and irrigated with normal saline. The wound measurements at this point were 27 cm x 14 cm x 6 cm. 4 sheets of adaptic were used to wrap the testicles. The adaptic was then wrapped with 4 white sponges tied together with 2-0 silk suture to make a hammock for the testicles. One black sponge was placed in the most posterior portion of the wound, tunneling posterior bilaterally. One large black sponge was placed overlying this to cover the whole wound. One black sponge was used as a bridge to the RLQ of the abdomen. The wound vac adhesives were applied and the wound vac was initiated at 125 mmHg continuous. The patient was then allowed to awaken, extubated, and transferred to the PACU having tolerated the procedure well. All instrument, needle, and sponge counts were correct at the end of the case. Radiofrequency detection was negative.     Dr. Anamraia Alegria was present for the entire procedure      Patient Disposition:  PACU     This procedure was not performed to treat colon cancer through resection      SIGNATURE: Fritz Rausch DO  DATE: July 4, 2023  TIME: 2:14 PM

## 2023-07-04 NOTE — PROGRESS NOTES
[address] [place] [address]  Progress Note: Critical Care  Name: Ginny Patel 57 y.o. male I MRN: [id number]  Unit/Bed#: [facility] 202-01 I Date of Admission: 6/29/2023   Date of Service: 7/4/2023 I Hospital Day: 5    Assessment/Plan   Neuro:   · Diagnosis: Acute pain  · Tylenol 650 mg PO q6h  · Oxycodone 5 mg q4h PRN (0 doses/24h)  · Oxycodone 10 mg q4h PRN (0 doses/24h)  · Dilaudid 0.5 mg IV q3h PRN (3 doses/24h)  · Regulate sleep/wake cycle  · Melatonin 3 mg qHS      CV:   · Diagnosis: Septic shock - resolved  · Plan: Maintain MAP > 65 mmHg  · Hx HTN  · Home lisinopril on hold      Pulm:  No active issues    GI:   · Plan for diverting colostomy today  · Bowel prep  · Bowel regimen       :   · Diagnosis: Rohan's gangrene  · Plan: S/p debridements on 6/28, 6/29, 6/30, 7/1, 7/2  · Bedside dressing change 7/3  · Suprapubic catheter placement on 7/1  · Plan for OR today for washout/debridement, diverting colostomy   · Dressing changes per general surgery   · Monitor urine output       F/E/N:   · Replete electrolytes as needed  · NPO pending OR, restart diet post-op  ·  ml/hr, decrease pot-op       Heme/Onc:   · Diagnosis: Anemia, component of acute blood loss and dilutional   · Plan: Trend Hgb daily, no active signs of bleeding   · DVT PPx: lovenox 40 mg dialy      Endo:   · Diagnosis: DM type 2 with hyperglycemia   · Plan: Maintained on metformin and trulicity as an outpatient   · 6/30/2023 HbA1c 7.3%  · SSI algorithm 5 with q6h accuechecks  · Change to QID post-op if tolerating PO  · Goal blood glucose 140-180  · May require insulin drip once taking PO       ID:   · Diagnosis: Rohan's gangrene  · Plan: ID consulted, appreciate their recommendations  · Unasyn 3g q6h, D#2 (D#7 of total abx)  · Linezolid 500 mg q12h, D#5  · D/C when no longer requiring debridement   · Trend WBC/fever curve       MSK/Skin:   Local wound care    Disposition: Med Surg    ICU Core Measures     A: Assess, Prevent, and Manage Pain · Has pain been assessed? Yes  · Need for changes to pain regimen? No   B: Both SAT/SAT  · N/A   C: Choice of Sedation · RASS Goal: N/A patient not on sedation  · Need for changes to sedation or analgesia regimen? No   D: Delirium · CAM-ICU: Negative   E: Early Mobility  · Plan for early mobility? Yes   F: Family Engagement · Plan for family engagement today? Yes       Antibiotic Review: Infectious disease consulted    Review of Invasive Devices:            Prophylaxis:  VTE VTE covered by:  enoxaparin, Subcutaneous       Stress Ulcer  not ordered          Subjective   HPI/24hr events: 58 y/o male with sasha's gangrene s/p multiple debridement. NPO pending OR today for diverting colostomy and wound debridement. Patient is frustrated and wants to go home soon. Review of Systems   Respiratory: Negative for shortness of breath. Cardiovascular: Negative for chest pain. Gastrointestinal: Positive for diarrhea. Negative for abdominal pain and nausea. Genitourinary:        Discomfort at site of wound            Objective                            Vitals I/O      Most Recent Min/Max in 24hrs   Temp 98.2 °F (36.8 °C) Temp  Min: 98.2 °F (36.8 °C)  Max: 99.2 °F (37.3 °C)   Pulse 84 Pulse  Min: 76  Max: 90   Resp 22 Resp  Min: 17  Max: 25   /64 BP  Min: 116/63  Max: 132/71   O2 Sat 93 % SpO2  Min: 93 %  Max: 96 %      Intake/Output Summary (Last 24 hours) at 7/4/2023 0532  Last data filed at 7/4/2023 9073  Gross per 24 hour   Intake 3750 ml   Output 1280 ml   Net 2470 ml         Diet NPO; Sips with meds     Invasive Monitoring Physical exam    Physical Exam  Constitutional:       Appearance: He is not ill-appearing. HENT:      Head: Atraumatic. Mouth/Throat:      Mouth: Mucous membranes are dry. Cardiovascular:      Rate and Rhythm: Normal rate and regular rhythm. Pulmonary:      Effort: No respiratory distress. Breath sounds: No wheezing, rhonchi or rales. Abdominal:      General: Bowel sounds are normal. There is no distension. Palpations: Abdomen is soft. Tenderness: There is no abdominal tenderness. Comments: Rectal tube   Genitourinary:     Comments: Suprapubic catheter  Wound with dressing in place  Musculoskeletal:      Cervical back: Neck supple. Right lower leg: No edema. Left lower leg: No edema. Skin:     General: Skin is warm and dry. Capillary Refill: Capillary refill takes less than 2 seconds. Neurological:      General: No focal deficit present. Mental Status: He is alert and oriented to person, place, and time.    Psychiatric:      Comments: Frustrated              Diagnostic Studies           Medications:  Scheduled PRN   acetaminophen, 650 mg, Q6H [address]  ampicillin-sulbactam, 3 g, Q6H  cefazolin, 2,000 mg, On Call To OR  enoxaparin, 40 mg, Q24H [address]  insulin lispro, 4-20 Units, Q6H GEN  linezolid, 600 mg, Q12H  melatonin, 3 mg, HS  metroNIDAZOLE, 500 mg, On Call To OR  senna-docusate sodium, 1 tablet, BID      HYDROmorphone, 0.5 mg, Q3H PRN  ondansetron, 4 mg, Q6H PRN  oxyCODONE, 10 mg, Q4H PRN  oxyCODONE, 5 mg, Q4H PRN  polyethylene glycol, 17 g, Daily PRN       Continuous    lactated ringers, 125 mL/hr, Last Rate: 125 mL/hr (07/04/23 0340)         Labs:    CBC    Recent Labs     07/03/23  0455   WBC 13.84*   HGB 7.5*   HCT 22.5*        BMP    Recent Labs     07/03/23  0455   SODIUM 139   K 3.6   *   CO2 29   AGAP 0   BUN 10   CREATININE 0.52*   CALCIUM 7.5*       Coags    No recent results     Additional Electrolytes  Recent Labs     07/03/23  1115   PHOS 2.3          Blood Gas    No recent results  No recent results LFTs  No recent results    Infectious  No recent results  Glucose  Recent Labs     07/03/23  0455   GLUC 151*                   Vanessa Fenton PA-C

## 2023-07-04 NOTE — ANESTHESIA PREPROCEDURE EVALUATION
Procedure:  DEBRIDEMENT OF PERINEAL WOUND ([facility] OUT) (Perineum)  LAPAROSCOPY DIAGNOSTIC WITH CREATION OF OSTOMY (Abdomen)  EXAM UNDER ANESTHESIA (EUA), TESTICULAR FIXATION (Scrotum)  TESTICULAR FIXATION (Bilateral: Scrotum)    Relevant Problems   ANESTHESIA (within normal limits)      CARDIO   (+) HTN (hypertension)      Endocrine   (+) DM (diabetes mellitus) (HCC)      Musculoskeletal and Integument   (+) Rohan's gangrene        Physical Exam    Airway    Mallampati score: III  TM Distance: >3 FB  Neck ROM: full     Dental       Cardiovascular      Pulmonary      Other Findings        Anesthesia Plan  ASA Score- 4     Anesthesia Type- general with ASA Monitors. Additional Monitors:   Airway Plan: ETT. Plan Factors-Exercise tolerance (METS): >4 METS. Chart reviewed. EKG reviewed. Existing labs reviewed. Patient summary reviewed. Patient is not a current smoker. Induction- intravenous. Postoperative Plan- Plan for postoperative opioid use. Informed Consent- Anesthetic plan and risks discussed with patient. I personally reviewed this patient with the CRNA. Discussed and agreed on the Anesthesia Plan with the CRNA. Bud Portillo

## 2023-07-04 NOTE — OP NOTE
OPERATIVE REPORT  PATIENT NAME: Ha Ding    :  1960  MRN: [id number]  Pt Location: [facility] OR ROOM 07    SURGERY DATE: 2023    Surgeon(s) and Role:  Panel 1:     * Rosendo Merlin, MD - Primary     * Denyse Lanes, DO - Assisting     * Kaylen Robetrs MD - Vandana Nava MD  Panel 2:     * Ludy Espinosa MD - Primary    Preop Diagnosis:  Rohan's gangrene [N49.3]    Post-Op Diagnosis Codes:     * Rohan's gangrene [N49.3]    Procedure(s):  · Cystoscopy  · Suprapubic tube exchange  · Bilateral testicular fixation  · Excision of bilateral testicular appendages    Specimen(s):  * No specimens in log *    Estimated Blood Loss:   Minimal    Drains:  Rectal Tube With balloon (Active)   Rectal Tube Output 0 mL 23 1200   Number of days: 4       Suprapubic Catheter 14 Fr. (Active)   Site Assessment Clean; Intact 23 0400   Dressing Status Clean; Intact;Dry 23 0400   Dressing Type Open to air 23 0400   Collection Container Standard drainage bag 23 0400   Securement Method Sutured 23 0400   Reason for Continuing Urinary Catheterization past POD 1 Physician order 23 2200   Output (mL) 750 mL 23 0509   Number of days: 3       Anesthesia Type:   Choice    Operative Indications:  Rohan's gangrene [N49.3]      Operative Findings:  · Suprapubic catheter was easily exchanged over a wire for an 18 Citizen of [place] silicone [place] tip catheter with 10 cc in the balloon. · The wound was noted to be largely clean without need for additional debridement. · Bilateral testicular appendages were noted and excised. · The testicles were fixed together with 4-0 Prolene suture in interrupted fashion. · The epididymis was noted to be significantly inflamed; more significant on the left compared to the right. Complications:   None    Procedure and Technique:  The patient was brought to the operating room and general anesthesia was initiated.   The patient was prepped and draped in dorsolithotomy position. An operative timeout was performed to confirm the patient identity and procedure. A flexible cystoscope was driven per urethra into the bladder. The distal tip of the previously placed SP tube was noted to be inside the bladder with the balloon deflated. An angled Solo plus wire was passed through the existing catheter into the bladder. The securement suture was cut and that catheter was removed. An 25 [place] silicone [place] tip catheter was passed over the Solo plus wire into the bladder under direct vision. The balloon was inflated with 10 cc of sterile water and the wire was removed. The cystoscope was then driven outside of the bladder. The drapes were taken down and the patient was reprepped and draped for the colostomy portion of the procedure performed by trauma surgery. Please see separate operative dictation for details. I returned to the operating room after the colostomy had been completed. A 15 blade scalpel was used to incise the tunica vaginalis overlying the right testicle. A plane was created between the tunica albuginea and the tunica vaginalis using a tonsil. The tunica vaginalis was opened with electrocautery such that the redundant tunica could be approximated posteriorly behind the testicle. A running 2-0 Vicryl suture was used to fix the tunica vaginalis around the testicle/spermatic cord. A testicular appendage was excised with electrocautery. Attention was shifted to the left side. A scalpel was again used to incise the tunica vaginalis exposing the testicle. Once tunica had been opened enough tunica was not available to imbricate behind the testicle and spermatic cord therefore the decision was made to excise the excess. The excess hernia vaginalis was excised with electrocautery. Hemostasis was achieved with a running 2-0 Vicryl suture.   Testicular fixation was performed at the medial aspect of the testes to fix the testicles together and prevent testicular torsion once the wound VAC has been placed. 4-0 Prolene suture was placed in the tunica albuginea and 3 medial locations in interrupted fashion. Wound VAC placement was completed by trauma surgery. Please see their operative dictation for details. I was present for the entire procedure.     Patient Disposition:  PACU         SIGNATURE: Morgan Strickland MD  DATE: July 4, 2023  TIME: 10:34 AM

## 2023-07-04 NOTE — ANESTHESIA POSTPROCEDURE EVALUATION
Post-Op Assessment Note    CV Status:  Stable  Pain Score: 0    Pain management: adequate     Mental Status:  Sleepy   Hydration Status:  Euvolemic   PONV Controlled:  None   Airway Patency:  Patent      Post Op Vitals Reviewed: Yes      Staff: CRNA         There were no known notable events for this encounter.     BP  149/85    Temp 97.1   Pulse 83   Resp 19   SpO2 100

## 2023-07-04 NOTE — PROGRESS NOTES
Progress Note -General surgery  Ian Aguero 57 y.o. 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 -F/u sensitivities  -OOB to chair  -DVT ppx    Subjective/Objective     Subjective:  No acute events overnight. Denies any fevers/chills. No nausea/vomiting. No fevers/chills. Reports he's uncomfortable from his wound. Overnight suprapubic tubewas cut    Objective:   Vitals: Blood pressure 121/64, pulse 84, temperature 98.5 °F (36.9 °C), temperature source Oral, resp. rate 22, height 6' 2" (1.88 m), weight 92.2 kg (203 lb 4.2 oz), SpO2 93 %. ,Body mass index is 26.1 kg/m². I/O       06/29 0701  06/30 0700 06/30 0701  07/01 0700 07/01 0701  07/02 0700    P. O. 222      I.V. (mL/kg) 2584.8 (28) 3723.4 (40.4)     IV Piggyback 700 950     Total Intake(mL/kg) 3506.8 (38) 4673.4 (50.7)     Urine (mL/kg/hr) 1790 (0.8) 1725 (0.8)     Stool  0     Blood 100      Total Output 1890 1725     Net +1616.8 +2948. 4                  Physical Exam:  General: NAD  HENT: NCAT MMM  Neck: supple, no JVD  CV: nl rate  Lungs: nl wob. No resp distress  ABD: Soft, nontender, nondistended. Rectal tube in place  Extrem: No CCE  :Suprapubic tube in place. Dressing is c/d/i. Neuro: AAOx3          Lab, Imaging and other studies:   I have personally reviewed pertinent reports.   , CBC with diff:   Lab Results   Component Value Date    WBC 13.47 (H) 07/04/2023    HGB 7.3 (L) 07/04/2023    HCT 22.9 (L) 07/04/2023     (H) 07/04/2023     07/04/2023    RBC 2.18 (L) 07/04/2023    MCH 33.5 07/04/2023    MCHC 31.9 07/04/2023    RDW 14.2 07/04/2023    MPV 9.7 07/04/2023   , BMP/CMP:   No results found for: "SODIUM", "K", "CL", "CO2", "ANIONGAP", "BUN", "CREATININE", "GLUCOSE", "CALCIUM", "AST", "ALT", "ALKPHOS", "PROT", "BILITOT", "EGFR", Magnesium: No components found for: "MAG"  VTE Pharmacologic Prophylaxis: Heparin  VTE Mechanical Prophylaxis: sequential compression device        Kasi Barton DO  7/4/2023  6:44 AM

## 2023-07-05 ENCOUNTER — ANESTHESIA EVENT (INPATIENT)
Dept: PERIOP | Facility: HOSPITAL | Age: 63
DRG: 709 | End: 2023-07-05
Payer: COMMERCIAL

## 2023-07-05 LAB
ABO GROUP BLD BPU: NORMAL
ABO GROUP BLD BPU: NORMAL
ANION GAP SERPL CALCULATED.3IONS-SCNC: 3 MMOL/L
BPU ID: NORMAL
BPU ID: NORMAL
BUN SERPL-MCNC: 7 MG/DL (ref 5–25)
CALCIUM SERPL-MCNC: 7.9 MG/DL (ref 8.3–10.1)
CHLORIDE SERPL-SCNC: 113 MMOL/L (ref 96–108)
CO2 SERPL-SCNC: 26 MMOL/L (ref 21–32)
CREAT SERPL-MCNC: 0.55 MG/DL (ref 0.6–1.3)
ERYTHROCYTE [DISTWIDTH] IN BLOOD BY AUTOMATED COUNT: 16 % (ref 11.6–15.1)
GFR SERPL CREATININE-BSD FRML MDRD: 110 ML/MIN/1.73SQ M
GLUCOSE SERPL-MCNC: 148 MG/DL (ref 65–140)
GLUCOSE SERPL-MCNC: 151 MG/DL (ref 65–140)
GLUCOSE SERPL-MCNC: 155 MG/DL (ref 65–140)
GLUCOSE SERPL-MCNC: 159 MG/DL (ref 65–140)
GLUCOSE SERPL-MCNC: 169 MG/DL (ref 65–140)
GLUCOSE SERPL-MCNC: 175 MG/DL (ref 65–140)
GLUCOSE SERPL-MCNC: 226 MG/DL (ref 65–140)
GLUCOSE SERPL-MCNC: 449 MG/DL (ref 65–140)
HCT VFR BLD AUTO: 24.6 % (ref 36.5–49.3)
HGB BLD-MCNC: 7.9 G/DL (ref 12–17)
MAGNESIUM SERPL-MCNC: 2.1 MG/DL (ref 1.6–2.6)
MCH RBC QN AUTO: 33.1 PG (ref 26.8–34.3)
MCHC RBC AUTO-ENTMCNC: 32.1 G/DL (ref 31.4–37.4)
MCV RBC AUTO: 103 FL (ref 82–98)
PHOSPHATE SERPL-MCNC: 2 MG/DL (ref 2.3–4.1)
PLATELET # BLD AUTO: 328 THOUSANDS/UL (ref 149–390)
PMV BLD AUTO: 9.7 FL (ref 8.9–12.7)
POTASSIUM SERPL-SCNC: 3.7 MMOL/L (ref 3.5–5.3)
RBC # BLD AUTO: 2.39 MILLION/UL (ref 3.88–5.62)
SODIUM SERPL-SCNC: 142 MMOL/L (ref 135–147)
UNIT DISPENSE STATUS: NORMAL
UNIT DISPENSE STATUS: NORMAL
UNIT PRODUCT CODE: NORMAL
UNIT PRODUCT CODE: NORMAL
UNIT PRODUCT VOLUME: 250 ML
UNIT PRODUCT VOLUME: 250 ML
UNIT RH: NORMAL
UNIT RH: NORMAL
WBC # BLD AUTO: 16.08 THOUSAND/UL (ref 4.31–10.16)

## 2023-07-05 PROCEDURE — 97163 PT EVAL HIGH COMPLEX 45 MIN: CPT

## 2023-07-05 PROCEDURE — 99232 SBSQ HOSP IP/OBS MODERATE 35: CPT | Performed by: PHYSICIAN ASSISTANT

## 2023-07-05 PROCEDURE — 84100 ASSAY OF PHOSPHORUS: CPT | Performed by: STUDENT IN AN ORGANIZED HEALTH CARE EDUCATION/TRAINING PROGRAM

## 2023-07-05 PROCEDURE — 99233 SBSQ HOSP IP/OBS HIGH 50: CPT | Performed by: INTERNAL MEDICINE

## 2023-07-05 PROCEDURE — 97167 OT EVAL HIGH COMPLEX 60 MIN: CPT

## 2023-07-05 PROCEDURE — 85027 COMPLETE CBC AUTOMATED: CPT | Performed by: STUDENT IN AN ORGANIZED HEALTH CARE EDUCATION/TRAINING PROGRAM

## 2023-07-05 PROCEDURE — 80048 BASIC METABOLIC PNL TOTAL CA: CPT | Performed by: STUDENT IN AN ORGANIZED HEALTH CARE EDUCATION/TRAINING PROGRAM

## 2023-07-05 PROCEDURE — 83735 ASSAY OF MAGNESIUM: CPT | Performed by: STUDENT IN AN ORGANIZED HEALTH CARE EDUCATION/TRAINING PROGRAM

## 2023-07-05 PROCEDURE — 82948 REAGENT STRIP/BLOOD GLUCOSE: CPT

## 2023-07-05 PROCEDURE — 99024 POSTOP FOLLOW-UP VISIT: CPT | Performed by: SURGERY

## 2023-07-05 RX ORDER — TRAZODONE HYDROCHLORIDE 50 MG/1
50 TABLET ORAL
Status: DISCONTINUED | OUTPATIENT
Start: 2023-07-05 | End: 2023-07-05

## 2023-07-05 RX ORDER — POTASSIUM CHLORIDE 20 MEQ/1
40 TABLET, EXTENDED RELEASE ORAL ONCE
Status: COMPLETED | OUTPATIENT
Start: 2023-07-05 | End: 2023-07-05

## 2023-07-05 RX ORDER — QUETIAPINE FUMARATE 25 MG/1
25 TABLET, FILM COATED ORAL
Status: DISCONTINUED | OUTPATIENT
Start: 2023-07-05 | End: 2023-07-14 | Stop reason: HOSPADM

## 2023-07-05 RX ADMIN — QUETIAPINE 25 MG: 25 TABLET ORAL at 21:56

## 2023-07-05 RX ADMIN — ENOXAPARIN SODIUM 40 MG: 40 INJECTION SUBCUTANEOUS at 08:43

## 2023-07-05 RX ADMIN — SODIUM CHLORIDE 3 G: 9 INJECTION, SOLUTION INTRAVENOUS at 14:50

## 2023-07-05 RX ADMIN — SODIUM CHLORIDE 3 G: 9 INJECTION, SOLUTION INTRAVENOUS at 03:02

## 2023-07-05 RX ADMIN — INSULIN LISPRO 4 UNITS: 100 INJECTION, SOLUTION INTRAVENOUS; SUBCUTANEOUS at 12:41

## 2023-07-05 RX ADMIN — MELATONIN TAB 3 MG 3 MG: 3 TAB at 21:56

## 2023-07-05 RX ADMIN — OXYCODONE HYDROCHLORIDE 10 MG: 10 TABLET ORAL at 12:33

## 2023-07-05 RX ADMIN — DIBASIC SODIUM PHOSPHATE, MONOBASIC POTASSIUM PHOSPHATE AND MONOBASIC SODIUM PHOSPHATE 2 TABLET: 852; 155; 130 TABLET ORAL at 12:34

## 2023-07-05 RX ADMIN — SODIUM CHLORIDE 3 G: 9 INJECTION, SOLUTION INTRAVENOUS at 08:57

## 2023-07-05 RX ADMIN — LINEZOLID 600 MG: 600 INJECTION, SOLUTION INTRAVENOUS at 06:03

## 2023-07-05 RX ADMIN — SODIUM CHLORIDE 3 G: 9 INJECTION, SOLUTION INTRAVENOUS at 21:56

## 2023-07-05 RX ADMIN — POTASSIUM CHLORIDE 40 MEQ: 1500 TABLET, EXTENDED RELEASE ORAL at 08:54

## 2023-07-05 RX ADMIN — SENNOSIDES AND DOCUSATE SODIUM 1 TABLET: 50; 8.6 TABLET ORAL at 08:43

## 2023-07-05 RX ADMIN — ACETAMINOPHEN 975 MG: 325 TABLET, FILM COATED ORAL at 21:56

## 2023-07-05 RX ADMIN — ACETAMINOPHEN 975 MG: 325 TABLET, FILM COATED ORAL at 05:58

## 2023-07-05 RX ADMIN — OXYCODONE HYDROCHLORIDE 5 MG: 5 TABLET ORAL at 03:01

## 2023-07-05 RX ADMIN — SENNOSIDES AND DOCUSATE SODIUM 1 TABLET: 50; 8.6 TABLET ORAL at 18:15

## 2023-07-05 RX ADMIN — INSULIN LISPRO 4 UNITS: 100 INJECTION, SOLUTION INTRAVENOUS; SUBCUTANEOUS at 21:56

## 2023-07-05 RX ADMIN — DIBASIC SODIUM PHOSPHATE, MONOBASIC POTASSIUM PHOSPHATE AND MONOBASIC SODIUM PHOSPHATE 2 TABLET: 852; 155; 130 TABLET ORAL at 18:15

## 2023-07-05 RX ADMIN — DIBASIC SODIUM PHOSPHATE, MONOBASIC POTASSIUM PHOSPHATE AND MONOBASIC SODIUM PHOSPHATE 2 TABLET: 852; 155; 130 TABLET ORAL at 08:54

## 2023-07-05 RX ADMIN — PANTOPRAZOLE SODIUM 40 MG: 40 TABLET, DELAYED RELEASE ORAL at 05:58

## 2023-07-05 RX ADMIN — INSULIN GLARGINE 8 UNITS: 100 INJECTION, SOLUTION SUBCUTANEOUS at 21:56

## 2023-07-05 RX ADMIN — INSULIN LISPRO 4 UNITS: 100 INJECTION, SOLUTION INTRAVENOUS; SUBCUTANEOUS at 06:46

## 2023-07-05 RX ADMIN — ACETAMINOPHEN 975 MG: 325 TABLET, FILM COATED ORAL at 14:49

## 2023-07-05 NOTE — PROGRESS NOTES
Progress Note - Infectious Disease   Elbert Show 57 y.o. male MRN: [id number]  Unit/Bed#: [facility] 202- Encounter: [id number]      Impression/Recommendations:  1. Severe sepsis, present on admission, secondary to Rohan's gangrene. Patient is clinically improved. He still has leukocytosis but no further fever, tach, tachypnea or lactic acidosis. He had mild hypotension on admission, which resolved and not requiring any pressors. With hemodynamic instability, no further need for antitoxin. Antibiotic plan as in below. Monitor temperature/WBC. Monitor hemodynamic. 2.  Rohan's gangrene, status post multiple I&D. At last I&D yesterday, there was just fibrinous exudate and only mild purulence. Operative cultures with actinomyces and Prevotella. Growth of coagulase-negative Staphylococcus is likely colonization/contamination. Patient has VAC in place. He has minimal pain. With much improved wound, no further need for antitoxin. Continue IV Unasyn. No further need for linezolid. Further I&D and wound VAC dressing change in OR per surgery. When wound is clean, antibiotic can be transitioned to p.o.    3.  DM, poorly controlled, with hyperglycemia and elevated hemoglobin A1c. This contributes to infection above. Management per primary service. Discussed with patient in detail regarding the above plan. Antibiotics:  Unasyn/linezolid  Antibiotic # 8    Subjective:  Patient with only mild perineal pain, controlled. Temperature stays down. No chills. He is tolerating antibiotics well. No nausea, vomiting or diarrhea.     Objective:  Vitals:  Temp:  [96.6 °F (35.9 °C)-98.5 °F (36.9 °C)] 97.9 °F (36.6 °C)  HR:  [68-86] 68  Resp:  [14-22] 14  BP: ()/(55-85) 144/75  SpO2:  [90 %-98 %] 94 %  Temp (24hrs), Av.7 °F (36.5 °C), Min:96.6 °F (35.9 °C), Max:98.5 °F (36.9 °C)  Current: Temperature: 97.9 °F (36.6 °C)    Physical Exam:     General: Awake, alert, cooperative, no distress. Neck:  Supple. No mass. No lymphadenopathy. Lungs: Expansion symmetric, no rales, no wheezing, respirations unlabored. Heart:  Regular rate and rhythm, S1 and S2 normal, no murmur. Abdomen: Soft, nondistended, non-tender, bowel sounds active all four quadrants, no masses, no organomegaly. :  Wound with VAC. Moderate serous drainage. No purulence. No erythema/warmth. Mild tenderness. Extremities: No edema. No erythema/warmth. No ulcer. Nontender to palpation. Skin:  No rash. Neuro: Moves all extremities. Invasive Devices     Peripheral Intravenous Line  Duration           Peripheral IV 07/03/23 Distal;Right;Upper;Ventral (anterior) Arm 1 day    Peripheral IV 07/04/23 Right Antecubital 1 day          Drain  Duration           Rectal Tube With balloon 4 days    Suprapubic Catheter 18 Fr. 1 day    Ileostomy Loop LUQ <1 day                Labs studies:   I have personally reviewed pertinent labs. Results from last 7 days   Lab Units 07/05/23  0603 07/04/23  0533 07/03/23  0455 07/02/23  0428 07/01/23  4276 06/29/23  1524 06/29/23  0327 06/28/23  1736   POTASSIUM mmol/L 3.7 3.9 3.6   < > 3.5   < > 3.9 4.1   CHLORIDE mmol/L 113* 112* 110*   < > 109*   < > 109* 91*   CO2 mmol/L 26 26 29   < > 26   < > 23 22   BUN mg/dL 7 6 10   < > 9   < > 29* 27*   CREATININE mg/dL 0.55* 0.50* 0.52*   < > 0.54*   < > 0.93 1.16   EGFR ml/min/1.73sq m 110 114 112   < > 110   < > 86 66   CALCIUM mg/dL 7.9* 7.8* 7.5*   < > 7.9*   < > 8.2* 10.4*   AST U/L  --   --   --   --  19 -- 23 19   ALT U/L  --   --   --   --  19  --  19 18   ALK PHOS U/L  --   --   --   --  70  --  60 83    < > = values in this interval not displayed.      Results from last 7 days   Lab Units 07/05/23  0603 07/04/23  1425 07/04/23  0533 07/03/23  0455   WBC Thousand/uL 16.08*  --  13.47* 13.84*   HEMOGLOBIN g/dL 7.9* 9.0* 7.3* 7.5*   PLATELETS Thousands/uL 328  --  329 297     Results from last 7 days   Lab Units 06/29/23  0933 06/28/23 2053 06/28/23 2042 06/28/23  1736   BLOOD CULTURE   --   --   --  No Growth After 5 Days. No Growth After 5 Days. GRAM STAIN RESULT  Rare Polys*  2+ Gram positive cocci in pairs*  1+ Gram positive rods* 4+ Gram negative rods*  Rare Epithelial Cells*  1+ Gram positive cocci in pairs* Rare Gram positive rods*  1+ Gram positive cocci in pairs and chains*  No polys seen*  --        Imaging Studies:   I have personally reviewed pertinent imaging study reports and images in PACS. EKG, Pathology, and Other Studies:   I have personally reviewed pertinent reports.

## 2023-07-05 NOTE — PLAN OF CARE
Problem: MOBILITY - ADULT  Goal: Maintain or return to baseline ADL function  Description: INTERVENTIONS:  -  Assess patient's ability to carry out ADLs; assess patient's baseline for ADL function and identify physical deficits which impact ability to perform ADLs (bathing, care of mouth/teeth, toileting, grooming, dressing, etc.)  - Assess/evaluate cause of self-care deficits   - Assess range of motion  - Assess patient's mobility; develop plan if impaired  - Assess patient's need for assistive devices and provide as appropriate  - Encourage maximum independence but intervene and supervise when necessary  - Involve family in performance of ADLs  - Assess for home care needs following discharge   - Consider OT consult to assist with ADL evaluation and planning for discharge  - Provide patient education as appropriate  Outcome: Progressing  Goal: Maintains/Returns to pre admission functional level  Description: INTERVENTIONS:  - Perform BMAT or MOVE assessment daily.   - Set and communicate daily mobility goal to care team and patient/family/caregiver. - Collaborate with rehabilitation services on mobility goals if consulted  - Perform Range of Motion  times a day. - Reposition patient every  hours.   - Dangle patient  times a day  - Stand patient  times a day  - Ambulate patient  times a day  - Out of bed to chair  times a day   - Out of bed for meals  times a day  - Out of bed for toileting  - Record patient progress and toleration of activity level   Outcome: Progressing     Problem: SKIN/TISSUE INTEGRITY - ADULT  Goal: Skin Integrity remains intact(Skin Breakdown Prevention)  Description: Assess:  -Perform Vinny assessment every   -Clean and moisturize skin every   -Inspect skin when repositioning, toileting, and assisting with ADLS  -Assess under medical devices such as  every   -Assess extremities for adequate circulation and sensation     Bed Management:  -Have minimal linens on bed & keep smooth, unwrinkled  -Change linens as needed when moist or perspiring  -Avoid sitting or lying in one position for more than  hours while in bed  -Keep HOB at degrees     Toileting:  -Offer bedside commode  -Assess for incontinence every   -Use incontinent care products after each incontinent episode such as     Activity:  -Mobilize patient  times a day  -Encourage activity and walks on unit  -Encourage or provide ROM exercises   -Turn and reposition patient every  Hours  -Use appropriate equipment to lift or move patient in bed  -Instruct/ Assist with weight shifting every  when out of bed in chair  -Consider limitation of chair time  hour intervals    Skin Care:  -Avoid use of baby powder, tape, friction and shearing, hot water or constrictive clothing  -Relieve pressure over bony prominences using   -Do not massage red bony areas    Next Steps:  -Teach patient strategies to minimize risks such as    -Consider consults to  interdisciplinary teams such as   Outcome: Progressing  Goal: Incision(s), wounds(s) or drain site(s) healing without S/S of infection  Description: INTERVENTIONS  - Assess and document dressing, incision, wound bed, drain sites and surrounding tissue  - Provide patient and family education  - Perform skin care/dressing changes every   Outcome: Progressing  Goal: Pressure injury heals and does not worsen  Description: Interventions:  - Implement low air loss mattress or specialty surface (Criteria met)  - Apply silicone foam dressing  - Instruct/assist with weight shifting every  minutes when in chair   - Limit chair time to  hour intervals  - Use special pressure reducing interventions such as  when in chair   - Apply fecal or urinary incontinence containment device   - Perform passive or active ROM every   - Turn and reposition patient & offload bony prominences every  hours   - Utilize friction reducing device or surface for transfers   - Consider consults to  interdisciplinary teams such as   - Use incontinent care products after each incontinent episode such as   - Consider nutrition services referral as needed  Outcome: Progressing     Problem: Prexisting or High Potential for Compromised Skin Integrity  Goal: Skin integrity is maintained or improved  Description: INTERVENTIONS:  - Identify patients at risk for skin breakdown  - Assess and monitor skin integrity  - Assess and monitor nutrition and hydration status  - Monitor labs   - Assess for incontinence   - Turn and reposition patient  - Assist with mobility/ambulation  - Relieve pressure over bony prominences  - Avoid friction and shearing  - Provide appropriate hygiene as needed including keeping skin clean and dry  - Evaluate need for skin moisturizer/barrier cream  - Collaborate with interdisciplinary team   - Patient/family teaching  - Consider wound care consult   Outcome: Progressing     Problem: Nutrition/Hydration-ADULT  Goal: Nutrient/Hydration intake appropriate for improving, restoring or maintaining nutritional needs  Description: Monitor and assess patient's nutrition/hydration status for malnutrition. Collaborate with interdisciplinary team and initiate plan and interventions as ordered. Monitor patient's weight and dietary intake as ordered or per policy. Utilize nutrition screening tool and intervene as necessary. Determine patient's food preferences and provide high-protein, high-caloric foods as appropriate.      INTERVENTIONS:  - Monitor oral intake, urinary output, labs, and treatment plans  - Assess nutrition and hydration status and recommend course of action  - Evaluate amount of meals eaten  - Assist patient with eating if necessary   - Allow adequate time for meals  - Recommend/ encourage appropriate diets, oral nutritional supplements, and vitamin/mineral supplements  - Order, calculate, and assess calorie counts as needed  - Recommend, monitor, and adjust tube feedings and TPN/PPN based on assessed needs  - Assess need for intravenous fluids  - Provide specific nutrition/hydration education as appropriate  - Include patient/family/caregiver in decisions related to nutrition  Outcome: Progressing     Problem: PAIN - ADULT  Goal: Verbalizes/displays adequate comfort level or baseline comfort level  Description: Interventions:  - Encourage patient to monitor pain and request assistance  - Assess pain using appropriate pain scale  - Administer analgesics based on type and severity of pain and evaluate response  - Implement non-pharmacological measures as appropriate and evaluate response  - Consider cultural and social influences on pain and pain management  - Notify physician/advanced practitioner if interventions unsuccessful or patient reports new pain  Outcome: Progressing     Problem: INFECTION - ADULT  Goal: Absence or prevention of progression during hospitalization  Description: INTERVENTIONS:  - Assess and monitor for signs and symptoms of infection  - Monitor lab/diagnostic results  - Monitor all insertion sites, i.e. indwelling lines, tubes, and drains  - Monitor endotracheal if appropriate and nasal secretions for changes in amount and color  - [place] appropriate cooling/warming therapies per order  - Administer medications as ordered  - Instruct and encourage patient and family to use good hand hygiene technique  - Identify and instruct in appropriate isolation precautions for identified infection/condition  Outcome: Progressing  Goal: Absence of fever/infection during neutropenic period  Description: INTERVENTIONS:  - Monitor WBC    Outcome: Progressing     Problem: SAFETY ADULT  Goal: Maintain or return to baseline ADL function  Description: INTERVENTIONS:  -  Assess patient's ability to carry out ADLs; assess patient's baseline for ADL function and identify physical deficits which impact ability to perform ADLs (bathing, care of mouth/teeth, toileting, grooming, dressing, etc.)  - Assess/evaluate cause of self-care deficits   - Assess range of motion  - Assess patient's mobility; develop plan if impaired  - Assess patient's need for assistive devices and provide as appropriate  - Encourage maximum independence but intervene and supervise when necessary  - Involve family in performance of ADLs  - Assess for home care needs following discharge   - Consider OT consult to assist with ADL evaluation and planning for discharge  - Provide patient education as appropriate  Outcome: Progressing  Goal: Maintains/Returns to pre admission functional level  Description: INTERVENTIONS:  - Perform BMAT or MOVE assessment daily.   - Set and communicate daily mobility goal to care team and patient/family/caregiver. - Collaborate with rehabilitation services on mobility goals if consulted  - Perform Range of Motion  times a day. - Reposition patient every  hours.   - Dangle patie times a day  - Stand patient  times a day  - Ambulate patient  times a day  - Out of bed to chair  times a day   - Out of bed for meals  times a day  - Out of bed for toileting  - Record patient progress and toleration of activity level   Outcome: Progressing  Goal: Patient will remain free of falls  Description: INTERVENTIONS:  - Educate patient/family on patient safety including physical limitations  - Instruct patient to call for assistance with activity   - Consult OT/PT to assist with strengthening/mobility   - Keep Call bell within reach  - Keep bed low and locked with side rails adjusted as appropriate  - Keep care items and personal belongings within reach  - Initiate and maintain comfort rounds  - Make Fall Risk Sign visible to staff  - Offer Toileting every  Hours, in advance of need  - Initiate/Maintain alarm  - Obtain necessary fall risk management equipment:   - Apply yellow socks and bracelet for high fall risk patients  - Consider moving patient to room near nurses station  Outcome: Progressing     Problem: DISCHARGE PLANNING  Goal: Discharge to home or other facility with appropriate resources  Description: INTERVENTIONS:  - Identify barriers to discharge w/patient and caregiver  - Arrange for needed discharge resources and transportation as appropriate  - Identify discharge learning needs (meds, wound care, etc.)  - Arrange for interpretive services to assist at discharge as needed  - Refer to Case Management Department for coordinating discharge planning if the patient needs post-hospital services based on physician/advanced practitioner order or complex needs related to functional status, cognitive ability, or social support system  Outcome: Progressing     Problem: Knowledge Deficit  Goal: Patient/family/caregiver demonstrates understanding of disease process, treatment plan, medications, and discharge instructions  Description: Complete learning assessment and assess knowledge base.   Interventions:  - Provide teaching at level of understanding  - Provide teaching via preferred learning methods  Outcome: Progressing

## 2023-07-05 NOTE — PHYSICAL THERAPY NOTE
PHYSICAL THERAPY EVALUATION  NAME:  James Soto  DATE: 07/05/23    AGE:   57 y.o. 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]    Past Medical History:   Diagnosis Date    Diabetes mellitus [facility])      Past Surgical History:   Procedure Laterality Date    CYSTOSCOPY N/A 7/4/2023    Procedure: CYSTOSCOPY and suprapubic tube exchange;  Surgeon: Esther Perez MD;  Location: [facility];  Service: Urology    EXAMINATION UNDER ANESTHESIA N/A 7/4/2023    Procedure: EUA, testicular fiation;  Surgeon: Esther Perez MD;  [facility];  Service: Urology    INCISION AND DRAINAGE OF WOUND N/A 6/28/2023    Procedure: 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 ;  Surgeon: Brenda Mckenzie MD;  [facility];  Service: Urology    INCISION AND DRAINAGE OF WOUND N/A 6/28/2023    Procedure: 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;  Surgeon: Jovita Grewal MD;  [facility];  Service: General    NY CYSTOSTOMY CYSTOTOMY W/DRAINAGE N/A 7/1/2023    Procedure: CYSTOSCOPY; CYSTOSTOMY SUPRAPUBIC OPEN;  Surgeon: Esther Perez MD;  [facility];  Service: Urology    NY LAPS ABD PRTM&OMENTUM DX W/WO [facility] BR/WA SPX N/A 7/4/2023    Procedure: LAPAROSCOPY DIAGNOSTIC WITH CREATION OF OSTOMY;  Surgeon: Jesus Juarez MD;  [facility];  Service: General    ROTATOR CUFF REPAIR      VAC DRESSING APPLICATION N/A 3/3/1785    Procedure: APPLICATION VAC DRESSING;  Surgeon: Jesus Juarez MD;  [facility];  Service: General    WOUND DEBRIDEMENT N/A 6/29/2023    Procedure: DEBRIDEMENT WOUND [facility] OUT); Surgeon: Maggie Jaime MD;  [facility];  Service: General    WOUND DEBRIDEMENT N/A 7/2/2023    Procedure: DEBRIDEMENT PERINEAL WOUND AND DRESSING CHANGE [facility] OUT);   Surgeon: Jesus Juarez MD;  [facility];  Service: General    WOUND DEBRIDEMENT N/A 7/1/2023    Procedure: DEBRIDEMENT WOUND AND DRESSING CHANGE [facility] OUT);  Surgeon: Susan Cazares MD;  [facility];  Service: General    WOUND DEBRIDEMENT N/A 6/30/2023    Procedure: DEBRIDEMENT WOUND [facility] OUT); Surgeon: Susan Cazares MD;  [facility];  Service: General    WOUND DEBRIDEMENT N/A 7/4/2023    Procedure: DEBRIDEMENT OF PERINEAL WOUND ([address]); Surgeon: Mihai Jones MD;  [facility];  Service: General       Length Of Stay: 6  PHYSICAL THERAPY EVALUATION :    07/05/23 1159   PT Last Visit   PT Visit Date 07/05/23   Note Type   Note type Evaluation   Pain Assessment   Pain Assessment Tool 0-10   Pain Score 10 - Worst Possible Pain   Pain Location/Orientation   (perineum)   Effect of Pain on Daily Activities limited sitting; bed mobility/ scooting limited,   Patient's Stated Pain Goal No pain   Hospital Pain Intervention(s) Ambulation/increased activity;Repositioned   Restrictions/Precautions   Weight Bearing Precautions Per Order No   Braces or Orthoses   (none)   Other Precautions Multiple lines;Telemetry; Fall Risk;Pain  (ramírez ; wound vac)   Home Living   Type [address]  One level  (3STE + HR)   Bathroom Shower/Tub Tub/shower unit   Bathroom Toilet Standard   Bathroom Equipment Grab bars in [address] Walker;Cane  (not using PTA)   Additional Comments Prior to admission, pt was fully indep and working PT (+) ; [facility]/ [facility] w/ 3 MARCOS + HR; I w/ ADL"s and IADL's/ spouse works but can take time off when pt d/c home. 0 falls. Pt is retired  and now works PT at [employer], . Prior Function   Level of [place] Independent with ADLs; Independent with functional mobility; Independent with IADLS   Lives With Spouse   Receives Help From Family   IADLs Independent with driving; Independent with meal prep; Independent with medication management   Falls in the last 6 months 0   Vocational Part time employment   Cognition Overall Cognitive Status WFL   Attention Within functional limits   Orientation Level Oriented X4   Memory Within functional limits   Following Commands Follows one step commands without difficulty   Comments cooperative and motivated- pain limited   RUE Assessment   RUE Assessment WFL   LUE Assessment   LUE Assessment WFL   RLE Assessment   RLE Assessment WFL   LLE Assessment   LLE Assessment WFL   Coordination   Movements are Fluid and Coordinated   (guarding 2* severe pain)   Light Touch   RLE Light Touch Grossly intact   LLE Light Touch Grossly intact   Sharp/Dull   RLE Sharp/Dull Grossly intact   LLE Sharp/Dull Grossly intact   Proprioception   RLE Proprioception Grossly intact   LLE Proprioception Grossly Intact   Bed Mobility   Supine to Sit 3  Moderate assistance   Additional items Assist x 2   Additional Comments pt OOB and positined in reclienr for comfort post session   Transfers   Sit to Stand 3  Moderate assistance  (elevated bed surface)   Additional items Assist x 1   Stand to Sit 3  Moderate assistance   Additional items Assist x 1; Increased time required;Verbal cues   Additional Comments rw   Ambulation/Elevation   Gait pattern Wide NANCY; Decreased foot clearance; Antalgic; Excessively slow   Gait Assistance 3  Moderate assist  ((+) additional A of another for lines + safety)   Additional items Assist x 1   Assistive Device Rolling walker   Distance 4'   Ambulation/Elevation Additional Comments (+) dizziness and pain w/o knee buckling limiting further distances- vitals stable. would recommend chair follow for safety for progression- pt not OOB x7-8 days.    Balance   Static Sitting Fair -   Dynamic Sitting Poor +   Static Standing Poor +   Dynamic Standing Poor   Ambulatory Poor  (rw)   Activity Tolerance   Activity Tolerance Patient limited by pain   Medical Staff Made Aware RAY Young   Nurse Made Aware yes   Nathalia Luciano is 57 y.o. male seen for PT evaluation s/p [facility] to [facility] on 6/29/2023. Pt presenting w/ scrotal swelling and pain in which pt dx w/ sasha's gangrene. Pt s/p "DEBRIDEMENT OF PERINEAL WOUND ([address]), LAPAROSCOPY DIAGNOSTIC WITH CREATION OF OSTOMY, CYSTOSCOPY and suprapubic tube exchange, EUA. testicular fiation, APPLICATION VAC DRESSING" on 7/4. Pt  has a past medical history of Diabetes mellitus ([address]). .   Due to acute medical issues, frequent OR for VAC changes;  ongoing medical management  for primary dx; pain, fall risk, increased reliance on more restrictive AD compared to baseline;  decreased activity tolerance compared to baseline, increased assistance needed from caregiver at current time, continuous telemetry + o2  monitoring, multiple lines, decline in overall functional mobility status; health management issues; note unstable clinical picture (high complexity)   Prior to admission, pt was fully indep and working PT (+) ; living w/ spouse in a [place] home w/ 3 MARCOS + HR; I w/ ADL"s and IADL's/ spouse works but can take time off when pt d/c home. 0 falls. Pt is retired  and now works PT at [employer], . Currently pt  is requiring modAx2 A for bed skills; modA x1 for functional transfers and modAx1 for ambulation w/ RW 4' bed> chair (+) dizziness w/o knee buckling. distances limited by fatigue pain and LE instability (prolonged bedrest x a reported 7-8 days). Pt presents functioning below baseline and currently w/ overall mobility deficits 2* to: decreased LE strength/AROM; limited flexibility; pain; wound vac (groin);   generalized weakness/ deconditioning; decreased endurance; decreased activity tolerance; impaired balance; gait deviations; dizziness; SOB/NASCIMENTO; fatigue;  limited insight into current deficits; bed/ chair alarms; multiple lines; Pt currently at risk for falls.   (Please find additional objective findings from PT assessment regarding body systems outlined above.) Pt will continue to benefit from skilled PT interventions to address stated impairments; to maximize functional potential; for ongoing pt/ family training; and DME needs. PT is currently recommending rehab for now; but w/ progress and progressive mobility pt may be able to progress to home w/ spouse A on d/c. Spouse who was present on eval reports she could not manage him at [facility]- but will be able to provide some assist on d/c.- ideally pt and spouse 1* goal is for pt to d/c home . Pt/ family agreeable to plan and goals as stated on evaluation. Prognosis Good   Problem List Decreased strength;Decreased range of motion;Decreased endurance; Impaired balance;Decreased mobility; Decreased skin integrity;Pain   Goals   Patient Goals less pain   STG Expiration Date 07/19/23   Short Term Goal #1 In 14 days pt will complete: 1) Bed mobility skills with MI to facilitate safe return to previous living environment and decrease burden on caregivers. 2) Functional transfers with MI  to facilitate safe return to previous living environment  3) Ambulation with least restrictive AD MI' without LOB and stable vitals for safe ambulation in home/ community environment. 4) Stair training up/ down 3step/s with  rail/s  and MI for safe access to previous living environment and to increase community access. 5) Improve balance by 1 grade in order to decrease fall risk. 6)  PT for ongoing pt and family education; DME needs and D/C planning to promote highest level of function in least restrictive environment. Plan   Treatment/Interventions ADL retraining;Functional transfer training;LE strengthening/ROM; Elevations; Therapeutic exercise; Endurance training;Patient/family training;Equipment eval/education; Bed mobility;Gait training; Compensatory technique education;Spoke to nursing;Spoke to case management;OT;Family   PT Frequency 3-5x/wk   Recommendation   PT Discharge Recommendation Post acute rehabilitation services  (vs home pending progress- rehab for now.)   Equipment Recommended Farzad Powell AM-PAC Basic Mobility Inpatient   Turning in Flat Bed Without Bedrails 2   Lying on Back to Sitting on Edge of Flat Bed Without Bedrails 1   Moving Bed to Chair 2   Standing Up From Chair Using Arms 2   Walk in Room 2   Climb 3-5 Stairs With Railing 1   Basic Mobility Inpatient Raw Score 10   Turning Head Towards Sound 4   Follow Simple Instructions 4   Low Function Basic Mobility Raw Score  18   Low Function Basic Mobility Standardized Score  29.25   Highest Level Of Mobility   -HLM Goal 4: Move to chair/commode   JH-HLM Achieved 5: Stand (1 or more minutes)   End of Consult   Patient Position at End of Consult Bedside chair;Bed/Chair alarm activated; All needs within reach

## 2023-07-05 NOTE — PROGRESS NOTES
Progress Note -General surgery  Glynn Jeronimo 57 y.o. male MRN: [id number]  Unit/Bed#: ICCU 202-01 Encounter: [id number]    Assessment:  57 y.o. male with Rohan's gangrene  S/p:  6/28 debridement - andria/cullen (cedillo)  6/29 Perineal debridement/washout - Mason   6/30 Perineal debridement/ EUA - Basil/Uro  7/1 Debridement, suprapubic tube - Basil/Uro  7/2 Perineal and scrotal debridement - Lucero  7/4 laparoscopic loop diverting colostomy creation, perineal debridement, cystoscopy with suprapubic tube exchange, EUA, testicular fixation, vac placement - Lucero/Saturnino    AVSS    UOP 1.5 L  Ostomy 75 cc SS output per bag    WBC 16.08 (13)  Hgb 7.9 (7.3)    Plan:  - CCD2 diet  - maintain wound vac, planning for OR vac change 7/6  - if vac comes down, plan for temporary WTD dressing until OR vac change  - appreciate ID recs, currently on linezolid and unasyn  - f/u culture sensitivities 6/28  - maintain suprapubic catheter, monitor UOP  - OOB to chair  - DVT ppx    Subjective/Objective     Subjective:  No acute events overnight. Tolerating diet, no n/v. Pain is well controlled. Main complaint is that the bed is uncomfortable    Objective:   Vitals: Blood pressure 135/68, pulse 86, temperature 98.1 °F (36.7 °C), temperature source Oral, resp. rate 19, height 6' 2" (1.88 m), weight 92.2 kg (203 lb 4.2 oz), SpO2 94 %. ,Body mass index is 26.1 kg/m². I/O       06/29 0701 06/30 0700 06/30 0701 07/01 0700 07/01 0701 07/02 0700    P. O. 222      I.V. (mL/kg) 2584.8 (28) 3723.4 (40.4)     IV Piggyback 700 950     Total Intake(mL/kg) 3506.8 (38) 4673.4 (50.7)     Urine (mL/kg/hr) 1790 (0.8) 1725 (0.8)     Stool  0     Blood 100      Total Output 1890 1725     Net +1616.8 +2948. 4                  Physical Exam:  General: No acute distress  Neuro: alert and oriented  HEENT: moist mucous membranes  CV: Well perfused, regular rate and rhythm  Lungs: Normal work of breathing, no increased respiratory effort  Abdomen: Soft, non-tender, non-distended. Incisions c/d/i  : suprapubic cath in place with good urine return. Vac in place with good seal  Extremities: No edema, clubbing or cyanosis  Skin: Warm, dry        Lab, Imaging and other studies:   I have personally reviewed pertinent reports.   , CBC with diff:   Lab Results   Component Value Date    WBC 13.47 (H) 07/04/2023    HGB 9.0 (L) 07/04/2023    HCT 27.7 (L) 07/04/2023     (H) 07/04/2023     07/04/2023    RBC 2.18 (L) 07/04/2023    MCH 33.5 07/04/2023    MCHC 31.9 07/04/2023    RDW 14.2 07/04/2023    MPV 9.7 07/04/2023   , BMP/CMP:   Lab Results   Component Value Date    SODIUM 142 07/04/2023    K 3.9 07/04/2023     (H) 07/04/2023    CO2 26 07/04/2023    BUN 6 07/04/2023    CREATININE 0.50 (L) 07/04/2023    CALCIUM 7.8 (L) 07/04/2023    EGFR 114 07/04/2023   , Magnesium: No components found for: "MAG"  VTE Pharmacologic Prophylaxis: Heparin  VTE Mechanical Prophylaxis: sequential compression device        Nalini Badillo MD  7/4/2023  11:00 PM

## 2023-07-05 NOTE — UTILIZATION REVIEW
Continued Stay Review    Date: 7-4-23                       Current Patient Class: inpatient  Current Level of Care: critical care    HPI:62 y.o. male initially admitted on 6-29-23  with Rohan's gangrene  S/p:  6/28 debridement - andria/cullen (cedillo)  6/29 Perineal debridement/washout - Mason   6/30 Perineal debridement/ EUA - Basil/Uro  7/1 Debridement, suprapubic tube - Basil/Uro  7/2 Perineal and scrotal debridement - Lucero  7/4 laparoscopic loop diverting colostomy creation, perineal debridement, cystoscopy with suprapubic tube exchange, EUA, testicular fixation, vac placement - Lucero/Saturnino      Assessment/Plan:     SURGERY DATE: 7/4/2023    Procedure(s):  DEBRIDEMENT OF PERINEAL WOUND ([address])  LAPAROSCOPY DIAGNOSTIC WITH CREATION OF OSTOMY  CYSTOSCOPY and suprapubic tube exchange  EUA. 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      Complications:   None      7-5-23  Critical care    POD 1     Patient with bilateral 1+ pitting edema, decreased breath sounds. Colostomy with scant bloody drainage. Suprapubic catheter with clear yellow urine. Vac dressing at perineum with minimal output. Continue iv unasyn. Plan OR 7-6 for vac change. Pain management includes: po tylenol q8 scheduled, prn oxycodone and prn iv dilaudid. Advance diet.       Vital Signs:     Patient Vitals for the past 24 hrs:   BP Temp Pulse Resp SpO2   07/05/23 1150 124/67 99.1 °F (37.3 °C) 70 20 95 %   07/05/23 0815 144/75 97.9 °F (36.6 °C) 68 14 94 %   07/05/23 0247 109/55 98.2 °F (36.8 °C) 72 22 96 %   07/04/23 2305 95/70 98.5 °F (36.9 °C) 80 15 92 %   07/04/23 2113 135/68 -- 86 19 94 %   07/04/23 2005 140/65 -- 84 16 --   07/04/23 1910 120/80 98.1 °F (36.7 °C) 86 17 91 %   07/04/23 1845 153/68 -- 86 19 92 %   07/04/23 1535 123/77 (!) 96.6 °F (35.9 °C) 80 20 90 %   07/04/23 1500 138/76 (!) 97.2 °F (36.2 °C) 82 18 93 %   07/04/23 1445 145/82 -- 84 20 94 %   07/04/23 1430 140/80 -- 82 17 98 %   07/04/23 1415 149/85 (!) 97.1 °F (36.2 °C) 78 18 96 %       Pertinent Labs/Diagnostic Results:       Results from last 7 days   Lab Units 07/05/23  0603 07/04/23  1425 07/04/23  0533 07/03/23  0455 07/02/23  0428 06/28/23  2347 06/28/23  1736   WBC Thousand/uL 16.08*  --  13.47* 13.84* 15.72*   < > 13.84*   HEMOGLOBIN g/dL 7.9* 9.0* 7.3* 7.5* 8.3*   < > 14.1   HEMATOCRIT % 24.6* 27.7* 22.9* 22.5* 25.6*   < > 40.3   PLATELETS Thousands/uL 328  --  329 297 287   < > 176   BANDS PCT %  --   --  3  --   --   --  20*    < > = values in this interval not displayed.          Results from last 7 days   Lab Units 07/05/23  0603 07/04/23  0533 07/03/23  1115 07/03/23  0455 07/02/23  0428 07/01/23  0684 06/30/23  1654 06/30/23  0323 06/29/23  1524   SODIUM mmol/L 142 142  --  139 138 140   < > 139 140   POTASSIUM mmol/L 3.7 3.9  --  3.6 4.1 3.5   < > 3.3* 3.5   CHLORIDE mmol/L 113* 112*  --  110* 108 109*   < > 109* 110*   CO2 mmol/L 26 26  --  29 24 26   < > 23 22   ANION GAP mmol/L 3 4  --  0 6 5   < > 7 8   BUN mg/dL 7 6  --  10 14 9   < > 17 23   CREATININE mg/dL 0.55* 0.50*  --  0.52* 0.58* 0.54*   < > 0.59* 0.69   EGFR ml/min/1.73sq m 110 114  --  112 107 110   < > 106 100   CALCIUM mg/dL 7.9* 7.8*  --  7.5* 7.9* 7.9*   < > 7.8* 8.1*   CALCIUM, IONIZED mmol/L  --  1.09*  --   --  1.06* 1.03*  --   --   --    MAGNESIUM mg/dL 2.1 2.0  --   --   --  2.1  --  2.1 2.2   PHOSPHORUS mg/dL 2.0* 2.9 2.3  --  3.1 1.3*  --  1.0*  --     < > = values in this interval not displayed.      Results from last 7 days   Lab Units 07/01/23  0608 06/29/23  0327 06/28/23  1736   AST U/L 19 23 19   ALT U/L 19 19 18   ALK PHOS U/L 70 60 83   TOTAL PROTEIN g/dL 5.3* 5.8* 8.1   ALBUMIN g/dL 1.3* 1.9* 3.6   TOTAL BILIRUBIN mg/dL 0.48 0.51 1.21*     Results from last 7 days   Lab Units 07/05/23  0602 07/04/23  2109 07/04/23  1550 07/04/23  1436 07/04/23  0532 07/03/23  2335 07/03/23  1809 07/03/23  1159 07/03/23  0544 07/02/23  2353 07/02/23  1744 07/02/23  1350   POC GLUCOSE mg/dl 175* 271* 225* 197* 151* 216* 189* 192* 161* 256* 211* 216*     Results from last 7 days   Lab Units 07/05/23  0603 07/04/23  0533 07/03/23  0455 07/02/23  0428 07/01/23  0714 06/30/23  1654 06/30/23  0323 06/29/23  1524 06/29/23  0327 06/28/23  1736   GLUCOSE RANDOM mg/dL 159* 131 151* 232* 108 168* 111 221* 283* 441*         Results from last 7 days   Lab Units 06/30/23  0553   HEMOGLOBIN A1C % 7.3*   EAG mg/dl 163     BETA-HYDROXYBUTYRATE   Date Value Ref Range Status   06/28/2023 1.1 (H) <0.6 mmol/L Final      Results from last 7 days   Lab Units 06/30/23  0240 06/29/23  1401   PH ART  7.509* 7.504*   PCO2 ART mm Hg 28.4* 27.7*   PO2 ART mm Hg 68.5* 187.1*   HCO3 ART mmol/L 22.1 21.3*   BASE EXC ART mmol/L -0.5 -1.2   O2 CONTENT ART mL/dL 10.7* 13.0*   O2 HGB, ARTERIAL % 93.4* 98.0*   ABG SOURCE  Line, Arterial Line, Arterial     Results from last 7 days   Lab Units 06/28/23  1759   PH REYNALDO  7.398   PCO2 REYNALDO mm Hg 33.8*   PO2 REYNALDO mm Hg 43.6   HCO3 REYNALDO mmol/L 20.4*   BASE EXC REYNALDO mmol/L -3.6   O2 CONTENT REYNALDO ml/dL 14.6   O2 HGB, VENOUS % 77.0       Results from last 7 days   Lab Units 06/28/23  1736   PROTIME seconds 13.3   INR  1.03   PTT seconds 33         Results from last 7 days   Lab Units 06/28/23  1736   PROCALCITONIN ng/ml 28.29*     Results from last 7 days   Lab Units 06/30/23  1654 06/30/23  0240 06/29/23  1354 06/29/23  0600 06/29/23  0327   LACTIC ACID mmol/L 1.2 1.3 1.9 2.2* 2.4*       Results from last 7 days   Lab Units 07/05/23  0656 07/05/23  0555   UNIT PRODUCT CODE  [id number]  [id number]  [id number]   UNIT NUMBER  [id number]  [id number]  [id number]   UNITABO  A  AB O  O   UNITRH  POS  NEG POS  NEG   CROSSMATCH   --  Compatible  Compatible   UNIT DISPENSE STATUS  Return to Inv  Return to Inv Crossmatched  Presumed Trans   UNIT PRODUCT VOL ml 250  250 350  350       Results from last 7 days   Lab Units 06/29/23  0327   CLARITY UA  Turbid   COLOR UA  Yellow   SPEC GRAV UA  1.028   PH UA  6.0   GLUCOSE UA mg/dl >=1000 (1%)*   KETONES UA mg/dl Trace*   BLOOD UA  Large*   PROTEIN UA mg/dl 100 (2+)*   NITRITE UA  Negative   BILIRUBIN UA  Negative   UROBILINOGEN UA (BE) mg/dl 2.0*   LEUKOCYTES UA  Elevated glucose may cause decreased leukocyte values. See urine microscopic for UWBC result*   WBC UA /hpf 2-4*   RBC UA /hpf 30-50*   BACTERIA UA /hpf Occasional   EPITHELIAL CELLS WET PREP /hpf None Seen     Results from last 7 days   Lab Units 06/29/23  0924 06/28/23  2053 06/28/23 2042 06/28/23  1736   BLOOD CULTURE   --   --   --  No Growth After 5 Days. No Growth After 5 Days.    GRAM STAIN RESULT  Rare Polys*  2+ Gram positive cocci in pairs*  1+ Gram positive rods* 4+ Gram negative rods*  Rare Epithelial Cells*  1+ Gram positive cocci in pairs* Rare Gram positive rods*  1+ Gram positive cocci in pairs and chains*  No polys seen*  --              Results from last 7 days   Lab Units 06/30/23  0615   VANCOMYCIN RM ug/mL 8.4*     Scheduled Medications:    acetaminophen, 975 mg, Oral, Q8H GEN  ampicillin-sulbactam, 3 g, Intravenous, Q6H  enoxaparin, 40 mg, Subcutaneous, Q24H GEN  insulin glargine, 8 Units, Subcutaneous, HS  insulin lispro, 2-12 Units, Subcutaneous, HS  insulin lispro, 4-20 Units, Subcutaneous, TID AC  melatonin, 3 mg, Oral, HS  potassium-sodium phosphateS, 2 tablet, Oral, TID With Meals  senna-docusate sodium, 1 tablet, Oral, BID      Continuous IV Infusions:     PRN Meds:  HYDROmorphone, 0.5 mg, Intravenous, Q3H PRN  ondansetron, 4 mg, Intravenous, Q6H PRN  oxyCODONE, 10 mg, Oral, Q4H PRN  oxyCODONE, 5 mg, Oral, Q4H PRN  polyethylene glycol, 17 g, Oral, Daily PRN        Discharge Plan: to be determined     Network Utilization Review Department  ATTENTION: Please call with any questions or concerns to [phone] and carefully listen to the prompts so that you are directed to the right person. All voicemails are confidential.  Conrad Chopra all requests for admission clinical reviews, approved or denied determinations and any other requests to dedicated fax number below belonging to the campus where the patient is receiving treatment.  List of dedicated fax numbers for the Facilities:  [place] DENIALS (Administrative/Medical Necessity) [phone] [address] [facility] (Maternity/NICU/Pediatrics) [phone]   [address] [place] [address] Drive [phone]   [facility] [address] [facility] [phone]   [facility]8 [facility] 207 [facility] [address] [place] [address] [address] [address] [facility] [phone]   [address] [facility] [address] [phone]

## 2023-07-05 NOTE — CASE MANAGEMENT
Case Management Progress Note    Patient name Hao Baxter  Location [facility] 202/[facility] 868-73 MRN [id number]  : 1960 Date 2023       LOS (days): 6  Geometric Mean LOS (GMLOS) (days):   Days to GMLOS:        OBJECTIVE:        Current admission status: Inpatient  Preferred Pharmacy:   PATIENT/FAMILY REPORTS NO PREFERRED PHARMACY  No address on file      [facility]/pharmacy [facility] Abdulkadir Addison [facility]  [address] [address]  Phone: [phone] Fax: [phone]    Primary Care Provider: No primary care provider on file. Primary Insurance: BLUE CROSS  Secondary Insurance:     PROGRESS NOTE:  Pt is scheduled for OR tomorrow  for wound vac change  CM continues to follow for DCP.  D/C disposition uncertain

## 2023-07-05 NOTE — PLAN OF CARE
Problem: PHYSICAL THERAPY ADULT  Goal: Performs mobility at highest level of function for planned discharge setting. See evaluation for individualized goals. Description: Treatment/Interventions: ADL retraining, Functional transfer training, LE strengthening/ROM, Elevations, Therapeutic exercise, Endurance training, Patient/family training, Equipment eval/education, Bed mobility, Gait training, Compensatory technique education, Spoke to nursing, Spoke to case management, OT, Family  Equipment Recommended: Dolores Pearce       See flowsheet documentation for full assessment, interventions and recommendations. Note: Prognosis: Good  Problem List: Decreased strength, Decreased range of motion, Decreased endurance, Impaired balance, Decreased mobility, Decreased skin integrity, Pain      Pt is 57 y.o. male seen for PT evaluation s/p admit to [address] [place] [address] on 6/29/2023. Pt presenting w/ scrotal swelling and pain in which pt dx w/ sasha's gangrene. Pt s/p "DEBRIDEMENT OF PERINEAL WOUND ([address] OUT), LAPAROSCOPY DIAGNOSTIC WITH CREATION OF OSTOMY, CYSTOSCOPY and suprapubic tube exchange, EUA. testicular fiation, APPLICATION VAC DRESSING" on 7/4. Pt  has a past medical history of Diabetes mellitus ([address]). .   Due to acute medical issues, frequent OR for VAC changes;  ongoing medical management  for primary dx; pain, fall risk, increased reliance on more restrictive AD compared to baseline;  decreased activity tolerance compared to baseline, increased assistance needed from caregiver at current time, continuous telemetry + o2  monitoring, multiple lines, decline in overall functional mobility status; health management issues; note unstable clinical picture (high complexity)   Prior to admission, pt was fully indep and working PT (+) ; living w/ spouse in a [place] home w/ 3 MARCOS + HR; I w/ ADL"s and IADL's/ spouse works but can take time off when pt d/c home. 0 falls.  Pt is retired  and now works PT at [employer], . Currently pt  is requiring modAx2 A for bed skills; modA x1 for functional transfers and modAx1 for ambulation w/ RW 4' bed> chair (+) dizziness w/o knee buckling. distances limited by fatigue pain and LE instability (prolonged bedrest x a reported 7-8 days). Pt presents functioning below baseline and currently w/ overall mobility deficits 2* to: decreased LE strength/AROM; limited flexibility; pain; wound vac (groin);   generalized weakness/ deconditioning; decreased endurance; decreased activity tolerance; impaired balance; gait deviations; dizziness; SOB/NASCIMENTO; fatigue;  limited insight into current deficits; bed/ chair alarms; multiple lines; Pt currently at risk for falls. (Please find additional objective findings from PT assessment regarding body systems outlined above.) Pt will continue to benefit from skilled PT interventions to address stated impairments; to maximize functional potential; for ongoing pt/ family training; and DME needs. PT is currently recommending rehab for now; but w/ progress and progressive mobility pt may be able to progress to home w/ spouse A on d/c. Spouse who was present on eval reports she could not manage him at [facility]- but will be able to provide some assist on d/c.- ideally pt and spouse 1* goal is for pt to d/c home . Pt/ family agreeable to plan and goals as stated on evaluation. PT Discharge Recommendation: Post acute rehabilitation services (vs home pending progress- rehab for now.)    See flowsheet documentation for full assessment.

## 2023-07-05 NOTE — OCCUPATIONAL THERAPY NOTE
Occupational Therapy Evaluation     Patient Name: Ian Aguero  CTS'H Date: 7/5/2023  Problem List  Principal Problem:    Rohan's gangrene  Active Problems:    DM (diabetes mellitus) ([address])    HTN (hypertension)    Suprapubic catheter ([address])    Urethral disorder    Hyperglycemia    Past Medical History  Past Medical History:   Diagnosis Date    Diabetes mellitus ([address])      Past Surgical History  Past Surgical History:   Procedure Laterality Date    INCISION AND DRAINAGE OF WOUND N/A 6/28/2023    Procedure: 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 ;  Surgeon: Tan Ramsey MD;  [facility]: [facility];  Service: Urology    INCISION AND DRAINAGE OF WOUND N/A 6/28/2023    Procedure: 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;  Surgeon: Madhavi Hurst MD;  [facility];  Service: General    FL CYSTOSTOMY CYSTOTOMY W/DRAINAGE N/A 7/1/2023    Procedure: CYSTOSCOPY; CYSTOSTOMY SUPRAPUBIC OPEN;  Surgeon: Morgan Strickland MD;  [facility];  Service: Urology    ROTATOR CUFF REPAIR      WOUND DEBRIDEMENT N/A 6/29/2023    Procedure: DEBRIDEMENT WOUND [facility] OUT); Surgeon: Eddie Lima MD;  [facility];  Service: General    WOUND DEBRIDEMENT N/A 7/2/2023    Procedure: DEBRIDEMENT PERINEAL WOUND AND DRESSING CHANGE [facility] OUT); Surgeon: Eneida Lou MD;  [facility];  Service: General    WOUND DEBRIDEMENT N/A 7/1/2023    Procedure: DEBRIDEMENT WOUND AND DRESSING CHANGE [facility] OUT); Surgeon: Jose Montalvo MD;  [facility];  Service: General    WOUND DEBRIDEMENT N/A 6/30/2023    Procedure: DEBRIDEMENT WOUND [facility] OUT);   Surgeon: Jose Montalvo MD;  [facility];  Service: General           07/05/23 1215   OT Last Visit   OT Visit Date 07/05/23   Note Type   Note type Evaluation   Additional Comments Pt seen w/ PT to increase safety, decrease fall risk, and maximize functional/occupational performance 2* medical complexity which is a regression from pt's functional baseline. Pain Assessment   Pain Assessment Tool 0-10   Pain Score 10 - Worst Possible Pain   Pain Location/Orientation Location: Buttocks; Other (Comment)  (Scrotum)   Effect of Pain on Daily Activities Impacts ability to engage in valued occupations   Hospital Pain Intervention(s) Repositioned; Ambulation/increased activity; Emotional support   Restrictions/Precautions   Weight Bearing Precautions Per Order No   Other Precautions Multiple lines;Telemetry; Fall Risk;Pain  (ramírez, wound vac)   Home Living   Type of Home House  (1 story ranch home with 3 MARCOS)   Home Layout One level;Performs ADLs on one level; Able to live on main level with bedroom/bathroom; Access   Bathroom Shower/Tub Tub/shower unit   Bathroom Toilet Standard   Bathroom Equipment Grab bars in shower;Grab bars around toilet   [address] Walker;Cane   Additional Comments Pt reports living in a 1 story ranch home with 3 MARCOS with his wife; reports that his wife will take time off of work to assist with functional needs prn; no DME PTA, but has access to RW and cane prn   Prior Function   Level of [place] Independent with ADLs; Independent with functional mobility; Independent with IADLS   Lives With Spouse   Receives Help From Family   IADLs Independent with driving; Independent with meal prep; Independent with medication management   Falls in the last 6 months 0   Vocational Part time employment   Comments (+) driving   Lifestyle   Autonomy PTA, pt was independent in ADLs/IADLs and functional mobility w/ no DME; (+) driving   Reciprocal Relationships Lives with his wife who can assist with functional needs prn   Service to Others Retired; previously worked in [employer]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[place] office however now works part time at [employer] Enjoys spending time with his grandchildren   Subjective   Subjective "My wife is taking time off work to help."   ADL   Where Assessed Edge of bed   Eating Assistance 5  Supervision/Setup   Grooming Assistance 5  Supervision/Setup   UB Bathing Assistance 4  Minimal Assistance    [address] 2  Maximal [place] 4  [address] 2  Maximal [address]  3  Moderate [place] 3  Moderate Assistance   Bed Mobility   Supine to Sit 3  Moderate assistance   Additional items Assist x 2;HOB elevated; Bedrails; Increased time required;Verbal cues;LE management   Sit to Supine Unable to assess   Additional Comments Pt performed supine <> sit with Mod A x2 for UB postural support/LE management with increased time and bed rails for support 2* increased 10/10 pain; @ end of session, pt left sitting upright in recliner with all functional needs in reach   Transfers   Sit to Stand 3  Moderate assistance   Additional items Assist x 1; Increased time required;Verbal cues   Stand to Sit 3  Moderate assistance   Additional items Assist x 1; Increased time required;Verbal cues   Additional Comments w/ RW for safety and support   Functional Mobility   Functional Mobility 3  Moderate assistance   Additional Comments Pt completed short functional mobility from EOB <> recliner w/ Mod A x1 w/ RW for safety and support with increased time and verbal cues for RW management/sequencing   Additional items Rolling walker   Balance   Static Sitting Fair -   Dynamic Sitting Poor +   Static Standing Poor +   Dynamic Standing Poor   Ambulatory Poor   Activity Tolerance   Activity Tolerance Patient limited by pain   Medical Staff Maru Garcia DPT   Nurse Made Aware RN cleared and updated after session   RUE Assessment   RUE Assessment WFL   LUE Assessment   LUE Assessment WFL   Hand Function   Gross Motor Coordination Functional   Fine Motor Coordination Functional   Cognition   Overall Cognitive Status [facility] Arousal/Participation Alert; Responsive;Arousable; Cooperative   Attention Within functional limits   Orientation Level Oriented X4   Memory Within functional limits   Following Commands Follows one step commands without difficulty   Comments Pt pleasant and cooperative; significantly limited by increased pain t/o   Assessment   Limitation Decreased ADL status; Decreased endurance;Decreased self-care trans;Decreased high-level ADLs   Prognosis Fair   Assessment Pt is a 56 yo male who p(resented to [facility] with scrotal swelling and pain in which pt dx w/ sasha's gangrene. Pt s/p "DEBRIDEMENT OF PERINEAL WOUND ([address] OUT), LAPAROSCOPY DIAGNOSTIC WITH CREATION OF OSTOMY, CYSTOSCOPY and suprapubic tube exchange, EUA. testicular fiation, APPLICATION VAC DRESSING" on 7/4. Pt  has a past medical history of Diabetes mellitus ([address] Pt with active OT orders in which OT consulted to assess pt's functional status and occupational performance to determine safe d/c needs. Pt seen with PT to increase safety, decrease fall risk, and maximize functional/occupational performance 2* medical complexity which is a regression from pt's functional baseline. Pt lives with his wife in a 1 story ranch home with 3 MARCOS. PTA, pt was independent in ADLs/IADLs and functional mobility w/ no DME. (+) driving. Currently, pt performing bed mobility w/ Mod A x2, functional transfers/mobility w/ Mod A x1 w/ RW, UB ADLs w/ Min A, and LB ADLs w/ Max A. Pt demonstrates the following limitations/impairments which impact the pt's ability to engage in valued occupations: balance, endurance/activity tolerance, standing tolerance, functional reach, postural/trunk control, strength, safety awareness, and pain. From an OT standpoint, recommend discharge to post-acute rehab once medically stable. The patient's raw score on the AM-PAC Daily Activity Inpatient Short Form is 16.  A raw score of less than 19 suggests the patient may benefit from discharge to post-acute rehabilitation services. Please refer to the recommendation of the Occupational Therapist for safe discharge planning. Pt would benefit from skilled OT services 2-3x/wk to address immediate acute care needs and underlying performance skills to promote safety, decrease fall risk, and enhance occupational performance to return to PLOF. Goals to be met within the next 10-14 days. Goals   Patient Goals To have less pain   LTG Time Frame 10-14   Long Term Goal #1 See OT goals listed below   Plan   Treatment Interventions ADL retraining;Functional transfer training;UE strengthening/ROM; Endurance training;Patient/family training;Equipment evaluation/education; Compensatory technique education;Continued evaluation; Energy conservation; Activityengagement   Goal Expiration Date 07/19/23   OT Frequency 2-3x/wk   Recommendation   OT Discharge Recommendation Post acute rehabilitation services  (Pending continued functional progress/pain management)   AM-PAC Daily Activity Inpatient   Lower Body Dressing 2   Bathing 2   Toileting 2   Upper Body Dressing 3   Grooming 3   Eating 4   Daily Activity Raw Score 16   Daily Activity Standardized Score (Calc for Raw Score >=11) 35.96   AM-PAC Applied Cognition Inpatient   Following a Speech/Presentation 4   Understanding Ordinary Conversation 4   Taking Medications 4   Remembering Where Things Are Placed or Put Away 4   Remembering List of 4-5 Errands 4   Taking Care of Complicated Tasks 4   Applied Cognition Raw Score 24   Applied Cognition Standardized Score 62.21   End of Consult   Education Provided Yes;Family or social support of family present for education by provider   Patient Position at End of Consult Bedside chair; All needs within reach   Nurse Communication Nurse aware of consult     OT GOALS:    Pt will improve functional mobility during ADL/IADL/leisure tasks with Mod I using AE/DME prn.     Pt will improve activity tolerance/functional endurance during ADL/IADL/leisure tasks for at least 20 minutes to improve occupational performance and engagement in valued occupations using AE/DME prn. Pt will engage in ongoing functional/formal cognitive assessments to assist with safe d/c planning and increase safety during functional tasks. Pt will participate in simulated IADL management task w/ Mod I to increase independence and engagement in valued occupations w/ good balance/safety. Pt will improve dynamic standing balance for at least 15 minutes with Mod I during functional tasks to decrease fall risk and improve independence and engagement in ADL/IADL/leisure activities. Pt will follow 100% of multi-step commands in ADL/IADL/leisure activities to improve functional cognition used in functional daily routines. Pt will complete functional transfers on and off all surfaces used in daily routines with Mod I for safety to maximize functional/occupational performance. Pt will complete all bed mobility tasks with Mod I to serve as a prerequisite for EOB/OOB ADL/IADL/leisure tasks, optimize positioning/comfort, and increase functional independence. Pt will independently demonstrate good carryover of safety precautions and education/training during ADL/IADL/leisure tasks with energy conservation techniques s/p skilled instruction without verbal cues. Pt will complete UB ADL tasks with Mod I using AE/DME prn to increase functional independence in ADL/IADL/leisure tasks. Pt will complete LB ADL tasks with Mod I using AE/DME prn to increase functional independence in ADL/IADL/leisure tasks. Pt will complete toileting tasks with Mod I and good hygiene/thoroughness using AE/DME prn to increase functional independence. Pt will independently identify and utilize 2-3 positive coping strategies to enhance overall wellbeing and engagement in valued occupations.     Whitney Blanchard MS, OTR/L

## 2023-07-05 NOTE — PROGRESS NOTES
[address] [place] [address]  Progress Note: Critical Care  Name: Addie Rodrigues 57 y.o. male I MRN: [id number]  Unit/Bed#: [facility] 202-01 I Date of Admission: 6/29/2023   Date of Service: 7/5/2023 I Hospital Day: 6    Assessment/Plan   Neuro:   • Diagnosis: Acute pain  ? Tylenol 975 mg PO q8h  ? Oxycodone 5 mg q4h PRN (1 dose/24h)  ? Oxycodone 10 mg q4h PRN (0 doses/24h)  ? Dilaudid 0.5 mg IV q3h PRN (1 dose/24h)  • Regulate sleep/wake cycle  ? Melatonin 3 mg qHS        CV:   • Diagnosis: Septic shock - resolved  ? Plan: Maintain MAP > 65 mmHg  • Hx HTN  ? Home lisinopril on hold        Pulm:  No active issues     GI:   • S/p diverting colostomy on 7/4  • Monitor ostomy output  · Bowel regimen with senokot S 1 tab BID  · GI PPx: protonix 40 mg daily         :   • Diagnosis: Rohan's gangrene  ? Plan: S/p OR debridements on 6/28, 6/29, 6/30, 7/1, 7/2, 7/4  ? Bedside dressing change 7/3  ? Suprapubic catheter placement on 7/1, exchange on 7/4 with testicular fixation and placement of vac  ? VAC changes per general surgery   ? Monitor urine output         F/E/N:   • Replete electrolytes as needed  • Carb control diet         Heme/Onc:   • Diagnosis: Anemia, component of acute blood loss and dilutional   ? Plan: S/p 1 PRBC 7/4  ? Trend Hgb daily, no active signs of bleeding   • DVT PPx: lovenox 40 mg dialy        Endo:   • Diagnosis: DM type 2 with hyperglycemia   ? Plan: Maintained on metformin and trulicity as an outpatient   ? 6/30/2023 HbA1c 7.3%  ? Lantus 8 units qHS and SSI algorithm 4 with QID accuechecks  - Goal blood glucose 140-180        ID:   • Diagnosis: Rohan's gangrene  ? Plan: ID consulted, appreciate their recommendations  ? Unasyn 3g q6h, D#3 (D#8 of total abx)  ? Linezolid 500 mg q12h, D#6  - D/C when no longer requiring debridement   ?  Trend WBC/fever curve        MSK/Skin:   Local wound care        Disposition: Med Surg    ICU Core Measures     A: Assess, Prevent, and Manage Pain · Has pain been assessed? Yes  · Need for changes to pain regimen? No   B: Both SAT/SAT  · N/A   C: Choice of Sedation · RASS Goal: N/A patient not on sedation  · Need for changes to sedation or analgesia regimen? No   D: Delirium · CAM-ICU: Negative   E: Early Mobility  · Plan for early mobility? Yes   F: Family Engagement · Plan for family engagement today? Yes       Antibiotic Review: Infectious disease consulted    Review of Invasive Devices:            Prophylaxis:  VTE VTE covered by:  enoxaparin, Subcutaneous       Stress Ulcer  covered bypantoprazole (PROTONIX) EC tablet 40 mg [[id number]]          Subjective   HPI/24hr events: 56 y/o male with sasha's gangrene s/p multiple debridements. POD # 1 s/p debridement of perineal wound, diagnostic laparoscopy with creation of colostomy, cystoscopy with suprapubic tube exchange, testicular fixation, application of VAC. Review of Systems   Respiratory: Negative for shortness of breath. Cardiovascular: Negative for chest pain. Gastrointestinal: Positive for abdominal pain. Negative for nausea. Objective                            Vitals I/O      Most Recent Min/Max in 24hrs   Temp 98.5 °F (36.9 °C) Temp  Min: 96.6 °F (35.9 °C)  Max: 98.5 °F (36.9 °C)   Pulse 80 Pulse  Min: 70  Max: 86   Resp 15 Resp  Min: 13  Max: 20   BP 95/70 BP  Min: 95/70  Max: 153/68   O2 Sat 92 % SpO2  Min: 90 %  Max: 98 %      Intake/Output Summary (Last 24 hours) at 7/5/2023 0205  Last data filed at 7/4/2023 2318  Gross per 24 hour   Intake 4100 ml   Output 2000 ml   Net 2100 ml         Diet Luis Carlos/CHO Controlled; Consistent Carbohydrate Diet Level 2 (5 carb servings/75 grams CHO/meal)  Diet NPO; Sips with meds     Invasive Monitoring Physical exam    Physical Exam  HENT:      Head: Atraumatic. Mouth/Throat:      Mouth: Mucous membranes are moist.   Cardiovascular:      Rate and Rhythm: Normal rate and regular rhythm.    Pulmonary:      Effort: No respiratory distress. Breath sounds: No wheezing, rhonchi or rales. Abdominal:      Palpations: Abdomen is soft. Tenderness: There is abdominal tenderness. Comments: Colostomy with scant bloody drainage   Genitourinary:     Comments: Suprapubic catheter in place  Musculoskeletal:      Cervical back: Neck supple. Right lower le+ Pitting Edema present. Left lower le+ Pitting Edema present. Skin:     General: Skin is warm and dry. Capillary Refill: Capillary refill takes 2 to 3 seconds. Coloration: Skin is pale. Neurological:      General: No focal deficit present. Mental Status: He is alert and oriented to person, place, and time.               Diagnostic Studies      EKG:   Imaging:      Medications:  Scheduled PRN   acetaminophen, 975 mg, Q8H GEN  ampicillin-sulbactam, 3 g, Q6H  enoxaparin, 40 mg, Q24H GEN  insulin glargine, 8 Units, HS  insulin lispro, 2-12 Units, HS  insulin lispro, 4-20 Units, TID AC  linezolid, 600 mg, Q12H  melatonin, 3 mg, HS  pantoprazole, 40 mg, Early Morning  senna-docusate sodium, 1 tablet, BID      HYDROmorphone, 0.5 mg, Q3H PRN  ondansetron, 4 mg, Q6H PRN  oxyCODONE, 10 mg, Q4H PRN  oxyCODONE, 5 mg, Q4H PRN  polyethylene glycol, 17 g, Daily PRN       Continuous          Labs:    CBC    Recent Labs     23  0455 23  0533 23  1425   WBC 13.84* 13.47*  --    HGB 7.5* 7.3* 9.0*   HCT 22.5* 22.9* 27.7*    329  --    BANDSPCT  --  3  --      BMP    Recent Labs     23  0455 23  0533   SODIUM 139 142   K 3.6 3.9   * 112*   CO2 29 26   AGAP 0 4   BUN 10 6   CREATININE 0.52* 0.50*   CALCIUM 7.5* 7.8*       Coags    No recent results     Additional Electrolytes  Recent Labs     23  1115 23  0533   MG  --  2.0   PHOS 2.3 2.9   CAIONIZED  --  1.09*          Blood Gas    No recent results  No recent results LFTs  No recent results    Infectious  No recent results  Glucose  Recent Labs 07/03/23  0455 07/04/23  0533   GLUC 151* 131                   Guilherme Jackson PA-C

## 2023-07-05 NOTE — PLAN OF CARE
Problem: OCCUPATIONAL THERAPY ADULT  Goal: Performs self-care activities at highest level of function for planned discharge setting. See evaluation for individualized goals. Description: Treatment Interventions: ADL retraining, Functional transfer training, UE strengthening/ROM, Endurance training, Patient/family training, Equipment evaluation/education, Compensatory technique education, Continued evaluation, Energy conservation, Activityengagement          See flowsheet documentation for full assessment, interventions and recommendations. Note: Limitation: Decreased ADL status, Decreased endurance, Decreased self-care trans, Decreased high-level ADLs  Prognosis: Fair  Assessment: Pt is a 56 yo male who p(resented to [facility] with scrotal swelling and pain in which pt dx w/ sasha's gangrene. Pt s/p "DEBRIDEMENT OF PERINEAL WOUND ([address] OUT), LAPAROSCOPY DIAGNOSTIC WITH CREATION OF OSTOMY, CYSTOSCOPY and suprapubic tube exchange, EUA. testicular fiation, APPLICATION VAC DRESSING" on 7/4. Pt  has a past medical history of Diabetes mellitus ([address]). Pt with active OT orders in which OT consulted to assess pt's functional status and occupational performance to determine safe d/c needs. Pt seen with PT to increase safety, decrease fall risk, and maximize functional/occupational performance 2* medical complexity which is a regression from pt's functional baseline. Pt lives with his wife in a 1 story ranch home with 3 MARCOS. PTA, pt was independent in ADLs/IADLs and functional mobility w/ no DME. (+) driving. Currently, pt performing bed mobility w/ Mod A x2, functional transfers/mobility w/ Mod A x1 w/ RW, UB ADLs w/ Min A, and LB ADLs w/ Max A. Pt demonstrates the following limitations/impairments which impact the pt's ability to engage in valued occupations: balance, endurance/activity tolerance, standing tolerance, functional reach, postural/trunk control, strength, safety awareness, and pain.  From an OT standpoint, recommend discharge to post-acute rehab once medically stable. The patient's raw score on the AM-PAC Daily Activity Inpatient Short Form is 16. A raw score of less than 19 suggests the patient may benefit from discharge to post-acute rehabilitation services. Please refer to the recommendation of the Occupational Therapist for safe discharge planning. Pt would benefit from skilled OT services 2-3x/wk to address immediate acute care needs and underlying performance skills to promote safety, decrease fall risk, and enhance occupational performance to return to PLOF. Goals to be met within the next 10-14 days.      OT Discharge Recommendation: Post acute rehabilitation services (Pending continued functional progress/pain management)

## 2023-07-06 ENCOUNTER — ANESTHESIA (INPATIENT)
Dept: PERIOP | Facility: HOSPITAL | Age: 63
DRG: 709 | End: 2023-07-06
Payer: COMMERCIAL

## 2023-07-06 LAB
ANION GAP SERPL CALCULATED.3IONS-SCNC: 2 MMOL/L
BUN SERPL-MCNC: 5 MG/DL (ref 5–25)
CALCIUM SERPL-MCNC: 7.8 MG/DL (ref 8.3–10.1)
CHLORIDE SERPL-SCNC: 112 MMOL/L (ref 96–108)
CO2 SERPL-SCNC: 28 MMOL/L (ref 21–32)
CREAT SERPL-MCNC: 0.57 MG/DL (ref 0.6–1.3)
ERYTHROCYTE [DISTWIDTH] IN BLOOD BY AUTOMATED COUNT: 16.3 % (ref 11.6–15.1)
GFR SERPL CREATININE-BSD FRML MDRD: 108 ML/MIN/1.73SQ M
GLUCOSE SERPL-MCNC: 109 MG/DL (ref 65–140)
GLUCOSE SERPL-MCNC: 114 MG/DL (ref 65–140)
GLUCOSE SERPL-MCNC: 138 MG/DL (ref 65–140)
GLUCOSE SERPL-MCNC: 184 MG/DL (ref 65–140)
GLUCOSE SERPL-MCNC: 252 MG/DL (ref 65–140)
GLUCOSE SERPL-MCNC: 85 MG/DL (ref 65–140)
HCT VFR BLD AUTO: 22.3 % (ref 36.5–49.3)
HGB BLD-MCNC: 7.4 G/DL (ref 12–17)
MCH RBC QN AUTO: 33.9 PG (ref 26.8–34.3)
MCHC RBC AUTO-ENTMCNC: 33.2 G/DL (ref 31.4–37.4)
MCV RBC AUTO: 102 FL (ref 82–98)
NRBC BLD AUTO-RTO: 0 /100 WBCS
PHOSPHATE SERPL-MCNC: 2.8 MG/DL (ref 2.3–4.1)
PLATELET # BLD AUTO: 375 THOUSANDS/UL (ref 149–390)
PMV BLD AUTO: 10.5 FL (ref 8.9–12.7)
POTASSIUM SERPL-SCNC: 4.5 MMOL/L (ref 3.5–5.3)
RBC # BLD AUTO: 2.18 MILLION/UL (ref 3.88–5.62)
SODIUM SERPL-SCNC: 142 MMOL/L (ref 135–147)
WBC # BLD AUTO: 14.27 THOUSAND/UL (ref 4.31–10.16)

## 2023-07-06 PROCEDURE — 0JBB0ZZ EXCISION OF PERINEUM SUBCUTANEOUS TISSUE AND FASCIA, OPEN APPROACH: ICD-10-PCS | Performed by: SURGERY

## 2023-07-06 PROCEDURE — 11045 DBRDMT SUBQ TISS EACH ADDL: CPT | Performed by: SURGERY

## 2023-07-06 PROCEDURE — 84100 ASSAY OF PHOSPHORUS: CPT | Performed by: NURSE PRACTITIONER

## 2023-07-06 PROCEDURE — 11042 DBRDMT SUBQ TIS 1ST 20SQCM/<: CPT | Performed by: SURGERY

## 2023-07-06 PROCEDURE — 82948 REAGENT STRIP/BLOOD GLUCOSE: CPT

## 2023-07-06 PROCEDURE — 85027 COMPLETE CBC AUTOMATED: CPT | Performed by: NURSE PRACTITIONER

## 2023-07-06 PROCEDURE — 80048 BASIC METABOLIC PNL TOTAL CA: CPT | Performed by: NURSE PRACTITIONER

## 2023-07-06 PROCEDURE — 99232 SBSQ HOSP IP/OBS MODERATE 35: CPT | Performed by: INTERNAL MEDICINE

## 2023-07-06 PROCEDURE — 0YB60ZZ EXCISION OF LEFT INGUINAL REGION, OPEN APPROACH: ICD-10-PCS | Performed by: SURGERY

## 2023-07-06 PROCEDURE — 99024 POSTOP FOLLOW-UP VISIT: CPT | Performed by: SURGERY

## 2023-07-06 PROCEDURE — 0YB50ZZ EXCISION OF RIGHT INGUINAL REGION, OPEN APPROACH: ICD-10-PCS | Performed by: SURGERY

## 2023-07-06 RX ORDER — FENTANYL CITRATE/PF 50 MCG/ML
50 SYRINGE (ML) INJECTION
Status: DISCONTINUED | OUTPATIENT
Start: 2023-07-06 | End: 2023-07-06 | Stop reason: HOSPADM

## 2023-07-06 RX ORDER — PROPOFOL 10 MG/ML
INJECTION, EMULSION INTRAVENOUS AS NEEDED
Status: DISCONTINUED | OUTPATIENT
Start: 2023-07-06 | End: 2023-07-06

## 2023-07-06 RX ORDER — GLYCOPYRROLATE 0.2 MG/ML
INJECTION INTRAMUSCULAR; INTRAVENOUS AS NEEDED
Status: DISCONTINUED | OUTPATIENT
Start: 2023-07-06 | End: 2023-07-06

## 2023-07-06 RX ORDER — PROPOFOL 10 MG/ML
INJECTION, EMULSION INTRAVENOUS CONTINUOUS PRN
Status: DISCONTINUED | OUTPATIENT
Start: 2023-07-06 | End: 2023-07-06

## 2023-07-06 RX ORDER — MAGNESIUM HYDROXIDE 1200 MG/15ML
LIQUID ORAL AS NEEDED
Status: DISCONTINUED | OUTPATIENT
Start: 2023-07-06 | End: 2023-07-06 | Stop reason: HOSPADM

## 2023-07-06 RX ORDER — ONDANSETRON 2 MG/ML
4 INJECTION INTRAMUSCULAR; INTRAVENOUS ONCE AS NEEDED
Status: DISCONTINUED | OUTPATIENT
Start: 2023-07-06 | End: 2023-07-06 | Stop reason: HOSPADM

## 2023-07-06 RX ORDER — HYDROMORPHONE HCL/PF 1 MG/ML
0.5 SYRINGE (ML) INJECTION
Status: DISCONTINUED | OUTPATIENT
Start: 2023-07-06 | End: 2023-07-06 | Stop reason: HOSPADM

## 2023-07-06 RX ORDER — ONDANSETRON 2 MG/ML
INJECTION INTRAMUSCULAR; INTRAVENOUS AS NEEDED
Status: DISCONTINUED | OUTPATIENT
Start: 2023-07-06 | End: 2023-07-06

## 2023-07-06 RX ORDER — SODIUM CHLORIDE, SODIUM LACTATE, POTASSIUM CHLORIDE, CALCIUM CHLORIDE 600; 310; 30; 20 MG/100ML; MG/100ML; MG/100ML; MG/100ML
100 INJECTION, SOLUTION INTRAVENOUS CONTINUOUS
Status: DISCONTINUED | OUTPATIENT
Start: 2023-07-06 | End: 2023-07-08

## 2023-07-06 RX ORDER — ROCURONIUM BROMIDE 10 MG/ML
INJECTION, SOLUTION INTRAVENOUS AS NEEDED
Status: DISCONTINUED | OUTPATIENT
Start: 2023-07-06 | End: 2023-07-06

## 2023-07-06 RX ORDER — HYDROMORPHONE HCL/PF 1 MG/ML
SYRINGE (ML) INJECTION AS NEEDED
Status: DISCONTINUED | OUTPATIENT
Start: 2023-07-06 | End: 2023-07-06

## 2023-07-06 RX ORDER — NEOSTIGMINE METHYLSULFATE 1 MG/ML
INJECTION INTRAVENOUS AS NEEDED
Status: DISCONTINUED | OUTPATIENT
Start: 2023-07-06 | End: 2023-07-06

## 2023-07-06 RX ORDER — LIDOCAINE HYDROCHLORIDE 10 MG/ML
INJECTION, SOLUTION EPIDURAL; INFILTRATION; INTRACAUDAL; PERINEURAL AS NEEDED
Status: DISCONTINUED | OUTPATIENT
Start: 2023-07-06 | End: 2023-07-06

## 2023-07-06 RX ORDER — DEXAMETHASONE SODIUM PHOSPHATE 10 MG/ML
INJECTION, SOLUTION INTRAMUSCULAR; INTRAVENOUS AS NEEDED
Status: DISCONTINUED | OUTPATIENT
Start: 2023-07-06 | End: 2023-07-06

## 2023-07-06 RX ORDER — SODIUM CHLORIDE, SODIUM LACTATE, POTASSIUM CHLORIDE, CALCIUM CHLORIDE 600; 310; 30; 20 MG/100ML; MG/100ML; MG/100ML; MG/100ML
INJECTION, SOLUTION INTRAVENOUS CONTINUOUS PRN
Status: DISCONTINUED | OUTPATIENT
Start: 2023-07-06 | End: 2023-07-06

## 2023-07-06 RX ORDER — MIDAZOLAM HYDROCHLORIDE 2 MG/2ML
INJECTION, SOLUTION INTRAMUSCULAR; INTRAVENOUS AS NEEDED
Status: DISCONTINUED | OUTPATIENT
Start: 2023-07-06 | End: 2023-07-06

## 2023-07-06 RX ORDER — FENTANYL CITRATE 50 UG/ML
INJECTION, SOLUTION INTRAMUSCULAR; INTRAVENOUS AS NEEDED
Status: DISCONTINUED | OUTPATIENT
Start: 2023-07-06 | End: 2023-07-06

## 2023-07-06 RX ADMIN — HYDROMORPHONE HYDROCHLORIDE 0.3 MG: 1 INJECTION, SOLUTION INTRAMUSCULAR; INTRAVENOUS; SUBCUTANEOUS at 09:15

## 2023-07-06 RX ADMIN — FENTANYL CITRATE 25 MCG: 50 INJECTION, SOLUTION INTRAMUSCULAR; INTRAVENOUS at 08:59

## 2023-07-06 RX ADMIN — INSULIN LISPRO 4 UNITS: 100 INJECTION, SOLUTION INTRAVENOUS; SUBCUTANEOUS at 16:25

## 2023-07-06 RX ADMIN — SODIUM CHLORIDE 3 G: 9 INJECTION, SOLUTION INTRAVENOUS at 21:09

## 2023-07-06 RX ADMIN — MELATONIN TAB 3 MG 3 MG: 3 TAB at 21:10

## 2023-07-06 RX ADMIN — GLYCOPYRROLATE 0.8 MG: 0.2 INJECTION, SOLUTION INTRAMUSCULAR; INTRAVENOUS at 09:38

## 2023-07-06 RX ADMIN — SODIUM CHLORIDE 3 G: 9 INJECTION, SOLUTION INTRAVENOUS at 16:25

## 2023-07-06 RX ADMIN — PROPOFOL 100 MCG/KG/MIN: 10 INJECTION, EMULSION INTRAVENOUS at 08:18

## 2023-07-06 RX ADMIN — MIDAZOLAM 2 MG: 1 INJECTION INTRAMUSCULAR; INTRAVENOUS at 08:05

## 2023-07-06 RX ADMIN — PROPOFOL 50 MG: 10 INJECTION, EMULSION INTRAVENOUS at 08:41

## 2023-07-06 RX ADMIN — ONDANSETRON 4 MG: 2 INJECTION INTRAMUSCULAR; INTRAVENOUS at 09:11

## 2023-07-06 RX ADMIN — HYDROMORPHONE HYDROCHLORIDE 0.5 MG: 1 INJECTION, SOLUTION INTRAMUSCULAR; INTRAVENOUS; SUBCUTANEOUS at 08:07

## 2023-07-06 RX ADMIN — HYDROMORPHONE HYDROCHLORIDE 0.5 MG: 1 INJECTION, SOLUTION INTRAMUSCULAR; INTRAVENOUS; SUBCUTANEOUS at 10:48

## 2023-07-06 RX ADMIN — SODIUM CHLORIDE 3 G: 9 INJECTION, SOLUTION INTRAVENOUS at 08:51

## 2023-07-06 RX ADMIN — SODIUM CHLORIDE, SODIUM LACTATE, POTASSIUM CHLORIDE, AND CALCIUM CHLORIDE: .6; .31; .03; .02 INJECTION, SOLUTION INTRAVENOUS at 07:59

## 2023-07-06 RX ADMIN — ACETAMINOPHEN 975 MG: 325 TABLET, FILM COATED ORAL at 05:22

## 2023-07-06 RX ADMIN — NEOSTIGMINE METHYLSULFATE 4 MG: 1 INJECTION INTRAVENOUS at 09:38

## 2023-07-06 RX ADMIN — INSULIN LISPRO 6 UNITS: 100 INJECTION, SOLUTION INTRAVENOUS; SUBCUTANEOUS at 22:20

## 2023-07-06 RX ADMIN — SODIUM CHLORIDE, SODIUM LACTATE, POTASSIUM CHLORIDE, AND CALCIUM CHLORIDE 100 ML/HR: .6; .31; .03; .02 INJECTION, SOLUTION INTRAVENOUS at 10:49

## 2023-07-06 RX ADMIN — OXYCODONE HYDROCHLORIDE 10 MG: 10 TABLET ORAL at 16:23

## 2023-07-06 RX ADMIN — HYDROMORPHONE HYDROCHLORIDE 0.2 MG: 1 INJECTION, SOLUTION INTRAMUSCULAR; INTRAVENOUS; SUBCUTANEOUS at 09:45

## 2023-07-06 RX ADMIN — FENTANYL CITRATE 50 MCG: 50 INJECTION, SOLUTION INTRAMUSCULAR; INTRAVENOUS at 08:37

## 2023-07-06 RX ADMIN — SODIUM CHLORIDE, SODIUM LACTATE, POTASSIUM CHLORIDE, AND CALCIUM CHLORIDE 100 ML/HR: .6; .31; .03; .02 INJECTION, SOLUTION INTRAVENOUS at 21:10

## 2023-07-06 RX ADMIN — SODIUM CHLORIDE 3 G: 9 INJECTION, SOLUTION INTRAVENOUS at 04:15

## 2023-07-06 RX ADMIN — LIDOCAINE HYDROCHLORIDE 50 MG: 10 INJECTION, SOLUTION EPIDURAL; INFILTRATION; INTRACAUDAL; PERINEURAL at 08:11

## 2023-07-06 RX ADMIN — FENTANYL CITRATE 25 MCG: 50 INJECTION, SOLUTION INTRAMUSCULAR; INTRAVENOUS at 09:40

## 2023-07-06 RX ADMIN — INSULIN GLARGINE 8 UNITS: 100 INJECTION, SOLUTION SUBCUTANEOUS at 21:09

## 2023-07-06 RX ADMIN — QUETIAPINE 25 MG: 25 TABLET ORAL at 21:10

## 2023-07-06 RX ADMIN — DEXAMETHASONE SODIUM PHOSPHATE 10 MG: 10 INJECTION, SOLUTION INTRAMUSCULAR; INTRAVENOUS at 08:22

## 2023-07-06 RX ADMIN — ROCURONIUM BROMIDE 50 MG: 10 INJECTION, SOLUTION INTRAVENOUS at 08:11

## 2023-07-06 RX ADMIN — ACETAMINOPHEN 975 MG: 325 TABLET, FILM COATED ORAL at 21:10

## 2023-07-06 RX ADMIN — ACETAMINOPHEN 975 MG: 325 TABLET, FILM COATED ORAL at 13:59

## 2023-07-06 NOTE — PLAN OF CARE
Problem: MOBILITY - ADULT  Goal: Maintain or return to baseline ADL function  Description: INTERVENTIONS:  -  Assess patient's ability to carry out ADLs; assess patient's baseline for ADL function and identify physical deficits which impact ability to perform ADLs (bathing, care of mouth/teeth, toileting, grooming, dressing, etc.)  - Assess/evaluate cause of self-care deficits   - Assess range of motion  - Assess patient's mobility; develop plan if impaired  - Assess patient's need for assistive devices and provide as appropriate  - Encourage maximum independence but intervene and supervise when necessary  - Involve family in performance of ADLs  - Assess for home care needs following discharge   - Consider OT consult to assist with ADL evaluation and planning for discharge  - Provide patient education as appropriate  7/5/2023 2039 by Carlo Grove RN  Outcome: Progressing  7/5/2023 2039 by Carlo Grove RN  Outcome: Progressing  Goal: Maintains/Returns to pre admission functional level  Description: INTERVENTIONS:  - Perform BMAT or MOVE assessment daily.   - Set and communicate daily mobility goal to care team and patient/family/caregiver.    - Collaborate with rehabilitation services on mobility goals if consulted  - Out of bed for toileting  - Record patient progress and toleration of activity level   7/5/2023 2039 by Carlo Grove RN  Outcome: Progressing  7/5/2023 2039 by Carlo Grove RN  Outcome: Progressing     Problem: SKIN/TISSUE INTEGRITY - ADULT  Goal: Skin Integrity remains intact(Skin Breakdown Prevention)  Description: Assess:  -Assess extremities for adequate circulation and sensation     Bed Management:  -Have minimal linens on bed & keep smooth, unwrinkled    Toileting:  -Offer bedside commode    Activity:  -Encourage activity and walks on unit  -Encourage or provide ROM exercises   -Use appropriate equipment to lift or move patient in bed    Skin Care:  -Avoid use of baby powder, tape, friction and shearing, hot water or constrictive clothing  -Do not massage red bony areas  7/5/2023 2039 by Nathalie Powell RN  Outcome: Progressing  7/5/2023 2039 by Nathalie Powell RN  Outcome: Progressing  Goal: Incision(s), wounds(s) or drain site(s) healing without S/S of infection  Description: INTERVENTIONS  - Assess and document dressing, incision, wound bed, drain sites and surrounding tissue  - Provide patient and family education  7/5/2023 2039 by Nathalie Powell RN  Outcome: Progressing  7/5/2023 2039 by Nathalie Powell RN  Outcome: Progressing  Goal: Pressure injury heals and does not worsen  Description: Interventions:  - Implement low air loss mattress or specialty surface (Criteria met)  - Apply silicone foam dressing  - Consider nutrition services referral as needed  7/5/2023 2039 by Nathalie Powell RN  Outcome: Progressing  7/5/2023 2039 by Nathalie Powell RN  Outcome: Progressing     Problem: Prexisting or High Potential for Compromised Skin Integrity  Goal: Skin integrity is maintained or improved  Description: INTERVENTIONS:  - Identify patients at risk for skin breakdown  - Assess and monitor skin integrity  - Assess and monitor nutrition and hydration status  - Monitor labs   - Assess for incontinence   - Turn and reposition patient  - Assist with mobility/ambulation  - Relieve pressure over bony prominences  - Avoid friction and shearing  - Provide appropriate hygiene as needed including keeping skin clean and dry  - Evaluate need for skin moisturizer/barrier cream  - Collaborate with interdisciplinary team   - Patient/family teaching  - Consider wound care consult   7/5/2023 2039 by Nathalie Powell RN  Outcome: Progressing  7/5/2023 2039 by Nathalie Powell RN  Outcome: Progressing     Problem: Nutrition/Hydration-ADULT  Goal: Nutrient/Hydration intake appropriate for improving, restoring or maintaining nutritional needs  Description: Monitor and assess patient's nutrition/hydration status for malnutrition. Collaborate with interdisciplinary team and initiate plan and interventions as ordered. Monitor patient's weight and dietary intake as ordered or per policy. Utilize nutrition screening tool and intervene as necessary. Determine patient's food preferences and provide high-protein, high-caloric foods as appropriate.      INTERVENTIONS:  - Monitor oral intake, urinary output, labs, and treatment plans  - Assess nutrition and hydration status and recommend course of action  - Evaluate amount of meals eaten  - Assist patient with eating if necessary   - Allow adequate time for meals  - Recommend/ encourage appropriate diets, oral nutritional supplements, and vitamin/mineral supplements  - Order, calculate, and assess calorie counts as needed  - Recommend, monitor, and adjust tube feedings and TPN/PPN based on assessed needs  - Assess need for intravenous fluids  - Provide specific nutrition/hydration education as appropriate  - Include patient/family/caregiver in decisions related to nutrition  7/5/2023 2039 by Chanda Hubbard RN  Outcome: Progressing  7/5/2023 2039 by Chanda Hubbard RN  Outcome: Progressing     Problem: PAIN - ADULT  Goal: Verbalizes/displays adequate comfort level or baseline comfort level  Description: Interventions:  - Encourage patient to monitor pain and request assistance  - Assess pain using appropriate pain scale  - Administer analgesics based on type and severity of pain and evaluate response  - Implement non-pharmacological measures as appropriate and evaluate response  - Consider cultural and social influences on pain and pain management  - Notify physician/advanced practitioner if interventions unsuccessful or patient reports new pain  7/5/2023 2039 by Chanda Hubbard RN  Outcome: Progressing  7/5/2023 2039 by Chanda Hubbard RN  Outcome: Progressing     Problem: INFECTION - ADULT  Goal: Absence or prevention of progression during hospitalization  Description: INTERVENTIONS:  - Assess and monitor for signs and symptoms of infection  - Monitor lab/diagnostic results  - Monitor all insertion sites, i.e. indwelling lines, tubes, and drains  - Monitor endotracheal if appropriate and nasal secretions for changes in amount and color  - [place] appropriate cooling/warming therapies per order  - Administer medications as ordered  - Instruct and encourage patient and family to use good hand hygiene technique  - Identify and instruct in appropriate isolation precautions for identified infection/condition  7/5/2023 2039 by Audi Skelton RN  Outcome: Progressing  7/5/2023 2039 by Audi Skelton RN  Outcome: Progressing  Goal: Absence of fever/infection during neutropenic period  Description: INTERVENTIONS:  - Monitor WBC    7/5/2023 2039 by Audi Skelton RN  Outcome: Progressing  7/5/2023 2039 by Audi Skelton RN  Outcome: Progressing     Problem: DISCHARGE PLANNING  Goal: Discharge to home or other facility with appropriate resources  Description: INTERVENTIONS:  - Identify barriers to discharge w/patient and caregiver  - Arrange for needed discharge resources and transportation as appropriate  - Identify discharge learning needs (meds, wound care, etc.)  - Arrange for interpretive services to assist at discharge as needed  - Refer to Case Management Department for coordinating discharge planning if the patient needs post-hospital services based on physician/advanced practitioner order or complex needs related to functional status, cognitive ability, or social support system  Outcome: Progressing     Problem: Knowledge Deficit  Goal: Patient/family/caregiver demonstrates understanding of disease process, treatment plan, medications, and discharge instructions  Description: Complete learning assessment and assess knowledge base.   Interventions:  - Provide teaching at level of understanding  - Provide teaching via preferred learning methods  Outcome: Progressing

## 2023-07-06 NOTE — CASE MANAGEMENT
Case Management Discharge Planning Note    Patient name Elbert Show  [facility] MRN [id number]  : 1960 Date 2023       Current Admission Date: 2023  Current Admission Diagnosis:Rohan's gangrene   Patient Active Problem List    Diagnosis Date Noted   • Suprapubic catheter ([address]) 2023   • Urethral disorder 2023   • Hyperglycemia 2023   • DM (diabetes mellitus) ([address]) 2023   • Lactic acidosis 2023   • HTN (hypertension) 2023   • Rohan's gangrene 2023      LOS (days): 7  Geometric Mean LOS (GMLOS) (days):   Days to GMLOS:     OBJECTIVE:  Risk of Unplanned Readmission Score: 16.37         Current admission status: Inpatient   Preferred Pharmacy:   PATIENT/FAMILY REPORTS NO PREFERRED PHARMACY  No address on file      [facility]  [address] [address]  Phone: [phone] Fax: [phone]    Primary Care Provider: No primary care provider on file. Primary Insurance: BLUE CROSS  Secondary Insurance:     DISCHARGE DETAILS:      Additional Comments: Per provider, pt had washout and wound VAC change today in OR. CM to follow.

## 2023-07-06 NOTE — ANESTHESIA PREPROCEDURE EVALUATION
Procedure:  DEBRIDEMENT WOUND AND DRESSING CHANGE ([address]) (Perineum)    Relevant Problems   ANESTHESIA (within normal limits)      CARDIO   (+) HTN (hypertension)        Physical Exam    Airway    Mallampati score: III  TM Distance: >3 FB  Neck ROM: full     Dental       Cardiovascular      Pulmonary      Other Findings        Anesthesia Plan  ASA Score- 4     Anesthesia Type- general with ASA Monitors. Additional Monitors:   Airway Plan: ETT. Plan Factors-Exercise tolerance (METS): >4 METS. Chart reviewed. EKG reviewed. Existing labs reviewed. Patient summary reviewed. Patient is not a current smoker. Induction- intravenous. Postoperative Plan- Plan for postoperative opioid use. Informed Consent- Anesthetic plan and risks discussed with patient. I personally reviewed this patient with the CRNA. Discussed and agreed on the Anesthesia Plan with the CRNA. Claudia Del Real

## 2023-07-06 NOTE — PLAN OF CARE
Problem: MOBILITY - ADULT  Goal: Maintain or return to baseline ADL function  Description: INTERVENTIONS:  -  Assess patient's ability to carry out ADLs; assess patient's baseline for ADL function and identify physical deficits which impact ability to perform ADLs (bathing, care of mouth/teeth, toileting, grooming, dressing, etc.)  - Assess/evaluate cause of self-care deficits   - Assess range of motion  - Assess patient's mobility; develop plan if impaired  - Assess patient's need for assistive devices and provide as appropriate  - Encourage maximum independence but intervene and supervise when necessary  - Involve family in performance of ADLs  - Assess for home care needs following discharge   - Consider OT consult to assist with ADL evaluation and planning for discharge  - Provide patient education as appropriate  Outcome: Progressing        Problem: Prexisting or High Potential for Compromised Skin Integrity  Goal: Skin integrity is maintained or improved  Description: INTERVENTIONS:  - Identify patients at risk for skin breakdown  - Assess and monitor skin integrity  - Assess and monitor nutrition and hydration status  - Monitor labs   - Assess for incontinence   - Turn and reposition patient  - Assist with mobility/ambulation  - Relieve pressure over bony prominences  - Avoid friction and shearing  - Provide appropriate hygiene as needed including keeping skin clean and dry  - Evaluate need for skin moisturizer/barrier cream  - Collaborate with interdisciplinary team   - Patient/family teaching  - Consider wound care consult   Outcome: Progressing

## 2023-07-06 NOTE — PROGRESS NOTES
General Surgery  Progress Note   Osmin Cardoza 57 y.o. 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   Ostomy 50cc SS output per bag       Plan:  - Continue CCD2 diet  - maintain wound vac, planning for OR vac change   - As per ID recs,continue IV Unasyn, discontinue Linezolid  - Monitor fever curve and wbc  - f/u culture sensitivities   - maintain suprapubic catheter, monitor UOP  - OOB to chair  - DVT ppx    Subjective/Objective     Subjective:   Patient seen and examined at bedside, in no acute distress. Patient's pain is well controlled. He denies nausea or vomiting. Passing flatus and having bowel movements. Pt with some mild intermittent confusion noted by nursing upon transition to the floor yesterday evening, oriented to self/year/month but stating location incorrectly as [facility]. He knows he is here for medical care and that he will be going for surgery this morning. Objective:   Vitals:Blood pressure 131/63, pulse 87, temperature 98.8 °F (37.1 °C), resp. rate (!) 25, height 6' 2" (1.88 m), weight 92.2 kg (203 lb 4.2 oz), SpO2 90 %.   Temp (24hrs), Av.7 °F (37.1 °C), Min:97.9 °F (36.6 °C), Max:99.3 °F (37.4 °C)      I/O        07 07 0701  07/06 0700    I.V. (mL/kg) 2500 (27.1)     Blood 350     IV Piggyback 300     Total Intake(mL/kg) 3150 (34.2)     Urine (mL/kg/hr) 1465 (0.7) 500 (0.3)    Emesis/NG output 50     Drains 290 Stool 75     Blood 50     Total Output 1930 500    Net +1220 -500                Invasive Devices     Peripheral Intravenous Line  Duration           Peripheral IV 07/03/23 Distal;Right;Upper;Ventral (anterior) Arm 2 days    Peripheral IV 07/04/23 Right Antecubital 1 day          Drain  Duration           Ileostomy Loop LUQ 1 day    Suprapubic Catheter 18 Fr. 1 day                Physical Exam:  General: No acute distress  Neuro: Awake, Alert and Oriented x 2; oriented to person and time, not place  HEENT:  Normocephalic, atraumatic, moist mucous membranes  CV: S1, S2 normal intensity, no rubs, gallops, murmurs, regular rate and rhythm  Lungs: Normal work of breathing, no increased respiratory effort, CTAB  Abdomen: Soft, non-tender, non-distended. Incision(s) clean, dry and intact. Extremities: No edema  Genitourinary: suprapubic cath in place with good urine return.  Vac in place with good seal  Skin: Warm, dry    Lab Results:   BMP/CMP:   Lab Results   Component Value Date    SODIUM 142 07/05/2023    K 3.7 07/05/2023     (H) 07/05/2023    CO2 26 07/05/2023    BUN 7 07/05/2023    CREATININE 0.55 (L) 07/05/2023    CALCIUM 7.9 (L) 07/05/2023    EGFR 110 07/05/2023    and CBC:   Lab Results   Component Value Date    WBC 16.08 (H) 07/05/2023    HGB 7.9 (L) 07/05/2023    HCT 24.6 (L) 07/05/2023     (H) 07/05/2023     07/05/2023    RBC 2.39 (L) 07/05/2023    MCH 33.1 07/05/2023    MCHC 32.1 07/05/2023    RDW 16.0 (H) 07/05/2023    MPV 9.7 07/05/2023     VTE Pharmacologic Prophylaxis: Heparin  VTE Mechanical Prophylaxis: sequential compression device    Evelia Hunt MD  7/5/2023  10:59 PM

## 2023-07-06 NOTE — PLAN OF CARE
Problem: MOBILITY - ADULT  Goal: Maintain or return to baseline ADL function  Description: INTERVENTIONS:  -  Assess patient's ability to carry out ADLs; assess patient's baseline for ADL function and identify physical deficits which impact ability to perform ADLs (bathing, care of mouth/teeth, toileting, grooming, dressing, etc.)  - Assess/evaluate cause of self-care deficits   - Assess range of motion  - Assess patient's mobility; develop plan if impaired  - Assess patient's need for assistive devices and provide as appropriate  - Encourage maximum independence but intervene and supervise when necessary  - Involve family in performance of ADLs  - Assess for home care needs following discharge   - Consider OT consult to assist with ADL evaluation and planning for discharge  - Provide patient education as appropriate  Outcome: Progressing  Goal: Maintains/Returns to pre admission functional level  Description: INTERVENTIONS:  - Perform BMAT or MOVE assessment daily.   - Set and communicate daily mobility goal to care team and patient/family/caregiver. - Collaborate with rehabilitation services on mobility goals if consulted  - Perform Range of Motion  times a day. - Reposition patient every  hours.   - Dangle alena times a day  - Stand patient times a day  - Ambulate patient  times a day  - Out of bed to chair  times a day   - Out of bed for meals  times a day  - Out of bed for toileting  - Record patient progress and toleration of activity level   Outcome: Progressing     Problem: SKIN/TISSUE INTEGRITY - ADULT  Goal: Skin Integrity remains intact(Skin Breakdown Prevention)  Description: Assess:  -Perform Vinny assessment every   -Clean and moisturize skin every   -Inspect skin when repositioning, toileting, and assisting with ADLS  -Assess under medical devices such as  every   -Assess extremities for adequate circulation and sensation     Bed Management:  -Have minimal linens on bed & keep smooth, unwrinkled  -Change linens as needed when moist or perspiring  -Avoid sitting or lying in one position for more than  hours while in bed  -Keep HOB at degrees     Toileting:  -Offer bedside commode  -Assess for incontinence every   -Use incontinent care products after each incontinent episode such as     Activity:  -Mobilize patient  times a day  -Encourage activity and walks on unit  -Encourage or provide ROM exercises   -Turn and reposition patient every  Hours  -Use appropriate equipment to lift or move patient in bed  -Instruct/ Assist with weight shifting every  when out of bed in chair  -Consider limitation of chair time  hour intervals    Skin Care:  -Avoid use of baby powder, tape, friction and shearing, hot water or constrictive clothing  -Relieve pressure over bony prominences using   -Do not massage red bony areas    Next Steps:  -Teach patient strategies to minimize risks such as    -Consider consults to  interdisciplinary teams such as   Outcome: Progressing  Goal: Incision(s), wounds(s) or drain site(s) healing without S/S of infection  Description: INTERVENTIONS  - Assess and document dressing, incision, wound bed, drain sites and surrounding tissue  - Provide patient and family education  - Perform skin care/dressing changes every   Outcome: Progressing  Goal: Pressure injury heals and does not worsen  Description: Interventions:  - Implement low air loss mattress or specialty surface (Criteria met)  - Apply silicone foam dressing  - Instruct/assist with weight shifting every  minutes when in chair   - Limit chair time to  hour intervals  - Use special pressure reducing interventions such as  when in chair   - Apply fecal or urinary incontinence containment device   - Perform passive or active ROM every   - Turn and reposition patient & offload bony prominences every  hours   - Utilize friction reducing device or surface for transfers   - Consider consults to  interdisciplinary teams such as   - Use incontinent care products after each incontinent episode such as   - Consider nutrition services referral as needed  Outcome: Progressing     Problem: Prexisting or High Potential for Compromised Skin Integrity  Goal: Skin integrity is maintained or improved  Description: INTERVENTIONS:  - Identify patients at risk for skin breakdown  - Assess and monitor skin integrity  - Assess and monitor nutrition and hydration status  - Monitor labs   - Assess for incontinence   - Turn and reposition patient  - Assist with mobility/ambulation  - Relieve pressure over bony prominences  - Avoid friction and shearing  - Provide appropriate hygiene as needed including keeping skin clean and dry  - Evaluate need for skin moisturizer/barrier cream  - Collaborate with interdisciplinary team   - Patient/family teaching  - Consider wound care consult   Outcome: Progressing     Problem: Nutrition/Hydration-ADULT  Goal: Nutrient/Hydration intake appropriate for improving, restoring or maintaining nutritional needs  Description: Monitor and assess patient's nutrition/hydration status for malnutrition. Collaborate with interdisciplinary team and initiate plan and interventions as ordered. Monitor patient's weight and dietary intake as ordered or per policy. Utilize nutrition screening tool and intervene as necessary. Determine patient's food preferences and provide high-protein, high-caloric foods as appropriate.      INTERVENTIONS:  - Monitor oral intake, urinary output, labs, and treatment plans  - Assess nutrition and hydration status and recommend course of action  - Evaluate amount of meals eaten  - Assist patient with eating if necessary   - Allow adequate time for meals  - Recommend/ encourage appropriate diets, oral nutritional supplements, and vitamin/mineral supplements  - Order, calculate, and assess calorie counts as needed  - Recommend, monitor, and adjust tube feedings and TPN/PPN based on assessed needs  - Assess need for intravenous fluids  - Provide specific nutrition/hydration education as appropriate  - Include patient/family/caregiver in decisions related to nutrition  Outcome: Progressing     Problem: PAIN - ADULT  Goal: Verbalizes/displays adequate comfort level or baseline comfort level  Description: Interventions:  - Encourage patient to monitor pain and request assistance  - Assess pain using appropriate pain scale  - Administer analgesics based on type and severity of pain and evaluate response  - Implement non-pharmacological measures as appropriate and evaluate response  - Consider cultural and social influences on pain and pain management  - Notify physician/advanced practitioner if interventions unsuccessful or patient reports new pain  Outcome: Progressing     Problem: INFECTION - ADULT  Goal: Absence or prevention of progression during hospitalization  Description: INTERVENTIONS:  - Assess and monitor for signs and symptoms of infection  - Monitor lab/diagnostic results  - Monitor all insertion sites, i.e. indwelling lines, tubes, and drains  - Monitor endotracheal if appropriate and nasal secretions for changes in amount and color  - [place] appropriate cooling/warming therapies per order  - Administer medications as ordered  - Instruct and encourage patient and family to use good hand hygiene technique  - Identify and instruct in appropriate isolation precautions for identified infection/condition  Outcome: Progressing  Goal: Absence of fever/infection during neutropenic period  Description: INTERVENTIONS:  - Monitor WBC    Outcome: Progressing     Problem: SAFETY ADULT  Goal: Maintain or return to baseline ADL function  Description: INTERVENTIONS:  -  Assess patient's ability to carry out ADLs; assess patient's baseline for ADL function and identify physical deficits which impact ability to perform ADLs (bathing, care of mouth/teeth, toileting, grooming, dressing, etc.)  - Assess/evaluate cause of self-care deficits   - Assess range of motion  - Assess patient's mobility; develop plan if impaired  - Assess patient's need for assistive devices and provide as appropriate  - Encourage maximum independence but intervene and supervise when necessary  - Involve family in performance of ADLs  - Assess for home care needs following discharge   - Consider OT consult to assist with ADL evaluation and planning for discharge  - Provide patient education as appropriate  Outcome: Progressing  Goal: Maintains/Returns to pre admission functional level  Description: INTERVENTIONS:  - Perform BMAT or MOVE assessment daily.   - Set and communicate daily mobility goal to care team and patient/family/caregiver. - Collaborate with rehabilitation services on mobility goals if consulted  - Perform Range of Motion  times a day. - Reposition patient every  hours.   - Dangle patient  times a day  - Stand patient  times a day  - Ambulate patient  times a day  - Out of bed to chair  times a day   - Out of bed for meals  times a day  - Out of bed for toileting  - Record patient progress and toleration of activity level   Outcome: Progressing  Goal: Patient will remain free of falls  Description: INTERVENTIONS:  - Educate patient/family on patient safety including physical limitations  - Instruct patient to call for assistance with activity   - Consult OT/PT to assist with strengthening/mobility   - Keep Call bell within reach  - Keep bed low and locked with side rails adjusted as appropriate  - Keep care items and personal belongings within reach  - Initiate and maintain comfort rounds  - Make Fall Risk Sign visible to staff  - Offer Toileting every  Hours, in advance of need  - Initiate/Maintain alarm  - Obtain necessary fall risk management equipment:   - Apply yellow socks and bracelet for high fall risk patients  - Consider moving patient to room near nurses station  Outcome: Progressing     Problem: DISCHARGE PLANNING  Goal: Discharge to home or other facility with appropriate resources  Description: INTERVENTIONS:  - Identify barriers to discharge w/patient and caregiver  - Arrange for needed discharge resources and transportation as appropriate  - Identify discharge learning needs (meds, wound care, etc.)  - Arrange for interpretive services to assist at discharge as needed  - Refer to Case Management Department for coordinating discharge planning if the patient needs post-hospital services based on physician/advanced practitioner order or complex needs related to functional status, cognitive ability, or social support system  Outcome: Progressing     Problem: Knowledge Deficit  Goal: Patient/family/caregiver demonstrates understanding of disease process, treatment plan, medications, and discharge instructions  Description: Complete learning assessment and assess knowledge base.   Interventions:  - Provide teaching at level of understanding  - Provide teaching via preferred learning methods  Outcome: Progressing

## 2023-07-06 NOTE — ANESTHESIA POSTPROCEDURE EVALUATION
Post-Op Assessment Note    CV Status:  Stable  Pain Score: 0    Pain management: adequate     Mental Status:  Alert and awake   Hydration Status:  Euvolemic and stable   PONV Controlled:  Controlled   Airway Patency:  Patent      Post Op Vitals Reviewed: Yes      Staff: CRNA, Anesthesiologist   Comments: Report  given to INA Redd. Pt states he is comfortable        There were no known notable events for this encounter.     /88 (07/06/23 0951)    Temp (!) 97.4 °F (36.3 °C) (07/06/23 0951)    Pulse 71 (07/06/23 0951)   Resp 16 (07/06/23 0951)    SpO2 98 % (07/06/23 0951)

## 2023-07-06 NOTE — PHYSICAL THERAPY NOTE
Physical Therapy Cancellation Note       07/06/23 0800   PT Last Visit   PT Visit Date 07/06/23   Note Type   Note Type Cancelled Session   Cancel Reasons Patient to operating room  (for vac change)     Zacarias Aponte, PT, DPT

## 2023-07-06 NOTE — PROGRESS NOTES
Progress Note - Infectious Disease   Brooke Postal 57 y.o. male MRN: [id number]  Unit/Bed#: [facility] 921-01 Encounter: [id number]      Impression/Recommendations:  1. Severe sepsis, present on admission, secondary to Rohan's gangrene. Patient is clinically improved. He still has leukocytosis but no further fever, tach, tachypnea or lactic acidosis. He had mild hypotension on admission, which resolved and not requiring any pressors. With hemodynamic instability, no further need for antitoxin. Antibiotic plan as in below. Monitor temperature/WBC. Monitor hemodynamic.     2. Rohan's gangrene, status post multiple I&D. At last I&D yesterday, there was just fibrinous exudate and only mild purulence. Operative cultures with Actinomyces and Prevotella. Growth of coagulase-negative Staphylococcus is likely colonization/contamination. Patient has VAC in place. He has minimal pain. Patient is status post repeat I&D. Today, still with some purulence and necrotic tissue. We will continue IV antibiotic until no further purulence and necrotic tissue are seen at I&D. Continue IV Unasyn. Further I&D and wound VAC dressing change in OR per surgery. When wound is clean, antibiotic can be transitioned to p.o. Augmentin.     3.  DM, poorly controlled, with hyperglycemia and elevated hemoglobin A1c. This contributes to infection above. Management per primary service.     Discussed with patient and family in detail regarding the above plan.     Antibiotics:  Unasyn  Antibiotic # 9     Subjective:  Patient is status post repeat I&D earlier today. He only has mild perineal pain, controlled. Temperature stays down. No chills. He is tolerating antibiotics well.   No nausea, vomiting or diarrhea.     Objective:  Vitals:  Temp:  [97.2 °F (36.2 °C)-99.3 °F (37.4 °C)] 97.9 °F (36.6 °C)  HR:  [71-87] 85  Resp:  [16-25] 17  BP: (119-143)/(63-88) 120/70  SpO2:  [90 %-98 %] 91 %  Temp (24hrs), Av.2 °F (36.8 °C), Min:97.2 °F (36.2 °C), Max:99.3 °F (37.4 °C)  Current: Temperature: 97.9 °F (36.6 °C)    Physical Exam:     General: Awake, alert, cooperative, no distress. Neck:  Supple. No mass. No lymphadenopathy. Lungs: Expansion symmetric, no rales, no wheezing, respirations unlabored. Heart:  Regular rate and rhythm, S1 and S2 normal, no murmur. Abdomen: Soft, nondistended, non-tender, bowel sounds active all four quadrants, no masses, no organomegaly. Perineum: Wound with VAC in place. No gross purulence. No erythema/warmth. Mild tenderness. Extremities: No edema. No erythema/warmth. No ulcer. Nontender to palpation. Skin:  No rash. Neuro: Moves all extremities. Invasive Devices     Peripheral Intravenous Line  Duration           Peripheral IV 07/04/23 Right Antecubital 2 days    Peripheral IV 07/06/23 Left Antecubital <1 day    Peripheral IV 07/06/23 Right Hand <1 day          Drain  Duration           Ileostomy Loop LUQ 2 days    Suprapubic Catheter 18 Fr. 2 days                Labs studies:   I have personally reviewed pertinent labs. Results from last 7 days   Lab Units 07/06/23  0406 07/05/23  0603 07/04/23  0533 07/02/23  0428 07/01/23  9892   POTASSIUM mmol/L 4.5 3.7 3.9   < > 3.5   CHLORIDE mmol/L 112* 113* 112*   < > 109*   CO2 mmol/L 28 26 26   < > 26   BUN mg/dL 5 7 6   < > 9   CREATININE mg/dL 0.57* 0.55* 0.50*   < > 0.54*   EGFR ml/min/1.73sq m 108 110 114   < > 110   CALCIUM mg/dL 7.8* 7.9* 7.8*   < > 7.9*   AST U/L  --   --   --   --  19   ALT U/L  --   --   --   --  19   ALK PHOS U/L  --   --   --   --  70    < > = values in this interval not displayed.      Results from last 7 days   Lab Units 07/06/23  0439 07/05/23  0603 07/04/23  1425 07/04/23  0533   WBC Thousand/uL 14.27* 16.08*  --  13.47*   HEMOGLOBIN g/dL 7.4* 7.9* 9.0* 7.3*   PLATELETS Thousands/uL 375 328  --  329           Imaging Studies:   I have personally reviewed pertinent imaging study reports and images in PACS.    EKG, Pathology, and Other Studies:   I have personally reviewed pertinent reports.

## 2023-07-06 NOTE — OP NOTE
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 [address] OUT. AND WOUND VAC CHANGE    Specimen(s):  * No specimens in log *    Estimated Blood Loss:   Minimal    Drains:  Ileostomy Loop LUQ (Active)   Stomal Appliance Clean;Dry; Intact 23 1826   Stoma Assessment [place] 23 1826   Stoma Shape Round 23 182   Peristomal Assessment FABBY 23 182   Output (mL) 75 mL 23 0601   Number of days: 2       Suprapubic Catheter 18 Fr. 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 Fibrinous exudate at base of wound diffusely. Method:  _X__ Excisional  _X__ Non-excisional debridement  Instrument:  ___  Blade  __X_ Scalpel  ___ Scissors  ___Lavage  ___Other, please specify: Curette   Depth:  _X__ Subcutaneous  ___ Fascia  ___ Muscle  ___ Bone    Size of the wound:   27 cm x 14 cm x 6 cm pre-debridement. 27.5 cm x 14 cm x 6 cm post-debridement. Wound description: Bilateral ischial tunnels with persistent purulent fluid. Fibrinous exudate at base of wound diffusely. Indicate if the procedure extended to the wound margins?  _x__ Yes  ___ No  Indicate if the procedure extended to bleeding tissue? _X__ Yes  ___ No      Complications:   None    Procedure and Technique:  The patient was brought to the operating room and identified verbally and via wristband. He was transferred to the operating room table and positioned supine. General endotracheal anesthesia was induced successfully. He was the positioned in the lithotomy position. The patient's perineal wound vac was removed, this included 3 black sponges, 4 white sponges and 3 adaptic. The entire wound and wound dressing was extensively explored for an additional adaptic sheet and one was not found. The perineum was prepped and draped in the usual sterile fashion. Pre-operative antibiotics were administered. A time out was performed confirming the patient, procedure, and site. All parties were in agreement. Attention was turned to the perineum where the wound was explored. There was persistent purulence along the ischial tunnels bilaterally. There was fibrinous exudate at the wound base diffusely. Necrotic tissue along the superior portion of the wound was sharply debrided with a 10 blade scalpel down to healthy bleeding tissue. The wound was bluntly debrided with a curette. The pulsavac was used to irrigate the wound with 3 L normal saline. Wound dimensions 27 cm x 14 cm x 6 cm pre-debridement and 27.5 cm x 14 cm x 6 cm post-debridement. A wound vac was then applied to the perineum and placed to 125 mmHg continuous     The patient was then allowed to awaken, extubated, and transferred to the PACU having tolerated the procedure well. All instrument, needle, and sponge counts were correct at the end of the case. Radiofrequency detection was negative.     Dr. Rizvi was present for the entire procedure      Patient Disposition:  PACU         SIGNATURE: Hussein Liz DO  DATE: July 6, 2023  TIME: 9:59 AM

## 2023-07-07 LAB
ABO GROUP BLD BPU: NORMAL
ABO GROUP BLD BPU: NORMAL
ANION GAP SERPL CALCULATED.3IONS-SCNC: 4 MMOL/L
ANISOCYTOSIS BLD QL SMEAR: PRESENT
BASOPHILS # BLD MANUAL: 0 THOUSAND/UL (ref 0–0.1)
BASOPHILS NFR MAR MANUAL: 0 % (ref 0–1)
BPU ID: NORMAL
BPU ID: NORMAL
BUN SERPL-MCNC: 6 MG/DL (ref 5–25)
CALCIUM SERPL-MCNC: 8 MG/DL (ref 8.3–10.1)
CHLORIDE SERPL-SCNC: 113 MMOL/L (ref 96–108)
CO2 SERPL-SCNC: 29 MMOL/L (ref 21–32)
CREAT SERPL-MCNC: 0.58 MG/DL (ref 0.6–1.3)
CROSSMATCH: NORMAL
CROSSMATCH: NORMAL
EOSINOPHIL # BLD MANUAL: 0 THOUSAND/UL (ref 0–0.4)
EOSINOPHIL NFR BLD MANUAL: 0 % (ref 0–6)
ERYTHROCYTE [DISTWIDTH] IN BLOOD BY AUTOMATED COUNT: 15.9 % (ref 11.6–15.1)
GFR SERPL CREATININE-BSD FRML MDRD: 107 ML/MIN/1.73SQ M
GLUCOSE SERPL-MCNC: 104 MG/DL (ref 65–140)
GLUCOSE SERPL-MCNC: 126 MG/DL (ref 65–140)
GLUCOSE SERPL-MCNC: 158 MG/DL (ref 65–140)
GLUCOSE SERPL-MCNC: 166 MG/DL (ref 65–140)
GLUCOSE SERPL-MCNC: 268 MG/DL (ref 65–140)
HCT VFR BLD AUTO: 25.1 % (ref 36.5–49.3)
HGB BLD-MCNC: 7.9 G/DL (ref 12–17)
LYMPHOCYTES # BLD AUTO: 0.88 THOUSAND/UL (ref 0.6–4.47)
LYMPHOCYTES # BLD AUTO: 7 % (ref 14–44)
MCH RBC QN AUTO: 32.8 PG (ref 26.8–34.3)
MCHC RBC AUTO-ENTMCNC: 31.5 G/DL (ref 31.4–37.4)
MCV RBC AUTO: 104 FL (ref 82–98)
MONOCYTES # BLD AUTO: 0.63 THOUSAND/UL (ref 0–1.22)
MONOCYTES NFR BLD: 5 % (ref 4–12)
NEUTROPHILS # BLD MANUAL: 10.9 THOUSAND/UL (ref 1.85–7.62)
NEUTS BAND NFR BLD MANUAL: 2 % (ref 0–8)
NEUTS SEG NFR BLD AUTO: 85 % (ref 43–75)
PLATELET # BLD AUTO: 385 THOUSANDS/UL (ref 149–390)
PLATELET BLD QL SMEAR: ADEQUATE
PMV BLD AUTO: 9.2 FL (ref 8.9–12.7)
POLYCHROMASIA BLD QL SMEAR: PRESENT
POTASSIUM SERPL-SCNC: 3.7 MMOL/L (ref 3.5–5.3)
RBC # BLD AUTO: 2.41 MILLION/UL (ref 3.88–5.62)
RBC MORPH BLD: PRESENT
SODIUM SERPL-SCNC: 146 MMOL/L (ref 135–147)
UNIT DISPENSE STATUS: NORMAL
UNIT DISPENSE STATUS: NORMAL
UNIT PRODUCT CODE: NORMAL
UNIT PRODUCT CODE: NORMAL
UNIT PRODUCT VOLUME: 350 ML
UNIT PRODUCT VOLUME: 350 ML
UNIT RH: NORMAL
UNIT RH: NORMAL
VARIANT LYMPHS # BLD AUTO: 1 %
WBC # BLD AUTO: 12.53 THOUSAND/UL (ref 4.31–10.16)

## 2023-07-07 PROCEDURE — 99232 SBSQ HOSP IP/OBS MODERATE 35: CPT | Performed by: INTERNAL MEDICINE

## 2023-07-07 PROCEDURE — 82948 REAGENT STRIP/BLOOD GLUCOSE: CPT

## 2023-07-07 PROCEDURE — 97530 THERAPEUTIC ACTIVITIES: CPT

## 2023-07-07 PROCEDURE — 99024 POSTOP FOLLOW-UP VISIT: CPT | Performed by: SURGERY

## 2023-07-07 PROCEDURE — 80048 BASIC METABOLIC PNL TOTAL CA: CPT

## 2023-07-07 PROCEDURE — 99024 POSTOP FOLLOW-UP VISIT: CPT | Performed by: UROLOGY

## 2023-07-07 PROCEDURE — 85027 COMPLETE CBC AUTOMATED: CPT

## 2023-07-07 PROCEDURE — 85007 BL SMEAR W/DIFF WBC COUNT: CPT

## 2023-07-07 PROCEDURE — 97535 SELF CARE MNGMENT TRAINING: CPT

## 2023-07-07 RX ADMIN — INSULIN GLARGINE 8 UNITS: 100 INJECTION, SOLUTION SUBCUTANEOUS at 22:30

## 2023-07-07 RX ADMIN — ACETAMINOPHEN 975 MG: 325 TABLET, FILM COATED ORAL at 05:58

## 2023-07-07 RX ADMIN — MELATONIN TAB 3 MG 3 MG: 3 TAB at 22:30

## 2023-07-07 RX ADMIN — SODIUM CHLORIDE 3 G: 9 INJECTION, SOLUTION INTRAVENOUS at 20:38

## 2023-07-07 RX ADMIN — SODIUM CHLORIDE 3 G: 9 INJECTION, SOLUTION INTRAVENOUS at 14:44

## 2023-07-07 RX ADMIN — INSULIN LISPRO 4 UNITS: 100 INJECTION, SOLUTION INTRAVENOUS; SUBCUTANEOUS at 09:24

## 2023-07-07 RX ADMIN — QUETIAPINE 25 MG: 25 TABLET ORAL at 22:30

## 2023-07-07 RX ADMIN — ACETAMINOPHEN 975 MG: 325 TABLET, FILM COATED ORAL at 22:30

## 2023-07-07 RX ADMIN — ENOXAPARIN SODIUM 40 MG: 40 INJECTION SUBCUTANEOUS at 09:22

## 2023-07-07 RX ADMIN — SODIUM CHLORIDE 3 G: 9 INJECTION, SOLUTION INTRAVENOUS at 09:22

## 2023-07-07 RX ADMIN — SODIUM CHLORIDE 3 G: 9 INJECTION, SOLUTION INTRAVENOUS at 04:22

## 2023-07-07 RX ADMIN — SENNOSIDES AND DOCUSATE SODIUM 1 TABLET: 50; 8.6 TABLET ORAL at 09:22

## 2023-07-07 RX ADMIN — ACETAMINOPHEN 975 MG: 325 TABLET, FILM COATED ORAL at 14:43

## 2023-07-07 RX ADMIN — INSULIN LISPRO 8 UNITS: 100 INJECTION, SOLUTION INTRAVENOUS; SUBCUTANEOUS at 12:16

## 2023-07-07 RX ADMIN — OXYCODONE HYDROCHLORIDE 10 MG: 10 TABLET ORAL at 12:16

## 2023-07-07 RX ADMIN — SODIUM CHLORIDE, SODIUM LACTATE, POTASSIUM CHLORIDE, AND CALCIUM CHLORIDE 100 ML/HR: .6; .31; .03; .02 INJECTION, SOLUTION INTRAVENOUS at 17:29

## 2023-07-07 NOTE — UTILIZATION REVIEW
Continued Stay Review    Date: 7/7/23                      Current Patient Class: Inpatient  Current Level of Care: Med Surg    HPI:62 y.o. male initially admitted on 6/29/23    S/p:  6/28 debridement - andria/cullen (mamadou)  6/29 Perineal debridement/washout - Mason   6/30 Perineal debridement/ EUA - Basil/Uro  7/1 Debridement, suprapubic tube - Basil/Uro  7/2 Perineal and scrotal debridement - Lucero  7/4 laparoscopic loop diverting colostomy creation, perineal debridement, cystoscopy with suprapubic tube exchange, EUA, testicular fixation, vac placement - Lucero/Saturnino  7/6 Perineum washout/debridement    7/6 Operative Report:    Procedure(s): DEBRIDEMENT WOUND. [address] OUT. AND WOUND VAC CHANGE  Anesthesia Type: General  Operative Findings: Bilateral ischial tunnels with persistent purulent fluid. Fibrinous exudate at base of wound diffusely.        7/7 Surgery: Patient's pain is well controlled. Alert and oriented today. WBC downtrending. UOP 1050. Ostomy 50cc SS output per bag.  Plan:  Continue CCD2 diet, maintain wound VAC.  As per ID recs,continue IV Unasyn, transition to PO augmentin when possible. Monitor fever curve and WBC,  maintain suprapubic catheter, monitor UOP. OOB to chair. Infectious Disease: Continue IV Unasyn, treat x 7 days after complete source control. PE: Wound VAC in place. Copious serous fluid. No purulence, no erythema/warmth. Mild tenderness. Physical Therapy discharge recommendation is post acute rehab.      Vital Signs:     Date/Time Temp Pulse Resp BP MAP (mmHg) SpO2 Calculated FIO2 (%) - Nasal Cannula O2 Flow Rate (L/min) Nasal Cannula O2 Flow Rate (L/min) O2 Device   07/07/23 07:04:29 98 °F (36.7 °C) 71 17 121/65 84 92 % -- -- -- --   07/06/23 21:58:07 98.3 °F (36.8 °C) 88 14 133/65 88 92 % -- -- -- --   07/06/23 19:36:13 99.6 °F (37.6 °C) 85 15 131/67 88 96 % -- -- -- --   07/06/23 1838 -- -- -- -- -- -- -- -- -- None (Room air)   07/06/23 15:06:15 98.2 °F (36.8 °C) 88 14 128/67 87 95 % -- -- -- --   07/06/23 13:46:23 98.4 °F (36.9 °C) 94 -- 123/68 86 88 % Abnormal  -- -- -- --   07/06/23 13:07:20 98 °F (36.7 °C) 86 17 121/69 86 93 % -- -- -- --   07/06/23 11:53:45 97.9 °F (36.6 °C) 85 17 120/70 87 91 % -- -- -- None (Room air)   07/06/23 10:41:09 97.3 °F (36.3 °C) Abnormal  74 17 143/82 102 95 % -- -- -- --   07/06/23 1015 97.2 °F (36.2 °C) Abnormal  86 19 135/72 111 97 % 28 -- 2 L/min Nasal cannula   07/06/23 1000 -- 82 19 119/68 93 96 % 28 -- 2 L/min Nasal cannula   07/06/23 0951 97.4 °F (36.3 °C) Abnormal  71 16 136/88 112 98 % -- 6 L/min -- Simple mask   07/06/23 07:09:42 98.6 °F (37 °C) 73 17 123/65 84 95 % -- -- -- --   07/05/23 23:13:20 98.3 °F (36.8 °C) 84 -- 131/64 86 91 % -- -- -- --   07/05/23 22:39:08 98.8 °F (37.1 °C) 87 -- 131/63 86 90 % -- -- -- --   07/05/23 2027 -- -- -- -- -- -- -- -- -- None (Room air)   07/05/23 17:48:50 99.3 °F (37.4 °C) 77 -- 138/78 98 94 % -- -- -- --   07/05/23 1620 98.8 °F (37.1 °C) 82 25  134/70 90 -- -- -- -- --   07/05/23 1150 99.1 °F (37.3 °C) 70 20 124/67 80 95 % -- -- -- --   07/05/23 0815 97.9 °F (36.6 °C) 68 14 144/75 104 94 % -- -- -- --   07/05/23 0247 98.2 °F (36.8 °C) 72 22 109/55 79 96 % -- -- -- --       Pertinent Labs/Diagnostic Results:         Results from last 7 days   Lab Units 07/07/23  0753 07/06/23  0439 07/05/23  0603 07/04/23  1425 07/04/23  0533   WBC Thousand/uL 12.53* 14.27* 16.08*  --  13.47*   HEMOGLOBIN g/dL 7.9* 7.4* 7.9* 9.0* 7.3*   HEMATOCRIT % 25.1* 22.3* 24.6* 27.7* 22.9*   PLATELETS Thousands/uL 385 375 328  --  329   BANDS PCT % 2  --   --   --  3         Results from last 7 days   Lab Units 07/07/23  0753 07/06/23  0406 07/05/23  0603 07/04/23  0533 07/03/23  1115 07/03/23  0455 07/02/23  0428 07/01/23  0608   SODIUM mmol/L 146 142 142 142  --  139 138 140   POTASSIUM mmol/L 3.7 4.5 3.7 3.9  --  3.6 4.1 3.5   CHLORIDE mmol/L 113* 112* 113* 112*  --  110* 108 109*   CO2 mmol/L 29 28 26 26  --  29 24 26 ANION GAP mmol/L 4 2 3 4  --  0 6 5   BUN mg/dL 6 5 7 6  --  10 14 9   CREATININE mg/dL 0.58* 0.57* 0.55* 0.50*  --  0.52* 0.58* 0.54*   EGFR ml/min/1.73sq m 107 108 110 114  --  112 107 110   CALCIUM mg/dL 8.0* 7.8* 7.9* 7.8*  --  7.5* 7.9* 7.9*   CALCIUM, IONIZED mmol/L  --   --   --  1.09*  --   --  1.06* 1.03*   MAGNESIUM mg/dL  --   --  2.1 2.0  --   --   --  2.1   PHOSPHORUS mg/dL  --  2.8 2.0* 2.9 2.3  --  3.1 1.3*     Results from last 7 days   Lab Units 07/01/23  0608   AST U/L 19   ALT U/L 19   ALK PHOS U/L 70   TOTAL PROTEIN g/dL 5.3*   ALBUMIN g/dL 1.3*   TOTAL BILIRUBIN mg/dL 0.48     Results from last 7 days   Lab Units 07/07/23  1059 07/07/23  0705 07/06/23  2200 07/06/23  1605 07/06/23  1106 07/06/23  0958 07/06/23  0709 07/05/23  2322 07/05/23  2120 07/05/23  1808 07/05/23  1738 07/05/23  1617   POC GLUCOSE mg/dl 268* 158* 252* 184* 138 109 114 151* 226* 148* 449* 155*     Results from last 7 days   Lab Units 07/07/23  0753 07/06/23  0406 07/05/23  0603 07/04/23  0533 07/03/23  0455 07/02/23  0428 07/01/23  0608 06/30/23  1654   GLUCOSE RANDOM mg/dL 166* 85 159* 131 151* 232* 108 168*             BETA-HYDROXYBUTYRATE   Date Value Ref Range Status   06/28/2023 1.1 (H) <0.6 mmol/L Final           Results from last 7 days   Lab Units 06/30/23  1654   LACTIC ACID mmol/L 1.2                Blood Transfusion Record    Product Unit Status Volume          Transfuse Leukoreduced RBC      23  [id number]  [id number] Completed 07/04/23 1409 350 mL         Contains abnormal data Anaerobic culture and Gram stain     Status: Final result          Dx: Rohan's gangrene     Specimen Information: Abscess;  Tissue      Anaerobic Culture 4+ Growth of - presumptive Prevotella bergensis Prevotella species Abnormal        4+ Growth of    Mixed Aerobic and Anaerobic Ute              Specimen Collected: 06/28/23 20:53 Last Resulted: 07/02/23 15:49                     Medications:   Scheduled Medications:  acetaminophen, 975 mg, Oral, Q8H [address]  ampicillin-sulbactam, 3 g, Intravenous, Q6H  enoxaparin, 40 mg, Subcutaneous, Q24H GEN  insulin glargine, 8 Units, Subcutaneous, HS  insulin lispro, 2-12 Units, Subcutaneous, HS  insulin lispro, 4-20 Units, Subcutaneous, TID AC  melatonin, 3 mg, Oral, HS  QUEtiapine, 25 mg, Oral, HS  senna-docusate sodium, 1 tablet, Oral, BID    ampicillin-sulbactam (UNASYN) 3 g in sodium chloride 0.9 % 100 mL IVPB  Dose: 3 g  Freq: Every 6 hours Route: IV  Last Dose: 3 g (07/07/23 1444)  Start: 07/03/23 1500        Continuous IV Infusions:      lactated ringers, 100 mL/hr, Intravenous, Continuous      PRN Meds:  HYDROmorphone, 0.5 mg, Intravenous, Q3H PRN x 1 dose 7/6  ondansetron, 4 mg, Intravenous, Q6H PRN  oxyCODONE, 10 mg, Oral, Q4H PRN x 1 dose 7/6, x 1 dose 7/7 thus far  oxyCODONE, 5 mg, Oral, Q4H PRN  polyethylene glycol, 17 g, Oral, Daily PRN        Discharge Plan:   [facility]        Network Utilization Review Department  ATTENTION: Please call with any questions or concerns to [phone] and carefully listen to the prompts so that you are directed to the right person. All voicemails are confidential.  Juli Andrews all requests for admission clinical reviews, approved or denied determinations and any other requests to dedicated fax number below belonging to the campus where the patient is receiving treatment.  List of dedicated fax numbers for the Facilities:  [place] DENIALS (Administrative/Medical Necessity) [phone] [address] (Maternity/NICU/Pediatrics) [phone]   [address] [place] [address] [address] [facility] [phone] [address] [address]  [phone]

## 2023-07-07 NOTE — CASE MANAGEMENT
Case Management Discharge Planning Note    Patient name Hao Baxter  Location [facility] 921/[facility] 696-02 MRN [id number]  : 1960 Date 2023       Current Admission Date: 2023  Current Admission Diagnosis:Rohan's gangrene   Patient Active Problem List    Diagnosis Date Noted   • Suprapubic catheter ([address]) 2023   • Urethral disorder 2023   • Hyperglycemia 2023   • DM (diabetes mellitus) ([address]) 2023   • Lactic acidosis 2023   • HTN (hypertension) 2023   • Rohan's gangrene 2023      LOS (days): 8  Geometric Mean LOS (GMLOS) (days):   Days to GMLOS:     OBJECTIVE:  Risk of Unplanned Readmission Score: 16.48         Current admission status: Inpatient   Preferred Pharmacy:   PATIENT/FAMILY REPORTS NO PREFERRED PHARMACY  No address on file      [facility]/pharmacy [facility]  [address] [address]  Phone: [phone] Fax: [phone]    Primary Care Provider: No primary care provider on file. Primary Insurance: BLUE CROSS  Secondary Insurance:     DISCHARGE DETAILS:         Additional Comments: Per provider, pt scheduled for OR tomorrow. CM to continue to follow.

## 2023-07-07 NOTE — PLAN OF CARE
Problem: MOBILITY - ADULT  Goal: Maintain or return to baseline ADL function  Description: INTERVENTIONS:  -  Assess patient's ability to carry out ADLs; assess patient's baseline for ADL function and identify physical deficits which impact ability to perform ADLs (bathing, care of mouth/teeth, toileting, grooming, dressing, etc.)  - Assess/evaluate cause of self-care deficits   - Assess range of motion  - Assess patient's mobility; develop plan if impaired  - Assess patient's need for assistive devices and provide as appropriate  - Encourage maximum independence but intervene and supervise when necessary  - Involve family in performance of ADLs  - Assess for home care needs following discharge   - Consider OT consult to assist with ADL evaluation and planning for discharge  - Provide patient education as appropriate  Outcome: Progressing  Goal: Maintains/Returns to pre admission functional level  Description: INTERVENTIONS:  - Perform BMAT or MOVE assessment daily.   - Set and communicate daily mobility goal to care team and patient/family/caregiver. - Collaborate with rehabilitation services on mobility goals if consulted  - Perform Range of Motion  times a day. - Reposition patient every  hours.   - Dangle patient  times a day  - Stand patient  times a day  - Ambulate patient  times a day  - Out of bed to chair  times a day   - Out of bed for meals  times a day  - Out of bed for toileting  - Record patient progress and toleration of activity level   Outcome: Progressing     Problem: SKIN/TISSUE INTEGRITY - ADULT  Goal: Skin Integrity remains intact(Skin Breakdown Prevention)  Description: Assess:  -Perform Vinny assessment every   -Clean and moisturize skin every   -Inspect skin when repositioning, toileting, and assisting with ADLS  -Assess under medical devices such as  every   -Assess extremities for adequate circulation and sensation     Bed Management:  -Have minimal linens on bed & keep smooth, unwrinkled  -Change linens as needed when moist or perspiring  -Avoid sitting or lying in one position for more than  hours while in bed  -Keep HOB at degrees     Toileting:  -Offer bedside commode  -Assess for incontinence every   -Use incontinent care products after each incontinent episode such as     Activity:  -Mobilize patient  times a day  -Encourage activity and walks on unit  -Encourage or provide ROM exercises   -Turn and reposition patient every  Hours  -Use appropriate equipment to lift or move patient in bed  -Instruct/ Assist with weight shifting every  when out of bed in chair  -Consider limitation of chair time  hour intervals    Skin Care:  -Avoid use of baby powder, tape, friction and shearing, hot water or constrictive clothing  -Relieve pressure over bony prominences using   -Do not massage red bony areas    Next Steps:  -Teach patient strategies to minimize risks such as    -Consider consults to  interdisciplinary teams such as   Outcome: Progressing  Goal: Incision(s), wounds(s) or drain site(s) healing without S/S of infection  Description: INTERVENTIONS  - Assess and document dressing, incision, wound bed, drain sites and surrounding tissue  - Provide patient and family education  - Perform skin care/dressing changes every   Outcome: Progressing  Goal: Pressure injury heals and does not worsen  Description: Interventions:  - Implement low air loss mattress or specialty surface (Criteria met)  - Apply silicone foam dressing  - Instruct/assist with weight shifting every  minutes when in chair   - Limit chair time to  hour intervals  - Use special pressure reducing interventions such as  when in chair   - Apply fecal or urinary incontinence containment device   - Perform passive or active ROM every   - Turn and reposition patient & offload bony prominences every  hours   - Utilize friction reducing device or surface for transfers   - Consider consults to  interdisciplinary teams such as   - Use incontinent care products after each incontinent episode such as   - Consider nutrition services referral as needed  Outcome: Progressing     Problem: Prexisting or High Potential for Compromised Skin Integrity  Goal: Skin integrity is maintained or improved  Description: INTERVENTIONS:  - Identify patients at risk for skin breakdown  - Assess and monitor skin integrity  - Assess and monitor nutrition and hydration status  - Monitor labs   - Assess for incontinence   - Turn and reposition patient  - Assist with mobility/ambulation  - Relieve pressure over bony prominences  - Avoid friction and shearing  - Provide appropriate hygiene as needed including keeping skin clean and dry  - Evaluate need for skin moisturizer/barrier cream  - Collaborate with interdisciplinary team   - Patient/family teaching  - Consider wound care consult   Outcome: Progressing     Problem: Nutrition/Hydration-ADULT  Goal: Nutrient/Hydration intake appropriate for improving, restoring or maintaining nutritional needs  Description: Monitor and assess patient's nutrition/hydration status for malnutrition. Collaborate with interdisciplinary team and initiate plan and interventions as ordered. Monitor patient's weight and dietary intake as ordered or per policy. Utilize nutrition screening tool and intervene as necessary. Determine patient's food preferences and provide high-protein, high-caloric foods as appropriate.      INTERVENTIONS:  - Monitor oral intake, urinary output, labs, and treatment plans  - Assess nutrition and hydration status and recommend course of action  - Evaluate amount of meals eaten  - Assist patient with eating if necessary   - Allow adequate time for meals  - Recommend/ encourage appropriate diets, oral nutritional supplements, and vitamin/mineral supplements  - Order, calculate, and assess calorie counts as needed  - Recommend, monitor, and adjust tube feedings and TPN/PPN based on assessed needs  - Assess need for intravenous fluids  - Provide specific nutrition/hydration education as appropriate  - Include patient/family/caregiver in decisions related to nutrition  Outcome: Progressing     Problem: PAIN - ADULT  Goal: Verbalizes/displays adequate comfort level or baseline comfort level  Description: Interventions:  - Encourage patient to monitor pain and request assistance  - Assess pain using appropriate pain scale  - Administer analgesics based on type and severity of pain and evaluate response  - Implement non-pharmacological measures as appropriate and evaluate response  - Consider cultural and social influences on pain and pain management  - Notify physician/advanced practitioner if interventions unsuccessful or patient reports new pain  Outcome: Progressing     Problem: INFECTION - ADULT  Goal: Absence or prevention of progression during hospitalization  Description: INTERVENTIONS:  - Assess and monitor for signs and symptoms of infection  - Monitor lab/diagnostic results  - Monitor all insertion sites, i.e. indwelling lines, tubes, and drains  - Monitor endotracheal if appropriate and nasal secretions for changes in amount and color  - Eden appropriate cooling/warming therapies per order  - Administer medications as ordered  - Instruct and encourage patient and family to use good hand hygiene technique  - Identify and instruct in appropriate isolation precautions for identified infection/condition  Outcome: Progressing  Goal: Absence of fever/infection during neutropenic period  Description: INTERVENTIONS:  - Monitor WBC    Outcome: Progressing     Problem: SAFETY ADULT  Goal: Maintain or return to baseline ADL function  Description: INTERVENTIONS:  -  Assess patient's ability to carry out ADLs; assess patient's baseline for ADL function and identify physical deficits which impact ability to perform ADLs (bathing, care of mouth/teeth, toileting, grooming, dressing, etc.)  - Assess/evaluate cause of self-care deficits   - Assess range of motion  - Assess patient's mobility; develop plan if impaired  - Assess patient's need for assistive devices and provide as appropriate  - Encourage maximum independence but intervene and supervise when necessary  - Involve family in performance of ADLs  - Assess for home care needs following discharge   - Consider OT consult to assist with ADL evaluation and planning for discharge  - Provide patient education as appropriate  Outcome: Progressing  Goal: Maintains/Returns to pre admission functional level  Description: INTERVENTIONS:  - Perform BMAT or MOVE assessment daily.   - Set and communicate daily mobility goal to care team and patient/family/caregiver. - Collaborate with rehabilitation services on mobility goals if consulted  - Perform Range of Motion  times a day. - Reposition patient every  hours.   - Dangle patient  times a day  - Stand patient  times a day  - Ambulate patient  times a day  - Out of bed to chair  times a day   - Out of bed for meals  times a day  - Out of bed for toileting  - Record patient progress and toleration of activity level   Outcome: Progressing  Goal: Patient will remain free of falls  Description: INTERVENTIONS:  - Educate patient/family on patient safety including physical limitations  - Instruct patient to call for assistance with activity   - Consult OT/PT to assist with strengthening/mobility   - Keep Call bell within reach  - Keep bed low and locked with side rails adjusted as appropriate  - Keep care items and personal belongings within reach  - Initiate and maintain comfort rounds  - Make Fall Risk Sign visible to staff  - Offer Toileting every  Hours, in advance of need  - Initiate/Maintain alarm  - Obtain necessary fall risk management equipmen  - Apply yellow socks and bracelet for high fall risk patients  - Consider moving patient to room near nurses station  Outcome: Progressing     Problem: DISCHARGE PLANNING  Goal: Discharge to home or other facility with appropriate resources  Description: INTERVENTIONS:  - Identify barriers to discharge w/patient and caregiver  - Arrange for needed discharge resources and transportation as appropriate  - Identify discharge learning needs (meds, wound care, etc.)  - Arrange for interpretive services to assist at discharge as needed  - Refer to Case Management Department for coordinating discharge planning if the patient needs post-hospital services based on physician/advanced practitioner order or complex needs related to functional status, cognitive ability, or social support system  Outcome: Progressing     Problem: Knowledge Deficit  Goal: Patient/family/caregiver demonstrates understanding of disease process, treatment plan, medications, and discharge instructions  Description: Complete learning assessment and assess knowledge base.   Interventions:  - Provide teaching at level of understanding  - Provide teaching via preferred learning methods  Outcome: Progressing

## 2023-07-07 NOTE — PROGRESS NOTES
Progress Note - Urology  Ginny Patel 1960, 57 y.o. male MRN: [id number]    Unit/Bed#: [facility] 921-01 Encounter: [id number]    Suprapubic catheter ([address])  Assessment & Plan  · Replaced in OR   · Draining clear yellow urine with sediment  · WBC 12.53  · Creatinine 0.58  · Continue medical optimization per primary team  · Notify urology for any need for additional surgical assistance        Subjective: Out of bed in chair resting without complaints offered. 24 HR EVENTS:   no significant events. Patient has  no complaints. Review of Systems   Constitutional: Negative for activity change, appetite change, chills, fatigue and fever. Genitourinary: Positive for difficulty urinating. Negative for hematuria. Objective:  Nursing Rounds:   Vitals: Blood pressure 125/66, pulse 79, temperature 98.8 °F (37.1 °C), resp. rate 18, height 6' 2" (1.88 m), weight 92.2 kg (203 lb 4.2 oz), SpO2 95 %. ,Body mass index is 26.1 kg/m². INS & OUTS:  I/O last 24 hours: In: 1920 [I.V.:1820; IV Piggyback:100]  Out: 9536 [Urine:1125; Stool:150]    Physical Exam  Vitals and nursing note reviewed. Constitutional:       General: He is not in acute distress. Appearance: Normal appearance. He is not ill-appearing, toxic-appearing or diaphoretic. HENT:      Head: Normocephalic. Pulmonary:      Effort: Pulmonary effort is normal. No respiratory distress. Abdominal:      General: Abdomen is flat. There is no distension. Genitourinary:     Comments: SPT to gravity draining yellow urine with sediment  Musculoskeletal:         General: No swelling. Cervical back: Normal range of motion. Skin:     General: Skin is warm and dry. Neurological:      General: No focal deficit present. Mental Status: He is alert and oriented to person, place, and time.    Psychiatric:         Mood and Affect: Mood normal.         Behavior: Behavior normal.         Labs:  Recent Labs     07/05/23  0603 07/06/23  1041 07/07/23  0753   WBC 16.08* 14.27* 12.53*       Recent Labs     07/05/23  0603 07/06/23  0439 07/07/23  0753   HGB 7.9* 7.4* 7.9*     Recent Labs     07/05/23  0603 07/06/23  0439 07/07/23  0753   HCT 24.6* 22.3* 25.1*     Recent Labs     07/05/23  0603 07/06/23  0406 07/07/23 0753   CREATININE 0.55* 0.57* 0.58*     Lab Results   Component Value Date    HGB 7.9 (L) 07/07/2023    HCT 25.1 (L) 07/07/2023    WBC 12.53 (H) 07/07/2023     07/07/2023     Lab Results   Component Value Date    K 3.7 07/07/2023     (H) 07/07/2023    CO2 29 07/07/2023    BUN 6 07/07/2023    CREATININE 0.58 (L) 07/07/2023    CALCIUM 8.0 (L) 07/07/2023     Urinalysis: 2-4 WBC's for hpf, 30-50 RBC's per HPF, occasional + bacteria and 0-3 casts seen  Urine Culture: Not completed    History:    Past Medical History:   Diagnosis Date   • Diabetes mellitus ([address])      Past Surgical History:   Procedure Laterality Date   • CYSTOSCOPY N/A 7/4/2023    Procedure: CYSTOSCOPY and suprapubic tube exchange;  Surgeon: Ben Lowry MD;  [facility];  Service: Urology   • EXAMINATION UNDER ANESTHESIA N/A 7/4/2023    Procedure: EUA, testicular fiation;  Surgeon: Ben Lowry MD;  [facility];  Service: Urology   • INCISION AND DRAINAGE OF WOUND N/A 6/28/2023    Procedure: 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 ;  Surgeon: Za Cho MD;  [facility];  Service: Urology   • INCISION AND DRAINAGE OF WOUND N/A 6/28/2023    Procedure: 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;  Surgeon: Luís Perez MD;  [facility];  Service: General   • OR CYSTOSTOMY CYSTOTOMY W/DRAINAGE N/A 7/1/2023    Procedure: CYSTOSCOPY; CYSTOSTOMY SUPRAPUBIC OPEN;  Surgeon: Ben Lowry MD;  [facility];  Service: Urology   • OR LAPS ABD PRTM&OMENTUM DX W/WO [facility] BR/WA SPX N/A 7/4/2023    Procedure: LAPAROSCOPY DIAGNOSTIC WITH CREATION OF OSTOMY;  Surgeon: Amanda Medley, MD;  [facility];  Service: General   • ROTATOR CUFF REPAIR     • VAC DRESSING APPLICATION N/A 2/3/5301    Procedure: APPLICATION VAC DRESSING;  Surgeon: Campos Reese MD;  [facility];  Service: General   • WOUND DEBRIDEMENT N/A 6/29/2023    Procedure: DEBRIDEMENT WOUND [facility] OUT); Surgeon: Lani Keith MD;  [facility];  Service: General   • WOUND DEBRIDEMENT N/A 7/2/2023    Procedure: DEBRIDEMENT PERINEAL WOUND AND DRESSING CHANGE [facility] OUT); Surgeon: Campos Reese MD;  [facility];  Service: General   • WOUND DEBRIDEMENT N/A 7/1/2023    Procedure: DEBRIDEMENT WOUND AND DRESSING CHANGE [facility] OUT); Surgeon: John Cr MD;  [facility];  Service: General   • WOUND DEBRIDEMENT N/A 6/30/2023    Procedure: DEBRIDEMENT WOUND [facility] OUT); Surgeon: John Cr MD;  [facility];  Service: General   • WOUND DEBRIDEMENT N/A 7/4/2023    Procedure: DEBRIDEMENT OF PERINEAL WOUND ([address]); Surgeon: Campos Reese MD;  [facility];  Service: General   • WOUND DEBRIDEMENT N/A 7/6/2023    Procedure: DEBRIDEMENT WOUND, [address], AND WOUND VAC CHANGE;  Surgeon: Boby Rizvi DO;  [facility];  Service: General     History reviewed. No pertinent family history.   Social History     Socioeconomic History   • Marital status: /Civil Union     Spouse name: None   • Number of children: None   • Years of education: None   • Highest education level: None   Occupational History   • None   Tobacco Use   • Smoking status: Never   • Smokeless tobacco: Never   Vaping Use   • Vaping Use: Every day   • Substances: Nicotine   Substance and Sexual Activity   • Alcohol use: Yes     Comment: socially   • Drug use: Never   • Sexual activity: None   Other Topics Concern   • None   Social History Narrative   • None     Social Determinants of Health     Financial Resource Strain: Not on file   Food Insecurity: No Food Insecurity (6/30/2023) Hunger Vital Sign    • Worried About Running Out of Food in the Last Year: Never true    • Ran Out of Food in the Last Year: Never true   Transportation Needs: No Transportation Needs (6/30/2023)    PRAPARE - Transportation    • Lack of Transportation (Medical): No    • Lack of Transportation (Non-Medical):  No   Physical Activity: Not on file   Stress: Not on file   Social Connections: Not on file   Intimate Partner Violence: Not on file   Housing Stability: Unknown (6/30/2023)    Housing Stability Vital Sign    • Unable to Pay for Housing in the Last Year: No    • Number of Places Lived in the Last Year: Not on file    • Unstable Housing in the Last Year: No       The following portions of the patient's history were reviewed and updated as appropriate: allergies, current medications, past family history, past medical history, past social history, past surgical history and problem list    DAMIAN Gonzales  Date: 7/7/2023 Time: 4:45 PM

## 2023-07-07 NOTE — PHYSICAL THERAPY NOTE
PHYSICAL THERAPY NOTE          Patient Name: James Soto  MCMPD'H Date: 7/7/2023 07/07/23 1150   PT Last Visit   PT Visit Date 07/07/23   Note Type   Note Type Treatment   Pain Assessment   Pain Assessment Tool 0-10   Pain Score 10 - Worst Possible Pain  (0/10 pain at rest, increases with mobility)   Hospital Pain Intervention(s) Repositioned; Ambulation/increased activity; Emotional support   Restrictions/Precautions   Weight Bearing Precautions Per Order No   Other Precautions Multiple lines; Fall Risk;Pain  (wound vac ramírez)   Cognition   Overall Cognitive Status WFL   Arousal/Participation Alert; Responsive;Arousable; Cooperative   Orientation Level Oriented X4   Following Commands Follows one step commands without difficulty   Comments pt pleasant and cooperative   Bed Mobility   Supine to Sit 3  Moderate assistance   Additional items Assist x 1;HOB elevated; Bedrails; Increased time required   Sit to Supine Unable to assess   Additional Comments pt supine in bed upon arrival. Pt left sitting OOB in recliner with all needs wihtin reach at the end of therapy session   Transfers   Sit to Stand 3  Moderate assistance   Additional items Assist x 2; Increased time required;Verbal cues   Stand to Sit 3  Moderate assistance   Additional items Assist x 2; Increased time required;Verbal cues   Additional Comments transfers with b/l HHA x2 trials   Ambulation/Elevation   Gait pattern Excessively slow; Short stride; Foward flexed;Decreased foot clearance; Improper Weight shift   Gait Assistance 3  Moderate assist   Additional items Assist x 2;Verbal cues; Tactile cues   Assistive Device   (b/l HHA)   Distance 5ft to recliner   Ambulation/Elevation Additional Comments (+) dizziness with sitting EOB/ ambulation   Balance   Static Sitting Fair   Dynamic Sitting Fair -   Static Standing Poor +   Dynamic Standing Poor   Ambulatory Poor   Endurance Deficit   Endurance Deficit Yes   Endurance Deficit Description pain, generalized weakness, deconditioning   Activity Tolerance   Activity Tolerance Patient limited by pain   Medical Staff Made Aware OT Deborah Oleary; co-session completed this date 2* increased medical complexity and multiple co-morbdidites   Nurse Made Aware RN cleared pt to participate in therapy session- updated post session   Assessment   Prognosis Good   Problem List Decreased strength;Decreased range of motion;Decreased endurance; Impaired balance;Decreased mobility; Decreased skin integrity;Pain   Assessment Pt seen for PT treatment session this date. Therapy session focused on bed mobility, functional transfers, gait training and endurance training in order to improve overall mobility and independence. Pt requires assist of 1-2 for all mobility performed this date. Pt continues to be limited by incision pain/ decreased sitting tolerance. Able to perform 2x STS throughout session with b/l HHA as well as take short shuffling steps to bedisde chair, pt deferring additional gait trial 2* pain- RN updated. Increased time required for repositioning in chair with pillows and wedges for increased comfort. Pt making slow progress toward goals. Pt was left sitting OOB in recliner at the end of PT session with all needs in reach. Pt would benefit from continued PT services while in hospital to address remaining limitations. PT to continue to follow pt and recommends STR. The patient's AM-PAC Basic Mobility Inpatient Short Form Raw Score is 9. A Raw score of less than or equal to 16 suggests the patient may benefit from discharge to post-acute rehabilitation services. Please also refer to the recommendation of the Physical Therapist for safe discharge planning. Goals   Patient Goals to have less pain   STG Expiration Date 07/19/23   PT Treatment Day 1   Plan   Treatment/Interventions ADL retraining;Functional transfer training;LE strengthening/ROM; Endurance training;Equipment eval/education;Patient/family training;Gait training;Bed mobility;Spoke to nursing;Spoke to case management;OT   Progress Slow progress, decreased activity tolerance   PT Frequency 3-5x/wk   Recommendation   PT Discharge Recommendation Post acute rehabilitation services   Equipment Recommended [facility] Recommended Wheeled walker   AM-PAC Basic Mobility Inpatient   Turning in Flat Bed Without Bedrails 2   Lying on Back to Sitting on Edge of Flat Bed Without Bedrails 2   Moving Bed to Chair 2   Standing Up From Chair Using Arms 1   Walk in Room 1   Climb 3-5 Stairs With Railing 1   Basic Mobility Inpatient Raw Score 9   Highest Level Of Mobility   JH-HLM Goal 3: Sit at edge of bed   JH-HLM Achieved 5: Stand (1 or more minutes)   Education   Education Provided Mobility training   Patient Demonstrates acceptance/verbal understanding   End of Consult   Patient Position at End of Consult Bedside chair; All needs within reach       Jayla Luna, PT, DPT

## 2023-07-07 NOTE — PROGRESS NOTES
Progress Note - General Surgery   Lawrence Hernandez 57 y.o. 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 UOP 1050  Ostomy 50cc SS output per bag    Plan:  - Continue CCD2 diet  - maintain wound vac  - As per ID recs,continue IV Unasyn, transition to PO augmentin when possible  - Monitor fever curve and wbc  - f/u culture sensitivities 6/28  - maintain suprapubic catheter, monitor UOP  - OOB to chair  - DVT ppx    Subjective/Objective       Subjective: Patient seen and examined at bedside, in no acute distress. Patient's pain is well controlled. He denies nausea or vomiting. Passing flatus and having bowel movements. Pt alert and oriented today. Tolerating diet well. Objective:  Vitals    Blood pressure 133/65, pulse 88, temperature 98.3 °F (36.8 °C), resp. rate 14, height 6' 2" (1.88 m), weight 92.2 kg (203 lb 4.2 oz), SpO2 92 %. ,Body mass index is 26.1 kg/m².       Intake/Output Summary (Last 24 hours) at 7/7/2023 6990  Last data filed at 7/6/2023 2158  Gross per 24 hour   Intake 1920 ml   Output 675 ml   Net 1245 ml       Invasive Devices     Peripheral Intravenous Line  Duration           Peripheral IV 07/04/23 Right Antecubital 2 days    Peripheral IV 07/06/23 Left Antecubital 1 day          Drain  Duration           Ileostomy Loop LUQ 2 days    Suprapubic Catheter 18 Fr. 2 days                Physical Exam:     Physical Exam:  General: No acute distress  Neuro: Awake, Alert and Oriented x 3  HEENT:  Normocephalic, atraumatic, moist mucous membranes  CV: S1, S2 normal intensity, no rubs, gallops, murmurs, regular rate and rhythm  Lungs: Normal work of breathing, no increased respiratory effort, CTAB  Abdomen: Soft, non-tender, non-distended. Incision(s) clean, dry and intact. Extremities: No edema  Genitourinary: suprapubic cath in place with good urine return.  Vac in place with good seal  Skin: Warm, dry       Lab, Imaging and other studies:CBC: No results found for: "WBC", "HGB", "HCT", "MCV", "PLT", "ADJUSTEDWBC", "RBC", "MCH", "MCHC", "RDW", "MPV", "NRBC", CMP: No results found for: "SODIUM", "K", "CL", "CO2", "ANIONGAP", "BUN", "CREATININE", "GLUCOSE", "CALCIUM", "AST", "ALT", "ALKPHOS", "PROT", "BILITOT", "EGFR", Urinalysis: No results found for: "COLORU", "CLARITYU", "SPECGRAV", "PHUR", "LEUKOCYTESUR", "NITRITE", "PROTEINUA", "GLUCOSEU", "Christin Gabriel", "BILIRUBINUR", "BLOODU"    VTE Pharmacologic Prophylaxis: Heparin  VTE Mechanical Prophylaxis: sequential compression device

## 2023-07-07 NOTE — OCCUPATIONAL THERAPY NOTE
Occupational Therapy Progress Note     Patient Name: Sukhdev Sorto  HJGFL'E Date: 7/7/2023  Problem List  Principal Problem:    Rohan's gangrene  Active Problems:    DM (diabetes mellitus) ([address])    HTN (hypertension)    Suprapubic catheter ([address])    Urethral disorder    Hyperglycemia          07/07/23 1149   OT Last Visit   OT Visit Date 07/07/23   Note Type   Note Type Treatment   Pain Assessment   Pain Assessment Tool 0-10   Pain Score 10 - Worst Possible Pain   Effect of Pain on Daily Activities 5/10 at rest, 10/10 with activity   Hospital Pain Intervention(s) Repositioned; Ambulation/increased activity   Restrictions/Precautions   Weight Bearing Precautions Per Order No   Other Precautions Multiple lines; Fall Risk;Pain  (wound vac)   Lifestyle   Autonomy PTA, pt was independent in ADLs/IADLs and functional mobility w/ no DME; (+) driving   Reciprocal Relationships Lives with his wife who can assist with functional needs prn   Service to Others Retired; 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 [employer] Enjoys spending time with his grandchildren + riding his motorcycle   ADL   UB [address] Final 5  Supervision/Setup   UB Dressing Deficit [address]; Thread LUE   LB Dressing Assistance 2  Maximal Assistance   LB Dressing Deficit Don/doff R sock; Don/doff L sock   Bed Mobility   Supine to Sit 3  Moderate assistance   Additional items Assist x 1;HOB elevated; Bedrails; Increased time required;LE management   Sit to Supine Unable to assess   Transfers   Sit to Stand 3  Moderate assistance   Additional items Assist x 2; Increased time required   Stand to Sit 3  Moderate assistance   Additional items Assist x 2; Increased time required   Additional Comments transfers with B/L HHA   Functional Mobility   Functional Mobility 3  Moderate assistance   Additional Comments Ax2   Additional items Hand hold assistance   Cognition   Overall Cognitive Status WFL   Arousal/Participation Alert; Responsive; Cooperative   Attention Within functional limits   Orientation Level Oriented X4   Memory Within functional limits   Following Commands Follows one step commands without difficulty   Comments Pt pleasant and cooperative t/o session   Activity Tolerance   Activity Tolerance Patient limited by pain   Medical Staff Made Aware PT Quinn Bernal RN Jerry   Assessment   Assessment Patient participated in Skilled OT session this date with interventions consisting of ADL re training with the use of correct body mechnaics, Energy Conservation techniques, safety awareness and fall prevention techniques,  therapeutic activities to: increase activity tolerance, increase dynamic sit/ stand balance during functional activity  and increase OOB/ sitting tolerance . Upon arrival patient was found supine in bed. Pt demonstrated the following tasks: MOD A sup to sit, MOD A x 2 STS and fnxl mobility with B/L HHA. Pt performs UBD with S, MAX A for LBD. Pt limited by pain, unable to tolerate upright position for ~ 1-2 minutes. Patient continues to be functioning below baseline level, occupational performance remains limited secondary to factors listed above and increased risk for falls and injury. From OT standpoint, recommendation at time of d/c would be STR - pending progress and pain control. Patient to benefit from continued Occupational Therapy treatment while in the hospital to address deficits as defined above and maximize level of functional independence with ADLs and functional mobility. Pt was left after session with all current needs met. The patient's raw score on the AM-PAC Daily Activity Inpatient Short Form is 16. A raw score of less than 19 suggests the patient may benefit from discharge to post-acute rehabilitation services. Please refer to the recommendation of the Occupational Therapist for safe discharge planning.    Plan   Treatment Interventions ADL retraining;Functional transfer training;UE strengthening/ROM; Endurance training;Patient/family training;Equipment evaluation/education; Compensatory technique education;Continued evaluation; Energy conservation; Activityengagement   Goal Expiration Date 07/19/23   OT Treatment Day 1   OT Frequency 2-3x/wk   Recommendation   OT Discharge Recommendation Post acute rehabilitation services  (pending progress)   AM-PAC Daily Activity Inpatient   Lower Body Dressing 2   Bathing 2   Toileting 2   Upper Body Dressing 3   Grooming 3   Eating 4   Daily Activity Raw Score 16   Daily Activity Standardized Score (Calc for Raw Score >=11) 35.96   AM-PAC Applied Cognition Inpatient   Following a Speech/Presentation 4   Understanding Ordinary Conversation 4   Taking Medications 4   Remembering Where Things Are Placed or Put Away 4   Remembering List of 4-5 Errands 4   Taking Care of Complicated Tasks 4   Applied Cognition Raw Score 24   Applied Cognition Standardized Score 62.21         Karen Trujillo MS, OTR/L

## 2023-07-07 NOTE — ASSESSMENT & PLAN NOTE
Voicemail left for Evelyn THORPE re: pt asking about med not being approval.    Spoke to Ene Villaseñor CM, RN re: med approval. · Replaced in OR   · Draining clear yellow urine with sediment  · WBC 12.53  · Creatinine 0.58  · Continue medical optimization per primary team  · Notify urology for any need for additional surgical assistance

## 2023-07-07 NOTE — PLAN OF CARE
Problem: PHYSICAL THERAPY ADULT  Goal: Performs mobility at highest level of function for planned discharge setting. See evaluation for individualized goals. Description: Treatment/Interventions: ADL retraining, Functional transfer training, LE strengthening/ROM, Elevations, Therapeutic exercise, Endurance training, Patient/family training, Equipment eval/education, Bed mobility, Gait training, Compensatory technique education, Spoke to nursing, Spoke to case management, OT, Family  Equipment Recommended: Sourav Rosaels       See flowsheet documentation for full assessment, interventions and recommendations. Outcome: Progressing  Note: Prognosis: Good  Problem List: Decreased strength, Decreased range of motion, Decreased endurance, Impaired balance, Decreased mobility, Decreased skin integrity, Pain  Assessment: Pt seen for PT treatment session this date. Therapy session focused on bed mobility, functional transfers, gait training and endurance training in order to improve overall mobility and independence. Pt requires assist of 1-2 for all mobility performed this date. Pt continues to be limited by incision pain/ decreased sitting tolerance. Able to perform 2x STS throughout session with b/l HHA as well as take short shuffling steps to bedisde chair, pt deferring additional gait trial 2* pain- RN updated. Increased time required for repositioning in chair with pillows and wedges for increased comfort. Pt making slow progress toward goals. Pt was left sitting OOB in recliner at the end of PT session with all needs in reach. Pt would benefit from continued PT services while in hospital to address remaining limitations. PT to continue to follow pt and recommends STR. The patient's AM-PAC Basic Mobility Inpatient Short Form Raw Score is 9. A Raw score of less than or equal to 16 suggests the patient may benefit from discharge to post-acute rehabilitation services.  Please also refer to the recommendation of the Physical Therapist for safe discharge planning. PT Discharge Recommendation: Post acute rehabilitation services    See flowsheet documentation for full assessment.

## 2023-07-07 NOTE — PLAN OF CARE
Problem: OCCUPATIONAL THERAPY ADULT  Goal: Performs self-care activities at highest level of function for planned discharge setting. See evaluation for individualized goals. Description: Treatment Interventions: ADL retraining, Functional transfer training, UE strengthening/ROM, Endurance training, Patient/family training, Equipment evaluation/education, Compensatory technique education, Continued evaluation, Energy conservation, Activityengagement          See flowsheet documentation for full assessment, interventions and recommendations. Outcome: Progressing  Note: Limitation: Decreased ADL status, Decreased endurance, Decreased self-care trans, Decreased high-level ADLs  Prognosis: Fair  Assessment: Patient participated in Skilled OT session this date with interventions consisting of ADL re training with the use of correct body mechnaics, Energy Conservation techniques, safety awareness and fall prevention techniques,  therapeutic activities to: increase activity tolerance, increase dynamic sit/ stand balance during functional activity  and increase OOB/ sitting tolerance . Upon arrival patient was found supine in bed. Pt demonstrated the following tasks: MOD A sup to sit, MOD A x 2 STS and fnxl mobility with B/L HHA. Pt performs UBD with S, MAX A for LBD. Pt limited by pain, unable to tolerate upright position for ~ 1-2 minutes. Patient continues to be functioning below baseline level, occupational performance remains limited secondary to factors listed above and increased risk for falls and injury. From OT standpoint, recommendation at time of d/c would be STR - pending progress and pain control. Patient to benefit from continued Occupational Therapy treatment while in the hospital to address deficits as defined above and maximize level of functional independence with ADLs and functional mobility. Pt was left after session with all current needs met.  The patient's raw score on the AM-PAC Daily Activity Inpatient Short Form is 16. A raw score of less than 19 suggests the patient may benefit from discharge to post-acute rehabilitation services. Please refer to the recommendation of the Occupational Therapist for safe discharge planning.      OT Discharge Recommendation: Post acute rehabilitation services (pending progress)

## 2023-07-07 NOTE — PROGRESS NOTES
Progress Note - Infectious Disease   Giuliano Martin 57 y.o. male MRN: [id number]  Unit/Bed#: [facility] 921-01 Encounter: [id number]      Impression/Recommendations:  1.  Severe sepsis, present on admission, secondary to Rohan's gangrene.  Patient is clinically improved.  He still has leukocytosis but no further fever, tach, tachypnea or lactic acidosis.  He had mild hypotension on admission, which resolved and not requiring any pressors. Antibiotic plan as in below. Monitor temperature/WBC. Monitor hemodynamic.     2.  Rohan's gangrene, status post multiple I&D.  At last I&D yesterday, there was just fibrinous exudate and only mild purulence.  Operative cultures with Actinomyces and Prevotella. Growth of coagulase-negative Staphylococcus is likely colonization/contamination.  Patient has VAC in place.  He has minimal pain. Patient is status post repeat I&D. Today, still with some purulence and necrotic tissue. We will continue IV antibiotic until no further purulence and necrotic tissue are seen at I&D. Continue IV Unasyn. Further I&D and wound VAC dressing change in OR per surgery. When wound is clean, antibiotic can be transitioned to p.o. Augmentin. Treat x7 days after complete source control.     3.  DM, poorly controlled, with hyperglycemia and elevated hemoglobin A1c.  This contributes to infection above. Management per primary service.     Discussed with patient in detail regarding the above plan.     Antibiotics:  Unasyn  Antibiotic # 10     Subjective:  Patient is comfortable. Perineal pain mild, controlled. Temperature stays down.  No chills.   He is tolerating antibiotics well.  No nausea, vomiting or diarrhea.     Objective:  Vitals:  Temp:  [97.9 °F (36.6 °C)-99.6 °F (37.6 °C)] 98 °F (36.7 °C)  HR:  [71-94] 71  Resp:  [14-17] 17  BP: (120-133)/(65-70) 121/65  SpO2:  [88 %-96 %] 92 %  Temp (24hrs), Av.3 °F (36.8 °C), Min:97.9 °F (36.6 °C), Max:99.6 °F (37.6 °C)  Current: Temperature: 98 °F (36.7 °C)    Physical Exam:     General: Awake, alert, cooperative, no distress. Neck:  Supple. No mass. No lymphadenopathy. Lungs: Expansion symmetric, no rales, no wheezing, respirations unlabored. Heart:  Regular rate and rhythm, S1 and S2 normal, no murmur. Abdomen: Soft, nondistended, non-tender, bowel sounds active all four quadrants, no masses, no organomegaly. :  Wound with VAC in place. Copious serous fluid. No purulence. No erythema/warmth. Mild tenderness. Extremities: No edema. No erythema/warmth. No ulcer. Nontender to palpation. Skin:  No rash. Neuro: Moves all extremities. Invasive Devices     Peripheral Intravenous Line  Duration           Peripheral IV 07/04/23 Right Antecubital 3 days    Peripheral IV 07/06/23 Left Antecubital 1 day          Drain  Duration           Suprapubic Catheter 18 Fr. 3 days    Ileostomy Loop LUQ 2 days                Labs studies:   I have personally reviewed pertinent labs. Results from last 7 days   Lab Units 07/07/23  0753 07/06/23  0406 07/05/23  0603 07/02/23  0428 07/01/23  0608   POTASSIUM mmol/L 3.7 4.5 3.7   < > 3.5   CHLORIDE mmol/L 113* 112* 113*   < > 109*   CO2 mmol/L 29 28 26   < > 26   BUN mg/dL 6 5 7   < > 9   CREATININE mg/dL 0.58* 0.57* 0.55*   < > 0.54*   EGFR ml/min/1.73sq m 107 108 110   < > 110   CALCIUM mg/dL 8.0* 7.8* 7.9*   < > 7.9*   AST U/L  --   --   --   --  19   ALT U/L  --   --   --   --  19   ALK PHOS U/L  --   --   --   --  70    < > = values in this interval not displayed. Results from last 7 days   Lab Units 07/07/23  0753 07/06/23  0439 07/05/23  0603   WBC Thousand/uL 12.53* 14.27* 16.08*   HEMOGLOBIN g/dL 7.9* 7.4* 7.9*   PLATELETS Thousands/uL 385 375 328           Imaging Studies:   I have personally reviewed pertinent imaging study reports and images in PACS. EKG, Pathology, and Other Studies:   I have personally reviewed pertinent reports.

## 2023-07-08 ENCOUNTER — ANESTHESIA EVENT (INPATIENT)
Dept: PERIOP | Facility: HOSPITAL | Age: 63
DRG: 709 | End: 2023-07-08
Payer: COMMERCIAL

## 2023-07-08 ENCOUNTER — ANESTHESIA (INPATIENT)
Dept: PERIOP | Facility: HOSPITAL | Age: 63
DRG: 709 | End: 2023-07-08
Payer: COMMERCIAL

## 2023-07-08 LAB
ABO GROUP BLD: NORMAL
ANION GAP SERPL CALCULATED.3IONS-SCNC: 3 MMOL/L
BASOPHILS # BLD AUTO: 0.02 THOUSANDS/ÂΜL (ref 0–0.1)
BASOPHILS NFR BLD AUTO: 0 % (ref 0–1)
BLD GP AB SCN SERPL QL: NEGATIVE
BUN SERPL-MCNC: 6 MG/DL (ref 5–25)
CALCIUM SERPL-MCNC: 7.9 MG/DL (ref 8.3–10.1)
CHLORIDE SERPL-SCNC: 114 MMOL/L (ref 96–108)
CO2 SERPL-SCNC: 26 MMOL/L (ref 21–32)
CREAT SERPL-MCNC: 0.55 MG/DL (ref 0.6–1.3)
EOSINOPHIL # BLD AUTO: 0.05 THOUSAND/ÂΜL (ref 0–0.61)
EOSINOPHIL NFR BLD AUTO: 0 % (ref 0–6)
ERYTHROCYTE [DISTWIDTH] IN BLOOD BY AUTOMATED COUNT: 15.9 % (ref 11.6–15.1)
GFR SERPL CREATININE-BSD FRML MDRD: 110 ML/MIN/1.73SQ M
GLUCOSE SERPL-MCNC: 116 MG/DL (ref 65–140)
GLUCOSE SERPL-MCNC: 117 MG/DL (ref 65–140)
GLUCOSE SERPL-MCNC: 124 MG/DL (ref 65–140)
GLUCOSE SERPL-MCNC: 134 MG/DL (ref 65–140)
GLUCOSE SERPL-MCNC: 148 MG/DL (ref 65–140)
GLUCOSE SERPL-MCNC: 275 MG/DL (ref 65–140)
HCT VFR BLD AUTO: 24.8 % (ref 36.5–49.3)
HGB BLD-MCNC: 7.8 G/DL (ref 12–17)
IMM GRANULOCYTES # BLD AUTO: 0.24 THOUSAND/UL (ref 0–0.2)
IMM GRANULOCYTES NFR BLD AUTO: 2 % (ref 0–2)
LYMPHOCYTES # BLD AUTO: 1.75 THOUSANDS/ÂΜL (ref 0.6–4.47)
LYMPHOCYTES NFR BLD AUTO: 14 % (ref 14–44)
MCH RBC QN AUTO: 33.2 PG (ref 26.8–34.3)
MCHC RBC AUTO-ENTMCNC: 31.5 G/DL (ref 31.4–37.4)
MCV RBC AUTO: 106 FL (ref 82–98)
MONOCYTES # BLD AUTO: 0.92 THOUSAND/ÂΜL (ref 0.17–1.22)
MONOCYTES NFR BLD AUTO: 7 % (ref 4–12)
NEUTROPHILS # BLD AUTO: 9.37 THOUSANDS/ÂΜL (ref 1.85–7.62)
NEUTS SEG NFR BLD AUTO: 77 % (ref 43–75)
NRBC BLD AUTO-RTO: 0 /100 WBCS
PLATELET # BLD AUTO: 373 THOUSANDS/UL (ref 149–390)
PMV BLD AUTO: 9.3 FL (ref 8.9–12.7)
POTASSIUM SERPL-SCNC: 3.9 MMOL/L (ref 3.5–5.3)
RBC # BLD AUTO: 2.35 MILLION/UL (ref 3.88–5.62)
RH BLD: POSITIVE
SODIUM SERPL-SCNC: 143 MMOL/L (ref 135–147)
SPECIMEN EXPIRATION DATE: NORMAL
WBC # BLD AUTO: 12.35 THOUSAND/UL (ref 4.31–10.16)

## 2023-07-08 PROCEDURE — 0JDB0ZZ EXTRACTION OF PERINEUM SUBCUTANEOUS TISSUE AND FASCIA, OPEN APPROACH: ICD-10-PCS | Performed by: SURGERY

## 2023-07-08 PROCEDURE — 82948 REAGENT STRIP/BLOOD GLUCOSE: CPT

## 2023-07-08 PROCEDURE — 85025 COMPLETE CBC W/AUTO DIFF WBC: CPT | Performed by: PHYSICIAN ASSISTANT

## 2023-07-08 PROCEDURE — 80048 BASIC METABOLIC PNL TOTAL CA: CPT | Performed by: PHYSICIAN ASSISTANT

## 2023-07-08 PROCEDURE — 86901 BLOOD TYPING SEROLOGIC RH(D): CPT | Performed by: STUDENT IN AN ORGANIZED HEALTH CARE EDUCATION/TRAINING PROGRAM

## 2023-07-08 PROCEDURE — 86900 BLOOD TYPING SEROLOGIC ABO: CPT | Performed by: STUDENT IN AN ORGANIZED HEALTH CARE EDUCATION/TRAINING PROGRAM

## 2023-07-08 PROCEDURE — 86850 RBC ANTIBODY SCREEN: CPT | Performed by: STUDENT IN AN ORGANIZED HEALTH CARE EDUCATION/TRAINING PROGRAM

## 2023-07-08 PROCEDURE — 99024 POSTOP FOLLOW-UP VISIT: CPT | Performed by: SURGERY

## 2023-07-08 PROCEDURE — 11006 DBRDMT SKIN XTRNL GENT PER: CPT | Performed by: SURGERY

## 2023-07-08 PROCEDURE — 99232 SBSQ HOSP IP/OBS MODERATE 35: CPT | Performed by: INTERNAL MEDICINE

## 2023-07-08 RX ORDER — DEXAMETHASONE SODIUM PHOSPHATE 10 MG/ML
INJECTION, SOLUTION INTRAMUSCULAR; INTRAVENOUS AS NEEDED
Status: DISCONTINUED | OUTPATIENT
Start: 2023-07-08 | End: 2023-07-08

## 2023-07-08 RX ORDER — FENTANYL CITRATE/PF 50 MCG/ML
25 SYRINGE (ML) INJECTION
Status: DISCONTINUED | OUTPATIENT
Start: 2023-07-08 | End: 2023-07-08 | Stop reason: HOSPADM

## 2023-07-08 RX ORDER — FENTANYL CITRATE 50 UG/ML
INJECTION, SOLUTION INTRAMUSCULAR; INTRAVENOUS AS NEEDED
Status: DISCONTINUED | OUTPATIENT
Start: 2023-07-08 | End: 2023-07-08

## 2023-07-08 RX ORDER — HYDROMORPHONE HCL/PF 1 MG/ML
SYRINGE (ML) INJECTION AS NEEDED
Status: DISCONTINUED | OUTPATIENT
Start: 2023-07-08 | End: 2023-07-08

## 2023-07-08 RX ORDER — ONDANSETRON 2 MG/ML
INJECTION INTRAMUSCULAR; INTRAVENOUS AS NEEDED
Status: DISCONTINUED | OUTPATIENT
Start: 2023-07-08 | End: 2023-07-08

## 2023-07-08 RX ORDER — SODIUM CHLORIDE, SODIUM LACTATE, POTASSIUM CHLORIDE, CALCIUM CHLORIDE 600; 310; 30; 20 MG/100ML; MG/100ML; MG/100ML; MG/100ML
100 INJECTION, SOLUTION INTRAVENOUS CONTINUOUS
Status: DISCONTINUED | OUTPATIENT
Start: 2023-07-08 | End: 2023-07-08

## 2023-07-08 RX ORDER — LIDOCAINE HYDROCHLORIDE 10 MG/ML
INJECTION, SOLUTION EPIDURAL; INFILTRATION; INTRACAUDAL; PERINEURAL AS NEEDED
Status: DISCONTINUED | OUTPATIENT
Start: 2023-07-08 | End: 2023-07-08

## 2023-07-08 RX ORDER — MAGNESIUM HYDROXIDE 1200 MG/15ML
LIQUID ORAL AS NEEDED
Status: DISCONTINUED | OUTPATIENT
Start: 2023-07-08 | End: 2023-07-08 | Stop reason: HOSPADM

## 2023-07-08 RX ORDER — ONDANSETRON 2 MG/ML
4 INJECTION INTRAMUSCULAR; INTRAVENOUS ONCE AS NEEDED
Status: DISCONTINUED | OUTPATIENT
Start: 2023-07-08 | End: 2023-07-08 | Stop reason: HOSPADM

## 2023-07-08 RX ORDER — PROPOFOL 10 MG/ML
INJECTION, EMULSION INTRAVENOUS AS NEEDED
Status: DISCONTINUED | OUTPATIENT
Start: 2023-07-08 | End: 2023-07-08

## 2023-07-08 RX ORDER — SODIUM CHLORIDE, SODIUM LACTATE, POTASSIUM CHLORIDE, CALCIUM CHLORIDE 600; 310; 30; 20 MG/100ML; MG/100ML; MG/100ML; MG/100ML
INJECTION, SOLUTION INTRAVENOUS CONTINUOUS PRN
Status: DISCONTINUED | OUTPATIENT
Start: 2023-07-08 | End: 2023-07-08

## 2023-07-08 RX ORDER — ALBUTEROL SULFATE 2.5 MG/3ML
2.5 SOLUTION RESPIRATORY (INHALATION) EVERY 4 HOURS PRN
Status: DISCONTINUED | OUTPATIENT
Start: 2023-07-08 | End: 2023-07-08 | Stop reason: HOSPADM

## 2023-07-08 RX ORDER — MIDAZOLAM HYDROCHLORIDE 2 MG/2ML
INJECTION, SOLUTION INTRAMUSCULAR; INTRAVENOUS AS NEEDED
Status: DISCONTINUED | OUTPATIENT
Start: 2023-07-08 | End: 2023-07-08

## 2023-07-08 RX ORDER — HYDROMORPHONE HCL/PF 1 MG/ML
0.5 SYRINGE (ML) INJECTION
Status: DISCONTINUED | OUTPATIENT
Start: 2023-07-08 | End: 2023-07-08 | Stop reason: HOSPADM

## 2023-07-08 RX ADMIN — MELATONIN TAB 3 MG 3 MG: 3 TAB at 22:23

## 2023-07-08 RX ADMIN — SODIUM CHLORIDE, SODIUM LACTATE, POTASSIUM CHLORIDE, AND CALCIUM CHLORIDE: .6; .31; .03; .02 INJECTION, SOLUTION INTRAVENOUS at 14:50

## 2023-07-08 RX ADMIN — FENTANYL CITRATE 25 MCG: 50 INJECTION, SOLUTION INTRAMUSCULAR; INTRAVENOUS at 15:38

## 2023-07-08 RX ADMIN — SODIUM CHLORIDE 3 G: 9 INJECTION, SOLUTION INTRAVENOUS at 03:48

## 2023-07-08 RX ADMIN — FENTANYL CITRATE 50 MCG: 50 INJECTION, SOLUTION INTRAMUSCULAR; INTRAVENOUS at 15:14

## 2023-07-08 RX ADMIN — SENNOSIDES AND DOCUSATE SODIUM 1 TABLET: 50; 8.6 TABLET ORAL at 17:58

## 2023-07-08 RX ADMIN — FENTANYL CITRATE 25 MCG: 50 INJECTION, SOLUTION INTRAMUSCULAR; INTRAVENOUS at 15:15

## 2023-07-08 RX ADMIN — SODIUM CHLORIDE 3 G: 9 INJECTION, SOLUTION INTRAVENOUS at 22:23

## 2023-07-08 RX ADMIN — SENNOSIDES AND DOCUSATE SODIUM 1 TABLET: 50; 8.6 TABLET ORAL at 08:04

## 2023-07-08 RX ADMIN — MIDAZOLAM 2 MG: 1 INJECTION INTRAMUSCULAR; INTRAVENOUS at 14:54

## 2023-07-08 RX ADMIN — DEXAMETHASONE SODIUM PHOSPHATE 8 MG: 10 INJECTION, SOLUTION INTRAMUSCULAR; INTRAVENOUS at 15:12

## 2023-07-08 RX ADMIN — ACETAMINOPHEN 975 MG: 325 TABLET, FILM COATED ORAL at 22:23

## 2023-07-08 RX ADMIN — SODIUM CHLORIDE, SODIUM LACTATE, POTASSIUM CHLORIDE, AND CALCIUM CHLORIDE 100 ML/HR: .6; .31; .03; .02 INJECTION, SOLUTION INTRAVENOUS at 08:09

## 2023-07-08 RX ADMIN — QUETIAPINE 25 MG: 25 TABLET ORAL at 22:23

## 2023-07-08 RX ADMIN — OXYCODONE HYDROCHLORIDE 10 MG: 10 TABLET ORAL at 22:26

## 2023-07-08 RX ADMIN — SODIUM CHLORIDE 3 G: 9 INJECTION, SOLUTION INTRAVENOUS at 14:36

## 2023-07-08 RX ADMIN — HYDROMORPHONE HYDROCHLORIDE 0.5 MG: 1 INJECTION, SOLUTION INTRAMUSCULAR; INTRAVENOUS; SUBCUTANEOUS at 15:42

## 2023-07-08 RX ADMIN — LIDOCAINE HYDROCHLORIDE 50 MG: 10 INJECTION, SOLUTION EPIDURAL; INFILTRATION; INTRACAUDAL; PERINEURAL at 15:04

## 2023-07-08 RX ADMIN — PROPOFOL 50 MG: 10 INJECTION, EMULSION INTRAVENOUS at 15:14

## 2023-07-08 RX ADMIN — ONDANSETRON 4 MG: 2 INJECTION INTRAMUSCULAR; INTRAVENOUS at 15:57

## 2023-07-08 RX ADMIN — PROPOFOL 150 MG: 10 INJECTION, EMULSION INTRAVENOUS at 15:04

## 2023-07-08 RX ADMIN — SODIUM CHLORIDE 3 G: 9 INJECTION, SOLUTION INTRAVENOUS at 08:07

## 2023-07-08 NOTE — ANESTHESIA POSTPROCEDURE EVALUATION
Post-Op Assessment Note    CV Status:  Stable    Pain management: adequate     Mental Status:  Alert and awake   Hydration Status:  Euvolemic   PONV Controlled:  Controlled   Airway Patency:  Patent      Post Op Vitals Reviewed: Yes      Staff: Anesthesiologist         There were no known notable events for this encounter.     /80 (07/08/23 1637)    Temp 98.2 °F (36.8 °C) (07/08/23 1637)    Pulse 84 (07/08/23 1637)   Resp 20 (07/08/23 1637)    SpO2 94 % (07/08/23 1637)

## 2023-07-08 NOTE — PROGRESS NOTES
Progress Note - General Surgery   Nano Paula 57 y.o. male MRN: [id number]  Unit/Bed#: [facility] 921-01 Encounter: [id number]    Assessment:  57 y. o. male with Rohan's gangrene  S/p:  6/28 debridement - andria/cullen (cedillo)  6/29 Perineal debridement/washout - Mason   6/30 Perineal debridement/ EUA - Basil/Uro  7/1 Debridement, suprapubic tube - Basil/Uro  7/2 Perineal and scrotal debridement - Lucero  7/4 laparoscopic loop diverting colostomy creation, perineal debridement, cystoscopy with suprapubic tube exchange, EUA, testicular fixation, vac placement - Lucero/Saturnino  7/6 Perineum washout/debridement- To     AVSS  Wound vac-750 SS  Ostomy-250  Uo-1.4L    WBC 12 (12)  Hgb 7.8 (7.9)  Cr 0.55    Plan:  - NPO, OR today for washout, debridement, and wound vac change.  -Will require plastic surgery when wound is clean for possible falp  - ABX per ID, appreciate recommendations  - Routine ostomy care  -Monitor UOP via suprapubic cath  -Pain/nausea control PRN  -DVT ppx    Subjective/Objective       Subjective: No acute events overnight. Doing well. No nausea or vomiting. Tolerating diet. No fevers/chills. Wants to go home. Objective:  Vitals    Blood pressure 132/62, pulse 67, temperature 98.9 °F (37.2 °C), resp. rate 17, height 6' 2" (1.88 m), weight 92.2 kg (203 lb 4.2 oz), SpO2 94 %. ,Body mass index is 26.1 kg/m². Intake/Output Summary (Last 24 hours) at 7/8/2023 0841  Last data filed at 7/8/2023 0600  Gross per 24 hour   Intake 1251.7 ml   Output 2400 ml   Net -1148.3 ml       Invasive Devices     Peripheral Intravenous Line  Duration           Peripheral IV 07/07/23 Distal;Left;Upper;Ventral (anterior) Arm <1 day          Drain  Duration           Ileostomy Loop LUQ 3 days    Suprapubic Catheter 18 Fr. 3 days                  Physical Exam:  General: NAD  HENT: NCAT MMM  Neck: supple, no JVD  CV: nl rate  Lungs: nl wob. No resp distress  ABD: Soft, nontender, nondistended.  Ostomy is viable with gas/stool in bag.  Incision is c/d/i  : wound vac in place w/ good seal  Extrem: No CCE  Neuro: AAOx3         Lab, Imaging and other studies:CBC:   Lab Results   Component Value Date    WBC 12.35 (H) 07/08/2023    HGB 7.8 (L) 07/08/2023    HCT 24.8 (L) 07/08/2023     (H) 07/08/2023     07/08/2023    RBC 2.35 (L) 07/08/2023    MCH 33.2 07/08/2023    MCHC 31.5 07/08/2023    RDW 15.9 (H) 07/08/2023    MPV 9.3 07/08/2023    NRBC 0 07/08/2023   , CMP:   Lab Results   Component Value Date    SODIUM 143 07/08/2023    K 3.9 07/08/2023     (H) 07/08/2023    CO2 26 07/08/2023    BUN 6 07/08/2023    CREATININE 0.55 (L) 07/08/2023    CALCIUM 7.9 (L) 07/08/2023    EGFR 110 07/08/2023   , Urinalysis: No results found for: "COLORU", "CLARITYU", "SPECGRAV", "PHUR", "LEUKOCYTESUR", "NITRITE", "PROTEINUA", "GLUCOSEU", "Christin Gabriel", "Benita Raisa", "BLOODU"    VTE Pharmacologic Prophylaxis: Heparin  VTE Mechanical Prophylaxis: sequential compression device

## 2023-07-08 NOTE — ANESTHESIA PREPROCEDURE EVALUATION
Procedure:  DEBRIDEMENT WOUND AND DRESSING CHANGE ([facility]) (Perineum)    Relevant Problems   CARDIO   (+) HTN (hypertension)      Endocrine   (+) DM (diabetes mellitus) ([address])      Musculoskeletal and Integument   (+) Rohan's gangrene      Genitourinary   (+) Urethral disorder      Other   (+) Hyperglycemia   (+) Suprapubic catheter (H[facility])             Anesthesia Plan  ASA Score- 3     Anesthesia Type- general with ASA Monitors. Additional Monitors:   Airway Plan: LMA. Comment: I, Dr. Dorothy Reyes, the attending physician, have personally seen and evaluated the patient prior to anesthetic care. I have reviewed the pre-anesthetic record, and other medical records if appropriate to the anesthetic care. If a CRNA is involved in the case, I have reviewed the CRNA assessment, if present, and agree. The patient is in a suitable condition to proceed with my formulated anesthetic plan. .       Plan Factors-    Chart reviewed. Induction- intravenous. Postoperative Plan-     Informed Consent- Anesthetic plan and risks discussed with patient. I personally reviewed this patient with the CRNA. Discussed and agreed on the Anesthesia Plan with the CRNA. Josep Adam

## 2023-07-08 NOTE — PLAN OF CARE
Problem: MOBILITY - ADULT  Goal: Maintain or return to baseline ADL function  Description: INTERVENTIONS:  -  Assess patient's ability to carry out ADLs; assess patient's baseline for ADL function and identify physical deficits which impact ability to perform ADLs (bathing, care of mouth/teeth, toileting, grooming, dressing, etc.)  - Assess/evaluate cause of self-care deficits   - Assess range of motion  - Assess patient's mobility; develop plan if impaired  - Assess patient's need for assistive devices and provide as appropriate  - Encourage maximum independence but intervene and supervise when necessary  - Involve family in performance of ADLs  - Assess for home care needs following discharge   - Consider OT consult to assist with ADL evaluation and planning for discharge  - Provide patient education as appropriate  Outcome: Progressing  Goal: Maintains/Returns to pre admission functional level  Description: INTERVENTIONS:  - Perform BMAT or MOVE assessment daily.   - Set and communicate daily mobility goal to care team and patient/family/caregiver. - Collaborate with rehabilitation services on mobility goals if consulted  - Perform Range of Motion 2 times a day. - Reposition patient every 2 hours.   - Dangle patient 2 times a day  - Stand patient 2 times a day  - Ambulate patient 2 times a day  - Out of bed to chair 2 times a day   - Out of bed for meals 2 times a day  - Out of bed for toileting  - Record patient progress and toleration of activity level   Outcome: Progressing     Problem: SKIN/TISSUE INTEGRITY - ADULT  Goal: Skin Integrity remains intact(Skin Breakdown Prevention)  Description: Assess:  -Perform Vinny assessment every   -Clean and moisturize skin every   -Inspect skin when repositioning, toileting, and assisting with ADLS    -Assess extremities for adequate circulation and sensation     Bed Management:  -Have minimal linens on bed & keep smooth, unwrinkled  -Change linens as needed when moist or perspiring  -Avoid sitting or lying in one position for more than 2 hours while in bed  -Keep HOB at 30 degrees     Toileting:  -Offer bedside commode    Activity:  -Mobilize patient 2 times a day  -Encourage activity and walks on unit  -Encourage or provide ROM exercises   -Turn and reposition patient every 2 Hours  -Use appropriate equipment to lift or move patient in bed  -Instruct/ Assist with weight shifting every 2 when out of bed in chair  -Consider limitation of chair time 2 hour intervals    Skin Care:  -Avoid use of baby powder  -Do not massage red bony areas    Next Steps:  Outcome: Progressin  Goal: Incision(s), wounds(s) or drain site(s) healing without S/S of infection  Description: INTERVENTIONS  - Assess and document dressing, incision, wound bed, drain sites and surrounding tissue  - Provide patient and family education  - Perform skin care/dressing changes every 2  Outcome: Progressing  Goal: Pressure injury heals and does not worsen  Description: Interventions:  - Implement low air loss mattress or specialty surface (Criteria met)  - Apply silicone foam dressing  - Instruct/assist with weight shifting every  minutes when in chair   - Limit chair time to 2  hour intervals  - Use special pressure reducing interventions such as wedges when in chair   - Apply fecal or urinary incontinence containment device   - Perform passive or active ROM every 2 hours   - Turn and reposition patient & offload bony prominences every 2 hours   - Utilize friction reducing device or surface for transfers  - Consider nutrition services referral as needed  Outcome: Progressing     Problem: Prexisting or High Potential for Compromised Skin Integrity  Goal: Skin integrity is maintained or improved  Description: INTERVENTIONS:  - Identify patients at risk for skin breakdown  - Assess and monitor skin integrity  - Assess and monitor nutrition and hydration status  - Monitor labs   - Assess for incontinence   - Turn and reposition patient  - Assist with mobility/ambulation  - Relieve pressure over bony prominences  - Avoid friction and shearing  - Provide appropriate hygiene as needed including keeping skin clean and dry  - Evaluate need for skin moisturizer/barrier cream  - Collaborate with interdisciplinary team   - Patient/family teaching  - Consider wound care consult   Outcome: Progressing     Problem: Nutrition/Hydration-ADULT  Goal: Nutrient/Hydration intake appropriate for improving, restoring or maintaining nutritional needs  Description: Monitor and assess patient's nutrition/hydration status for malnutrition. Collaborate with interdisciplinary team and initiate plan and interventions as ordered. Monitor patient's weight and dietary intake as ordered or per policy. Utilize nutrition screening tool and intervene as necessary. Determine patient's food preferences and provide high-protein, high-caloric foods as appropriate.      INTERVENTIONS:  - Monitor oral intake, urinary output, labs, and treatment plans  - Assess nutrition and hydration status and recommend course of action  - Evaluate amount of meals eaten  - Assist patient with eating if necessary   - Allow adequate time for meals  - Recommend/ encourage appropriate diets, oral nutritional supplements, and vitamin/mineral supplements  - Order, calculate, and assess calorie counts as needed  - Recommend, monitor, and adjust tube feedings and TPN/PPN based on assessed needs  - Assess need for intravenous fluids  - Provide specific nutrition/hydration education as appropriate  - Include patient/family/caregiver in decisions related to nutrition  Outcome: Progressing     Problem: PAIN - ADULT  Goal: Verbalizes/displays adequate comfort level or baseline comfort level  Description: Interventions:  - Encourage patient to monitor pain and request assistance  - Assess pain using appropriate pain scale  - Administer analgesics based on type and severity of pain and evaluate response  - Implement non-pharmacological measures as appropriate and evaluate response  - Consider cultural and social influences on pain and pain management  - Notify physician/advanced practitioner if interventions unsuccessful or patient reports new pain  Outcome: Progressing     Problem: INFECTION - ADULT  Goal: Absence or prevention of progression during hospitalization  Description: INTERVENTIONS:  - Assess and monitor for signs and symptoms of infection  - Monitor lab/diagnostic results  - Monitor all insertion sites, i.e. indwelling lines, tubes, and drains  - Monitor endotracheal if appropriate and nasal secretions for changes in amount and color  - [place] appropriate cooling/warming therapies per order  - Administer medications as ordered  - Instruct and encourage patient and family to use good hand hygiene technique  - Identify and instruct in appropriate isolation precautions for identified infection/condition  Outcome: Progressing  Goal: Absence of fever/infection during neutropenic period  Description: INTERVENTIONS:  - Monitor WBC    Outcome: Progressing     Problem: DISCHARGE PLANNING  Goal: Discharge to home or other facility with appropriate resources  Description: INTERVENTIONS:  - Identify barriers to discharge w/patient and caregiver  - Arrange for needed discharge resources and transportation as appropriate  - Identify discharge learning needs (meds, wound care, etc.)  - Arrange for interpretive services to assist at discharge as needed  - Refer to Case Management Department for coordinating discharge planning if the patient needs post-hospital services based on physician/advanced practitioner order or complex needs related to functional status, cognitive ability, or social support system  Outcome: Progressing     Problem: Knowledge Deficit  Goal: Patient/family/caregiver demonstrates understanding of disease process, treatment plan, medications, and discharge instructions  Description: Complete learning assessment and assess knowledge base.   Interventions:  - Provide teaching at level of understanding  - Provide teaching via preferred learning methods  Outcome: Progressing

## 2023-07-08 NOTE — PROGRESS NOTES
Progress Note - Infectious Disease   Glynn Jeronimo 57 y.o. male MRN: [id number]  Unit/Bed#: [facility] 921-01 Encounter: [id number]      Impression/Recommendations:  1.  Severe sepsis, present on admission, secondary to Rohan's gangrene.  Patient is clinically improved.  He still has leukocytosis but no further fever, tach, tachypnea or lactic acidosis.  He had mild hypotension on admission, which resolved and not requiring any pressors. Antibiotic plan as in below. Monitor temperature/WBC. Monitor hemodynamic.     2.  Rohan's gangrene, status post multiple I&D.  At last I&D yesterday, there was just fibrinous exudate and only mild purulence.  Operative cultures with Actinomyces and Prevotella. Growth of coagulase-negative Staphylococcus is likely colonization/contamination.  Patient has VAC in place. Vern Holter has minimal pain.  Patient is status post repeat I&D.  Today, still with some purulence and necrotic tissue.  We will continue IV antibiotic until no further purulence and necrotic tissue are seen at I&D. Continue IV Unasyn. Further I&D and wound VAC dressing change in OR per surgery. Eventual plan for STSG per surgery. When wound is clean, antibiotic can be transitioned to p.o. Augmentin. Treat x7 days after complete source control.     3.  DM, poorly controlled, with hyperglycemia and elevated hemoglobin A1c.  This contributes to infection above. Management per primary service.     Discussed with patient and his wife in detail regarding the above plan.     Antibiotics:  Unasyn  Antibiotic # 11     Subjective:  Patient is comfortable. Perineal pain mild, controlled. Temperature stays down.  No chills.   He is tolerating antibiotics well.  No nausea, vomiting or diarrhea.     Objective:  Vitals:  Temp:  [98.8 °F (37.1 °C)-99.2 °F (37.3 °C)] 98.9 °F (37.2 °C)  HR:  [67-79] 67  Resp:  [17-18] 17  BP: (125-148)/(62-72) 132/62  SpO2:  [94 %-96 %] 94 %  Temp (24hrs), Av °F (37.2 °C), Min:98.8 °F (37.1 °C), Max:99.2 °F (37.3 °C)  Current: Temperature: 98.9 °F (37.2 °C)    Physical Exam:     General: Awake, alert, cooperative, no distress. Neck:  Supple. No mass. No lymphadenopathy. Lungs: Expansion symmetric, no rales, no wheezing, respirations unlabored. Heart:  Regular rate and rhythm, S1 and S2 normal, no murmur. Abdomen: Soft, nondistended, non-tender, bowel sounds active all four quadrants, no masses, no organomegaly. :  Wound with VAC. Moderate serous drainage. No purulence. No erythema/warmth. Very mild tenderness. Extremities: No edema. No erythema/warmth. No ulcer. Nontender to palpation. Skin:  No rash. Neuro: Moves all extremities. Invasive Devices     Peripheral Intravenous Line  Duration           Peripheral IV 07/07/23 Distal;Left;Upper;Ventral (anterior) Arm 1 day          Drain  Duration           Ileostomy Loop LUQ 4 days    Suprapubic Catheter 18 Fr. 4 days                Labs studies:   I have personally reviewed pertinent labs. Results from last 7 days   Lab Units 07/08/23  0713 07/07/23  0753 07/06/23  0406   POTASSIUM mmol/L 3.9 3.7 4.5   CHLORIDE mmol/L 114* 113* 112*   CO2 mmol/L 26 29 28   BUN mg/dL 6 6 5   CREATININE mg/dL 0.55* 0.58* 0.57*   EGFR ml/min/1.73sq m 110 107 108   CALCIUM mg/dL 7.9* 8.0* 7.8*     Results from last 7 days   Lab Units 07/08/23  0713 07/07/23  0753 07/06/23  0439   WBC Thousand/uL 12.35* 12.53* 14.27*   HEMOGLOBIN g/dL 7.8* 7.9* 7.4*   PLATELETS Thousands/uL 373 385 375           Imaging Studies:   I have personally reviewed pertinent imaging study reports and images in PACS. EKG, Pathology, and Other Studies:   I have personally reviewed pertinent reports.

## 2023-07-08 NOTE — QUICK NOTE
Post-op Check - General Surgery  [place] Salts 57 y.o. male MRN: [id number]  Unit/Bed#: [facility] 921-01 Encounter: [id number]      Assessment:  57 y.o. 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 He endorses pain in the wound vac site. Has drained 70cc, seropurulent fluid. Tolerating liquids without nausea and vomiting. Has passed flatus and BM since procedure. Has also voided. Denies any blood in BM. Denies chest pain and SOB. No other complaints. Plan:  - monitor wound vac  - future debridement if needed.    - continue IV unasyn until wound is completely clean

## 2023-07-08 NOTE — OP NOTE
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 VAC change ([address] [place] [address] OUT)    Specimen(s):  * No specimens in log *    Estimated Blood Loss:   Minimal    Drains:  Ileostomy Loop LUQ (Active)   Stomal Appliance 1 piece 23 0840   Stoma Assessment Allgood 23 0840   Stoma Shape Round 23 0840   Peristomal Assessment FABBY 23 0840   Treatment Other (Comment) 23 0840   Output (mL) 50 mL 23 0840   Number of days: 4       Suprapubic Catheter 18 Fr. 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 He was transferred to the operating room table and positioned supine. General endotracheal anesthesia was induced successfully. The patient's perineal wound vac was removed including 4 Adaptic, 4 white sponges, 3 black sponges. The wound was prepped and draped in the usual sterile fashion. Pre-operative antibiotics were administered. A time out was performed confirming the patient, procedure, and site. All parties were in agreement.     Attention was then turned to the perineum where the wound was measured. Wound measurements 25 cm x 14 cm x 6cm. The wound was examined and noted to have fibrinous exudate at the base of the wound. There was some additional purulence noted in the ischial tracts bilaterally however no additional areas of excisional debridement were necessary. Non-excisional debridement was performed with a curette throughout the entire wound. The wound was irrigated with a Pulsavac using 3 L of normal saline. The testicles were then wrapped with 4 Adaptic. The wound VAC was placed using 4 white sponges, 3 black sponges, and covered with adhesive tape plus Ioban. The wound vac was set to 125 mmHg continuous     The patient was then allowed to awaken, extubated, and transferred to the PACU having tolerated the procedure well. All instrument, needle, and sponge counts were correct at the end of the case. Radiofrequency detection was negative.     Dr. Mundo Love was present for the entire procedure      Patient Disposition:  PACU         SIGNATURE: Kaylan Ellis DO  DATE: July 8, 2023  TIME: 4:57 PM

## 2023-07-08 NOTE — PLAN OF CARE
Problem: MOBILITY - ADULT  Goal: Maintain or return to baseline ADL function  Description: INTERVENTIONS:  -  Assess patient's ability to carry out ADLs; assess patient's baseline for ADL function and identify physical deficits which impact ability to perform ADLs (bathing, care of mouth/teeth, toileting, grooming, dressing, etc.)  - Assess/evaluate cause of self-care deficits   - Assess range of motion  - Assess patient's mobility; develop plan if impaired  - Assess patient's need for assistive devices and provide as appropriate  - Encourage maximum independence but intervene and supervise when necessary  - Involve family in performance of ADLs  - Assess for home care needs following discharge   - Consider OT consult to assist with ADL evaluation and planning for discharge  - Provide patient education as appropriate  Outcome: Progressing  Goal: Maintains/Returns to pre admission functional level  Description: INTERVENTIONS:  - Perform BMAT or MOVE assessment daily.   - Set and communicate daily mobility goal to care team and patient/family/caregiver.    - Collaborate with rehabilitation services on mobility goals if consulted  - Ambulate patient 3 times a day  - Out of bed to chair 3 times a day   - Out of bed for meals 3 times a day  - Out of bed for toileting  - Record patient progress and toleration of activity level   Outcome: Progressing     Problem: SKIN/TISSUE INTEGRITY - ADULT  Goal: Skin Integrity remains intact(Skin Breakdown Prevention)  Description: Assess:  -Perform Vinny assessment every shift  -Clean and moisturize skin every shift  -Inspect skin when repositioning, toileting, and assisting with ADLS  -Assess under medical devices such as oxygen tubing every shift  -Assess extremities for adequate circulation and sensation     Bed Management:  -Have minimal linens on bed & keep smooth, unwrinkled  -Change linens as needed when moist or perspiring  -Avoid sitting or lying in one position for more than 2 hours while in bed  -Keep HOB at 30 degrees     Toileting:  -Mobilize patient 3 times a day  -Encourage activity and walks on unit  -Encourage or provide ROM exercises   -Turn and reposition patient every 2 Hours  -Use appropriate equipment to lift or move patient in bed  -Instruct/ Assist with weight shifting every 15 min  when out of bed in chair  -Consider limitation of chair time one hour intervals    Skin Care:  -Avoid use of baby powder, tape, friction and shearing, hot water or constrictive clothing  -Relieve pressure over bony prominences using aleeyvn  -Do not massage red bony areas    Goal: Incision(s), wounds(s) or drain site(s) healing without S/S of infection  Description: INTERVENTIONS  - Assess and document dressing, incision, wound bed, drain sites and surrounding tissue  - Provide patient and family education  - Perform skin care/dressing changes every shift  Outcome: Progressing  Goal: Pressure injury heals and does not worsen  Description: Interventions:  - Implement low air loss mattress or specialty surface (Criteria met)  - Apply silicone foam dressing  - Instruct/assist with weight shifting every 15 minutes when in chair   - Limit chair time to one hour intervals  - Use special pressure reducing interventions such as waffle cushion when in chair   - Apply fecal or urinary incontinence containment device   - Turn and reposition patient & offload bony prominences every 2 hours   - Utilize friction reducing device or surface for transfers   - Consider nutrition services referral as needed  Outcome: Progressing     Problem: Prexisting or High Potential for Compromised Skin Integrity  Goal: Skin integrity is maintained or improved  Description: INTERVENTIONS:  - Identify patients at risk for skin breakdown  - Assess and monitor skin integrity  - Assess and monitor nutrition and hydration status  - Monitor labs   - Assess for incontinence   - Turn and reposition patient  - Assist with mobility/ambulation  - Relieve pressure over bony prominences  - Avoid friction and shearing  - Provide appropriate hygiene as needed including keeping skin clean and dry  - Evaluate need for skin moisturizer/barrier cream  - Collaborate with interdisciplinary team   - Patient/family teaching  - Consider wound care consult   Outcome: Progressing     Problem: Nutrition/Hydration-ADULT  Goal: Nutrient/Hydration intake appropriate for improving, restoring or maintaining nutritional needs  Description: Monitor and assess patient's nutrition/hydration status for malnutrition. Collaborate with interdisciplinary team and initiate plan and interventions as ordered. Monitor patient's weight and dietary intake as ordered or per policy. Utilize nutrition screening tool and intervene as necessary. Determine patient's food preferences and provide high-protein, high-caloric foods as appropriate.      INTERVENTIONS:  - Monitor oral intake, urinary output, labs, and treatment plans  - Assess nutrition and hydration status and recommend course of action  - Evaluate amount of meals eaten  - Assist patient with eating if necessary   - Allow adequate time for meals  - Recommend/ encourage appropriate diets, oral nutritional supplements, and vitamin/mineral supplements  - Order, calculate, and assess calorie counts as needed  - Recommend, monitor, and adjust tube feedings and TPN/PPN based on assessed needs  - Assess need for intravenous fluids  - Provide specific nutrition/hydration education as appropriate  - Include patient/family/caregiver in decisions related to nutrition  Outcome: Progressing     Problem: PAIN - ADULT  Goal: Verbalizes/displays adequate comfort level or baseline comfort level  Description: Interventions:  - Encourage patient to monitor pain and request assistance  - Assess pain using appropriate pain scale  - Administer analgesics based on type and severity of pain and evaluate response  - Implement non-pharmacological measures as appropriate and evaluate response  - Consider cultural and social influences on pain and pain management  - Notify physician/advanced practitioner if interventions unsuccessful or patient reports new pain  Outcome: Progressing     Problem: INFECTION - ADULT  Goal: Absence or prevention of progression during hospitalization  Description: INTERVENTIONS:  - Assess and monitor for signs and symptoms of infection  - Monitor lab/diagnostic results  - Monitor all insertion sites, i.e. indwelling lines, tubes, and drains  - Monitor endotracheal if appropriate and nasal secretions for changes in amount and color  - [place] appropriate cooling/warming therapies per order  - Administer medications as ordered  - Instruct and encourage patient and family to use good hand hygiene technique  - Identify and instruct in appropriate isolation precautions for identified infection/condition  Outcome: Progressing  Goal: Absence of fever/infection during neutropenic period  Description: INTERVENTIONS:  - Monitor WBC    Outcome: Progressing     Problem: SAFETY ADULT  Goal: Maintain or return to baseline ADL function  Description: INTERVENTIONS:  -  Assess patient's ability to carry out ADLs; assess patient's baseline for ADL function and identify physical deficits which impact ability to perform ADLs (bathing, care of mouth/teeth, toileting, grooming, dressing, etc.)  - Assess/evaluate cause of self-care deficits   - Assess range of motion  - Assess patient's mobility; develop plan if impaired  - Assess patient's need for assistive devices and provide as appropriate  - Encourage maximum independence but intervene and supervise when necessary  - Involve family in performance of ADLs  - Assess for home care needs following discharge   - Consider OT consult to assist with ADL evaluation and planning for discharge  - Provide patient education as appropriate  Outcome: Progressing  Goal: Maintains/Returns to pre admission functional level  Description: INTERVENTIONS:  - Perform BMAT or MOVE assessment daily.   - Set and communicate daily mobility goal to care team and patient/family/caregiver.    - Collaborate with rehabilitation services on mobility goals if consulted  - Ambulate patient 3 times a day  - Out of bed to chair 3 times a day   - Out of bed for meals 3 times a day  - Out of bed for toileting  - Record patient progress and toleration of activity level   Outcome: Progressing  Goal: Patient will remain free of falls  Description: INTERVENTIONS:  - Educate patient/family on patient safety including physical limitations  - Instruct patient to call for assistance with activity   - Consult OT/PT to assist with strengthening/mobility   - Keep Call bell within reach  - Keep bed low and locked with side rails adjusted as appropriate  - Keep care items and personal belongings within reach  - Initiate and maintain comfort rounds  - Make Fall Risk Sign visible to staff  - Offer Toileting every 2 Hours, in advance of need  - Initiate/Maintain bed alarm  - Obtain necessary fall risk management equipment  - Apply yellow socks and bracelet for high fall risk patients  - Consider moving patient to room near nurses station  Outcome: Progressing     Problem: DISCHARGE PLANNING  Goal: Discharge to home or other facility with appropriate resources  Description: INTERVENTIONS:  - Identify barriers to discharge w/patient and caregiver  - Arrange for needed discharge resources and transportation as appropriate  - Identify discharge learning needs (meds, wound care, etc.)  - Arrange for interpretive services to assist at discharge as needed  - Refer to Case Management Department for coordinating discharge planning if the patient needs post-hospital services based on physician/advanced practitioner order or complex needs related to functional status, cognitive ability, or social support system  Outcome: Progressing     Problem: Knowledge Deficit  Goal: Patient/family/caregiver demonstrates understanding of disease process, treatment plan, medications, and discharge instructions  Description: Complete learning assessment and assess knowledge base.   Interventions:  - Provide teaching at level of understanding  - Provide teaching via preferred learning methods  Outcome: Progressing

## 2023-07-09 LAB
ANION GAP SERPL CALCULATED.3IONS-SCNC: 3 MMOL/L
BASOPHILS # BLD AUTO: 0.02 THOUSANDS/ÂΜL (ref 0–0.1)
BASOPHILS NFR BLD AUTO: 0 % (ref 0–1)
BUN SERPL-MCNC: 7 MG/DL (ref 5–25)
CALCIUM SERPL-MCNC: 8.2 MG/DL (ref 8.3–10.1)
CHLORIDE SERPL-SCNC: 112 MMOL/L (ref 96–108)
CO2 SERPL-SCNC: 26 MMOL/L (ref 21–32)
CREAT SERPL-MCNC: 0.61 MG/DL (ref 0.6–1.3)
EOSINOPHIL # BLD AUTO: 0.01 THOUSAND/ÂΜL (ref 0–0.61)
EOSINOPHIL NFR BLD AUTO: 0 % (ref 0–6)
ERYTHROCYTE [DISTWIDTH] IN BLOOD BY AUTOMATED COUNT: 15.4 % (ref 11.6–15.1)
GFR SERPL CREATININE-BSD FRML MDRD: 105 ML/MIN/1.73SQ M
GLUCOSE SERPL-MCNC: 139 MG/DL (ref 65–140)
GLUCOSE SERPL-MCNC: 157 MG/DL (ref 65–140)
GLUCOSE SERPL-MCNC: 184 MG/DL (ref 65–140)
GLUCOSE SERPL-MCNC: 191 MG/DL (ref 65–140)
GLUCOSE SERPL-MCNC: 220 MG/DL (ref 65–140)
HCT VFR BLD AUTO: 24.6 % (ref 36.5–49.3)
HGB BLD-MCNC: 7.8 G/DL (ref 12–17)
IMM GRANULOCYTES # BLD AUTO: 0.2 THOUSAND/UL (ref 0–0.2)
IMM GRANULOCYTES NFR BLD AUTO: 2 % (ref 0–2)
LYMPHOCYTES # BLD AUTO: 2.1 THOUSANDS/ÂΜL (ref 0.6–4.47)
LYMPHOCYTES NFR BLD AUTO: 16 % (ref 14–44)
MAGNESIUM SERPL-MCNC: 2.3 MG/DL (ref 1.6–2.6)
MCH RBC QN AUTO: 33.1 PG (ref 26.8–34.3)
MCHC RBC AUTO-ENTMCNC: 31.7 G/DL (ref 31.4–37.4)
MCV RBC AUTO: 104 FL (ref 82–98)
MONOCYTES # BLD AUTO: 0.97 THOUSAND/ÂΜL (ref 0.17–1.22)
MONOCYTES NFR BLD AUTO: 8 % (ref 4–12)
NEUTROPHILS # BLD AUTO: 9.59 THOUSANDS/ÂΜL (ref 1.85–7.62)
NEUTS SEG NFR BLD AUTO: 74 % (ref 43–75)
NRBC BLD AUTO-RTO: 0 /100 WBCS
PHOSPHATE SERPL-MCNC: 2.9 MG/DL (ref 2.3–4.1)
PLATELET # BLD AUTO: 399 THOUSANDS/UL (ref 149–390)
PMV BLD AUTO: 9.2 FL (ref 8.9–12.7)
POTASSIUM SERPL-SCNC: 4.3 MMOL/L (ref 3.5–5.3)
RBC # BLD AUTO: 2.36 MILLION/UL (ref 3.88–5.62)
SODIUM SERPL-SCNC: 141 MMOL/L (ref 135–147)
WBC # BLD AUTO: 12.89 THOUSAND/UL (ref 4.31–10.16)

## 2023-07-09 PROCEDURE — 80048 BASIC METABOLIC PNL TOTAL CA: CPT

## 2023-07-09 PROCEDURE — 99232 SBSQ HOSP IP/OBS MODERATE 35: CPT | Performed by: INTERNAL MEDICINE

## 2023-07-09 PROCEDURE — 85025 COMPLETE CBC W/AUTO DIFF WBC: CPT

## 2023-07-09 PROCEDURE — 83735 ASSAY OF MAGNESIUM: CPT

## 2023-07-09 PROCEDURE — 82948 REAGENT STRIP/BLOOD GLUCOSE: CPT

## 2023-07-09 PROCEDURE — 99024 POSTOP FOLLOW-UP VISIT: CPT | Performed by: SURGERY

## 2023-07-09 PROCEDURE — 84100 ASSAY OF PHOSPHORUS: CPT

## 2023-07-09 RX ORDER — INSULIN LISPRO 100 [IU]/ML
4-20 INJECTION, SOLUTION INTRAVENOUS; SUBCUTANEOUS
Status: DISCONTINUED | OUTPATIENT
Start: 2023-07-09 | End: 2023-07-14 | Stop reason: HOSPADM

## 2023-07-09 RX ADMIN — SODIUM CHLORIDE 3 G: 9 INJECTION, SOLUTION INTRAVENOUS at 15:13

## 2023-07-09 RX ADMIN — OXYCODONE HYDROCHLORIDE 10 MG: 10 TABLET ORAL at 15:07

## 2023-07-09 RX ADMIN — SODIUM CHLORIDE 3 G: 9 INJECTION, SOLUTION INTRAVENOUS at 04:25

## 2023-07-09 RX ADMIN — SODIUM CHLORIDE 3 G: 9 INJECTION, SOLUTION INTRAVENOUS at 22:44

## 2023-07-09 RX ADMIN — INSULIN LISPRO 2 UNITS: 100 INJECTION, SOLUTION INTRAVENOUS; SUBCUTANEOUS at 22:45

## 2023-07-09 RX ADMIN — ENOXAPARIN SODIUM 40 MG: 40 INJECTION SUBCUTANEOUS at 08:51

## 2023-07-09 RX ADMIN — MELATONIN TAB 3 MG 3 MG: 3 TAB at 22:44

## 2023-07-09 RX ADMIN — INSULIN GLARGINE 8 UNITS: 100 INJECTION, SOLUTION SUBCUTANEOUS at 22:56

## 2023-07-09 RX ADMIN — QUETIAPINE 25 MG: 25 TABLET ORAL at 22:44

## 2023-07-09 RX ADMIN — INSULIN LISPRO 4 UNITS: 100 INJECTION, SOLUTION INTRAVENOUS; SUBCUTANEOUS at 15:03

## 2023-07-09 RX ADMIN — SENNOSIDES AND DOCUSATE SODIUM 1 TABLET: 50; 8.6 TABLET ORAL at 18:20

## 2023-07-09 RX ADMIN — SENNOSIDES AND DOCUSATE SODIUM 1 TABLET: 50; 8.6 TABLET ORAL at 08:51

## 2023-07-09 RX ADMIN — ACETAMINOPHEN 975 MG: 325 TABLET, FILM COATED ORAL at 22:44

## 2023-07-09 RX ADMIN — INSULIN LISPRO 4 UNITS: 100 INJECTION, SOLUTION INTRAVENOUS; SUBCUTANEOUS at 08:53

## 2023-07-09 RX ADMIN — ACETAMINOPHEN 975 MG: 325 TABLET, FILM COATED ORAL at 15:04

## 2023-07-09 NOTE — PROGRESS NOTES
Progress Note - Infectious Disease   Matt Fisher 57 y.o. male MRN: [id number]  Unit/Bed#: [facility] 921-01 Encounter: [id number]      Impression/Recommendations:  1.  Severe sepsis, present on admission, secondary to Rohan's gangrene.  Patient is clinically improved.  He still has leukocytosis but no further fever, tach, tachypnea or lactic acidosis.  He had mild hypotension on admission, which resolved and not requiring any pressors. Antibiotic plan as in below. Monitor temperature/WBC. Monitor hemodynamic.     2.  Rohan's gangrene, status post multiple I&D.  At last I&D yesterday, there was just fibrinous exudate and only mild purulence.  Operative cultures with Actinomyces and Prevotella. Growth of coagulase-negative Staphylococcus is likely colonization/contamination.  Patient has VAC in place. Suann Brittle has minimal pain.  Patient is status post repeat I&D yesterday, with only a small amount of purulence and no necrotic tissue. Beatris Osuna will continue IV antibiotic until no further purulence and necrotic tissue are seen at I&D. Continue IV Unasyn. Further I&D and wound VAC dressing change in OR per surgery. Eventual plan for STSG per surgery. When wound has no further purulence and necrosis, antibiotic can be transitioned to p.o. Augmentin. Treat x7 days after complete source control.     3.  DM, poorly controlled, with hyperglycemia and elevated hemoglobin A1c.  This contributes to infection above. Management per primary service.     Discussed with patient and his wife in detail regarding the above plan.     Antibiotics:  Unasyn  Antibiotic # 12     Subjective:  Patient is comfortable. Perineal pain mild, controlled. Temperature stays down.  No chills.   He is tolerating antibiotics well.  No nausea, vomiting or diarrhea.     Objective:  Vitals:  Temp:  [97.9 °F (36.6 °C)-98.7 °F (37.1 °C)] 98.7 °F (37.1 °C)  HR:  [69-87] 87  Resp:  [19-21] 19  BP: (132-143)/(54-86) 134/54  SpO2:  [89 %-99 %] 94 %  Temp (24hrs), Av.2 °F (36.8 °C), Min:97.9 °F (36.6 °C), Max:98.7 °F (37.1 °C)  Current: Temperature: 98.7 °F (37.1 °C)    Physical Exam:     General: Awake, alert, cooperative, no distress. Neck:  Supple. No mass. No lymphadenopathy. Lungs: Expansion symmetric, no rales, no wheezing, respirations unlabored. Heart:  Regular rate and rhythm, S1 and S2 normal, no murmur. Abdomen: Soft, nondistended, non-tender, bowel sounds active all four quadrants, no masses, no organomegaly. :  Wound with VAC. Moderate serous fluid but no purulence. No erythema/warmth. Mild tenderness. Extremities: No edema. No erythema/warmth. No ulcer. Nontender to palpation. Skin:  No rash. Neuro: Moves all extremities. Invasive Devices     Drain  Duration           Ileostomy Loop LUQ 5 days    Suprapubic Catheter 18 Fr. 5 days                Labs studies:   I have personally reviewed pertinent labs. Results from last 7 days   Lab Units 2315 2313 23  0753   POTASSIUM mmol/L 4.3 3.9 3.7   CHLORIDE mmol/L 112* 114* 113*   CO2 mmol/L 26 26 29   BUN mg/dL 7 6 6   CREATININE mg/dL 0.61 0.55* 0.58*   EGFR ml/min/1.73sq m 105 110 107   CALCIUM mg/dL 8.2* 7.9* 8.0*     Results from last 7 days   Lab Units 2313 23  0753   WBC Thousand/uL 12.89* 12.35* 12.53*   HEMOGLOBIN g/dL 7.8* 7.8* 7.9*   PLATELETS Thousands/uL 399* 373 385           Imaging Studies:   I have personally reviewed pertinent imaging study reports and images in PACS. EKG, Pathology, and Other Studies:   I have personally reviewed pertinent reports.

## 2023-07-09 NOTE — PLAN OF CARE
Problem: MOBILITY - ADULT  Goal: Maintain or return to baseline ADL function  Description: INTERVENTIONS:  -  Assess patient's ability to carry out ADLs; assess patient's baseline for ADL function and identify physical deficits which impact ability to perform ADLs (bathing, care of mouth/teeth, toileting, grooming, dressing, etc.)  - Assess/evaluate cause of self-care deficits   - Assess range of motion  - Assess patient's mobility; develop plan if impaired  - Assess patient's need for assistive devices and provide as appropriate  - Encourage maximum independence but intervene and supervise when necessary  - Involve family in performance of ADLs  - Assess for home care needs following discharge   - Consider OT consult to assist with ADL evaluation and planning for discharge  - Provide patient education as appropriate  Outcome: Progressing  Goal: Maintains/Returns to pre admission functional level  Description: INTERVENTIONS:  - Perform BMAT or MOVE assessment daily.   - Set and communicate daily mobility goal to care team and patient/family/caregiver. - Collaborate with rehabilitation services on mobility goals if consulted  - Perform Range of Motion 2 times a day. - Reposition patient every 2 hours.   - Dangle patient 2 times a day  - Stand patient 2 times a day  - Ambulate patient 2 times a day  - Out of bed to chair 2 times a day   - Out of bed for meals 2 times a day  - Out of bed for toileting  - Record patient progress and toleration of activity level   Outcome: Progressing     Problem: SKIN/TISSUE INTEGRITY - ADULT  Goal: Skin Integrity remains intact(Skin Breakdown Prevention)  Description: Assess:  -Perform Vinny assessment every 2  -Clean and moisturize skin every 2  -Inspect skin when repositioning, toileting, and assisting with ADLS  -Assess under medical devices such as 2 every 2  -Assess extremities for adequate circulation and sensation     Bed Management:  -Have minimal linens on bed & keep smooth, unwrinkled  -Change linens as needed when moist or perspiring  -Avoid sitting or lying in one position for more than 2 hours while in bed      Toileting:  -Offer bedside commode  -Assess for incontinence every 2  -Use incontinent care products after each incontinent episode such as 2    Activity:  -Mobilize patient 2 times a day  -Encourage activity and walks on unit  -Encourage or provide ROM exercises   -Turn and reposition patient every 2 Hours  -Use appropriate equipment to lift or move patient in bed  -Instruct/ Assist with weight shifting every 2 when out of bed in chair  -Consider limitation of chair time 2 hour intervals    Skin Care:  -Avoid use of baby powder, tape, friction and shearing, hot water or constrictive clothing  -Relieve pressure over bony prominences using 2  -Do not massage red bony areas    Next Steps:  -Teach patient strategies to minimize risks such as 2   -Consider consults to  interdisciplinary teams such as 2  Outcome: Progressing  Goal: Incision(s), wounds(s) or drain site(s) healing without S/S of infection  Description: INTERVENTIONS  - Assess and document dressing, incision, wound bed, drain sites and surrounding tissue  - Provide patient and family education  - Perform skin care/dressing changes every 2  Outcome: Progressing  Goal: Pressure injury heals and does not worsen  Description: Interventions:  - Implement low air loss mattress or specialty surface (Criteria met)  - Apply silicone foam dressing  - Instruct/assist with weight shifting every 2 minutes when in chair   - Limit chair time to 2 hour intervals  - Use special pressure reducing interventions such as 2 when in chair   - Apply fecal or urinary incontinence containment device   - Perform passive or active ROM every 2  - Turn and reposition patient & offload bony prominences every 2 hours   - Utilize friction reducing device or surface for transfers   - Consider consults to  interdisciplinary teams such as 2  - Use incontinent care products after each incontinent episode such as 2  - Consider nutrition services referral as needed  Outcome: Progressing     Problem: Prexisting or High Potential for Compromised Skin Integrity  Goal: Skin integrity is maintained or improved  Description: INTERVENTIONS:  - Identify patients at risk for skin breakdown  - Assess and monitor skin integrity  - Assess and monitor nutrition and hydration status  - Monitor labs   - Assess for incontinence   - Turn and reposition patient  - Assist with mobility/ambulation  - Relieve pressure over bony prominences  - Avoid friction and shearing  - Provide appropriate hygiene as needed including keeping skin clean and dry  - Evaluate need for skin moisturizer/barrier cream  - Collaborate with interdisciplinary team   - Patient/family teaching  - Consider wound care consult   Outcome: Progressing     Problem: Nutrition/Hydration-ADULT  Goal: Nutrient/Hydration intake appropriate for improving, restoring or maintaining nutritional needs  Description: Monitor and assess patient's nutrition/hydration status for malnutrition. Collaborate with interdisciplinary team and initiate plan and interventions as ordered. Monitor patient's weight and dietary intake as ordered or per policy. Utilize nutrition screening tool and intervene as necessary. Determine patient's food preferences and provide high-protein, high-caloric foods as appropriate.      INTERVENTIONS:  - Monitor oral intake, urinary output, labs, and treatment plans  - Assess nutrition and hydration status and recommend course of action  - Evaluate amount of meals eaten  - Assist patient with eating if necessary   - Allow adequate time for meals  - Recommend/ encourage appropriate diets, oral nutritional supplements, and vitamin/mineral supplements  - Order, calculate, and assess calorie counts as needed  - Recommend, monitor, and adjust tube feedings and TPN/PPN based on assessed needs  - Assess need for intravenous fluids  - Provide specific nutrition/hydration education as appropriate  - Include patient/family/caregiver in decisions related to nutrition  Outcome: Progressing     Problem: PAIN - ADULT  Goal: Verbalizes/displays adequate comfort level or baseline comfort level  Description: Interventions:  - Encourage patient to monitor pain and request assistance  - Assess pain using appropriate pain scale  - Administer analgesics based on type and severity of pain and evaluate response  - Implement non-pharmacological measures as appropriate and evaluate response  - Consider cultural and social influences on pain and pain management  - Notify physician/advanced practitioner if interventions unsuccessful or patient reports new pain  Outcome: Progressing     Problem: INFECTION - ADULT  Goal: Absence or prevention of progression during hospitalization  Description: INTERVENTIONS:  - Assess and monitor for signs and symptoms of infection  - Monitor lab/diagnostic results  - Monitor all insertion sites, i.e. indwelling lines, tubes, and drains  - Monitor endotracheal if appropriate and nasal secretions for changes in amount and color  - [place] appropriate cooling/warming therapies per order  - Administer medications as ordered  - Instruct and encourage patient and family to use good hand hygiene technique  - Identify and instruct in appropriate isolation precautions for identified infection/condition  Outcome: Progressing  Goal: Absence of fever/infection during neutropenic period  Description: INTERVENTIONS:  - Monitor WBC    Outcome: Progressing     Problem: SAFETY ADULT  Goal: Maintain or return to baseline ADL function  Description: INTERVENTIONS:  -  Assess patient's ability to carry out ADLs; assess patient's baseline for ADL function and identify physical deficits which impact ability to perform ADLs (bathing, care of mouth/teeth, toileting, grooming, dressing, etc.)  - Assess/evaluate cause of self-care deficits   - Assess range of motion  - Assess patient's mobility; develop plan if impaired  - Assess patient's need for assistive devices and provide as appropriate  - Encourage maximum independence but intervene and supervise when necessary  - Involve family in performance of ADLs  - Assess for home care needs following discharge   - Consider OT consult to assist with ADL evaluation and planning for discharge  - Provide patient education as appropriate  Outcome: Progressing  Goal: Patient will remain free of falls  Description: INTERVENTIONS:  - Educate patient/family on patient safety including physical limitations  - Instruct patient to call for assistance with activity   - Consult OT/PT to assist with strengthening/mobility   - Keep Call bell within reach  - Keep bed low and locked with side rails adjusted as appropriate  - Keep care items and personal belongings within reach  - Initiate and maintain comfort rounds  - Make Fall Risk Sign visible to staff  - Offer Toileting every 2 Hours, in advance of need  - Initiate/Maintain 2alarm  - Obtain necessary fall risk management equipment: 2  - Apply yellow socks and bracelet for high fall risk patients  - Consider moving patient to room near nurses station  Outcome: Progressing     Problem: DISCHARGE PLANNING  Goal: Discharge to home or other facility with appropriate resources  Description: INTERVENTIONS:  - Identify barriers to discharge w/patient and caregiver  - Arrange for needed discharge resources and transportation as appropriate  - Identify discharge learning needs (meds, wound care, etc.)  - Arrange for interpretive services to assist at discharge as needed  - Refer to Case Management Department for coordinating discharge planning if the patient needs post-hospital services based on physician/advanced practitioner order or complex needs related to functional status, cognitive ability, or social support system  Outcome: Progressing     Problem: Knowledge Deficit  Goal: Patient/family/caregiver demonstrates understanding of disease process, treatment plan, medications, and discharge instructions  Description: Complete learning assessment and assess knowledge base.   Interventions:  - Provide teaching at level of understanding  - Provide teaching via preferred learning methods  Outcome: Progressing

## 2023-07-09 NOTE — PROGRESS NOTES
Progress Note - General Surgery  Marshall Fan 57 y.o. male MRN: [id number]  Unit/Bed#: [facility] 921-01 Encounter: [id number]      Assessment:  57 y.o. 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 Drain output 200cc since last check (250cc total), serosanguinous. Suprapubic cath draining urine, yellow, nonbloody. Ostomy bag filled with gas and BM    Plan:  - future debridement and wound vac change OR likely 7/10  - pain control and   - antibiotics per ID recs, IV unasyn  - continue insulin regimen for DM      Subjective: no acute events, and denies pain. Endorses discomfort in perineum, but not pain. Has been tolerating liquids without difficulty, nausea, or vomiting. Has passed BM and flatus. Voiding with catheter. Has been ambulating around room. Objective:   Temp:  [97.9 °F (36.6 °C)-98.7 °F (37.1 °C)] 98.7 °F (37.1 °C)  HR:  [69-87] 87  Resp:  [19-21] 19  BP: (132-143)/(54-86) 134/54    Physical Exam:  General: No acute distress, alert and oriented  CV: Well perfused, regular rate  Lungs: Normal work of breathing, no increased respiratory effort  Abdomen: Soft, non-tender, non-distended. Rectal: has wound vac, draining to suction. Extremities: No edema, clubbing or cyanosis  Skin: Warm, dry      I/O       07/07 0701  07/08 0700 07/08 0701  07/09 0700 07/09 0701  07/10 0700    P. O. 0 80     I.V. (mL/kg) 1251.7 (13.6) 1400 (15.2)     IV Piggyback       Total Intake(mL/kg) 1251.7 (13.6) 1480 (16.1)     Urine (mL/kg/hr) 1400 (0.6) 1575 (0.7)     Emesis/NG output 0      Drains 750 100     Stool 250 225     Blood  5     Total Output 2400 1905     Net -1148.3 -425                  Lab, Imaging and other studies: I have personally reviewed pertinent reports.   , CBC with diff:   Lab Results   Component Value Date    WBC 12.89 (H) 07/09/2023    HGB 7.8 (L) 07/09/2023    HCT 24.6 (L) 07/09/2023     (H) 07/09/2023     (H) 07/09/2023    RBC 2.36 (L) 07/09/2023    MCH 33.1 07/09/2023    MCHC 31.7 07/09/2023    RDW 15.4 (H) 07/09/2023    MPV 9.2 07/09/2023    NRBC 0 07/09/2023   , BMP/CMP:   Lab Results   Component Value Date    SODIUM 141 07/09/2023    K 4.3 07/09/2023     (H) 07/09/2023    CO2 26 07/09/2023    BUN 7 07/09/2023    CREATININE 0.61 07/09/2023    CALCIUM 8.2 (L) 07/09/2023    EGFR 105 07/09/2023         #Disposition 07/09/23    #Diet:        Diet Orders   (From admission, onward)             Start     Ordered    07/10/23 0001  Diet NPO; Sips with meds  Diet effective midnight        References:    Adult Nutrition Support Algorithm    RD Therapeutic Diet Order Protocol   Question Answer Comment   Diet Type NPO    NPO Except: Sips with meds    RD to adjust diet per protocol? Yes        07/08/23 1709    07/08/23 1707  Diet Luis Carlos/CHO Controlled; Consistent Carbohydrate Diet Level 2 (5 carb servings/75 grams CHO/meal)  Diet effective midnight        References:    Adult Nutrition Support Algorithm    RD Therapeutic Diet Order Protocol   Question Answer Comment   Diet Type Luis Carlos/CHO Controlled    Luis Carlos/CHO Controlled Consistent Carbohydrate Diet Level 2 (5 carb servings/75 grams CHO/meal)    RD to adjust diet per protocol?  Yes        07/08/23 1706    07/06/23 1818  Dietary nutrition supplements  Once        Question Answer Comment   Select Supplement: Glucerna-Chocolate    Frequency Breakfast, Lunch, Dinner        07/06/23 1817               #DVT Prophylaxis: lovenox 40mg SC    Meryle Roberts, MS4  General Surgery Service

## 2023-07-10 ENCOUNTER — ANESTHESIA EVENT (INPATIENT)
Dept: PERIOP | Facility: HOSPITAL | Age: 63
DRG: 709 | End: 2023-07-10
Payer: COMMERCIAL

## 2023-07-10 ENCOUNTER — ANESTHESIA (INPATIENT)
Dept: PERIOP | Facility: HOSPITAL | Age: 63
DRG: 709 | End: 2023-07-10
Payer: COMMERCIAL

## 2023-07-10 LAB
ANION GAP SERPL CALCULATED.3IONS-SCNC: 2 MMOL/L
BASOPHILS # BLD AUTO: 0.03 THOUSANDS/ÂΜL (ref 0–0.1)
BASOPHILS NFR BLD AUTO: 0 % (ref 0–1)
BUN SERPL-MCNC: 5 MG/DL (ref 5–25)
CALCIUM SERPL-MCNC: 8.1 MG/DL (ref 8.3–10.1)
CHLORIDE SERPL-SCNC: 114 MMOL/L (ref 96–108)
CO2 SERPL-SCNC: 29 MMOL/L (ref 21–32)
CREAT SERPL-MCNC: 0.62 MG/DL (ref 0.6–1.3)
EOSINOPHIL # BLD AUTO: 0.05 THOUSAND/ÂΜL (ref 0–0.61)
EOSINOPHIL NFR BLD AUTO: 0 % (ref 0–6)
ERYTHROCYTE [DISTWIDTH] IN BLOOD BY AUTOMATED COUNT: 15.3 % (ref 11.6–15.1)
GFR SERPL CREATININE-BSD FRML MDRD: 104 ML/MIN/1.73SQ M
GLUCOSE SERPL-MCNC: 104 MG/DL (ref 65–140)
GLUCOSE SERPL-MCNC: 113 MG/DL (ref 65–140)
GLUCOSE SERPL-MCNC: 114 MG/DL (ref 65–140)
GLUCOSE SERPL-MCNC: 121 MG/DL (ref 65–140)
GLUCOSE SERPL-MCNC: 224 MG/DL (ref 65–140)
HCT VFR BLD AUTO: 24.4 % (ref 36.5–49.3)
HGB BLD-MCNC: 7.3 G/DL (ref 12–17)
IMM GRANULOCYTES # BLD AUTO: 0.16 THOUSAND/UL (ref 0–0.2)
IMM GRANULOCYTES NFR BLD AUTO: 1 % (ref 0–2)
LYMPHOCYTES # BLD AUTO: 2.3 THOUSANDS/ÂΜL (ref 0.6–4.47)
LYMPHOCYTES NFR BLD AUTO: 20 % (ref 14–44)
MCH RBC QN AUTO: 32.2 PG (ref 26.8–34.3)
MCHC RBC AUTO-ENTMCNC: 29.9 G/DL (ref 31.4–37.4)
MCV RBC AUTO: 108 FL (ref 82–98)
MONOCYTES # BLD AUTO: 0.96 THOUSAND/ÂΜL (ref 0.17–1.22)
MONOCYTES NFR BLD AUTO: 8 % (ref 4–12)
NEUTROPHILS # BLD AUTO: 8.16 THOUSANDS/ÂΜL (ref 1.85–7.62)
NEUTS SEG NFR BLD AUTO: 71 % (ref 43–75)
NRBC BLD AUTO-RTO: 0 /100 WBCS
PLATELET # BLD AUTO: 404 THOUSANDS/UL (ref 149–390)
PMV BLD AUTO: 9.1 FL (ref 8.9–12.7)
POTASSIUM SERPL-SCNC: 3.7 MMOL/L (ref 3.5–5.3)
RBC # BLD AUTO: 2.27 MILLION/UL (ref 3.88–5.62)
SODIUM SERPL-SCNC: 145 MMOL/L (ref 135–147)
WBC # BLD AUTO: 11.66 THOUSAND/UL (ref 4.31–10.16)

## 2023-07-10 PROCEDURE — 0JBB0ZZ EXCISION OF PERINEUM SUBCUTANEOUS TISSUE AND FASCIA, OPEN APPROACH: ICD-10-PCS | Performed by: SURGERY

## 2023-07-10 PROCEDURE — 0JQB0ZZ REPAIR PERINEUM SUBCUTANEOUS TISSUE AND FASCIA, OPEN APPROACH: ICD-10-PCS | Performed by: SURGERY

## 2023-07-10 PROCEDURE — NC001 PR NO CHARGE: Performed by: STUDENT IN AN ORGANIZED HEALTH CARE EDUCATION/TRAINING PROGRAM

## 2023-07-10 PROCEDURE — 80048 BASIC METABOLIC PNL TOTAL CA: CPT | Performed by: SURGERY

## 2023-07-10 PROCEDURE — 99232 SBSQ HOSP IP/OBS MODERATE 35: CPT | Performed by: INTERNAL MEDICINE

## 2023-07-10 PROCEDURE — 85025 COMPLETE CBC W/AUTO DIFF WBC: CPT | Performed by: SURGERY

## 2023-07-10 PROCEDURE — 11004 DBRDMT SKIN XTRNL GENT&PER: CPT | Performed by: SURGERY

## 2023-07-10 PROCEDURE — 99024 POSTOP FOLLOW-UP VISIT: CPT | Performed by: SURGERY

## 2023-07-10 PROCEDURE — 97606 NEG PRS WND THER DME>50 SQCM: CPT | Performed by: SURGERY

## 2023-07-10 PROCEDURE — 82948 REAGENT STRIP/BLOOD GLUCOSE: CPT

## 2023-07-10 RX ORDER — POTASSIUM CHLORIDE 20 MEQ/1
40 TABLET, EXTENDED RELEASE ORAL ONCE
Status: COMPLETED | OUTPATIENT
Start: 2023-07-10 | End: 2023-07-10

## 2023-07-10 RX ORDER — SODIUM CHLORIDE, SODIUM LACTATE, POTASSIUM CHLORIDE, CALCIUM CHLORIDE 600; 310; 30; 20 MG/100ML; MG/100ML; MG/100ML; MG/100ML
INJECTION, SOLUTION INTRAVENOUS CONTINUOUS PRN
Status: DISCONTINUED | OUTPATIENT
Start: 2023-07-10 | End: 2023-07-10

## 2023-07-10 RX ORDER — LIDOCAINE HYDROCHLORIDE 20 MG/ML
INJECTION, SOLUTION EPIDURAL; INFILTRATION; INTRACAUDAL; PERINEURAL AS NEEDED
Status: DISCONTINUED | OUTPATIENT
Start: 2023-07-10 | End: 2023-07-10

## 2023-07-10 RX ORDER — FENTANYL CITRATE/PF 50 MCG/ML
50 SYRINGE (ML) INJECTION
Status: DISCONTINUED | OUTPATIENT
Start: 2023-07-10 | End: 2023-07-10 | Stop reason: HOSPADM

## 2023-07-10 RX ORDER — DEXAMETHASONE SODIUM PHOSPHATE 10 MG/ML
INJECTION, SOLUTION INTRAMUSCULAR; INTRAVENOUS AS NEEDED
Status: DISCONTINUED | OUTPATIENT
Start: 2023-07-10 | End: 2023-07-10

## 2023-07-10 RX ORDER — ONDANSETRON 2 MG/ML
4 INJECTION INTRAMUSCULAR; INTRAVENOUS ONCE AS NEEDED
Status: DISCONTINUED | OUTPATIENT
Start: 2023-07-10 | End: 2023-07-10 | Stop reason: HOSPADM

## 2023-07-10 RX ORDER — HYDROMORPHONE HCL/PF 1 MG/ML
0.4 SYRINGE (ML) INJECTION
Status: DISCONTINUED | OUTPATIENT
Start: 2023-07-10 | End: 2023-07-10 | Stop reason: HOSPADM

## 2023-07-10 RX ORDER — PROMETHAZINE HYDROCHLORIDE 25 MG/ML
25 INJECTION, SOLUTION INTRAMUSCULAR; INTRAVENOUS ONCE AS NEEDED
Status: DISCONTINUED | OUTPATIENT
Start: 2023-07-10 | End: 2023-07-10 | Stop reason: HOSPADM

## 2023-07-10 RX ORDER — FENTANYL CITRATE 50 UG/ML
INJECTION, SOLUTION INTRAMUSCULAR; INTRAVENOUS AS NEEDED
Status: DISCONTINUED | OUTPATIENT
Start: 2023-07-10 | End: 2023-07-10

## 2023-07-10 RX ORDER — MIDAZOLAM HYDROCHLORIDE 2 MG/2ML
INJECTION, SOLUTION INTRAMUSCULAR; INTRAVENOUS AS NEEDED
Status: DISCONTINUED | OUTPATIENT
Start: 2023-07-10 | End: 2023-07-10

## 2023-07-10 RX ORDER — HYDROMORPHONE HCL/PF 1 MG/ML
SYRINGE (ML) INJECTION AS NEEDED
Status: DISCONTINUED | OUTPATIENT
Start: 2023-07-10 | End: 2023-07-10

## 2023-07-10 RX ORDER — MAGNESIUM HYDROXIDE 1200 MG/15ML
LIQUID ORAL AS NEEDED
Status: DISCONTINUED | OUTPATIENT
Start: 2023-07-10 | End: 2023-07-10 | Stop reason: HOSPADM

## 2023-07-10 RX ORDER — ONDANSETRON 2 MG/ML
INJECTION INTRAMUSCULAR; INTRAVENOUS AS NEEDED
Status: DISCONTINUED | OUTPATIENT
Start: 2023-07-10 | End: 2023-07-10

## 2023-07-10 RX ORDER — PROPOFOL 10 MG/ML
INJECTION, EMULSION INTRAVENOUS AS NEEDED
Status: DISCONTINUED | OUTPATIENT
Start: 2023-07-10 | End: 2023-07-10

## 2023-07-10 RX ADMIN — PROPOFOL 150 MG: 10 INJECTION, EMULSION INTRAVENOUS at 15:56

## 2023-07-10 RX ADMIN — INSULIN LISPRO 4 UNITS: 100 INJECTION, SOLUTION INTRAVENOUS; SUBCUTANEOUS at 21:43

## 2023-07-10 RX ADMIN — ACETAMINOPHEN 975 MG: 325 TABLET, FILM COATED ORAL at 06:03

## 2023-07-10 RX ADMIN — DEXAMETHASONE SODIUM PHOSPHATE 10 MG: 10 INJECTION, SOLUTION INTRAMUSCULAR; INTRAVENOUS at 15:56

## 2023-07-10 RX ADMIN — FENTANYL CITRATE 25 MCG: 50 INJECTION INTRAMUSCULAR; INTRAVENOUS at 16:58

## 2023-07-10 RX ADMIN — LIDOCAINE HYDROCHLORIDE 100 MG: 20 INJECTION, SOLUTION EPIDURAL; INFILTRATION; INTRACAUDAL; PERINEURAL at 15:56

## 2023-07-10 RX ADMIN — SODIUM CHLORIDE, SODIUM LACTATE, POTASSIUM CHLORIDE, AND CALCIUM CHLORIDE: .6; .31; .03; .02 INJECTION, SOLUTION INTRAVENOUS at 15:46

## 2023-07-10 RX ADMIN — SODIUM CHLORIDE 3 G: 9 INJECTION, SOLUTION INTRAVENOUS at 09:54

## 2023-07-10 RX ADMIN — QUETIAPINE 25 MG: 25 TABLET ORAL at 21:41

## 2023-07-10 RX ADMIN — HYDROMORPHONE HYDROCHLORIDE 0.5 MG: 1 INJECTION, SOLUTION INTRAMUSCULAR; INTRAVENOUS; SUBCUTANEOUS at 16:45

## 2023-07-10 RX ADMIN — FENTANYL CITRATE 50 MCG: 50 INJECTION INTRAMUSCULAR; INTRAVENOUS at 16:25

## 2023-07-10 RX ADMIN — OXYCODONE HYDROCHLORIDE 10 MG: 10 TABLET ORAL at 11:25

## 2023-07-10 RX ADMIN — MIDAZOLAM 2 MG: 1 INJECTION INTRAMUSCULAR; INTRAVENOUS at 15:44

## 2023-07-10 RX ADMIN — INSULIN GLARGINE 8 UNITS: 100 INJECTION, SOLUTION SUBCUTANEOUS at 21:41

## 2023-07-10 RX ADMIN — SENNOSIDES AND DOCUSATE SODIUM 1 TABLET: 50; 8.6 TABLET ORAL at 09:53

## 2023-07-10 RX ADMIN — ONDANSETRON 4 MG: 2 INJECTION INTRAMUSCULAR; INTRAVENOUS at 16:11

## 2023-07-10 RX ADMIN — ACETAMINOPHEN 975 MG: 325 TABLET, FILM COATED ORAL at 14:09

## 2023-07-10 RX ADMIN — FENTANYL CITRATE 50 MCG: 50 INJECTION INTRAMUSCULAR; INTRAVENOUS at 16:11

## 2023-07-10 RX ADMIN — ENOXAPARIN SODIUM 40 MG: 40 INJECTION SUBCUTANEOUS at 09:53

## 2023-07-10 RX ADMIN — SODIUM CHLORIDE 3 G: 9 INJECTION, SOLUTION INTRAVENOUS at 21:40

## 2023-07-10 RX ADMIN — FENTANYL CITRATE 50 MCG: 50 INJECTION INTRAMUSCULAR; INTRAVENOUS at 16:08

## 2023-07-10 RX ADMIN — FENTANYL CITRATE 25 MCG: 50 INJECTION INTRAMUSCULAR; INTRAVENOUS at 17:00

## 2023-07-10 RX ADMIN — SODIUM CHLORIDE 3 G: 9 INJECTION, SOLUTION INTRAVENOUS at 03:39

## 2023-07-10 RX ADMIN — POTASSIUM CHLORIDE 40 MEQ: 1500 TABLET, EXTENDED RELEASE ORAL at 09:53

## 2023-07-10 RX ADMIN — ACETAMINOPHEN 975 MG: 325 TABLET, FILM COATED ORAL at 21:40

## 2023-07-10 RX ADMIN — MELATONIN TAB 3 MG 3 MG: 3 TAB at 21:41

## 2023-07-10 NOTE — PROGRESS NOTES
Progress Note - General Surgery   [facility] 57 y.o. 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  S/p:  6/28 debridement - andria/cullen (cedillo)  6/29 Perineal debridement/washout - Mason   6/30 Perineal debridement/ EUA - Basil/Uro  7/1 Debridement, suprapubic tube - Basil/Uro  7/2 Perineal and scrotal debridement - Lucero  7/4 laparoscopic loop diverting colostomy creation, perineal debridement, cystoscopy with suprapubic tube exchange, EUA, testicular fixation, vac placement - Lucero/Saturnino  7/6 Perineum washout/debridement- To     Spor 89%, now 93% on room air, Hypertensive to 171/71 overnight  Wound vac-350 SS  Ostomy-400  Urine output - 2.3L    WBC pending from 12.9  Hgb pending from 7.8  Cr pending from . 64    Plan:  - NPO, OR today for washout, debridement, and wound vac change.  -Resume diet post op  -Plastic surgery evaluation for possible flap  - ABX per ID, continue Unasyn for now, appreciate recommendations  -Consider transitioning to PO Augmentin if no further debridement required  - Routine ostomy care  -Monitor UOP via suprapubic cath  -Pain/nausea control PRN  -DVT ppx    Subjective/Objective       Subjective: No acute events overnight. Patient reports no pain and is tolerating diet well without nausea or vomiting. No SOB, fever or chills. Objective:  Vitals    Blood pressure 159/72, pulse 71, temperature 98.5 °F (36.9 °C), resp. rate 18, height 6' 2" (1.88 m), weight 92.2 kg (203 lb 4.2 oz), SpO2 93 %. ,Body mass index is 26.1 kg/m².       Intake/Output Summary (Last 24 hours) at 7/10/2023 0504  Last data filed at 7/10/2023 0350  Gross per 24 hour   Intake 360 ml   Output 3175 ml   Net -2815 ml       Invasive Devices     Peripheral Intravenous Line  Duration           Peripheral IV 07/09/23 Proximal;Right;Ventral (anterior) Forearm <1 day          Drain  Duration           Ileostomy Loop LUQ 5 days Suprapubic Catheter 18 Fr. 5 days                  Physical Exam:  General: resting comfortably, NAD  HENT: no Jaundice  Neck: No JVD  CV: RR  Lungs: Normal work of breathing, on room air  ABD: Soft, NT/ND.  Ostomy functioning well, suprapubic catheter functioning   : wound vac functioning with good seal.  Extrem: No extremity edema  Neuro: AAOx3         Lab, Imaging and other studies:CBC:   No results found for: "WBC", "HGB", "HCT", "MCV", "PLT", "ADJUSTEDWBC", "RBC", "MCH", "MCHC", "RDW", "MPV", "NRBC", CMP:   No results found for: "SODIUM", "K", "CL", "CO2", "ANIONGAP", "BUN", "CREATININE", "GLUCOSE", "CALCIUM", "AST", "ALT", "ALKPHOS", "PROT", "BILITOT", "EGFR", Urinalysis: No results found for: "COLORU", "CLARITYU", "SPECGRAV", "PHUR", "LEUKOCYTESUR", "NITRITE", "Sharl Reyes", "GLUCOSEU", "Liam Irasema", "Genene Catching", "BLOODU"    VTE Pharmacologic Prophylaxis: Heparin  VTE Mechanical Prophylaxis: sequential compression device    Adelina Alfaro MD  PGY 1

## 2023-07-10 NOTE — CASE MANAGEMENT
Case Management Discharge Planning Note    Patient name Addie Rodrigues  Location [facility] 143-38 MRN [id number]  : 1960 Date 7/10/2023       Current Admission Date: 2023  Current Admission Diagnosis:Rohan's gangrene   Patient Active Problem List    Diagnosis Date Noted   • Suprapubic catheter ([address]) 2023   • Urethral disorder 2023   • Hyperglycemia 2023   • DM (diabetes mellitus) ([address]) 2023   • Lactic acidosis 2023   • HTN (hypertension) 2023   • Rohan's gangrene 2023      LOS (days): 11  Geometric Mean LOS (GMLOS) (days):   Days to GMLOS:     OBJECTIVE:  Risk of Unplanned Readmission Score: 17         Current admission status: Inpatient   Preferred Pharmacy:   PATIENT/FAMILY REPORTS NO PREFERRED PHARMACY  No address on file      [facility] [place], [place] [facility] [place] [address]  Phone: [phone] Fax: [phone]    Primary Care Provider: No primary care provider on file. Primary Insurance: BLUE CROSS  Secondary Insurance:     DISCHARGE DETAILS:        Additional Comments: Per provider, pt to remain inpatient at this time. CM to follow.

## 2023-07-10 NOTE — ANESTHESIA POSTPROCEDURE EVALUATION
Post-Op Assessment Note    CV Status:  Stable    Pain management: adequate     Mental Status:  Alert and awake   Hydration Status:  Euvolemic   PONV Controlled:  Controlled   Airway Patency:  Patent      Post Op Vitals Reviewed: Yes      Staff: Anesthesiologist, CRNA         No notable events documented.     BP   152/74   Temp   98.7   Pulse  83   Resp   14   SpO2 97

## 2023-07-10 NOTE — PLAN OF CARE
Problem: MOBILITY - ADULT  Goal: Maintain or return to baseline ADL function  Description: INTERVENTIONS:  -  Assess patient's ability to carry out ADLs; assess patient's baseline for ADL function and identify physical deficits which impact ability to perform ADLs (bathing, care of mouth/teeth, toileting, grooming, dressing, etc.)  - Assess/evaluate cause of self-care deficits   - Assess range of motion  - Assess patient's mobility; develop plan if impaired  - Assess patient's need for assistive devices and provide as appropriate  - Encourage maximum independence but intervene and supervise when necessary  - Involve family in performance of ADLs  - Assess for home care needs following discharge   - Consider OT consult to assist with ADL evaluation and planning for discharge  - Provide patient education as appropriate  Outcome: Progressing  Goal: Maintains/Returns to pre admission functional level  Description: INTERVENTIONS:  - Perform BMAT or MOVE assessment daily.   - Set and communicate daily mobility goal to care team and patient/family/caregiver. - Collaborate with rehabilitation services on mobility goals if consulted  - Perform Range of Motion 2 times a day. - Reposition patient every 2 hours.   - Dangle patient 2 times a day  - Stand patient 2 times a day  - Ambulate patient 2 times a day  - Out of bed to chair 2 times a day   - Out of bed for meals 2 times a day  - Out of bed for toileting  - Record patient progress and toleration of activity level   Outcome: Progressing     Problem: SKIN/TISSUE INTEGRITY - ADULT  Goal: Skin Integrity remains intact(Skin Breakdown Prevention)  Description: Assess:  -Perform Vinny assessment every 2  -Clean and moisturize skin every 2  -Inspect skin when repositioning, toileting, and assisting with ADLS  -Assess under medical devices such as 2 every 2  -Assess extremities for adequate circulation and sensation         Skin Care:  -Avoid use of baby powder, tape, friction and shearing, hot water or constrictive clothing  -Relieve pressure over bony prominences using 2  -Do not massage red bony areas    Goal: Incision(s), wounds(s) or drain site(s) healing without S/S of infection  Description: INTERVENTIONS  - Assess and document dressing, incision, wound bed, drain sites and surrounding tissue  - Provide patient and family education  - Perform skin care/dressing changes every   Outcome: Progressing    Problem: Prexisting or High Potential for Compromised Skin Integrity  Goal: Skin integrity is maintained or improved  Description: INTERVENTIONS:  - Identify patients at risk for skin breakdown  - Assess and monitor skin integrity  - Assess and monitor nutrition and hydration status  - Monitor labs   - Assess for incontinence   - Turn and reposition patient  - Assist with mobility/ambulation  - Relieve pressure over bony prominences  - Avoid friction and shearing  - Provide appropriate hygiene as needed including keeping skin clean and dry  - Evaluate need for skin moisturizer/barrier cream  - Collaborate with interdisciplinary team   - Patient/family teaching  - Consider wound care consult   Outcome: Progressing     Problem: Nutrition/Hydration-ADULT  Goal: Nutrient/Hydration intake appropriate for improving, restoring or maintaining nutritional needs  Description: Monitor and assess patient's nutrition/hydration status for malnutrition. Collaborate with interdisciplinary team and initiate plan and interventions as ordered. Monitor patient's weight and dietary intake as ordered or per policy. Utilize nutrition screening tool and intervene as necessary. Determine patient's food preferences and provide high-protein, high-caloric foods as appropriate.      INTERVENTIONS:  - Monitor oral intake, urinary output, labs, and treatment plans  - Assess nutrition and hydration status and recommend course of action  - Evaluate amount of meals eaten  - Assist patient with eating if necessary   - Allow adequate time for meals  - Recommend/ encourage appropriate diets, oral nutritional supplements, and vitamin/mineral supplements  - Order, calculate, and assess calorie counts as needed  - Recommend, monitor, and adjust tube feedings and TPN/PPN based on assessed needs  - Assess need for intravenous fluids  - Provide specific nutrition/hydration education as appropriate  - Include patient/family/caregiver in decisions related to nutrition  Outcome: Progressing     Problem: PAIN - ADULT  Goal: Verbalizes/displays adequate comfort level or baseline comfort level  Description: Interventions:  - Encourage patient to monitor pain and request assistance  - Assess pain using appropriate pain scale  - Administer analgesics based on type and severity of pain and evaluate response  - Implement non-pharmacological measures as appropriate and evaluate response  - Consider cultural and social influences on pain and pain management  - Notify physician/advanced practitioner if interventions unsuccessful or patient reports new pain  Outcome: Progressing     Problem: INFECTION - ADULT  Goal: Absence or prevention of progression during hospitalization  Description: INTERVENTIONS:  - Assess and monitor for signs and symptoms of infection  - Monitor lab/diagnostic results  - Monitor all insertion sites, i.e. indwelling lines, tubes, and drains  - Monitor endotracheal if appropriate and nasal secretions for changes in amount and color  - [place] appropriate cooling/warming therapies per order  - Administer medications as ordered  - Instruct and encourage patient and family to use good hand hygiene technique  - Identify and instruct in appropriate isolation precautions for identified infection/condition  Outcome: Progressing  Goal: Absence of fever/infection during neutropenic period  Description: INTERVENTIONS:  - Monitor WBC    Outcome: Progressing     Problem: SAFETY ADULT  Goal: Maintain or return to baseline ADL function  Description: INTERVENTIONS:  -  Assess patient's ability to carry out ADLs; assess patient's baseline for ADL function and identify physical deficits which impact ability to perform ADLs (bathing, care of mouth/teeth, toileting, grooming, dressing, etc.)  - Assess/evaluate cause of self-care deficits   - Assess range of motion  - Assess patient's mobility; develop plan if impaired  - Assess patient's need for assistive devices and provide as appropriate  - Encourage maximum independence but intervene and supervise when necessary  - Involve family in performance of ADLs  - Assess for home care needs following discharge   - Consider OT consult to assist with ADL evaluation and planning for discharge  - Provide patient education as appropriate  Outcome: Progressing  Goal: Maintains/Returns to pre admission functional level  Description: INTERVENTIONS:  - Perform BMAT or MOVE assessment daily.   - Set and communicate daily mobility goal to care team and patient/family/caregiver. - Collaborate with rehabilitation services on mobility goals if consulted  - Perform Range of Motion *** times a day. - Reposition patient every *** hours.   - Dangle patient *** times a day  - Stand patient *** times a day  - Ambulate patient *** times a day  - Out of bed to chair *** times a day   - Out of bed for meals *** times a day  - Out of bed for toileting  - Record patient progress and toleration of activity level   Outcome: Progressing  Goal: Patient will remain free of falls  Description: INTERVENTIONS:  - Educate patient/family on patient safety including physical limitations  - Instruct patient to call for assistance with activity   - Consult OT/PT to assist with strengthening/mobility   - Keep Call bell within reach  - Keep bed low and locked with side rails adjusted as appropriate  - Keep care items and personal belongings within reach  - Initiate and maintain comfort rounds  - Make Fall Risk Sign visible to staff  - Offer Toileting every *** Hours, in advance of need  - Initiate/Maintain ***alarm  - Obtain necessary fall risk management equipment: ***  - Apply yellow socks and bracelet for high fall risk patients  - Consider moving patient to room near nurses station  Outcome: Progressing     Problem: DISCHARGE PLANNING  Goal: Discharge to home or other facility with appropriate resources  Description: INTERVENTIONS:  - Identify barriers to discharge w/patient and caregiver  - Arrange for needed discharge resources and transportation as appropriate  - Identify discharge learning needs (meds, wound care, etc.)  - Arrange for interpretive services to assist at discharge as needed  - Refer to Case Management Department for coordinating discharge planning if the patient needs post-hospital services based on physician/advanced practitioner order or complex needs related to functional status, cognitive ability, or social support system  Outcome: Progressing     Problem: Knowledge Deficit  Goal: Patient/family/caregiver demonstrates understanding of disease process, treatment plan, medications, and discharge instructions  Description: Complete learning assessment and assess knowledge base.   Interventions:  - Provide teaching at level of understanding  - Provide teaching via preferred learning methods  Outcome: Progressing

## 2023-07-10 NOTE — CONSULTS
Consultation - Plastic Surgery   Nano Paula 57 y.o. 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 Plan:  Patient was washed out by general surgery team today. Due to fibrinous burden, would recommend anotherr round of washout - discussed with general surgery, plan for Wednesday. If wound is clean with minimal debris, we will plan for tissue coverage for this upcoming Thursday 7/13 - this will likely involve split thickness skin graft and local tissue rearrangement. Will obtain consent from patient. Signature: Catrachita Postal 7/10/2023 5:45 PM  Plastic Surgery Fellow    HPI:  Nano Paula is a 57 y.o. male with DM who was admitted with a diagnosis of Rohan's gangrene. He underwent multiple debridements with general surgery involvign the perineum, scrotum, and penile skin. He subsequently underwent diverting sigmoid colostomy and suprapubic catheter placement for diversion. Plastic surgery was consulted to assist with tissue coverage.      Consults    Review of Systems    Historical Information   Past Medical History:   Diagnosis Date   • Diabetes mellitus ([address])      Past Surgical History:   Procedure Laterality Date   • CYSTOSCOPY N/A 7/4/2023    Procedure: CYSTOSCOPY and suprapubic tube exchange;  Surgeon: Jazlyn Lopez MD;  [facility];  Service: Urology   • EXAMINATION UNDER ANESTHESIA N/A 7/4/2023    Procedure: EUA, testicular fiation;  Surgeon: Jazlyn Lopez MD;  [facility];  Service: Urology   • INCISION AND DRAINAGE OF WOUND N/A 6/28/2023    Procedure: 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 ;  Surgeon: Parish Watts MD;  [facility];  Service: Urology   • INCISION AND DRAINAGE OF WOUND N/A 6/28/2023    Procedure: 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;  Surgeon: Pritesh Beth MD;  [facility];  Service: General   • MO CYSTOSTOMY CYSTOTOMY W/DRAINAGE N/A 7/1/2023    Procedure: CYSTOSCOPY; CYSTOSTOMY SUPRAPUBIC OPEN;  Surgeon: Thaddeus Heimlich, MD;  [facility];  Service: Urology   • MO LAPS ABD PRTM&OMENTUM DX W/WO SPEC BR/WA SPX N/A 7/4/2023    Procedure: LAPAROSCOPY DIAGNOSTIC WITH CREATION OF OSTOMY;  Surgeon: Julio Hernandez MD;  [facility];  Service: General   • ROTATOR CUFF REPAIR     • VAC DRESSING APPLICATION N/A 6/5/0024    Procedure: APPLICATION VAC DRESSING;  Surgeon: Julio Hernandez MD;  [facility];  Service: General   • WOUND DEBRIDEMENT N/A 6/29/2023    Procedure: DEBRIDEMENT WOUND [facility] OUT); Surgeon: Catherine Farrell MD;  [facility];  Service: General   • WOUND DEBRIDEMENT N/A 7/2/2023    Procedure: DEBRIDEMENT PERINEAL WOUND AND DRESSING CHANGE [facility] OUT); Surgeon: Julio Hernandez MD;  [facility];  Service: General   • WOUND DEBRIDEMENT N/A 7/1/2023    Procedure: DEBRIDEMENT WOUND AND DRESSING CHANGE [facility] OUT); Surgeon: Evelyne Currie MD;  [facility];  Service: General   • WOUND DEBRIDEMENT N/A 6/30/2023    Procedure: DEBRIDEMENT WOUND [facility] OUT); Surgeon: Evelyne Currie MD;  [facility];  Service: General   • WOUND DEBRIDEMENT N/A 7/4/2023    Procedure: DEBRIDEMENT OF PERINEAL WOUND ([address]); Surgeon: Julio Hernandez MD;  [facility];  Service: General   • WOUND DEBRIDEMENT N/A 7/6/2023    Procedure: DEBRIDEMENT WOUND, [address], AND WOUND VAC CHANGE;  Surgeon: Monster Rizvi DO;  [facility];  Service: General   • WOUND DEBRIDEMENT N/A 7/8/2023    Procedure: DEBRIDEMENT WOUND AND DRESSING CHANGE, VAC change ([address]);   Surgeon: Ammon Turner DO;  [facility];  Service: General     Social History   Social History     Substance and Sexual Activity   Alcohol Use Yes    Comment: socially     Social History     Substance and Sexual Activity   Drug Use Never     Social History     Tobacco Use   Smoking Status Never   Smokeless Tobacco Never     Family History: non-contributory    Meds/Allergies   all current active meds have been reviewed  No Known Allergies    Objective   First Vitals:   Blood Pressure: 95/65 (06/29/23 0159)  Pulse: 90 (06/29/23 0159)  Temperature: 99.3 °F (37.4 °C) (06/29/23 0159)  Temp Source: Axillary (06/29/23 0159)  Respirations: 20 (06/29/23 0159)  Height: 6' 2" (188 cm) (06/29/23 0209)  Weight - Scale: 92.2 kg (203 lb 4.2 oz) (06/29/23 0209)  SpO2: 96 % (06/29/23 0159)    Current Vitals:   Blood Pressure: 152/74 (07/10/23 1724)  Pulse: 84 (07/10/23 1724)  Temperature: (!) 97.1 °F (36.2 °C) (07/10/23 1724)  Temp Source: Temporal (07/08/23 1637)  Respirations: 12 (07/10/23 1724)  Height: 6' 2" (188 cm) (06/29/23 0209)  Weight - Scale: 92.2 kg (203 lb 4.2 oz) (06/29/23 0209)  SpO2: 98 % (07/10/23 1724)      Intake/Output Summary (Last 24 hours) at 7/10/2023 1745  Last data filed at 7/10/2023 1726  Gross per 24 hour   Intake 920 ml   Output 3250 ml   Net -2330 ml       Physical Exam  Physical Examination: General appearance - Sedated   Pulm - Intubated  Heart - RRR   Male - Extensive loss of skin of majority of penis and testes extending down to the perineum just proximal to rectum. Rectum appears intact. Wound tracks into bilateral ischial space and inguinal space with some fibrinous exudate.              Lab Results:   CBC:   Lab Results   Component Value Date    WBC 11.66 (H) 07/10/2023    HGB 7.3 (L) 07/10/2023    HCT 24.4 (L) 07/10/2023     (H) 07/10/2023     (H) 07/10/2023    RBC 2.27 (L) 07/10/2023    MCH 32.2 07/10/2023    MCHC 29.9 (L) 07/10/2023    RDW 15.3 (H) 07/10/2023    MPV 9.1 07/10/2023    NRBC 0 07/10/2023   , CMP:   Lab Results   Component Value Date    SODIUM 145 07/10/2023    K 3.7 07/10/2023     (H) 07/10/2023    CO2 29 07/10/2023    BUN 5 07/10/2023    CREATININE 0.62 07/10/2023    CALCIUM 8.1 (L) 07/10/2023    EGFR 104 07/10/2023   , Coagulation: No results found for: "PT", "INR", "APTT"  Imaging: I have personally reviewed pertinent reports. EKG, Pathology, and Other Studies: I have personally reviewed pertinent reports.

## 2023-07-10 NOTE — RESTORATIVE TECHNICIAN NOTE
Restorative Technician Note      Patient Name: Cordelia Mansfield     Restorative Tech Visit Date: 07/10/23  Note Type: Mobility  Patient Position Upon Consult: Supine  Activity Performed: Ambulated  Assistive Device: Roller walker; Other (Comment) (Ax2 for safety)  Patient Position at End of Consult: Bedside chair;  All needs within [facility]

## 2023-07-10 NOTE — PROGRESS NOTES
Pt received from PACU. Vitals stable. VAC and dressing D/C/I. 2L O2 NC in place. Pt repositioned and presently eating dinner. Wife at bedside. Will continue to monitor.

## 2023-07-10 NOTE — OP NOTE
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 DRESSING CHANGE. PARTIAL CLOSURE    Specimen(s):  * No specimens in log *    Estimated Blood Loss:   Minimal    Drains:  Ileostomy Loop LUQ (Active)   Stomal Appliance 1 piece 23 0840   Stoma Assessment [place] 23 0840   Stoma Shape Round 23 0840   Peristomal Assessment FABBY 23 0840   Treatment Other (Comment) 07/10/23 0350   Output (mL) 100 mL 07/10/23 1401   Number of days: 6       Suprapubic Catheter 18 Fr. 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 He was transferred to the operating room table and positioned supine. General endotracheal anesthesia was induced successfully. The patient's perineal wound vac was removed including 3 black sponges, 4 white sponges and 4 adaptics. He was then prepped and draped in the usual sterile fashion. Pre-operative antibiotics were administered.  A time out was performed confirming the patient, procedure, and site. All parties were in agreement. Attention was turned to the perineum where the wound measured 26 cm x 12 cm x 6 cm. The wound base was covered with a moderate amount of fibrinous filmy exudate. This was sharply debrided with a 10 blade scalpel. The entire base of the wound was then bluntly scraped with a curette. The wound was then irrigated with 1 L normal saline using the pulsavac. Each corner of the wound was partially closed with 2-0 nylon suture in a combination of interrupted and vertical mattress fashion. Total wound measurements after partial closure were 23 cm x 12 cm x 6 cm. The testicles were then wrapped with 4 adaptic, 4 white sponges were placed on top and 3 black sponges were placed in the wound. The wound vac was set to 125 mmHg continuous. The patient was then allowed to awaken, extubated, and transferred to the PACU having tolerated the procedure well. All instrument, needle, and sponge counts were correct at the end of the case. Radiofrequency detection was negative.     Dr. Mandeep Juárez was present for the entire procedure      Patient Disposition:  PACU         SIGNATURE: Estrella Chapin DO  DATE: July 10, 2023  TIME: 5:18 PM

## 2023-07-10 NOTE — TELEPHONE ENCOUNTER
SPT was exchange 7/4. I am hopeful patient is discharged prior to next tube change. Please hold an OV spot for him in 5-6 weeks with me for initial SPT change. If he remains admitted can be changed in the inpatient setting.

## 2023-07-10 NOTE — ANESTHESIA POSTPROCEDURE EVALUATION
Post-Op Assessment Note             Reason for prolonged intubation > 24 hours:  N/AReason for prolonged intubation > 48 hours:  N/A      There were no known notable events for this encounter.     /80 (07/10/23 1815)    Temp     Pulse 76 (07/10/23 1815)   Resp 19 (07/10/23 1815)    SpO2

## 2023-07-10 NOTE — ANESTHESIA PREPROCEDURE EVALUATION
Procedure:  DEBRIDEMENT WOUND AND DRESSING CHANGE ([address]) (Perineum)    Relevant Problems   ANESTHESIA (within normal limits)      CARDIO   (+) HTN (hypertension)        Physical Exam    Airway    Mallampati score: III  TM Distance: >3 FB  Neck ROM: full     Dental       Cardiovascular      Pulmonary      Other Findings        Anesthesia Plan  ASA Score- 3     Anesthesia Type- general with ASA Monitors. Additional Monitors:   Airway Plan: LMA. Plan Factors-Exercise tolerance (METS): >4 METS. Chart reviewed. EKG reviewed. Existing labs reviewed. Patient summary reviewed. Patient is not a current smoker. Induction- intravenous. Postoperative Plan- Plan for postoperative opioid use. Informed Consent- Anesthetic plan and risks discussed with patient. I personally reviewed this patient with the CRNA. Discussed and agreed on the Anesthesia Plan with the CRNA. Petr Bee

## 2023-07-10 NOTE — PROGRESS NOTES
Progress Note - Infectious Disease   [facility] 57 y.o. male MRN: [id number]  Unit/Bed#: [facility] 921-01 Encounter: [id number]      Impression/Recommendations:  1.  Severe sepsis, present on admission, secondary to Rohan's gangrene.  Patient is clinically improved.  He still has leukocytosis but no further fever, tach, tachypnea or lactic acidosis.  He had mild hypotension on admission, which resolved and not requiring any pressors. Antibiotic plan as in below. Monitor temperature/WBC. Monitor hemodynamic.     2.  Rohan's gangrene, status post multiple I&D.  At last I&D yesterday, there was just fibrinous exudate and only mild purulence.  Operative cultures with Actinomyces and Prevotella. Growth of coagulase-negative Staphylococcus is likely colonization/contamination.  Patient has VAC in place. Farhana Rogers has minimal pain.  Patient is status post repeat I&D yesterday, with only a small amount of purulence and no necrotic tissue. Jose Espitia will continue IV antibiotic until no further purulence and necrotic tissue are seen at I&D. Continue IV Unasyn. Further I&D and wound VAC dressing change in OR later today per surgery. Eventual plan for STSG per surgery. When wound has no further purulence and necrosis, antibiotic can be transitioned to p.o. Augmentin. Treat x7 days after complete source control.     3.  DM, poorly controlled, with hyperglycemia and elevated hemoglobin A1c.  This contributes to infection above. Management per primary service.     Discussed with patient and his wife in detail regarding the above plan.     Antibiotics:  Unasyn  Antibiotic # 13     Subjective:  Patient is comfortable. Perineal pain mild, controlled. Temperature stays down.  No chills.   He is tolerating antibiotics well.  No nausea, vomiting or diarrhea.     Objective:  Vitals:  Temp:  [98.5 °F (36.9 °C)-98.7 °F (37.1 °C)] 98.7 °F (37.1 °C)  HR:  [66-87] 66  Resp:  [17-20] 17  BP: (135-171)/(63-72) 152/70  SpO2:  [89 %-95 %] 95 %  Temp (24hrs), Av.6 °F (37 °C), Min:98.5 °F (36.9 °C), Max:98.7 °F (37.1 °C)  Current: Temperature: 98.7 °F (37.1 °C)    Physical Exam:     General: Awake, alert, cooperative, no distress. Neck:  Supple. No mass. No lymphadenopathy. Lungs: Expansion symmetric, no rales, no wheezing, respirations unlabored. Heart:  Regular rate and rhythm, S1 and S2 normal, no murmur. Abdomen: Soft, nondistended, non-tender, bowel sounds active all four quadrants, no masses, no organomegaly. :  Wound with VAC. No purulence. No erythema/warmth. Stable mild tenderness. Extremities: Stable mild leg edema. No erythema/warmth. No draining ulcer. Nontender to palpation. Skin:  No rash. Neuro: Moves all extremities. Invasive Devices     Peripheral Intravenous Line  Duration           Peripheral IV 23 Proximal;Right;Ventral (anterior) Forearm <1 day          Drain  Duration           Ileostomy Loop LUQ 6 days    Suprapubic Catheter 18 Fr. 6 days                Labs studies:   I have personally reviewed pertinent labs. Results from last 7 days   Lab Units 07/10/23  0625 23  0515 23  0713   POTASSIUM mmol/L 3.7 4.3 3.9   CHLORIDE mmol/L 114* 112* 114*   CO2 mmol/L 29 26 26   BUN mg/dL 5 7 6   CREATININE mg/dL 0.62 0.61 0.55*   EGFR ml/min/1.73sq m 104 105 110   CALCIUM mg/dL 8.1* 8.2* 7.9*     Results from last 7 days   Lab Units 07/10/23  0625 23  0515 23  0713   WBC Thousand/uL 11.66* 12.89* 12.35*   HEMOGLOBIN g/dL 7.3* 7.8* 7.8*   PLATELETS Thousands/uL 404* 399* 373           Imaging Studies:   I have personally reviewed pertinent imaging study reports and images in PACS. EKG, Pathology, and Other Studies:   I have personally reviewed pertinent reports.

## 2023-07-11 LAB
ANION GAP SERPL CALCULATED.3IONS-SCNC: 3 MMOL/L
BASOPHILS # BLD AUTO: 0.02 THOUSANDS/ÂΜL (ref 0–0.1)
BASOPHILS NFR BLD AUTO: 0 % (ref 0–1)
BUN SERPL-MCNC: 5 MG/DL (ref 5–25)
CALCIUM SERPL-MCNC: 8.4 MG/DL (ref 8.3–10.1)
CHLORIDE SERPL-SCNC: 112 MMOL/L (ref 96–108)
CO2 SERPL-SCNC: 28 MMOL/L (ref 21–32)
CREAT SERPL-MCNC: 0.65 MG/DL (ref 0.6–1.3)
EOSINOPHIL # BLD AUTO: 0.02 THOUSAND/ÂΜL (ref 0–0.61)
EOSINOPHIL NFR BLD AUTO: 0 % (ref 0–6)
ERYTHROCYTE [DISTWIDTH] IN BLOOD BY AUTOMATED COUNT: 14.7 % (ref 11.6–15.1)
GFR SERPL CREATININE-BSD FRML MDRD: 102 ML/MIN/1.73SQ M
GLUCOSE SERPL-MCNC: 148 MG/DL (ref 65–140)
GLUCOSE SERPL-MCNC: 159 MG/DL (ref 65–140)
GLUCOSE SERPL-MCNC: 159 MG/DL (ref 65–140)
GLUCOSE SERPL-MCNC: 161 MG/DL (ref 65–140)
GLUCOSE SERPL-MCNC: 168 MG/DL (ref 65–140)
GLUCOSE SERPL-MCNC: 172 MG/DL (ref 65–140)
HCT VFR BLD AUTO: 25.7 % (ref 36.5–49.3)
HGB BLD-MCNC: 7.7 G/DL (ref 12–17)
IMM GRANULOCYTES # BLD AUTO: 0.06 THOUSAND/UL (ref 0–0.2)
IMM GRANULOCYTES NFR BLD AUTO: 1 % (ref 0–2)
LYMPHOCYTES # BLD AUTO: 2.12 THOUSANDS/ÂΜL (ref 0.6–4.47)
LYMPHOCYTES NFR BLD AUTO: 22 % (ref 14–44)
MAGNESIUM SERPL-MCNC: 2.2 MG/DL (ref 1.6–2.6)
MCH RBC QN AUTO: 32.1 PG (ref 26.8–34.3)
MCHC RBC AUTO-ENTMCNC: 30 G/DL (ref 31.4–37.4)
MCV RBC AUTO: 107 FL (ref 82–98)
MONOCYTES # BLD AUTO: 0.8 THOUSAND/ÂΜL (ref 0.17–1.22)
MONOCYTES NFR BLD AUTO: 8 % (ref 4–12)
NEUTROPHILS # BLD AUTO: 6.66 THOUSANDS/ÂΜL (ref 1.85–7.62)
NEUTS SEG NFR BLD AUTO: 69 % (ref 43–75)
NRBC BLD AUTO-RTO: 0 /100 WBCS
PHOSPHATE SERPL-MCNC: 3 MG/DL (ref 2.3–4.1)
PLATELET # BLD AUTO: 458 THOUSANDS/UL (ref 149–390)
PMV BLD AUTO: 8.9 FL (ref 8.9–12.7)
POTASSIUM SERPL-SCNC: 4.3 MMOL/L (ref 3.5–5.3)
RBC # BLD AUTO: 2.4 MILLION/UL (ref 3.88–5.62)
SODIUM SERPL-SCNC: 143 MMOL/L (ref 135–147)
WBC # BLD AUTO: 9.68 THOUSAND/UL (ref 4.31–10.16)

## 2023-07-11 PROCEDURE — 84100 ASSAY OF PHOSPHORUS: CPT | Performed by: STUDENT IN AN ORGANIZED HEALTH CARE EDUCATION/TRAINING PROGRAM

## 2023-07-11 PROCEDURE — 97110 THERAPEUTIC EXERCISES: CPT

## 2023-07-11 PROCEDURE — 83735 ASSAY OF MAGNESIUM: CPT | Performed by: STUDENT IN AN ORGANIZED HEALTH CARE EDUCATION/TRAINING PROGRAM

## 2023-07-11 PROCEDURE — 97530 THERAPEUTIC ACTIVITIES: CPT

## 2023-07-11 PROCEDURE — 99232 SBSQ HOSP IP/OBS MODERATE 35: CPT | Performed by: INTERNAL MEDICINE

## 2023-07-11 PROCEDURE — 85025 COMPLETE CBC W/AUTO DIFF WBC: CPT | Performed by: STUDENT IN AN ORGANIZED HEALTH CARE EDUCATION/TRAINING PROGRAM

## 2023-07-11 PROCEDURE — 80048 BASIC METABOLIC PNL TOTAL CA: CPT | Performed by: STUDENT IN AN ORGANIZED HEALTH CARE EDUCATION/TRAINING PROGRAM

## 2023-07-11 PROCEDURE — 99024 POSTOP FOLLOW-UP VISIT: CPT | Performed by: SURGERY

## 2023-07-11 PROCEDURE — 82948 REAGENT STRIP/BLOOD GLUCOSE: CPT

## 2023-07-11 PROCEDURE — 97535 SELF CARE MNGMENT TRAINING: CPT

## 2023-07-11 RX ADMIN — SENNOSIDES AND DOCUSATE SODIUM 1 TABLET: 50; 8.6 TABLET ORAL at 17:57

## 2023-07-11 RX ADMIN — SENNOSIDES AND DOCUSATE SODIUM 1 TABLET: 50; 8.6 TABLET ORAL at 08:49

## 2023-07-11 RX ADMIN — ACETAMINOPHEN 975 MG: 325 TABLET, FILM COATED ORAL at 05:36

## 2023-07-11 RX ADMIN — SODIUM CHLORIDE 3 G: 9 INJECTION, SOLUTION INTRAVENOUS at 21:28

## 2023-07-11 RX ADMIN — INSULIN GLARGINE 8 UNITS: 100 INJECTION, SOLUTION SUBCUTANEOUS at 21:27

## 2023-07-11 RX ADMIN — INSULIN LISPRO 4 UNITS: 100 INJECTION, SOLUTION INTRAVENOUS; SUBCUTANEOUS at 12:18

## 2023-07-11 RX ADMIN — INSULIN LISPRO 4 UNITS: 100 INJECTION, SOLUTION INTRAVENOUS; SUBCUTANEOUS at 17:57

## 2023-07-11 RX ADMIN — SODIUM CHLORIDE 3 G: 9 INJECTION, SOLUTION INTRAVENOUS at 08:49

## 2023-07-11 RX ADMIN — QUETIAPINE 25 MG: 25 TABLET ORAL at 21:27

## 2023-07-11 RX ADMIN — ACETAMINOPHEN 975 MG: 325 TABLET, FILM COATED ORAL at 12:18

## 2023-07-11 RX ADMIN — SODIUM CHLORIDE 3 G: 9 INJECTION, SOLUTION INTRAVENOUS at 14:15

## 2023-07-11 RX ADMIN — OXYCODONE HYDROCHLORIDE 10 MG: 10 TABLET ORAL at 11:04

## 2023-07-11 RX ADMIN — MELATONIN TAB 3 MG 3 MG: 3 TAB at 21:27

## 2023-07-11 RX ADMIN — INSULIN LISPRO 4 UNITS: 100 INJECTION, SOLUTION INTRAVENOUS; SUBCUTANEOUS at 08:49

## 2023-07-11 RX ADMIN — ENOXAPARIN SODIUM 40 MG: 40 INJECTION SUBCUTANEOUS at 08:49

## 2023-07-11 RX ADMIN — SODIUM CHLORIDE 3 G: 9 INJECTION, SOLUTION INTRAVENOUS at 04:15

## 2023-07-11 RX ADMIN — ACETAMINOPHEN 975 MG: 325 TABLET, FILM COATED ORAL at 21:27

## 2023-07-11 NOTE — PROGRESS NOTES
Progress Note - General Surgery   Lawrence Hernandez 57 y.o. 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   Now s/p:  6/28 debridement (Juan/Yuli, at [facility])  6/29 perineal debridement/washout Gaylia Medicus)  6/30 perineal debridement/ EUA (Basil/Uro)  7/1 debridement, suprapubic tube placement (Basil/Uro)  7/2 perineal and scrotal debridement (Lucero)  7/4 laparascopic loop diverting colostomy creating, perineal debridement, cystoscopy with suprapubic tube exchange, EUA, testicular fixation, vac placement (Lucero/Saturnino)  7/6 perineum washout/debridement, vac change (To)  7/8 perineum washout/debridement, vac change Davi Siemens)  7/10 perineum washout/debridement, vac change [place])    Plan:  -Plan for OR 7/12, NPO at midnight  -Appreciate plastic surgery recommendations   - if wound clean/with minimal debris at next washout tentative plastic surgery plan (as per 7/10 plastic surgery consult note) for tissue coverage 7/13  -Appreciate ID recommendations   - continue IV unasyn until no longer purulent/necrotic, then transition to PO augmentin   - treat x7 days after complete source control  -Routine ostomy care  -Monitor UOP via suprapubic catheter  -Trend blood glucose, goal  - 180  -PRN pain/nausea control  -DVT prophylaxis    Subjective/Objective   Subjective: no acute events overnight. Tolerating diet without nausea or vomiting. Reports no pain. No fevers/chills. Objective:    Blood pressure 119/63, pulse 72, temperature 98 °F (36.7 °C), resp. rate 14, height 6' 2" (1.88 m), weight 92.2 kg (203 lb 4.2 oz), SpO2 92 %. ,Body mass index is 26.1 kg/m².       Intake/Output Summary (Last 24 hours) at 7/11/2023 0514  Last data filed at 7/10/2023 2144  Gross per 24 hour   Intake 800 ml   Output 2626 ml   Net -1826 ml       Invasive Devices     Peripheral Intravenous Line  Duration           Peripheral IV 07/09/23 Proximal;Right;Ventral (anterior) Forearm 1 day          Drain  Duration           Ileostomy Loop LUQ 6 days    Suprapubic Catheter 18 Fr. 6 days                Physical Exam:   General: Awake, alert, cooperative, no acute distress  Neuro: Alert, oriented to self/year/month, knows he is in a hospital  HEENT: Normocephalic, atraumatic  CV: HRR, S1, S2 normal,  2+ peripheral pulses, trace peripheral edema  Abdomen: Soft, non-tender, non-distended, ostomy pink with brown stool in bag  : Suprapubic catheter in place, functioning, yellow urine in bag, wvac in place, functioning with good seal, serosanguinous output 150cc's in cannister      Lab, Imaging and other studies:  I have personally reviewed pertinent lab results.   , CBC:   Lab Results   Component Value Date    WBC 11.66 (H) 07/10/2023    HGB 7.3 (L) 07/10/2023    HCT 24.4 (L) 07/10/2023     (H) 07/10/2023     (H) 07/10/2023    RBC 2.27 (L) 07/10/2023    MCH 32.2 07/10/2023    MCHC 29.9 (L) 07/10/2023    RDW 15.3 (H) 07/10/2023    MPV 9.1 07/10/2023    NRBC 0 07/10/2023   , CMP:   Lab Results   Component Value Date    SODIUM 145 07/10/2023    K 3.7 07/10/2023     (H) 07/10/2023    CO2 29 07/10/2023    BUN 5 07/10/2023    CREATININE 0.62 07/10/2023    CALCIUM 8.1 (L) 07/10/2023    EGFR 104 07/10/2023     VTE Pharmacologic Prophylaxis: Enoxaparin (Lovenox)  VTE Mechanical Prophylaxis: sequential compression device

## 2023-07-11 NOTE — PROGRESS NOTES
Progress Note - Infectious Disease   Bonita Prieto 57 y.o. male MRN: [id number]  Unit/Bed#: [facility] 921-01 Encounter: [id number]      Impression/Recommendations:  1.  Severe sepsis, present on admission, secondary to Rohan's gangrene.  Patient is clinically improved.  He still has leukocytosis but no further fever, tach, tachypnea or lactic acidosis.  He had mild hypotension on admission, which resolved and not requiring any pressors. Antibiotic plan as in below. Monitor temperature/WBC. Monitor hemodynamic.     2.  Rohan's gangrene, status post multiple I&D.  At last I&D yesterday, there was just fibrinous exudate and only mild purulence.  Operative cultures with Actinomyces and Prevotella. Growth of coagulase-negative Staphylococcus is likely colonization/contamination.  Patient has VAC in place. Arcadio Malhotra has minimal pain.  Patient is status post repeat I&D yesterday, with no further necrosis no purulence. At this point, antibiotic can probably be transitioned to p.o but will keep IV antibiotics through anticipated STSG . Continue IV Unasyn. Further I&D and wound VAC dressing change in OR per surgery. Tentative plan for STSG on  noted. Transition to p.o. Augmentin after STSG. Treat x7 days after complete source control.     3.  DM, poorly controlled, with hyperglycemia and elevated hemoglobin A1c.  This contributes to infection above. Management per primary service.     Discussed with patient and his wife in detail regarding the above plan.     Antibiotics:  Unasyn  Antibiotic # 14     Subjective:  Patient is comfortable. Perineal pain mild, controlled. Temperature stays down.  No chills.   He is tolerating antibiotics well.  No nausea, vomiting or diarrhea.     Objective:  Vitals:  Temp:  [97 °F (36.1 °C)-98 °F (36.7 °C)] 98 °F (36.7 °C)  HR:  [59-89] 59  Resp:  [12-22] 18  BP: (117-153)/(63-86) 118/73  SpO2:  [86 %-98 %] 97 %  Temp (24hrs), Av.7 °F (36.5 °C), Min:97 °F (36.1 °C), Max:98 °F (36.7 °C)  Current: Temperature: 98 °F (36.7 °C)    Physical Exam:     General: Awake, alert, cooperative, no distress. Neck:  Supple. No mass. No lymphadenopathy. Lungs: Expansion symmetric, no rales, no wheezing, respirations unlabored. Heart:  Regular rate and rhythm, S1 and S2 normal, no murmur. Abdomen: Soft, nondistended, non-tender, bowel sounds active all four quadrants, no masses, no organomegaly. :  With VAC. No purulence. No erythema/warmth. Stable mild tenderness. Extremities: Stable mild leg edema. No erythema/warmth. No ulcer. Nontender to palpation. Skin:  No rash. Neuro: Moves all extremities. Invasive Devices     Peripheral Intravenous Line  Duration           Peripheral IV 07/09/23 Proximal;Right;Ventral (anterior) Forearm 1 day          Drain  Duration           Suprapubic Catheter 18 Fr. 7 days    Ileostomy Loop LUQ 6 days                Labs studies:   I have personally reviewed pertinent labs. Results from last 7 days   Lab Units 07/11/23  0630 07/10/23  0625 07/09/23  0515   POTASSIUM mmol/L 4.3 3.7 4.3   CHLORIDE mmol/L 112* 114* 112*   CO2 mmol/L 28 29 26   BUN mg/dL 5 5 7   CREATININE mg/dL 0.65 0.62 0.61   EGFR ml/min/1.73sq m 102 104 105   CALCIUM mg/dL 8.4 8.1* 8.2*     Results from last 7 days   Lab Units 07/11/23  0630 07/10/23  0625 07/09/23  0515   WBC Thousand/uL 9.68 11.66* 12.89*   HEMOGLOBIN g/dL 7.7* 7.3* 7.8*   PLATELETS Thousands/uL 458* 404* 399*           Imaging Studies:   I have personally reviewed pertinent imaging study reports and images in PACS. EKG, Pathology, and Other Studies:   I have personally reviewed pertinent reports.

## 2023-07-11 NOTE — PHYSICAL THERAPY NOTE
PHYSICAL THERAPY NOTE          Patient Name: Misbah GROSS'S Date: 7/11/2023 07/11/23 1102   PT Last Visit   PT Visit Date 07/11/23   Note Type   Note Type Treatment   Pain Assessment   Pain Assessment Tool 0-10   Pain Score 10 - Worst Possible Pain   Pain Location/Orientation Location: Groin   Hospital Pain Intervention(s) Repositioned; Ambulation/increased activity; Emotional support   Restrictions/Precautions   Weight Bearing Precautions Per Order No   Other Precautions Multiple lines; Fall Risk;Pain  (wound vac, ramírez)   General   Chart Reviewed Yes   Response to Previous Treatment Patient with no complaints from previous session. Family/Caregiver Present No   Cognition   Overall Cognitive Status WFL   Arousal/Participation Alert; Responsive;Arousable; Cooperative   Attention Within functional limits   Orientation Level Oriented X4   Memory Within functional limits   Following Commands Follows one step commands without difficulty   Comments pt pleasant and cooperative   Bed Mobility   Supine to Sit 3  Moderate assistance   Additional items Assist x 1;HOB elevated; Bedrails; Increased time required;Verbal cues   Sit to Supine Unable to assess   Additional Comments pt found supine in bed upon arrival. Pt left sitting OOB in recliner with all needs wihtin reach   Transfers   Sit to Stand 3  Moderate assistance   Additional items Assist x 1; Increased time required;Verbal cues  (+ SBA of another for safety)   Stand to Sit 3  Moderate assistance   Additional items Assist x 1; Increased time required;Verbal cues   Additional Comments transfers with HHA; performed 3x STS throughout session   Ambulation/Elevation   Gait pattern Excessively slow; Short stride; Foward flexed;Decreased foot clearance; Improper Weight shift   Gait Assistance 3  Moderate assist   Additional items Assist x 1;Verbal cues; Tactile cues  (+ SBA of another for safety)   Assistive Device Other (Comment)  (HHA)   Distance 5ft, 3ft   Balance   Static Sitting Fair   Dynamic Sitting Fair -   Static Standing Poor +   Dynamic Standing Poor   Ambulatory Poor   Endurance Deficit   Endurance Deficit Yes   Endurance Deficit Description pain, generalized weakness, deconditioning   Activity Tolerance   Activity Tolerance Patient tolerated treatment well   Medical Staff Made Aware RAY Torres; co-session completed this date 2* increased medical complexity and multiple co-morbidities   Nurse Made Aware RN cleared pt to participate in therapy session   Exercises   Heelslides Supine;Bilateral;10 reps;AROM   Knee AROM Long Arc Quad Sitting;Bilateral;10 reps;AROM   Assessment   Prognosis Good   Problem List Decreased strength;Decreased range of motion;Decreased endurance; Impaired balance;Decreased mobility; Decreased skin integrity;Pain   Assessment Pt seen for PT treatment session this date. Therapy session focused on bed mobility, functional transfers, gait training in order to improve overall mobility and independence. Pt requires assist of 1 + SBA of another for safety +line management. Pt continues to require increased time for all mobility 2* pain, increased time required at each position. Pt perofrmed log roll to ease bed mobility tasks. Pt continues to have decreased sitting tolerance, most comfortable reclned back with feet elevated. Pt performed multiple standing trials in which he was able to tolerate position for 60-90seconds with UE support. Pt taken outside this date with RN/ MD approval. Pt educated on/ compliant with HEP of ankle pumps, heel slides, glute sets/ quad sets as well as LE kicks as tolerable. Pt making progress toward goals. Pt was left sitting OOB in recliner at the end of PT session with all needs in reach. Pt would benefit from continued PT services while in hospital to address remaining limitations. PT to continue to follow pt and recommends STR.  The patient's AM-PAC Basic Mobility Inpatient Short Form Raw Score is 12. A Raw score of less than or equal to 16 suggests the patient may benefit from discharge to post-acute rehabilitation services. Please also refer to the recommendation of the Physical Therapist for safe discharge planning. Goals   Patient Goals to go outside   [facility] Expiration Date 07/19/23   PT Treatment Day 2   Plan   Treatment/Interventions Functional transfer training;LE strengthening/ROM; Therapeutic exercise; Endurance training;Patient/family training;Equipment eval/education;Gait training;Bed mobility;Spoke to nursing;Spoke to case management;OT   Progress Slow progress, decreased activity tolerance   PT Frequency 3-5x/wk   Recommendation   PT Discharge Recommendation Post acute rehabilitation services   Equipment Recommended [facility] Recommended Wheeled walker   AM-PAC Basic Mobility Inpatient   Turning in Flat Bed Without Bedrails 3   Lying on Back to Sitting on Edge of Flat Bed Without Bedrails 2   Moving Bed to Chair 2   Standing Up From Chair Using Arms 2   Walk in Room 2   Climb 3-5 Stairs With Railing 1   Basic Mobility Inpatient Raw Score 12   Basic Mobility Standardized Score 32.23   Highest Level Of Mobility   JH-HLM Goal 4: Move to chair/commode   JH-HLM Achieved 5: Stand (1 or more minutes)   Education   Education Provided Mobility training   Patient Demonstrates acceptance/verbal understanding   End of Consult   Patient Position at End of Consult Bedside chair; All needs within reach     Michael Michaels, PT, DPT

## 2023-07-11 NOTE — OCCUPATIONAL THERAPY NOTE
Occupational Therapy Progress Note     Patient Name: Tammy Morillo  PWLJG'R Date: 7/11/2023  Problem List  Principal Problem:    Rohan's gangrene  Active Problems:    DM (diabetes mellitus) ([address])    HTN (hypertension)    Suprapubic catheter ([address])    Urethral disorder    Hyperglycemia            07/11/23 1103   OT Last Visit   OT Visit Date 07/11/23   Note Type   Note Type Treatment   Pain Assessment   Pain Score 10 - Worst Possible Pain   Pain Location/Orientation Location: Buttocks   Restrictions/Precautions   Weight Bearing Precautions Per Order No   Other Precautions Multiple lines; Fall Risk;Pain   ADL   Where Assessed Edge of bed   Grooming Assistance 5  Supervision/Setup   UB Bathing Assistance 4  Minimal Assistance   LB Bathing Assistance 2  Maximal Assistance   UB Dressing Assistance 4  Minimal Assistance   UB Dressing Deficit Thread RUE;Pull over head; Thread LUE;Pull down in back   LB Dressing Assistance 2  Maximal Assistance   LB Dressing Deficit Thread RLE into pants; Thread LLE into pants;Pull up over hips;Don/doff R sock; Don/doff L sock; Don/doff R shoe;Don/doff L shoe   Toileting Comments colostomy and ramírez   Bed Mobility   Supine to Sit 3  Moderate assistance   Additional items Assist x 1   Additional Comments OOB at end of session   Transfers   Sit to Stand 3  Moderate assistance   Additional items Assist x 1   Stand to Sit 3  Moderate assistance   Additional items Assist x 1   Stand pivot 3  Moderate assistance   Additional items Assist x 1   Additional Comments HHA   Subjective   Subjective "It feel so good to just get some fresh air"   Cognition   Overall Cognitive Status [facility]   Arousal/Participation Alert; Responsive;Arousable; Cooperative   Attention Within functional limits   Orientation Level Oriented X4   Memory Within functional limits   Following Commands Follows all commands and directions without difficulty   Comments Overall very pleasant and cooperative and motivated   Activity Tolerance   Activity Tolerance Patient limited by pain   Medical Staff Made Aware DPT, NSG aware   Assessment   Assessment Pt was seen this date for OT tx session focusing on self care tasks, bed mobility, sit to stand progressions, standing tolerance, tranfers, safety awareness, compensatory techniques, energy conservation, and overall activity tolerance. Pt presents supine and is agreeable to OT tx session, all vitals WNLS. Pt completes previously mentioned tasks at documented assist levels please see above in flow sheet. Pt continues to require overall Mod A x1 for transfers and mobility and Min A- Max A for self care tasks. Pt is overall limited by increased pain, decreased balance, increased fatigue, decreased strength, endurance, and activity tolerance and continues to function below baseline level. Pt resting OOB in chair at end of session with all needs in reach and alarm on. Pt would benefit from continued OT Tx to improve overall functional abilities and increase independence. Will continue to follow with current POC. Plan   Treatment Interventions ADL retraining;Functional transfer training;UE strengthening/ROM; Endurance training;Patient/family training;Equipment evaluation/education; Compensatory technique education;Continued evaluation; Energy conservation; Activityengagement   Goal Expiration Date 07/19/23   OT Treatment Day 2   OT Frequency 2-3x/wk   Recommendation   OT Discharge Recommendation Post acute rehabilitation services   AM-PAC Daily Activity Inpatient   Lower Body Dressing 2   Bathing 2   Toileting 2   Upper Body Dressing 2   Grooming 4   Eating 4   Daily Activity Raw Score 16   Daily Activity Standardized Score (Calc for Raw Score >=11) 35.96   AM-PAC Applied Cognition Inpatient   Following a Speech/Presentation 4   Understanding Ordinary Conversation 4   Taking Medications 4   Remembering Where Things Are Placed or Put Away 4   Remembering List of 4-5 Errands 4   Taking Care of Complicated Tasks 4   Applied Cognition Raw Score 24   Applied Cognition Standardized Score 62.21

## 2023-07-11 NOTE — CASE MANAGEMENT
Case Management Discharge Planning Note    Patient name Kym Guerrero  Location [facility] 375-40 MRN [id number]  : 1960 Date 2023       Current Admission Date: 2023  Current Admission Diagnosis:Rohan's gangrene   Patient Active Problem List    Diagnosis Date Noted   • Suprapubic catheter ([address]) 2023   • Urethral disorder 2023   • Hyperglycemia 2023   • DM (diabetes mellitus) ([address]) 2023   • Lactic acidosis 2023   • HTN (hypertension) 2023   • Rohan's gangrene 2023      LOS (days): 12  Geometric Mean LOS (GMLOS) (days):   Days to GMLOS:     OBJECTIVE:  Risk of Unplanned Readmission Score: 15.16         Current admission status: Inpatient   Preferred Pharmacy:   PATIENT/FAMILY REPORTS NO PREFERRED PHARMACY  No address on file      [facility]  [address] [place] [id number]  Phone: [phone] Fax: [phone]    Primary Care Provider: No primary care provider on file. Primary Insurance: BLUE CROSS  Secondary Insurance:     DISCHARGE DETAILS:          Additional Comments: Per provider, no changes with pt at this time. Pt to remain inpatient. CM to continue to follow.

## 2023-07-11 NOTE — PLAN OF CARE
Problem: OCCUPATIONAL THERAPY ADULT  Goal: Performs self-care activities at highest level of function for planned discharge setting. See evaluation for individualized goals. Description: Treatment Interventions: ADL retraining, Functional transfer training, UE strengthening/ROM, Endurance training, Patient/family training, Equipment evaluation/education, Compensatory technique education, Continued evaluation, Energy conservation, Activityengagement          See flowsheet documentation for full assessment, interventions and recommendations. Outcome: Progressing  Note: Limitation: Decreased ADL status, Decreased endurance, Decreased self-care trans, Decreased high-level ADLs  Prognosis: Fair  Assessment: Pt was seen this date for OT tx session focusing on self care tasks, bed mobility, sit to stand progressions, standing tolerance, tranfers, safety awareness, compensatory techniques, energy conservation, and overall activity tolerance. Pt presents supine and is agreeable to OT tx session, all vitals WNLS. Pt completes previously mentioned tasks at documented assist levels please see above in flow sheet. Pt continues to require overall Mod A x1 for transfers and mobility and Min A- Max A for self care tasks. Pt is overall limited by increased pain, decreased balance, increased fatigue, decreased strength, endurance, and activity tolerance and continues to function below baseline level. Pt resting OOB in chair at end of session with all needs in reach and alarm on. Pt would benefit from continued OT Tx to improve overall functional abilities and increase independence. Will continue to follow with current POC.      OT Discharge Recommendation: Post acute rehabilitation services

## 2023-07-11 NOTE — QUICK NOTE
Post Op Check Note - Surgery Resident  Arnel Perkins 57 y.o. male MRN: [id number]  Unit/Bed#: [facility] 921-01 Encounter: [id number]    ASSESSMENT:  Arnel Perkins is a 57 y.o. male who is status post debridement and partial closure    Subjective: Patient was examined at bedside. Patient reported no acute concerns for the team.  He was resting comfortably reports no postoperative pain. He ate dinner and is having ostomy function. He denies nausea vomiting fevers chills and shortness of breath at this time. Physical Exam:  GEN: NAD  CV: RR  Lung: Normal effort, on room air  Ab: Soft, NT/ND, ostomy functioning  Neuro: A+Ox3  Wound: Perineal wound vac in place and holding suction    Blood pressure 132/73, pulse 81, temperature 98 °F (36.7 °C), resp. rate 14, height 6' 2" (1.88 m), weight 92.2 kg (203 lb 4.2 oz), SpO2 (!) 89 %. ,Body mass index is 26.1 kg/m².       Intake/Output Summary (Last 24 hours) at 7/11/2023 0001  Last data filed at 7/10/2023 2144  Gross per 24 hour   Intake 800 ml   Output 4176 ml   Net -3376 ml       Invasive Devices     Peripheral Intravenous Line  Duration           Peripheral IV 07/09/23 Proximal;Right;Ventral (anterior) Forearm 1 day          Drain  Duration           Ileostomy Loop LUQ 6 days    Suprapubic Catheter 18 Fr. 6 days                VTE Pharmacologic Prophylaxis: Heparin

## 2023-07-11 NOTE — RESTORATIVE TECHNICIAN NOTE
Restorative Technician Note      Patient Name: Leo Habermann     Restorative Tech Visit Date: 07/11/23  Note Type: Mobility  Patient Position Upon Consult: Bedside chair  Activity Performed: Ambulated  Assistive Device: Roller walker  Patient Position at End of Consult: Supine; All needs within reach;  Other (comment) (left in the care of RN)      Ani Amador  Restorative Technician

## 2023-07-11 NOTE — PLAN OF CARE
Problem: PHYSICAL THERAPY ADULT  Goal: Performs mobility at highest level of function for planned discharge setting. See evaluation for individualized goals. Description: Treatment/Interventions: ADL retraining, Functional transfer training, LE strengthening/ROM, Elevations, Therapeutic exercise, Endurance training, Patient/family training, Equipment eval/education, Bed mobility, Gait training, Compensatory technique education, Spoke to nursing, Spoke to case management, OT, Family  Equipment Recommended: Bea Sanders       See flowsheet documentation for full assessment, interventions and recommendations. Outcome: Progressing  Note: Prognosis: Good  Problem List: Decreased strength, Decreased range of motion, Decreased endurance, Impaired balance, Decreased mobility, Decreased skin integrity, Pain  Assessment: Pt seen for PT treatment session this date. Therapy session focused on bed mobility, functional transfers, gait training in order to improve overall mobility and independence. Pt requires assist of 1 + SBA of another for safety +line management. Pt continues to require increased time for all mobility 2* pain, increased time required at each position. Pt perofrmed log roll to ease bed mobility tasks. Pt continues to have decreased sitting tolerance, most comfortable reclned back with feet elevated. Pt performed multiple standing trials in which he was able to tolerate position for 60-90seconds with UE support. Pt taken outside this date with RN/ MD approval. Pt educated on/ compliant with HEP of ankle pumps, heel slides, glute sets/ quad sets as well as LE kicks as tolerable. Pt making progress toward goals. Pt was left sitting OOB in recliner at the end of PT session with all needs in reach. Pt would benefit from continued PT services while in hospital to address remaining limitations. PT to continue to follow pt and recommends STR. The patient's AM-PAC Basic Mobility Inpatient Short Form Raw Score is 12.  A Raw score of less than or equal to 16 suggests the patient may benefit from discharge to post-acute rehabilitation services. Please also refer to the recommendation of the Physical Therapist for safe discharge planning. PT Discharge Recommendation: Post acute rehabilitation services    See flowsheet documentation for full assessment.

## 2023-07-11 NOTE — UTILIZATION REVIEW
Continued Stay Review    Date: 7/11/203                       Current Patient Class: inpatient  Current Level of Care: med/surg  HPI:62 y.o. male initially admitted on 6/29  Now s/p:  6/28 debridement (at [facility])  6/29 perineal debridement/washout  6/30 perineal debridement/ EUA   7/1 debridement, suprapubic tube placement   7/2 perineal and scrotal debridement   7/4 laparascopic loop diverting colostomy creating, perineal debridement, cystoscopy with suprapubic tube exchange, EUA, testicular fixation, vac placement   7/6 perineum washout/debridement, vac change   7/8 perineum washout/debridement, vac change  7/10 perineum washout/debridement, vac change     Assessment/Plan: Plan for OR 7/12, NPO after MN. Wound clean/with minimal debris at next washout tentative plastic surgery plan (as per 7/10 plastic surgery consult note) for tissue coverage 7/13. ID following -- continue IV unasyn until no longer purulent/necrotic, then transition to PO Augmentin possibly after STSG 7/13. Routine ostomy care. Monitor UOP from suprapubic cath. Cons carb diet. Accuchecks w/ ssi. Analgesics/antiemetics prn. SCDs.       Vital Signs:  Date/Time Temp Pulse Resp BP MAP (mmHg) SpO2 Calculated FIO2 (%) - Nasal Cannula O2 Flow Rate (L/min) Nasal Cannula O2 Flow Rate (L/min) O2 Device   07/11/23 07:31:33 98 °F (36.7 °C) 59 18 118/73 88 97 % -- -- -- --   07/11/23 07:29:40 98 °F (36.7 °C) 61 18 118/73 88 95 % -- -- -- --   07/11/23 0200 -- 72 -- 119/63 82 92 % -- -- -- --   07/11/23 0100 -- 88 -- 117/64 82 94 % -- -- -- --   07/11/23 0000 -- -- -- -- -- -- 28 -- 2 L/min --   07/10/23 2300 -- 81 -- 132/73 93 89 % Abnormal  -- -- -- --   07/10/23 2200 98 °F (36.7 °C) 89 -- 130/75 93 91 % -- -- -- --   07/10/23 2100 -- 89 -- 133/75 94 90 % -- -- -- --   07/10/23 2005 -- -- -- -- -- 93 % 28 -- 2 L/min Nasal cannula   07/10/23 2000 -- 82 -- 133/74 94 88 % Abnormal  -- -- -- None (Room air)   07/10/23 1930 -- 86 -- 124/73 90 86 % Abnormal  -- -- -- --   07/10/23 19:06:30 98 °F (36.7 °C) 84 14 140/86 104 88 % Abnormal  -- -- -- --   07/10/23 18:57:37 -- 77 18 140/86 104 89 % Abnormal  -- -- -- --   07/10/23 1815 97 °F (36.1 °C) Abnormal  76 19 153/80 104 92 % 28 -- 2 L/min Nasal cannula   07/10/23 1800 -- 80 22 153/82 105 96 % -- -- -- None (Room air)   07/10/23 1745 -- 84 20 149/83 102 96 % -- -- -- --   07/10/23 1730 -- 86 18 138/78 98 95 % 28 -- 2 L/min Nasal cannula   07/10/23 1724 97.1 °F (36.2 °C) Abnormal  84 12 152/74 120 98 % -- 6 L/min -- Simple mask   07/10/23 07:33:34 98.7 °F (37.1 °C) 66 17 152/70 97 95 % -- -- -- --   07/09/23 22:59:25 -- 71 -- 159/72 101 93 % -- -- -- --   07/09/23 22:14:15 98.5 °F (36.9 °C) 73 18 171/71 Abnormal  104 95 % -- -- -- --   07/09/23 1800 -- 87 -- -- -- 93 % -- -- -- --   07/09/23 15:22:16 98.6 °F (37 °C) 83 20 135/63 87 89 % Abnormal  -- -- -- --       Pertinent Labs/Diagnostic Results:     Results from last 7 days   Lab Units 07/11/23  0630 07/10/23  0625 07/09/23  0515 07/08/23  0713 07/07/23  0753 07/07/23  0753   WBC Thousand/uL 9.68 11.66* 12.89* 12.35*  --  12.53*   HEMOGLOBIN g/dL 7.7* 7.3* 7.8* 7.8*  --  7.9*   HEMATOCRIT % 25.7* 24.4* 24.6* 24.8*  --  25.1*   PLATELETS Thousands/uL 458* 404* 399* 373  --  385   NEUTROS ABS Thousands/µL 6.66 8.16* 9.59* 9.37*   < >  --    BANDS PCT %  --   --   --   --   --  2    < > = values in this interval not displayed.      Results from last 7 days   Lab Units 07/11/23  0630 07/10/23  6933 07/09/23  0515 07/08/23  0713 07/07/23  0753 07/06/23  0406 07/05/23  0603   SODIUM mmol/L 143 145 141 143 146 142 142   POTASSIUM mmol/L 4.3 3.7 4.3 3.9 3.7 4.5 3.7   CHLORIDE mmol/L 112* 114* 112* 114* 113* 112* 113*   CO2 mmol/L 28 29 26 26 29 28 26   ANION GAP mmol/L 3 2 3 3 4 2 3   BUN mg/dL 5 5 7 6 6 5 7   CREATININE mg/dL 0.65 0.62 0.61 0.55* 0.58* 0.57* 0.55*   EGFR ml/min/1.73sq m 102 104 105 110 107 108 110   CALCIUM mg/dL 8.4 8.1* 8.2* 7.9* 8.0* 7.8* 7.9*   MAGNESIUM mg/dL 2.2  --  2.3  --   --   --  2.1   PHOSPHORUS mg/dL 3.0  --  2.9  --   --  2.8 2.0*     Results from last 7 days   Lab Units 07/11/23  1113 07/11/23  0730 07/10/23  2035 07/10/23  1727 07/10/23  1130 07/10/23  0731 07/09/23  2041 07/09/23  1649 07/09/23  1116 07/09/23  0738 07/08/23  2116 07/08/23  1725   POC GLUCOSE mg/dl 159* 159* 224* 121 114 113 191* 139 157* 184* 275* 148*     Results from last 7 days   Lab Units 07/11/23  0630 07/10/23  0625 07/09/23  0515 07/08/23  0713 07/07/23  0753 07/06/23  0406 07/05/23  0603   GLUCOSE RANDOM mg/dL 172* 104 220* 116 166* 85 159*     Results from last 7 days   Lab Units 07/07/23  0348 07/05/23  0656   UNIT PRODUCT CODE  [id number]  [id number]  [id number]   UNIT NUMBER  [id number]  [id number]  [id number]   UNITABO  O  O A  AB   UNITRH  POS  NEG POS  NEG   CROSSMATCH  Compatible  Compatible  --    UNIT DISPENSE STATUS  Return to Inv  Presumed Trans Return to Inv  Return to Inv   UNIT PRODUCT VOL ml 350  350 250  250     Medications:   Scheduled Medications:  acetaminophen, 975 mg, Oral, Q8H GEN  ampicillin-sulbactam, 3 g, Intravenous, Q6H  enoxaparin, 40 mg, Subcutaneous, Q24H GEN  insulin glargine, 8 Units, Subcutaneous, HS  insulin lispro, 2-12 Units, Subcutaneous, HS  insulin lispro, 4-20 Units, Subcutaneous, TID AC  melatonin, 3 mg, Oral, HS  QUEtiapine, 25 mg, Oral, HS  senna-docusate sodium, 1 tablet, Oral, BID    PRN Meds:  HYDROmorphone, 0.5 mg, Intravenous, Q3H PRN  ondansetron, 4 mg, Intravenous, Q6H PRN  oxyCODONE, 10 mg, Oral, Q4H PRN 7/10 x1, 7/11 x1  oxyCODONE, 5 mg, Oral, Q4H PRN  polyethylene glycol, 17 g, Oral, Daily PRN        Discharge Plan: TBD    Network Utilization Review Department  ATTENTION: Please call with any questions or concerns to [phone] and carefully listen to the prompts so that you are directed to the right person.  All voicemails are confidential.  Nayely Kent all requests for admission clinical reviews, approved or denied determinations and any other requests to dedicated fax number below belonging to the campus where the patient is receiving treatment.  List of dedicated fax numbers for the Facilities:  [place] DENIALS (Administrative/Medical Necessity) [phone] [address] (Maternity/NICU/Pediatrics) [phone]   [address] [place] [address] [phone]   [facility] [address] [facility] [phone]   [facility] [address] [place] [address] [phone]   [address] [address] [facility] [phone]

## 2023-07-12 ENCOUNTER — ANESTHESIA EVENT (INPATIENT)
Dept: PERIOP | Facility: HOSPITAL | Age: 63
DRG: 709 | End: 2023-07-12
Payer: COMMERCIAL

## 2023-07-12 ENCOUNTER — ANESTHESIA (INPATIENT)
Dept: PERIOP | Facility: HOSPITAL | Age: 63
DRG: 709 | End: 2023-07-12
Payer: COMMERCIAL

## 2023-07-12 LAB
ANION GAP SERPL CALCULATED.3IONS-SCNC: 2 MMOL/L
BASOPHILS # BLD AUTO: 0.02 THOUSANDS/ÂΜL (ref 0–0.1)
BASOPHILS NFR BLD AUTO: 0 % (ref 0–1)
BUN SERPL-MCNC: 6 MG/DL (ref 5–25)
CALCIUM SERPL-MCNC: 8.2 MG/DL (ref 8.3–10.1)
CHLORIDE SERPL-SCNC: 113 MMOL/L (ref 96–108)
CO2 SERPL-SCNC: 28 MMOL/L (ref 21–32)
CREAT SERPL-MCNC: 0.66 MG/DL (ref 0.6–1.3)
EOSINOPHIL # BLD AUTO: 0.03 THOUSAND/ÂΜL (ref 0–0.61)
EOSINOPHIL NFR BLD AUTO: 0 % (ref 0–6)
ERYTHROCYTE [DISTWIDTH] IN BLOOD BY AUTOMATED COUNT: 14.8 % (ref 11.6–15.1)
GFR SERPL CREATININE-BSD FRML MDRD: 102 ML/MIN/1.73SQ M
GLUCOSE SERPL-MCNC: 123 MG/DL (ref 65–140)
GLUCOSE SERPL-MCNC: 133 MG/DL (ref 65–140)
GLUCOSE SERPL-MCNC: 162 MG/DL (ref 65–140)
GLUCOSE SERPL-MCNC: 218 MG/DL (ref 65–140)
GLUCOSE SERPL-MCNC: 254 MG/DL (ref 65–140)
GLUCOSE SERPL-MCNC: 276 MG/DL (ref 65–140)
HCT VFR BLD AUTO: 24.5 % (ref 36.5–49.3)
HGB BLD-MCNC: 7.5 G/DL (ref 12–17)
IMM GRANULOCYTES # BLD AUTO: 0.05 THOUSAND/UL (ref 0–0.2)
IMM GRANULOCYTES NFR BLD AUTO: 1 % (ref 0–2)
LYMPHOCYTES # BLD AUTO: 2.32 THOUSANDS/ÂΜL (ref 0.6–4.47)
LYMPHOCYTES NFR BLD AUTO: 21 % (ref 14–44)
MCH RBC QN AUTO: 32.3 PG (ref 26.8–34.3)
MCHC RBC AUTO-ENTMCNC: 30.6 G/DL (ref 31.4–37.4)
MCV RBC AUTO: 106 FL (ref 82–98)
MONOCYTES # BLD AUTO: 0.89 THOUSAND/ÂΜL (ref 0.17–1.22)
MONOCYTES NFR BLD AUTO: 8 % (ref 4–12)
NEUTROPHILS # BLD AUTO: 7.75 THOUSANDS/ÂΜL (ref 1.85–7.62)
NEUTS SEG NFR BLD AUTO: 70 % (ref 43–75)
NRBC BLD AUTO-RTO: 0 /100 WBCS
PLATELET # BLD AUTO: 418 THOUSANDS/UL (ref 149–390)
PMV BLD AUTO: 9 FL (ref 8.9–12.7)
POTASSIUM SERPL-SCNC: 4.1 MMOL/L (ref 3.5–5.3)
RBC # BLD AUTO: 2.32 MILLION/UL (ref 3.88–5.62)
SODIUM SERPL-SCNC: 143 MMOL/L (ref 135–147)
WBC # BLD AUTO: 11.06 THOUSAND/UL (ref 4.31–10.16)

## 2023-07-12 PROCEDURE — 82948 REAGENT STRIP/BLOOD GLUCOSE: CPT

## 2023-07-12 PROCEDURE — 97606 NEG PRS WND THER DME>50 SQCM: CPT | Performed by: SURGERY

## 2023-07-12 PROCEDURE — 85025 COMPLETE CBC W/AUTO DIFF WBC: CPT

## 2023-07-12 PROCEDURE — 99232 SBSQ HOSP IP/OBS MODERATE 35: CPT | Performed by: INTERNAL MEDICINE

## 2023-07-12 PROCEDURE — 99024 POSTOP FOLLOW-UP VISIT: CPT | Performed by: SURGERY

## 2023-07-12 PROCEDURE — 80048 BASIC METABOLIC PNL TOTAL CA: CPT

## 2023-07-12 PROCEDURE — 0JQB0ZZ REPAIR PERINEUM SUBCUTANEOUS TISSUE AND FASCIA, OPEN APPROACH: ICD-10-PCS | Performed by: SURGERY

## 2023-07-12 PROCEDURE — NC001 PR NO CHARGE: Performed by: SURGERY

## 2023-07-12 RX ORDER — FENTANYL CITRATE 50 UG/ML
INJECTION, SOLUTION INTRAMUSCULAR; INTRAVENOUS AS NEEDED
Status: DISCONTINUED | OUTPATIENT
Start: 2023-07-12 | End: 2023-07-12

## 2023-07-12 RX ORDER — HYDROMORPHONE HCL/PF 1 MG/ML
0.4 SYRINGE (ML) INJECTION
Status: DISCONTINUED | OUTPATIENT
Start: 2023-07-12 | End: 2023-07-12 | Stop reason: HOSPADM

## 2023-07-12 RX ORDER — SODIUM CHLORIDE, SODIUM LACTATE, POTASSIUM CHLORIDE, CALCIUM CHLORIDE 600; 310; 30; 20 MG/100ML; MG/100ML; MG/100ML; MG/100ML
INJECTION, SOLUTION INTRAVENOUS CONTINUOUS PRN
Status: DISCONTINUED | OUTPATIENT
Start: 2023-07-12 | End: 2023-07-12

## 2023-07-12 RX ORDER — ONDANSETRON 2 MG/ML
INJECTION INTRAMUSCULAR; INTRAVENOUS AS NEEDED
Status: DISCONTINUED | OUTPATIENT
Start: 2023-07-12 | End: 2023-07-12

## 2023-07-12 RX ORDER — LIDOCAINE HYDROCHLORIDE 10 MG/ML
INJECTION, SOLUTION EPIDURAL; INFILTRATION; INTRACAUDAL; PERINEURAL AS NEEDED
Status: DISCONTINUED | OUTPATIENT
Start: 2023-07-12 | End: 2023-07-12

## 2023-07-12 RX ORDER — HYDROMORPHONE HCL/PF 1 MG/ML
SYRINGE (ML) INJECTION AS NEEDED
Status: DISCONTINUED | OUTPATIENT
Start: 2023-07-12 | End: 2023-07-12

## 2023-07-12 RX ORDER — DEXAMETHASONE SODIUM PHOSPHATE 10 MG/ML
INJECTION, SOLUTION INTRAMUSCULAR; INTRAVENOUS AS NEEDED
Status: DISCONTINUED | OUTPATIENT
Start: 2023-07-12 | End: 2023-07-12

## 2023-07-12 RX ORDER — MIDAZOLAM HYDROCHLORIDE 2 MG/2ML
INJECTION, SOLUTION INTRAMUSCULAR; INTRAVENOUS AS NEEDED
Status: DISCONTINUED | OUTPATIENT
Start: 2023-07-12 | End: 2023-07-12

## 2023-07-12 RX ORDER — PROPOFOL 10 MG/ML
INJECTION, EMULSION INTRAVENOUS AS NEEDED
Status: DISCONTINUED | OUTPATIENT
Start: 2023-07-12 | End: 2023-07-12

## 2023-07-12 RX ORDER — ONDANSETRON 2 MG/ML
4 INJECTION INTRAMUSCULAR; INTRAVENOUS ONCE AS NEEDED
Status: DISCONTINUED | OUTPATIENT
Start: 2023-07-12 | End: 2023-07-12 | Stop reason: HOSPADM

## 2023-07-12 RX ORDER — FENTANYL CITRATE/PF 50 MCG/ML
25 SYRINGE (ML) INJECTION
Status: DISCONTINUED | OUTPATIENT
Start: 2023-07-12 | End: 2023-07-12 | Stop reason: HOSPADM

## 2023-07-12 RX ORDER — MAGNESIUM HYDROXIDE 1200 MG/15ML
LIQUID ORAL AS NEEDED
Status: DISCONTINUED | OUTPATIENT
Start: 2023-07-12 | End: 2023-07-12 | Stop reason: HOSPADM

## 2023-07-12 RX ADMIN — SODIUM CHLORIDE, SODIUM LACTATE, POTASSIUM CHLORIDE, AND CALCIUM CHLORIDE: .6; .31; .03; .02 INJECTION, SOLUTION INTRAVENOUS at 09:03

## 2023-07-12 RX ADMIN — FENTANYL CITRATE 50 MCG: 50 INJECTION INTRAMUSCULAR; INTRAVENOUS at 09:16

## 2023-07-12 RX ADMIN — PROPOFOL 20 MG: 10 INJECTION, EMULSION INTRAVENOUS at 09:14

## 2023-07-12 RX ADMIN — ACETAMINOPHEN 975 MG: 325 TABLET, FILM COATED ORAL at 14:32

## 2023-07-12 RX ADMIN — LIDOCAINE HYDROCHLORIDE 50 MG: 10 INJECTION, SOLUTION EPIDURAL; INFILTRATION; INTRACAUDAL at 09:10

## 2023-07-12 RX ADMIN — SODIUM CHLORIDE 3 G: 9 INJECTION, SOLUTION INTRAVENOUS at 03:30

## 2023-07-12 RX ADMIN — PROPOFOL 20 MG: 10 INJECTION, EMULSION INTRAVENOUS at 09:10

## 2023-07-12 RX ADMIN — ENOXAPARIN SODIUM 40 MG: 40 INJECTION SUBCUTANEOUS at 14:32

## 2023-07-12 RX ADMIN — QUETIAPINE 25 MG: 25 TABLET ORAL at 21:31

## 2023-07-12 RX ADMIN — SODIUM CHLORIDE 3 G: 9 INJECTION, SOLUTION INTRAVENOUS at 14:32

## 2023-07-12 RX ADMIN — ONDANSETRON 4 MG: 2 INJECTION INTRAMUSCULAR; INTRAVENOUS at 09:29

## 2023-07-12 RX ADMIN — FENTANYL CITRATE 50 MCG: 50 INJECTION INTRAMUSCULAR; INTRAVENOUS at 09:27

## 2023-07-12 RX ADMIN — INSULIN LISPRO 8 UNITS: 100 INJECTION, SOLUTION INTRAVENOUS; SUBCUTANEOUS at 19:31

## 2023-07-12 RX ADMIN — PROPOFOL 50 MG: 10 INJECTION, EMULSION INTRAVENOUS at 09:15

## 2023-07-12 RX ADMIN — PROPOFOL 50 MG: 10 INJECTION, EMULSION INTRAVENOUS at 09:11

## 2023-07-12 RX ADMIN — PROPOFOL 20 MG: 10 INJECTION, EMULSION INTRAVENOUS at 09:12

## 2023-07-12 RX ADMIN — PROPOFOL 40 MG: 10 INJECTION, EMULSION INTRAVENOUS at 09:13

## 2023-07-12 RX ADMIN — SODIUM CHLORIDE 3 G: 9 INJECTION, SOLUTION INTRAVENOUS at 21:31

## 2023-07-12 RX ADMIN — INSULIN GLARGINE 8 UNITS: 100 INJECTION, SOLUTION SUBCUTANEOUS at 21:31

## 2023-07-12 RX ADMIN — DEXAMETHASONE SODIUM PHOSPHATE 5 MG: 10 INJECTION, SOLUTION INTRAMUSCULAR; INTRAVENOUS at 09:14

## 2023-07-12 RX ADMIN — ACETAMINOPHEN 975 MG: 325 TABLET, FILM COATED ORAL at 04:12

## 2023-07-12 RX ADMIN — MELATONIN TAB 3 MG 3 MG: 3 TAB at 21:31

## 2023-07-12 RX ADMIN — HYDROMORPHONE HYDROCHLORIDE 0.5 MG: 1 INJECTION, SOLUTION INTRAMUSCULAR; INTRAVENOUS; SUBCUTANEOUS at 09:34

## 2023-07-12 RX ADMIN — INSULIN LISPRO 6 UNITS: 100 INJECTION, SOLUTION INTRAVENOUS; SUBCUTANEOUS at 21:33

## 2023-07-12 RX ADMIN — MIDAZOLAM 2 MG: 1 INJECTION INTRAMUSCULAR; INTRAVENOUS at 09:02

## 2023-07-12 RX ADMIN — ACETAMINOPHEN 975 MG: 325 TABLET, FILM COATED ORAL at 21:31

## 2023-07-12 NOTE — PLAN OF CARE
Problem: MOBILITY - ADULT  Goal: Maintain or return to baseline ADL function  Description: INTERVENTIONS:  -  Assess patient's ability to carry out ADLs; assess patient's baseline for ADL function and identify physical deficits which impact ability to perform ADLs (bathing, care of mouth/teeth, toileting, grooming, dressing, etc.)  - Assess/evaluate cause of self-care deficits   - Assess range of motion  - Assess patient's mobility; develop plan if impaired  - Assess patient's need for assistive devices and provide as appropriate  - Encourage maximum independence but intervene and supervise when necessary  - Involve family in performance of ADLs  - Assess for home care needs following discharge   - Consider OT consult to assist with ADL evaluation and planning for discharge  - Provide patient education as appropriate  Outcome: Progressing  Goal: Maintains/Returns to pre admission functional level  Description: INTERVENTIONS:  - Perform BMAT or MOVE assessment daily.   - Set and communicate daily mobility goal to care team and patient/family/caregiver. - Collaborate with rehabilitation services on mobility goals if consulted  - Perform Range of Motion 3 times a day. - Reposition patient every 3 hours.   - Dangle patient 3 times a day  - Stand patient 3 times a day  - Ambulate patient 3 times a day  - Out of bed to chair 3 times a day   - Out of bed for meals 3 times a day  - Out of bed for toileting  - Record patient progress and toleration of activity level   Outcome: Progressing     Problem: SKIN/TISSUE INTEGRITY - ADULT  Goal: Skin Integrity remains intact(Skin Breakdown Prevention)  Description: Assess:     Bed Management:  -Have minimal linens on bed & keep smooth, unwrinkled  -Change linens as needed when moist or perspiring  Toileting:  -Offer bedside commode    Activity:  -Mobilize patient 3 times a day  -Encourage activity and walks on unit  -Encourage or provide ROM exercises   -Turn and reposition patient every 3 Hours  -Use appropriate equipment to lift or move patient in bed      Skin Care:  -Avoid use of baby powder, tape, friction and shearing, hot water or constrictive clothing  Outcome: Progressing  Goal: Incision(s), wounds(s) or drain site(s) healing without S/S of infection  Description: INTERVENTIONS  - Assess and document dressing, incision, wound bed, drain sites and surrounding tissue  - Provide patient and family education  Outcome: Progressing  Goal: Pressure injury heals and does not worsen  Description: Interventions:  - Implement low air loss mattress or specialty surface (Criteria met)  - Apply silicone foam dressing  - Consider nutrition services referral as needed  Outcome: Progressing

## 2023-07-12 NOTE — CASE MANAGEMENT
Case Management Discharge Planning Note    Patient name Nathalie Ro  Location [facility] 921/[facility] 928-89 MRN [id number]  : 1960 Date 2023       Current Admission Date: 2023  Current Admission Diagnosis:Rohan's gangrene   Patient Active Problem List    Diagnosis Date Noted   • Suprapubic catheter ([address]) 2023   • Urethral disorder 2023   • Hyperglycemia 2023   • DM (diabetes mellitus) ([address]) 2023   • Lactic acidosis 2023   • HTN (hypertension) 2023   • Rohan's gangrene 2023      LOS (days): 13  Geometric Mean LOS (GMLOS) (days):   Days to GMLOS:     OBJECTIVE:  Risk of Unplanned Readmission Score: 17.54         Current admission status: Inpatient   Preferred Pharmacy:   PATIENT/FAMILY REPORTS NO PREFERRED PHARMACY  No address on file      [facility]  [address] [address]  Phone: [phone] Fax: [phone]    Primary Care Provider: No primary care provider on file. Primary Insurance: BLUE CROSS  Secondary Insurance:     DISCHARGE DETAILS:       Additional Comments: Per provider, pt scheduled for OR tomorrow. CM to continue to follow.

## 2023-07-12 NOTE — WOUND OSTOMY CARE
Wound/ostomy team consulted for patient as he has creation of new loop colostomy on 7/4 for ostomy teaching. Patient just returned from OR and slated for OR tomorrow as well. Made plan with patient and his wife, who was at bedside, for ostomy teaching at 9:30. Teaching kit left at bedside, wife reports already looking through booklet.           Tessie CALIN, RN, Damien Merritt

## 2023-07-12 NOTE — PROGRESS NOTES
Post Op Check Note - Surgery Resident  Leo Habermann 57 y.o. male MRN: [id number]  Unit/Bed#: [facility] 921-01 Encounter: [id number]    ASSESSMENT:  Leo Habermann is a 57 y.o. male who is status post debridement and partial closure    Subjective: Patient was examined at bedside. Patient reported no acute concerns for the team.  He was resting comfortably reports no postoperative pain. He ate dinner and is having ostomy function. He denies nausea vomiting fevers chills and shortness of breath at this time. Physical Exam:  GEN: NAD  CV: RR  Lung: Normal effort, on room air  Ab: Soft, NT/ND, ostomy functioning  Neuro: A+Ox3  Wound: Perineal wound vac in place and holding suction    Blood pressure 132/66, pulse 67, temperature 99.3 °F (37.4 °C), resp. rate 16, height 6' 2" (1.88 m), weight 92.2 kg (203 lb 4.2 oz), SpO2 95 %. ,Body mass index is 26.1 kg/m².       Intake/Output Summary (Last 24 hours) at 7/12/2023 0558  Last data filed at 7/12/2023 0415  Gross per 24 hour   Intake 236 ml   Output 3620 ml   Net -3384 ml       Invasive Devices     Peripheral Intravenous Line  Duration           Peripheral IV 07/09/23 Proximal;Right;Ventral (anterior) Forearm 2 days          Drain  Duration           Ileostomy Loop LUQ 7 days    Suprapubic Catheter 18 Fr. 7 days                VTE Pharmacologic Prophylaxis: Heparin

## 2023-07-12 NOTE — ANESTHESIA PREPROCEDURE EVALUATION
Procedure:  DEBRIDEMENT WOUND [facility] OUT) (Abdomen)  APPLICATION VAC DRESSING (Groin)    Relevant Problems   ANESTHESIA (within normal limits)      CARDIO   (+) HTN (hypertension)        Physical Exam    Airway    Mallampati score: III  TM Distance: >3 FB  Neck ROM: full     Dental       Cardiovascular  Rhythm: regular, Rate: normal,     Pulmonary  Breath sounds clear to auscultation,     Other Findings        Anesthesia Plan  ASA Score- 3     Anesthesia Type- general with ASA Monitors. Additional Monitors:   Airway Plan: LMA. Comment: Risks/benefits and alternatives discussed with patient including possible PONV, sore throat, damage to teeth/lips/gums/esophagus, and possibility of rare anesthetic and surgical emergencies including but not limited to heart attack, stroke, and/or death. All questions were answered. .       Plan Factors-Exercise tolerance (METS): >4 METS. Chart reviewed. EKG reviewed. Existing labs reviewed. Patient summary reviewed. Patient is not a current smoker. Induction- intravenous. Postoperative Plan- Plan for postoperative opioid use. Informed Consent- Anesthetic plan and risks discussed with patient. I personally reviewed this patient with the CRNA. Discussed and agreed on the Anesthesia Plan with the CRNA. Tereza Mata

## 2023-07-12 NOTE — PROGRESS NOTES
Progress Note - Infectious Disease   Giuliano Martin 57 y.o. male MRN: [id number]  Unit/Bed#: [facility] 921-01 Encounter: [id number]      Impression/Recommendations:  1.  Severe sepsis, present on admission, secondary to Rohan's gangrene.  Patient is clinically improved.  He still has leukocytosis but no further fever, tach, tachypnea or lactic acidosis.  He had mild hypotension on admission, which resolved and not requiring any pressors. Antibiotic plan as in below. Monitor temperature/WBC. Monitor hemodynamic.     2.  Rohan's gangrene, status post multiple I&D.  At last I&D yesterday, there was just fibrinous exudate and only mild purulence.  Operative cultures with Actinomyces and Prevotella. Growth of coagulase-negative Staphylococcus is likely colonization/contamination.  Patient has VAC in place. Jesus Tilley has minimal pain.  Patient is status post repeat I&D earlier today. Once again, wound is without any further necrosis no purulence. At this point, antibiotic can probably be transitioned to p.o but will keep IV antibiotics through anticipated STSG . Continue IV Unasyn. Tentative plan for STSG tomorrow noted. Transition to p.o. Augmentin after STSG. Treat x7 days post STSG.     3.  DM, poorly controlled, with hyperglycemia and elevated hemoglobin A1c.  This contributes to infection above. Management per primary service.     Discussed with patient and his wife in detail regarding the above plan.     Antibiotics:  Unasyn  Antibiotic # 15     Subjective:  Patient is status post repeat I&D earlier today. Perineal pain mild, controlled. Temperature stays down.  No chills.   He is tolerating antibiotics well.  No nausea, vomiting or diarrhea.     Objective:  Vitals:  Temp:  [97.1 °F (36.2 °C)-99.3 °F (37.4 °C)] 98.1 °F (36.7 °C)  HR:  [67-80] 72  Resp:  [16-24] 17  BP: (121-166)/(62-90) 138/82  SpO2:  [89 %-98 %] 98 %  Temp (24hrs), Av.5 °F (36.9 °C), Min:97.1 °F (36.2 °C), Max:99.3 °F (37.4 °C)  Current: Temperature: 98.1 °F (36.7 °C)    Physical Exam:     General: Awake, alert, cooperative, no distress. Neck:  Supple. No mass. No lymphadenopathy. Lungs: Expansion symmetric, no rales, no wheezing, respirations unlabored. Heart:  Regular rate and rhythm, S1 and S2 normal, no murmur. Abdomen: Soft, nondistended, non-tender, bowel sounds active all four quadrants, no masses, no organomegaly. :  Wound with VAC in place. Copious serous fluid. No purulence. No erythema/warmth. Very mild tenderness. Intraoperative wound photo reviewed. Wound with good granulation tissue and no purulence. Extremities: No edema. No erythema/warmth. No ulcer. Nontender to palpation. Skin:  No rash. Neuro: Moves all extremities. Invasive Devices     Peripheral Intravenous Line  Duration           Peripheral IV 07/09/23 Proximal;Right;Ventral (anterior) Forearm 2 days    Peripheral IV 07/12/23 Left Arm <1 day    Peripheral IV 07/12/23 Left Hand <1 day          Drain  Duration           Ileostomy Loop LUQ 8 days    Suprapubic Catheter 18 Fr. 8 days                Labs studies:   I have personally reviewed pertinent labs. Results from last 7 days   Lab Units 07/12/23  0652 07/11/23  0630 07/10/23  0625   POTASSIUM mmol/L 4.1 4.3 3.7   CHLORIDE mmol/L 113* 112* 114*   CO2 mmol/L 28 28 29   BUN mg/dL 6 5 5   CREATININE mg/dL 0.66 0.65 0.62   EGFR ml/min/1.73sq m 102 102 104   CALCIUM mg/dL 8.2* 8.4 8.1*     Results from last 7 days   Lab Units 07/12/23  0652 07/11/23  0630 07/10/23  0625   WBC Thousand/uL 11.06* 9.68 11.66*   HEMOGLOBIN g/dL 7.5* 7.7* 7.3*   PLATELETS Thousands/uL 418* 458* 404*           Imaging Studies:   I have personally reviewed pertinent imaging study reports and images in PACS. EKG, Pathology, and Other Studies:   I have personally reviewed pertinent reports.

## 2023-07-12 NOTE — OP NOTE
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 partial closure  APPLICATION VAC DRESSING    Specimen(s):  * No specimens in log *    Estimated Blood Loss:   Minimal    Drains:  Ileostomy Loop LUQ (Active)   Stomal Appliance 2 piece 07/10/23 2000   Stoma Assessment [place] 23 0840   Stoma Shape Round 23 0840   Peristomal Assessment FABBY 23 0840   Treatment Other (Comment) 07/10/23 0350   Output (mL) 45 mL 23 0415   Number of days: 8       Suprapubic Catheter 18 Fr. 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 No additional debridement was required. Complications:   None    Procedure and Technique:  The patient was brought to the operating room and identified verbally and via wristband. He was transferred to the operating room table and positioned supine. General endotracheal anesthesia was induced successfully. The patient's perineal wound VAC was removed and the perineum was prepped and draped in the usual sterile fashion. Pre-operative antibiotics were administered.  A time out was performed confirming the patient, procedure, and site. All parties were in agreement. Attention was turned to the perineum where the wound was examined. The wound measured 20 cm x 12 cm x 6 cm. Pink healthy granulation tissue is present throughout the entire wound base. There is a moderate amount of fibrinous exudate overlying the wound base. No additional areas of necrotic tissue were present. Additional debridement was not required. The wound base was lightly scraped with a curette. The wound was irrigated with 1 L of normal saline using a Pulsavac. The right most lateral and posterior corner was partially closed with 2-0 nylon suture in a vertical mattress fashion. Wound measurements after partial closure measured 19 cm x 12 cm x 6 cm. A wound VAC was then replaced, including for Adaptic around the testicles, 4 white sponges surrounding the testicles, 3 black sponges. The wound vac was set to 125 mmHg continuous. The patient was then allowed to awaken, extubated, and transferred to the PACU having tolerated the procedure well. All instrument, needle, and sponge counts were correct at the end of the case. Radiofrequency detection was negative.     Dr. Ruth Strickland was present for the entire procedure      Patient Disposition:  PACU         SIGNATURE: Karen Nassar, DO  DATE: July 12, 2023  TIME: 10:50 AM

## 2023-07-12 NOTE — QUICK NOTE
Post Op Check:      Assessment:   Patient not in distress. Pain is well controlled. VS stable. No signs of infection or bleeding. Plan:  Diet Luis Carlos/CHO Controlled; Consistent Carbohydrate Diet Level 2 (5 carb servings/75 grams CHO/meal)  Diet NPO; Sips with meds  -Continue to monitor fever curve  -Monitor for infection, bleeding  -Pain and nausea control PRN      Patients pain is well controlled. He denied any nausea, vomiting, fever, chills, chest pain, or shortness of breath. Vitals:    07/12/23 1122   BP: 138/82   Pulse: 72   Resp: 17   Temp: 98.1 °F (36.7 °C)   SpO2: 98%     General: NAD  HENT: NCAT MMM  Neck: supple, no JVD  CV: nl rate  Lungs: nl wob. No resp distress  ABD: Soft, nontender, non-distended. Ostomy: pink, functional, gas and stool in bag.   : Perineum: wound vac seal intact, no leakage or drainage  Extrem: No CCE  Neuro: AAOx3       Qamar Brown MD  7/12/2023 2:47 PM

## 2023-07-12 NOTE — ANESTHESIA POSTPROCEDURE EVALUATION
Post-Op Assessment Note    CV Status:  Stable  Pain Score: 0    Pain management: adequate     Mental Status:  Alert and awake   Hydration Status:  Euvolemic   PONV Controlled:  Controlled   Airway Patency:  Patent      Post Op Vitals Reviewed: Yes      Staff: Anesthesiologist, CRNA         No notable events documented.     /81 (07/12/23 1030)    Temp (!) 97.1 °F (36.2 °C) (07/12/23 1030)    Pulse 79 (07/12/23 1030)   Resp 18 (07/12/23 1030)    SpO2 95 % (07/12/23 1030)

## 2023-07-12 NOTE — RESTORATIVE TECHNICIAN NOTE
Restorative Technician Note      Patient Name: Elbert Pinedo     Restorative Tech Visit Date: 07/12/23  Note Type: Mobility  Patient Position Upon Consult: Supine (pt in supine on transport stretcher)  Activity Performed: Transferred  Assistive Device: Other (Comment) (smooth )  Patient Position at End of Consult: Supine;  All needs within [facility]

## 2023-07-12 NOTE — PROGRESS NOTES
Progress Note - General Surgery   Nano Paula 57 y.o. male MRN: [id number]  Unit/Bed#: [facility] 921-01 Encounter: [id number]    Assessment:  57 y. o. male with Rohan's gangrene  S/p:  6/28 debridement - andria/cullen (cedillo)  6/29 Perineal debridement/washout - Mason   6/30 Perineal debridement/ EUA - Basil/Uro  7/1 Debridement, suprapubic tube - Basil/Uro  7/2 Perineal and scrotal debridement - Lucero  7/4 laparoscopic loop diverting colostomy creation, perineal debridement, cystoscopy with suprapubic tube exchange, EUA, testicular fixation, vac placement - Lucero/Saturnino  7/6 Perineum washout/debridement- Dmitri  7/8 Perineal washout/ wound vac change - Abbe   7/10 Perineal washout/ partial closure / wound vac change - Basil           Plan:  - NPO, OR today for washout, debridement, and wound vac change. Plastics to evaluate for reconstruction tomorrow 7/12  -Resume diet post op  -Plastic surgery evaluation for possible flap  - ABX per ID, continue Unasyn for now, appreciate recommendations  -Consider transitioning to PO Augmentin if no further debridement required  - Routine ostomy care  -Monitor UOP via suprapubic cath  -Pain/nausea control PRN  -DVT ppx    Subjective/Objective     Subjective: Patient was examined at bedside. Patient reported no acute concerns for the team.  Patient was ambulating yesterday and tolerating diet. He denies any nausea vomiting fevers chills or shortness of breath. He is n.p.o. for the OR today. Objective:  Vitals    Blood pressure 132/66, pulse 67, temperature 99.3 °F (37.4 °C), resp. rate 16, height 6' 2" (1.88 m), weight 92.2 kg (203 lb 4.2 oz), SpO2 95 %. ,Body mass index is 26.1 kg/m².       Intake/Output Summary (Last 24 hours) at 7/12/2023 0621  Last data filed at 7/12/2023 0415  Gross per 24 hour   Intake 236 ml   Output 3620 ml   Net -3384 ml       Invasive Devices     Peripheral Intravenous Line  Duration           Peripheral IV 07/09/23 Proximal;Right;Ventral (anterior) Forearm 2 days          Drain  Duration           Ileostomy Loop LUQ 7 days    Suprapubic Catheter 18 Fr. 7 days                  Physical Exam:  GEN: NAD  CV: RR  Lung: No distress on room air  Ab: Soft, NT/ND, ostomy functioning   Neuro:  A+Ox3  Wound: Perineal wound vac in place and holding suction         Lab, Imaging and other studies:CBC:   Lab Results   Component Value Date    WBC 9.68 07/11/2023    HGB 7.7 (L) 07/11/2023    HCT 25.7 (L) 07/11/2023     (H) 07/11/2023     (H) 07/11/2023    RBC 2.40 (L) 07/11/2023    MCH 32.1 07/11/2023    MCHC 30.0 (L) 07/11/2023    RDW 14.7 07/11/2023    MPV 8.9 07/11/2023    NRBC 0 07/11/2023   , CMP:   Lab Results   Component Value Date    SODIUM 143 07/11/2023    K 4.3 07/11/2023     (H) 07/11/2023    CO2 28 07/11/2023    BUN 5 07/11/2023    CREATININE 0.65 07/11/2023    CALCIUM 8.4 07/11/2023    EGFR 102 07/11/2023   , Urinalysis: No results found for: "COLORU", "CLARITYU", "SPECGRAV", "PHUR", "LEUKOCYTESUR", "NITRITE", "PROTEINUA", "GLUCOSEU", "Ulice Bassam", "Downey Bolder", "BLOODU"    VTE Pharmacologic Prophylaxis: Heparin  VTE Mechanical Prophylaxis: sequential compression device    Ashutosh Maldonado MD  PGY 1

## 2023-07-13 ENCOUNTER — ANESTHESIA (INPATIENT)
Dept: PERIOP | Facility: HOSPITAL | Age: 63
DRG: 709 | End: 2023-07-13
Payer: COMMERCIAL

## 2023-07-13 ENCOUNTER — ANESTHESIA EVENT (INPATIENT)
Dept: PERIOP | Facility: HOSPITAL | Age: 63
DRG: 709 | End: 2023-07-13
Payer: COMMERCIAL

## 2023-07-13 LAB
ABO GROUP BLD: NORMAL
ANION GAP SERPL CALCULATED.3IONS-SCNC: 1 MMOL/L
BLD GP AB SCN SERPL QL: NEGATIVE
BUN SERPL-MCNC: 6 MG/DL (ref 5–25)
CALCIUM SERPL-MCNC: 8.2 MG/DL (ref 8.3–10.1)
CHLORIDE SERPL-SCNC: 112 MMOL/L (ref 96–108)
CO2 SERPL-SCNC: 27 MMOL/L (ref 21–32)
CREAT SERPL-MCNC: 0.67 MG/DL (ref 0.6–1.3)
ERYTHROCYTE [DISTWIDTH] IN BLOOD BY AUTOMATED COUNT: 14.7 % (ref 11.6–15.1)
GFR SERPL CREATININE-BSD FRML MDRD: 101 ML/MIN/1.73SQ M
GLUCOSE SERPL-MCNC: 132 MG/DL (ref 65–140)
GLUCOSE SERPL-MCNC: 84 MG/DL (ref 65–140)
GLUCOSE SERPL-MCNC: 90 MG/DL (ref 65–140)
GLUCOSE SERPL-MCNC: 91 MG/DL (ref 65–140)
GLUCOSE SERPL-MCNC: 92 MG/DL (ref 65–140)
HCT VFR BLD AUTO: 23.8 % (ref 36.5–49.3)
HGB BLD-MCNC: 7.2 G/DL (ref 12–17)
MCH RBC QN AUTO: 32.4 PG (ref 26.8–34.3)
MCHC RBC AUTO-ENTMCNC: 30.3 G/DL (ref 31.4–37.4)
MCV RBC AUTO: 107 FL (ref 82–98)
PLATELET # BLD AUTO: 414 THOUSANDS/UL (ref 149–390)
PMV BLD AUTO: 9.8 FL (ref 8.9–12.7)
POTASSIUM SERPL-SCNC: 3.8 MMOL/L (ref 3.5–5.3)
RBC # BLD AUTO: 2.22 MILLION/UL (ref 3.88–5.62)
RH BLD: POSITIVE
SODIUM SERPL-SCNC: 140 MMOL/L (ref 135–147)
SPECIMEN EXPIRATION DATE: NORMAL
WBC # BLD AUTO: 11.08 THOUSAND/UL (ref 4.31–10.16)

## 2023-07-13 PROCEDURE — 80048 BASIC METABOLIC PNL TOTAL CA: CPT

## 2023-07-13 PROCEDURE — 15004 WOUND PREP F/N/HF/G: CPT | Performed by: STUDENT IN AN ORGANIZED HEALTH CARE EDUCATION/TRAINING PROGRAM

## 2023-07-13 PROCEDURE — 0HRAX74 REPLACEMENT OF INGUINAL SKIN WITH AUTOLOGOUS TISSUE SUBSTITUTE, PARTIAL THICKNESS, EXTERNAL APPROACH: ICD-10-PCS | Performed by: STUDENT IN AN ORGANIZED HEALTH CARE EDUCATION/TRAINING PROGRAM

## 2023-07-13 PROCEDURE — 99232 SBSQ HOSP IP/OBS MODERATE 35: CPT | Performed by: INTERNAL MEDICINE

## 2023-07-13 PROCEDURE — 97530 THERAPEUTIC ACTIVITIES: CPT

## 2023-07-13 PROCEDURE — 0HBHXZZ EXCISION OF RIGHT UPPER LEG SKIN, EXTERNAL APPROACH: ICD-10-PCS | Performed by: STUDENT IN AN ORGANIZED HEALTH CARE EDUCATION/TRAINING PROGRAM

## 2023-07-13 PROCEDURE — 97116 GAIT TRAINING THERAPY: CPT

## 2023-07-13 PROCEDURE — 82948 REAGENT STRIP/BLOOD GLUCOSE: CPT

## 2023-07-13 PROCEDURE — 15121 SPLT AGRFT F/S/N/H/F/G/M EA: CPT | Performed by: STUDENT IN AN ORGANIZED HEALTH CARE EDUCATION/TRAINING PROGRAM

## 2023-07-13 PROCEDURE — 0HR9X74 REPLACEMENT OF PERINEUM SKIN WITH AUTOLOGOUS TISSUE SUBSTITUTE, PARTIAL THICKNESS, EXTERNAL APPROACH: ICD-10-PCS | Performed by: STUDENT IN AN ORGANIZED HEALTH CARE EDUCATION/TRAINING PROGRAM

## 2023-07-13 PROCEDURE — 85027 COMPLETE CBC AUTOMATED: CPT

## 2023-07-13 PROCEDURE — 86850 RBC ANTIBODY SCREEN: CPT | Performed by: STUDENT IN AN ORGANIZED HEALTH CARE EDUCATION/TRAINING PROGRAM

## 2023-07-13 PROCEDURE — 13102 CMPLX RPR TRUNK ADDL 5CM/<: CPT | Performed by: STUDENT IN AN ORGANIZED HEALTH CARE EDUCATION/TRAINING PROGRAM

## 2023-07-13 PROCEDURE — 0JBB0ZZ EXCISION OF PERINEUM SUBCUTANEOUS TISSUE AND FASCIA, OPEN APPROACH: ICD-10-PCS | Performed by: STUDENT IN AN ORGANIZED HEALTH CARE EDUCATION/TRAINING PROGRAM

## 2023-07-13 PROCEDURE — 15005 WND PREP F/N/HF/G ADDL CM: CPT | Performed by: STUDENT IN AN ORGANIZED HEALTH CARE EDUCATION/TRAINING PROGRAM

## 2023-07-13 PROCEDURE — NC001 PR NO CHARGE: Performed by: STUDENT IN AN ORGANIZED HEALTH CARE EDUCATION/TRAINING PROGRAM

## 2023-07-13 PROCEDURE — 13101 CMPLX RPR TRUNK 2.6-7.5 CM: CPT | Performed by: STUDENT IN AN ORGANIZED HEALTH CARE EDUCATION/TRAINING PROGRAM

## 2023-07-13 PROCEDURE — 86900 BLOOD TYPING SEROLOGIC ABO: CPT | Performed by: STUDENT IN AN ORGANIZED HEALTH CARE EDUCATION/TRAINING PROGRAM

## 2023-07-13 PROCEDURE — 15120 SPLT AGRFT F/S/N/H/F/G/M 1ST: CPT | Performed by: STUDENT IN AN ORGANIZED HEALTH CARE EDUCATION/TRAINING PROGRAM

## 2023-07-13 PROCEDURE — 99024 POSTOP FOLLOW-UP VISIT: CPT | Performed by: SURGERY

## 2023-07-13 PROCEDURE — 86901 BLOOD TYPING SEROLOGIC RH(D): CPT | Performed by: STUDENT IN AN ORGANIZED HEALTH CARE EDUCATION/TRAINING PROGRAM

## 2023-07-13 PROCEDURE — 97535 SELF CARE MNGMENT TRAINING: CPT

## 2023-07-13 RX ORDER — POTASSIUM CHLORIDE 20 MEQ/1
20 TABLET, EXTENDED RELEASE ORAL ONCE
Status: COMPLETED | OUTPATIENT
Start: 2023-07-13 | End: 2023-07-13

## 2023-07-13 RX ORDER — LIDOCAINE HYDROCHLORIDE AND EPINEPHRINE 10; 10 MG/ML; UG/ML
INJECTION, SOLUTION INFILTRATION; PERINEURAL AS NEEDED
Status: DISCONTINUED | OUTPATIENT
Start: 2023-07-13 | End: 2023-07-13 | Stop reason: HOSPADM

## 2023-07-13 RX ORDER — MIDAZOLAM HYDROCHLORIDE 2 MG/2ML
INJECTION, SOLUTION INTRAMUSCULAR; INTRAVENOUS AS NEEDED
Status: DISCONTINUED | OUTPATIENT
Start: 2023-07-13 | End: 2023-07-13

## 2023-07-13 RX ORDER — DEXAMETHASONE SODIUM PHOSPHATE 10 MG/ML
INJECTION, SOLUTION INTRAMUSCULAR; INTRAVENOUS AS NEEDED
Status: DISCONTINUED | OUTPATIENT
Start: 2023-07-13 | End: 2023-07-13

## 2023-07-13 RX ORDER — FENTANYL CITRATE/PF 50 MCG/ML
25 SYRINGE (ML) INJECTION
Status: DISCONTINUED | OUTPATIENT
Start: 2023-07-13 | End: 2023-07-13 | Stop reason: HOSPADM

## 2023-07-13 RX ORDER — FENTANYL CITRATE 50 UG/ML
INJECTION, SOLUTION INTRAMUSCULAR; INTRAVENOUS AS NEEDED
Status: DISCONTINUED | OUTPATIENT
Start: 2023-07-13 | End: 2023-07-13

## 2023-07-13 RX ORDER — HYDROMORPHONE HCL/PF 1 MG/ML
0.5 SYRINGE (ML) INJECTION
Status: DISCONTINUED | OUTPATIENT
Start: 2023-07-13 | End: 2023-07-13 | Stop reason: HOSPADM

## 2023-07-13 RX ORDER — SODIUM CHLORIDE, SODIUM LACTATE, POTASSIUM CHLORIDE, CALCIUM CHLORIDE 600; 310; 30; 20 MG/100ML; MG/100ML; MG/100ML; MG/100ML
INJECTION, SOLUTION INTRAVENOUS CONTINUOUS PRN
Status: DISCONTINUED | OUTPATIENT
Start: 2023-07-13 | End: 2023-07-13

## 2023-07-13 RX ORDER — MINERAL OIL
OIL (ML) MISCELLANEOUS AS NEEDED
Status: DISCONTINUED | OUTPATIENT
Start: 2023-07-13 | End: 2023-07-13 | Stop reason: HOSPADM

## 2023-07-13 RX ORDER — LIDOCAINE HYDROCHLORIDE 10 MG/ML
INJECTION, SOLUTION EPIDURAL; INFILTRATION; INTRACAUDAL; PERINEURAL AS NEEDED
Status: DISCONTINUED | OUTPATIENT
Start: 2023-07-13 | End: 2023-07-13

## 2023-07-13 RX ORDER — ONDANSETRON 2 MG/ML
INJECTION INTRAMUSCULAR; INTRAVENOUS AS NEEDED
Status: DISCONTINUED | OUTPATIENT
Start: 2023-07-13 | End: 2023-07-13

## 2023-07-13 RX ORDER — PROPOFOL 10 MG/ML
INJECTION, EMULSION INTRAVENOUS AS NEEDED
Status: DISCONTINUED | OUTPATIENT
Start: 2023-07-13 | End: 2023-07-13

## 2023-07-13 RX ADMIN — ONDANSETRON 4 MG: 2 INJECTION INTRAMUSCULAR; INTRAVENOUS at 17:59

## 2023-07-13 RX ADMIN — LIDOCAINE HYDROCHLORIDE 50 MG: 10 INJECTION, SOLUTION EPIDURAL; INFILTRATION; INTRACAUDAL; PERINEURAL at 17:48

## 2023-07-13 RX ADMIN — HYDROMORPHONE HYDROCHLORIDE 0.5 MG: 1 INJECTION, SOLUTION INTRAMUSCULAR; INTRAVENOUS; SUBCUTANEOUS at 18:34

## 2023-07-13 RX ADMIN — QUETIAPINE 25 MG: 25 TABLET ORAL at 22:32

## 2023-07-13 RX ADMIN — PROPOFOL 150 MG: 10 INJECTION, EMULSION INTRAVENOUS at 17:48

## 2023-07-13 RX ADMIN — ACETAMINOPHEN 975 MG: 325 TABLET, FILM COATED ORAL at 22:32

## 2023-07-13 RX ADMIN — PHENYLEPHRINE HYDROCHLORIDE 20 MCG/MIN: 10 INJECTION INTRAVENOUS at 18:17

## 2023-07-13 RX ADMIN — SODIUM CHLORIDE 3 G: 9 INJECTION, SOLUTION INTRAVENOUS at 22:32

## 2023-07-13 RX ADMIN — POTASSIUM CHLORIDE 20 MEQ: 1500 TABLET, EXTENDED RELEASE ORAL at 08:48

## 2023-07-13 RX ADMIN — SODIUM CHLORIDE 3 G: 9 INJECTION, SOLUTION INTRAVENOUS at 08:37

## 2023-07-13 RX ADMIN — SODIUM CHLORIDE 3 G: 9 INJECTION, SOLUTION INTRAVENOUS at 14:30

## 2023-07-13 RX ADMIN — SODIUM CHLORIDE, SODIUM LACTATE, POTASSIUM CHLORIDE, AND CALCIUM CHLORIDE: .6; .31; .03; .02 INJECTION, SOLUTION INTRAVENOUS at 17:41

## 2023-07-13 RX ADMIN — OXYCODONE HYDROCHLORIDE 10 MG: 10 TABLET ORAL at 22:35

## 2023-07-13 RX ADMIN — FENTANYL CITRATE 50 MCG: 50 INJECTION INTRAMUSCULAR; INTRAVENOUS at 17:56

## 2023-07-13 RX ADMIN — MELATONIN TAB 3 MG 3 MG: 3 TAB at 22:32

## 2023-07-13 RX ADMIN — ACETAMINOPHEN 975 MG: 325 TABLET, FILM COATED ORAL at 04:31

## 2023-07-13 RX ADMIN — ACETAMINOPHEN 975 MG: 325 TABLET, FILM COATED ORAL at 13:52

## 2023-07-13 RX ADMIN — INSULIN GLARGINE 8 UNITS: 100 INJECTION, SOLUTION SUBCUTANEOUS at 22:32

## 2023-07-13 RX ADMIN — DEXAMETHASONE SODIUM PHOSPHATE 8 MG: 10 INJECTION, SOLUTION INTRAMUSCULAR; INTRAVENOUS at 17:59

## 2023-07-13 RX ADMIN — FENTANYL CITRATE 50 MCG: 50 INJECTION INTRAMUSCULAR; INTRAVENOUS at 18:00

## 2023-07-13 RX ADMIN — MIDAZOLAM 2 MG: 1 INJECTION INTRAMUSCULAR; INTRAVENOUS at 17:43

## 2023-07-13 RX ADMIN — PROPOFOL 50 MG: 10 INJECTION, EMULSION INTRAVENOUS at 17:56

## 2023-07-13 RX ADMIN — SODIUM CHLORIDE 3 G: 9 INJECTION, SOLUTION INTRAVENOUS at 03:31

## 2023-07-13 NOTE — QUICK NOTE
Plastic Surgery    Split thickness skin graft to perineal wound with wound vac in place. Disposition per primary. Patient will need home wound vac. Plan for vac to stay in place (please do not change) for a total of 7 days. We will remove in clinic to assess graft take. Right thigh donor site with dressing which will stay in place until OPT follow up. Please keep on bed rest for 5 days to decrease shearing force & to promote graft take.      Maria Isabel Hooper  PRS

## 2023-07-13 NOTE — PLAN OF CARE
Problem: MOBILITY - ADULT  Goal: Maintain or return to baseline ADL function  Description: INTERVENTIONS:  -  Assess patient's ability to carry out ADLs; assess patient's baseline for ADL function and identify physical deficits which impact ability to perform ADLs (bathing, care of mouth/teeth, toileting, grooming, dressing, etc.)  - Assess/evaluate cause of self-care deficits   - Assess range of motion  - Assess patient's mobility; develop plan if impaired  - Assess patient's need for assistive devices and provide as appropriate  - Encourage maximum independence but intervene and supervise when necessary  - Involve family in performance of ADLs  - Assess for home care needs following discharge   - Consider OT consult to assist with ADL evaluation and planning for discharge  - Provide patient education as appropriate  Outcome: Progressing  Goal: Maintains/Returns to pre admission functional level  Description: INTERVENTIONS:  - Perform BMAT or MOVE assessment daily.   - Set and communicate daily mobility goal to care team and patient/family/caregiver. - Collaborate with rehabilitation services on mobility goals if consulted  - Perform Range of Motion 3 times a day. - Reposition patient every 3 hours.   - Dangle patient 3 times a day  - Stand patient 3 times a day  - Ambulate patient 3 times a day  - Out of bed to chair 3 times a day   - Out of bed for meals 3 times a day  - Out of bed for toileting  - Record patient progress and toleration of activity level   Outcome: Progressing     Problem: SKIN/TISSUE INTEGRITY - ADULT  Goal: Skin Integrity remains intact(Skin Breakdown Prevention)    Problem: Prexisting or High Potential for Compromised Skin Integrity  Goal: Skin integrity is maintained or improved  Description: INTERVENTIONS:  - Identify patients at risk for skin breakdown  - Assess and monitor skin integrity  - Assess and monitor nutrition and hydration status  - Monitor labs   - Assess for incontinence - Turn and reposition patient  - Assist with mobility/ambulation  - Relieve pressure over bony prominences  - Avoid friction and shearing  - Provide appropriate hygiene as needed including keeping skin clean and dry  - Evaluate need for skin moisturizer/barrier cream  - Collaborate with interdisciplinary team   - Patient/family teaching  - Consider wound care consult   Outcome: Progressing

## 2023-07-13 NOTE — ANESTHESIA PREPROCEDURE EVALUATION
Procedure:  SKIN GRAFT SPLIT THICKNESS (STSG)  TRUNK (Groin)  FLAP LOCAL TRUNK (Groin)  DEBRIDEMENT WOUND  ([address] OUT) & vac (Groin)    Relevant Problems   CARDIO   (+) HTN (hypertension)      DM      Physical Exam    Airway    Mallampati score: II  TM Distance: >3 FB  Neck ROM: full     Dental   No notable dental hx     Cardiovascular  Cardiovascular exam normal    Pulmonary  Pulmonary exam normal     Other Findings     Normal sinus rhythm  Normal ECG  No previous ECGs available    Anesthesia Plan  ASA Score- 3     Anesthesia Type- general with ASA Monitors. Additional Monitors:   Airway Plan: LMA. Plan Factors-Exercise tolerance (METS): >4 METS. Chart reviewed. EKG reviewed. Imaging results reviewed. Existing labs reviewed. Patient summary reviewed. Patient is not a current smoker. Patient not instructed to abstain from smoking on day of procedure. Patient did not smoke on day of surgery. Obstructive sleep apnea risk education given perioperatively. Induction- intravenous. Postoperative Plan-     Informed Consent- Anesthetic plan and risks discussed with patient. I personally reviewed this patient with the CRNA. Discussed and agreed on the Anesthesia Plan with the CRNA. .          Hb 7.2

## 2023-07-13 NOTE — QUICK NOTE
Nurse-Patient-Provider rounds were completed with the patient's nurses today, Emeka Morgan. We discussed the plan is to keep n.p.o. in anticipation of operative intervention today. Patient to proceed with plastic surgery for wound evaluation and dressing change in OR today. Resume oral diet postoperatively and encourage adequate oral intake for nutritional support in setting of wound healing. Appreciate continued assistance from ID with antibiotic regimen; continue IV Unasyn for now with plan to transition to oral Augmentin once split-thickness skin graft completed. Plan for 7-day course of oral Augmentin post skin grafting per ID. We reviewed all of the invasive devices/lines/telemetry orders.  - Maintain suprapubic catheter. DVT Prophylaxis:  - On Lovenox. Pain Assessment / Plan:  - Continue current analgesic regimen. Mobility Assessment / Plan:  - Activity as tolerated. - Continue PT and OT evaluation and treatment as indicated postoperatively. Goals / Barriers for discharge:  - Not yet appropriate for discharge while waiting further operative intervention with plastic surgery today. - Case management following; case and discharge needs discussed. All questions and concerns were addressed. I spent greater than 18 minutes reviewing the plan with the patient and the nurse, and coordinating care for the day.     Bina Hollis PA-C  7/13/2023 08:22 AM

## 2023-07-13 NOTE — ANESTHESIA POSTPROCEDURE EVALUATION
Post-Op Assessment Note    CV Status:  Stable  Pain Score: 0    Pain management: adequate     Mental Status:  Sleepy and awake   Hydration Status:  Stable   PONV Controlled:  Controlled   Airway Patency:  Patent      Post Op Vitals Reviewed: Yes      Staff: Anesthesiologist, CRNA         No notable events documented.     BP   139/79   Temp   98.2   Pulse 81   Resp 20   SpO2 98

## 2023-07-13 NOTE — PHYSICAL THERAPY NOTE
PHYSICAL THERAPY NOTE          Patient Name: Nano Paula  HUJRH'O Date: 7/13/2023 07/13/23 1057   PT Last Visit   PT Visit Date 07/13/23   Note Type   Note Type Treatment   Pain Assessment   Pain Assessment Tool 0-10   Pain Score 10 - Worst Possible Pain   Pain Location/Orientation Location: Groin; Location: [place] Pain Intervention(s) Repositioned; Ambulation/increased activity; Emotional support   Restrictions/Precautions   Weight Bearing Precautions Per Order No   Other Precautions Multiple lines; Fall Risk;Pain  (ramírez, wound vac)   General   Chart Reviewed Yes   Response to Previous Treatment Patient reporting fatigue but able to participate. Family/Caregiver Present Yes   Cognition   Arousal/Participation Responsive; Cooperative   Attention Attends with cues to redirect   Orientation Level Oriented X4   Following Commands Follows one step commands with increased time or repetition   Comments pt with increased fatigue, cooperative throughout session with increased time + encouargement   Bed Mobility   Supine to Sit 3  Moderate assistance   Additional items Assist x 1;HOB elevated; Bedrails   Sit to Supine 4  Minimal assistance   Additional items Assist x 1; Increased time required;Verbal cues;LE management   Additional Comments pt supine in bed upon arrival. Pt returned to supine in bed with all needs wihtin reach   Transfers   Sit to Stand 4  Minimal assistance   Additional items Assist x 1; Increased time required;Verbal cues   Stand to Sit 4  Minimal assistance   Additional items Assist x 1; Increased time required;Verbal cues   Additional Comments transfers with RW, cues for hand placement and sequencing   Ambulation/Elevation   Gait pattern Short stride; Foward flexed;Decreased foot clearance; Improper Weight shift   Gait Assistance 4  Minimal assist   Additional items Assist x 1;Verbal cues   Assistive Device Rolling walker   Distance 40ft   Balance   Static Sitting Fair   Dynamic Sitting Fair -   Static Standing Fair -   Dynamic Standing Poor +   Ambulatory Poor +   Endurance Deficit   Endurance Deficit Yes   Activity Tolerance   Activity Tolerance Patient limited by fatigue   Medical Staff Made Aware OT Tom Padilla; co-session completed this date 2* increased medical complexity, multiple co-morbidities as well as decreased activity tolerance and frequent OR trips   Nurse Made Aware RN cleared pt to participate in therapy session   Assessment   Prognosis Good   Problem List Decreased strength;Decreased range of motion;Decreased endurance; Impaired balance;Decreased mobility; Decreased skin integrity;Pain   Assessment Pt seen for PT treatment session this date. Therapy session focused on bed mobility, functional transfers, gait training and endurance training in order to improve overall mobility and independence. Pt requires min/ mod assist x1 for all functional mobility performed this date. Pt continues to be limited by generalized weakness, fatigue + pain- continues to need increased time to complete all tasks. Pt making steady progress toward goals as demonstrated by ambulating increased distances with RW- was impulsive with ambulation/ movement- increased cuing for RW management + safety. Pt was left supine in bed at the end of PT session with all needs in reach. Pt would benefit from continued PT services while in hospital to address remaining limitations. PT to continue to follow pt and recommends STR. The patient's AM-PAC Basic Mobility Inpatient Short Form Raw Score is 15. A Raw score of less than or equal to 16 suggests the patient may benefit from discharge to post-acute rehabilitation services. Please also refer to the recommendation of the Physical Therapist for safe discharge planning.    Goals   Patient Goals to rest   STG Expiration Date 07/19/23   PT Treatment Day 3   Plan   Treatment/Interventions Functional transfer training;LE strengthening/ROM; Therapeutic exercise; Endurance training;Patient/family training;Equipment eval/education; Bed mobility;Gait training;Spoke to nursing;Spoke to case management;OT   Progress Progressing toward goals   PT Frequency 3-5x/wk   Recommendation   PT Discharge Recommendation Post acute rehabilitation services   Equipment Recommended [address] Recommended Wheeled walker   AM-PAC Basic Mobility Inpatient   Turning in Flat Bed Without Bedrails 3   Lying on Back to Sitting on Edge of Flat Bed Without Bedrails 2   Moving Bed to Chair 3   Standing Up From Chair Using Arms 3   Walk in Room 3   Climb 3-5 Stairs With Railing 1   Basic Mobility Inpatient Raw Score 15   Basic Mobility Standardized Score 36.97   Highest Level Of Mobility   JH-HLM Goal 4: Move to chair/commode   JH-HLM Achieved 7: Walk 25 feet or more   Education   Education Provided Mobility training;Assistive device   Patient Reinforcement needed   End of Consult   Patient Position at End of Consult Supine; All needs within reach     Adolph Bain, PT, DPT

## 2023-07-13 NOTE — CASE MANAGEMENT
Case Management Discharge Planning Note    Patient name Nano Paula  Location [facility] 921/PPHP 364-86 MRN [id number]  : 1960 Date 2023       Current Admission Date: 2023  Current Admission Diagnosis:Rohan's gangrene   Patient Active Problem List    Diagnosis Date Noted   • Suprapubic catheter ([address]) 2023   • Urethral disorder 2023   • Hyperglycemia 2023   • DM (diabetes mellitus) ([address]) 2023   • Lactic acidosis 2023   • HTN (hypertension) 2023   • Rohan's gangrene 2023      LOS (days): 14  Geometric Mean LOS (GMLOS) (days):   Days to GMLOS:     OBJECTIVE:  Risk of Unplanned Readmission Score: 17.75         Current admission status: Inpatient   Preferred Pharmacy:   PATIENT/FAMILY REPORTS NO PREFERRED PHARMACY  No address on file      [facility] [place] [facility] [place] [facility] [place] [address]  Phone: [phone] Fax: [phone]    Primary Care Provider: No primary care provider on file. Primary Insurance: BLUE CROSS  Secondary Insurance:     DISCHARGE DETAILS:       Additional Comments: Per provider, pt to go to OR today with plastics for skin graft. CM to follow.

## 2023-07-13 NOTE — OP NOTE
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 Surgical preparation of perineal, genital wound (19x13 cm)  2. Partial complex closure 10 cm of posterior perineal wound  3. Split-thickness skin graft from right thigh to perineal and genital wound (19x13 cm, total 247 cm2)  4. Placement of VAC bolster (19x13 cm)    Specimen(s):  * No specimens in log *    Estimated Blood Loss:   Minimal    Drains:  Ileostomy Loop LUQ (Active)   Stomal Appliance 1 piece; Changed 23 0846   Stoma Assessment [place] 23 1100   Stoma Shape Round 23 1100   Peristomal Assessment FABBY 23 1100   Treatment Other (Comment) 07/10/23 0350   Output (mL) 100 mL 23 1926   Number of days: 9       Suprapubic Catheter 18 Fr. 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 After undergoing general anesthesia, a timeout was performed at which point all patient identifiers were deemed to be correct.  The genitals and perineum and thighs were prepped and draped in the normal sterile fashion. I first began by surgically preparing the wound bed. The fibrinous debris was excisionally and sharply debrided down to healthy bleeding tissue throughout the extent of the wound. The wound was also scrubbed with betadine scrub brush. After debridement, the wound appeared healthy for skin graft. The posterior aspect of the wound was undermined at least 5 cm to allow for advancement and partial complex closure. The deep subcutaneous tissue was reapproximated with 0-vicryl to decrease the deadspace. This was performed for total length of 10 cm. After this was performed, I then proceeded with harvesting split-thickness skin graft from the right thigh. 10 cc of 1% lidocaine with epi was used to locally infiltrate the donor site. Using a [employer] dermatome, a 14/1000th of an inch graft was harvested, meshed 3:1, and secured to the perineal and genital wound with 3-0 chromic. After securing the skin graft to the recipient site, xeroform was placed over the skin graft and a VAC bolster was placed with good suction, no leak. The right thigh donor site was dressed with megan, optilock, and tegederm dressings and compression with ace-wrap. This concluded the procedure. Patient tolerated the procedure well without complications. At the end of the case, all sponge, needle, and instrument counts were correct. Patient was awakened from anesthesia and taken to the PACU in stable condition.     I was present for the entire procedure, A qualified resident physician was available and A physician assistant was not required during the procedure for retraction, tissue handling, dissection and suturing        Patient Disposition:  PACU         SIGNATURE: Maribel Hogue MD  DATE: July 13, 2023  TIME: 7:30 PM

## 2023-07-13 NOTE — PROGRESS NOTES
Progress Note - Infectious Disease   Wan Denny 57 y.o. male MRN: [id number]  Unit/Bed#: [facility] 921-01 Encounter: [id number]      Impression/Recommendations:  1.  Severe sepsis, present on admission, secondary to Rohan's gangrene.  Patient is clinically improved.  He still has leukocytosis but no further fever, tach, tachypnea or lactic acidosis.  He had mild hypotension on admission, which resolved and not requiring any pressors. Antibiotic plan as in below. Monitor temperature/WBC. Monitor hemodynamic.     2.  Rohan's gangrene, status post multiple I&D.  At last I&D yesterday, there was just fibrinous exudate and only mild purulence.  Operative cultures with Actinomyces and Prevotella. Growth of coagulase-negative Staphylococcus is likely colonization/contamination.  Patient has VAC in place. [place] Cellar has minimal pain.  Patient is status post repeat I&D earlier today. Once again, wound is without any further necrosis no purulence.  At this point, antibiotic can probably be transitioned to p.o but will keep IV antibiotics through anticipated STSG . Continue IV Unasyn. Plan for STSG  later today noted. Transition to p.o. Augmentin after STSG. Treat x7 days post STSG.     3.  DM, poorly controlled, with hyperglycemia and elevated hemoglobin A1c.  This contributes to infection above. Management per primary service.     Discussed with patient and his wife in detail regarding the above plan.     Antibiotics:  Unasyn  Antibiotic # 16     Subjective:  Patient is comfortable. Perineal pain mild, controlled. Temperature stays down.  No chills.   He is tolerating antibiotics well.  No nausea, vomiting or diarrhea.     Objective:  Vitals:  Temp:  [98.1 °F (36.7 °C)-98.6 °F (37 °C)] 98.1 °F (36.7 °C)  HR:  [65-82] 65  Resp:  [16-17] 17  BP: (122-148)/(64-82) 135/71  SpO2:  [93 %-98 %] 93 %  Temp (24hrs), Av.3 °F (36.8 °C), Min:98.1 °F (36.7 °C), Max:98.6 °F (37 °C)  Current: Temperature: 98.1 °F (36.7 °C)    Physical Exam:     General: Awake, alert, cooperative, no distress. Neck:  Supple. No mass. No lymphadenopathy. Lungs: Expansion symmetric, no rales, no wheezing, respirations unlabored. Heart:  Regular rate and rhythm, S1 and S2 normal, no murmur. Abdomen: Soft, nondistended, non-tender, bowel sounds active all four quadrants, no masses, no organomegaly. :  Wound with VAC in place. Copious serous drainage but no purulence. Minimal tenderness. No erythema. Extremities: No edema. No erythema/warmth. No ulcer. Nontender to palpation. Skin:  No rash. Neuro: Moves all extremities. Invasive Devices     Peripheral Intravenous Line  Duration           Peripheral IV 07/12/23 Left Arm 1 day    Peripheral IV 07/12/23 Left Hand 1 day          Drain  Duration           Suprapubic Catheter 18 Fr. 9 days    Ileostomy Loop LUQ 8 days                Labs studies:   I have personally reviewed pertinent labs. Results from last 7 days   Lab Units 07/13/23  0439 07/12/23  0652 07/11/23  0630   POTASSIUM mmol/L 3.8 4.1 4.3   CHLORIDE mmol/L 112* 113* 112*   CO2 mmol/L 27 28 28   BUN mg/dL 6 6 5   CREATININE mg/dL 0.67 0.66 0.65   EGFR ml/min/1.73sq m 101 102 102   CALCIUM mg/dL 8.2* 8.2* 8.4     Results from last 7 days   Lab Units 07/13/23  0439 07/12/23  0652 07/11/23  0630   WBC Thousand/uL 11.08* 11.06* 9.68   HEMOGLOBIN g/dL 7.2* 7.5* 7.7*   PLATELETS Thousands/uL 414* 418* 458*           Imaging Studies:   I have personally reviewed pertinent imaging study reports and images in PACS. EKG, Pathology, and Other Studies:   I have personally reviewed pertinent reports.

## 2023-07-13 NOTE — OCCUPATIONAL THERAPY NOTE
Occupational Therapy Progress Note     Patient Name: Demetri Gregory  CSZNO'G Date: 7/13/2023  Problem List  Principal Problem:    Rohan's gangrene  Active Problems:    DM (diabetes mellitus) ([address])    HTN (hypertension)    Suprapubic catheter ([address])    Urethral disorder    Hyperglycemia            07/13/23 1058   OT Last Visit   OT Visit Date 07/13/23   Note Type   Note Type Treatment   Pain Assessment   Pain Assessment Tool 0-10   Pain Score No Pain   Restrictions/Precautions   Weight Bearing Precautions Per Order No   Other Precautions Multiple lines; Fall Risk;Pain  (wound vac)   Lifestyle   Autonomy PTA, pt was independent in ADLs/IADLs and functional mobility w/ no DME; (+) driving   Reciprocal Relationships Lives with his wife who can assist with functional needs prn   Service to Others Retired; 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 however now works part time at [employer] Enjoys spending time with his grandchildren + riding his motorcycle   ADL   Where Assessed Supine, bed   Grooming Assistance 7  Independent   Grooming Deficit Wash/dry face   UB Bathing Assistance 5  Supervision/Setup   UB Bathing Deficit Right arm;Left arm; Abdomen; Chest   LB Bathing Assistance 3  Moderate Assistance   LB Bathing Deficit Right lower leg including foot; Left lower leg including foot; Buttocks   UB Dressing Assistance 5  Supervision/Setup   UB Dressing Deficit Thread RUE; Thread LUE   LB Dressing Assistance 2  Maximal Assistance   LB Dressing Deficit Don/doff R sock; Don/doff L sock   Bed Mobility   Supine to Sit 3  Moderate assistance   Additional items Assist x 1;HOB elevated; Bedrails; Increased time required   Sit to Supine 4  Minimal assistance   Additional items Assist x 1;HOB elevated; Bedrails; Increased time required   Transfers   Sit to Stand 4  Minimal assistance   Additional items Assist x 1; Increased time required   Stand to Sit 4  Minimal assistance   Additional items Assist x 1; Increased time required   Additional Comments transfers with RW   Functional Mobility   Functional Mobility 4  Minimal assistance   Additional items Rolling walker   Subjective   Subjective "I'm exhausted"   Cognition   Overall Cognitive Status WFL   Arousal/Participation Responsive; Cooperative   Attention Attends with cues to redirect   Orientation Level Oriented X4   Memory Unable to assess   Following Commands Follows one step commands with increased time or repetition   Comments Pt pleasant and cooperative during session   Activity Tolerance   Activity Tolerance Patient limited by fatigue   Medical Staff Made Aware PT Latha Push - pt with increased fatigue, limited activity tolerance/endurance. Unable to tolerate 2 sessions this date. RN clearance for session   Assessment   Assessment Patient participated in Skilled OT session this date with interventions consisting of ADL re training with the use of correct body mechnaics, Energy Conservation techniques, safety awareness and fall prevention techniques,  therapeutic activities to: increase activity tolerance, increase dynamic sit/ stand balance during functional activity  and increase OOB/ sitting tolerance . Upon arrival patient was found supine in bed. Pt demonstrated the following tasks: MOD A sup to sit, MIN A sit to supine. Pt performs STS and fnxl mobility with MIN A using RW. Independent for grooming, S for UB ADLs, MOD A for LB bathing, MAX A for LBD. Educated pt on HEP for w/in room. Patient continues to be functioning below baseline level, occupational performance remains limited secondary to factors listed above and increased risk for falls and injury. From OT standpoint, recommendation at time of d/c would be STR - pending progress. Patient to benefit from continued Occupational Therapy treatment while in the hospital to address deficits as defined above and maximize level of functional independence with ADLs and functional mobility.  Pt was left after session with all current needs met. The patient's raw score on the AM-PAC Daily Activity Inpatient Short Form is 17. A raw score of less than 19 suggests the patient may benefit from discharge to post-acute rehabilitation services. Please refer to the recommendation of the Occupational Therapist for safe discharge planning. Plan   Treatment Interventions ADL retraining;Functional transfer training;UE strengthening/ROM; Endurance training;Cognitive reorientation;Patient/family training;Equipment evaluation/education; Compensatory technique education;Continued evaluation; Energy conservation; Activityengagement   Goal Expiration Date 07/19/23   OT Treatment Day 3   OT Frequency 2-3x/wk   Recommendation   OT Discharge Recommendation Post acute rehabilitation services  (pending progress)   AM-PAC Daily Activity Inpatient   Lower Body Dressing 2   Bathing 2   Toileting 2   Upper Body Dressing 3   Grooming 4   Eating 4   Daily Activity Raw Score 17   Daily Activity Standardized Score (Calc for Raw Score >=11) 37.26   AM-PAC Applied Cognition Inpatient   Following a Speech/Presentation 4   Understanding Ordinary Conversation 4   Taking Medications 4   Remembering Where Things Are Placed or Put Away 4   Remembering List of 4-5 Errands 4   Taking Care of Complicated Tasks 4   Applied Cognition Raw Score 24   Applied Cognition Standardized Score 62.21     Moses Bean MS, OTR/L

## 2023-07-13 NOTE — PLAN OF CARE
Problem: OCCUPATIONAL THERAPY ADULT  Goal: Performs self-care activities at highest level of function for planned discharge setting. See evaluation for individualized goals. Description: Treatment Interventions: ADL retraining, Functional transfer training, UE strengthening/ROM, Endurance training, Patient/family training, Equipment evaluation/education, Compensatory technique education, Continued evaluation, Energy conservation, Activityengagement          See flowsheet documentation for full assessment, interventions and recommendations. Outcome: Progressing  Note: Limitation: Decreased ADL status, Decreased endurance, Decreased self-care trans, Decreased high-level ADLs  Prognosis: Fair  Assessment: Patient participated in Skilled OT session this date with interventions consisting of ADL re training with the use of correct body mechnaics, Energy Conservation techniques, safety awareness and fall prevention techniques,  therapeutic activities to: increase activity tolerance, increase dynamic sit/ stand balance during functional activity  and increase OOB/ sitting tolerance . Upon arrival patient was found supine in bed. Pt demonstrated the following tasks: MOD A sup to sit, MIN A sit to supine. Pt performs STS and fnxl mobility with MIN A using RW. Independent for grooming, S for UB ADLs, MOD A for LB bathing, MAX A for LBD. Educated pt on HEP for w/in room. Patient continues to be functioning below baseline level, occupational performance remains limited secondary to factors listed above and increased risk for falls and injury. From OT standpoint, recommendation at time of d/c would be STR - pending progress. Patient to benefit from continued Occupational Therapy treatment while in the hospital to address deficits as defined above and maximize level of functional independence with ADLs and functional mobility. Pt was left after session with all current needs met.  The patient's raw score on the AM-PAC Daily Activity Inpatient Short Form is 17. A raw score of less than 19 suggests the patient may benefit from discharge to post-acute rehabilitation services. Please refer to the recommendation of the Occupational Therapist for safe discharge planning.      OT Discharge Recommendation: Post acute rehabilitation services (pending progress)

## 2023-07-13 NOTE — PLAN OF CARE
Problem: PHYSICAL THERAPY ADULT  Goal: Performs mobility at highest level of function for planned discharge setting. See evaluation for individualized goals. Description: Treatment/Interventions: ADL retraining, Functional transfer training, LE strengthening/ROM, Elevations, Therapeutic exercise, Endurance training, Patient/family training, Equipment eval/education, Bed mobility, Gait training, Compensatory technique education, Spoke to nursing, Spoke to case management, OT, Family  Equipment Recommended: Shell Peters       See flowsheet documentation for full assessment, interventions and recommendations. Outcome: Progressing  Note: Prognosis: Good  Problem List: Decreased strength, Decreased range of motion, Decreased endurance, Impaired balance, Decreased mobility, Decreased skin integrity, Pain  Assessment: Pt seen for PT treatment session this date. Therapy session focused on bed mobility, functional transfers, gait training and endurance training in order to improve overall mobility and independence. Pt requires min/ mod assist x1 for all functional mobility performed this date. Pt continues to be limited by generalized weakness, fatigue + pain- continues to need increased time to complete all tasks. Pt making steady progress toward goals as demonstrated by ambulating increased distances with RW- was impulsive with ambulation/ movement- increased cuing for RW management + safety. Pt was left supine in bed at the end of PT session with all needs in reach. Pt would benefit from continued PT services while in hospital to address remaining limitations. PT to continue to follow pt and recommends STR. The patient's AM-PAC Basic Mobility Inpatient Short Form Raw Score is 15. A Raw score of less than or equal to 16 suggests the patient may benefit from discharge to post-acute rehabilitation services. Please also refer to the recommendation of the Physical Therapist for safe discharge planning.         PT Discharge Recommendation: Post acute rehabilitation services    See flowsheet documentation for full assessment.

## 2023-07-13 NOTE — PROGRESS NOTES
PRS Note    Patient seen and examined in preop. Discussed debridement with skin grafting to perineal, scrotal and genital areas with wound VAC placement. Discussed risks, benefits, complications including but not limited to infection, bleeding, scarring, scar contracture, partial vs complete graft loss, damage to surrounding structures, need for further surgery. Patient acknowledged. All questions answered, concerns addressed. Consents obtained.     Chris Melendrez MD   [facility]   [facility]   Office: [phone]

## 2023-07-13 NOTE — PROGRESS NOTES
General Surgery  Progress Note   Nathalie Ro 57 y.o. male MRN: [id number]  Unit/Bed#: [facility] 921-01 Encounter: [id number]    Assessment:  57 y. o. male with Rohan's gangrene  S/p:   debridement - andria/cullen (cedillo)   Perineal debridement/washout - Mason    Perineal debridement/ EUA - Basil/Uro   Debridement, suprapubic tube - Basil/Uro   Perineal and scrotal debridement - Lucero   laparoscopic loop diverting colostomy creation, perineal debridement, cystoscopy with suprapubic tube exchange, EUA, testicular fixation, vac placement - Lucero/Saturnino   Perineum washout/debridement- To   Perineal washout/ wound vac change - Abbe   7/10 Perineal washout/ partial closure / wound vac change - Maksim Carvajal    Perineal washout/ partial closure / wound vac change - Basil      Vital signs stable  UOP:4300 ml  CBC, CMP pending       Plan:  -NPO, OR with Plastics today    -Resume diet post op  -Plastic surgery evaluation for possible flap  - ABX per ID, continue Unasyn for now, appreciate recommendations  -Consider transitioning to PO Augmentin if no further debridement required  - Routine ostomy care  -Monitor UOP via suprapubic cath  -Pain/nausea control PRN  -DVT ppx      Subjective/Objective     Subjective:   Patient seen and examined at bedside, in no acute distress. No acute events overnight. Patient's pain is well controlled. He denies nausea or vomiting. Passing flatus and having bowel movements. Objective:   Vitals:Blood pressure 135/71, pulse 65, temperature 98.1 °F (36.7 °C), resp. rate 17, height 6' 2" (1.88 m), weight 92.2 kg (203 lb 4.2 oz), SpO2 93 %. Temp (24hrs), Av.1 °F (36.7 °C), Min:97.1 °F (36.2 °C), Max:98.6 °F (37 °C)      I/O        07 07 07 07    P. O. 236 0    I.V. (mL/kg)  800 (8.7)    Total Intake(mL/kg) 236 (2.6) 800 (8.7)    Urine (mL/kg/hr) 2950 (1.3) 3150 (1.4)    Drains 400 275    Stool 270 200    Total Output 3620 6135    Net -3384 -2825                Invasive Devices     Peripheral Intravenous Line  Duration           Peripheral IV 07/12/23 Left Hand 1 day    Peripheral IV 07/12/23 Left Arm <1 day          Drain  Duration           Ileostomy Loop LUQ 8 days    Suprapubic Catheter 18 Fr. 8 days                Physical Exam:   General: No acute distress  Neuro: Awake, Alert and Oriented   HEENT:  Normocephalic, atraumatic, moist mucous membranes  CV:, regular rate and rhythm  Lungs: Normal work of breathing, no increased respiratory effort  Abdomen: Soft, non-tender, non-distended; ostomy functioning   Wound: Perineal wound vac in place and holding suction  Extremities: No edema, clubbing or cyanosis  Skin: Warm, dry    Lab Results:   BMP/CMP:   Lab Results   Component Value Date    SODIUM 140 07/13/2023    K 3.8 07/13/2023     (H) 07/13/2023    CO2 27 07/13/2023    BUN 6 07/13/2023    CREATININE 0.67 07/13/2023    CALCIUM 8.2 (L) 07/13/2023    EGFR 101 07/13/2023    and CBC:   Lab Results   Component Value Date    WBC 11.08 (H) 07/13/2023    HGB 7.2 (L) 07/13/2023    HCT 23.8 (L) 07/13/2023     (H) 07/13/2023     (H) 07/13/2023    RBC 2.22 (L) 07/13/2023    MCH 32.4 07/13/2023    MCHC 30.3 (L) 07/13/2023    RDW 14.7 07/13/2023    MPV 9.8 07/13/2023     VTE Pharmacologic Prophylaxis: Heparin  VTE Mechanical Prophylaxis: sequential compression device      Partha Coles MD  7/13/2023  9:58 AM

## 2023-07-14 VITALS
OXYGEN SATURATION: 95 % | SYSTOLIC BLOOD PRESSURE: 131 MMHG | RESPIRATION RATE: 16 BRPM | HEIGHT: 74 IN | TEMPERATURE: 99.3 F | DIASTOLIC BLOOD PRESSURE: 72 MMHG | BODY MASS INDEX: 26.09 KG/M2 | HEART RATE: 68 BPM | WEIGHT: 203.26 LBS

## 2023-07-14 LAB
ALBUMIN SERPL BCP-MCNC: 1.5 G/DL (ref 3.5–5)
ALP SERPL-CCNC: 73 U/L (ref 46–116)
ALT SERPL W P-5'-P-CCNC: 24 U/L (ref 12–78)
ANION GAP SERPL CALCULATED.3IONS-SCNC: 2 MMOL/L
AST SERPL W P-5'-P-CCNC: 12 U/L (ref 5–45)
BASOPHILS # BLD AUTO: 0.02 THOUSANDS/ÂΜL (ref 0–0.1)
BASOPHILS NFR BLD AUTO: 0 % (ref 0–1)
BILIRUB SERPL-MCNC: 0.12 MG/DL (ref 0.2–1)
BUN SERPL-MCNC: 7 MG/DL (ref 5–25)
CALCIUM ALBUM COR SERPL-MCNC: 10.1 MG/DL (ref 8.3–10.1)
CALCIUM SERPL-MCNC: 8.1 MG/DL (ref 8.3–10.1)
CHLORIDE SERPL-SCNC: 113 MMOL/L (ref 96–108)
CO2 SERPL-SCNC: 28 MMOL/L (ref 21–32)
CREAT SERPL-MCNC: 0.64 MG/DL (ref 0.6–1.3)
EOSINOPHIL # BLD AUTO: 0.01 THOUSAND/ÂΜL (ref 0–0.61)
EOSINOPHIL NFR BLD AUTO: 0 % (ref 0–6)
ERYTHROCYTE [DISTWIDTH] IN BLOOD BY AUTOMATED COUNT: 14.5 % (ref 11.6–15.1)
GFR SERPL CREATININE-BSD FRML MDRD: 103 ML/MIN/1.73SQ M
GLUCOSE SERPL-MCNC: 142 MG/DL (ref 65–140)
GLUCOSE SERPL-MCNC: 171 MG/DL (ref 65–140)
GLUCOSE SERPL-MCNC: 203 MG/DL (ref 65–140)
HCT VFR BLD AUTO: 24.7 % (ref 36.5–49.3)
HGB BLD-MCNC: 7.4 G/DL (ref 12–17)
IMM GRANULOCYTES # BLD AUTO: 0.06 THOUSAND/UL (ref 0–0.2)
IMM GRANULOCYTES NFR BLD AUTO: 1 % (ref 0–2)
LYMPHOCYTES # BLD AUTO: 1.94 THOUSANDS/ÂΜL (ref 0.6–4.47)
LYMPHOCYTES NFR BLD AUTO: 20 % (ref 14–44)
MCH RBC QN AUTO: 31.9 PG (ref 26.8–34.3)
MCHC RBC AUTO-ENTMCNC: 30 G/DL (ref 31.4–37.4)
MCV RBC AUTO: 107 FL (ref 82–98)
MONOCYTES # BLD AUTO: 0.61 THOUSAND/ÂΜL (ref 0.17–1.22)
MONOCYTES NFR BLD AUTO: 6 % (ref 4–12)
NEUTROPHILS # BLD AUTO: 6.9 THOUSANDS/ÂΜL (ref 1.85–7.62)
NEUTS SEG NFR BLD AUTO: 73 % (ref 43–75)
NRBC BLD AUTO-RTO: 0 /100 WBCS
PLATELET # BLD AUTO: 399 THOUSANDS/UL (ref 149–390)
PMV BLD AUTO: 8.7 FL (ref 8.9–12.7)
POTASSIUM SERPL-SCNC: 4.1 MMOL/L (ref 3.5–5.3)
PROT SERPL-MCNC: 5.7 G/DL (ref 6.4–8.4)
RBC # BLD AUTO: 2.32 MILLION/UL (ref 3.88–5.62)
SODIUM SERPL-SCNC: 143 MMOL/L (ref 135–147)
WBC # BLD AUTO: 9.54 THOUSAND/UL (ref 4.31–10.16)

## 2023-07-14 PROCEDURE — 99232 SBSQ HOSP IP/OBS MODERATE 35: CPT | Performed by: INTERNAL MEDICINE

## 2023-07-14 PROCEDURE — 80053 COMPREHEN METABOLIC PANEL: CPT | Performed by: SURGERY

## 2023-07-14 PROCEDURE — 99222 1ST HOSP IP/OBS MODERATE 55: CPT

## 2023-07-14 PROCEDURE — 99238 HOSP IP/OBS DSCHRG MGMT 30/<: CPT | Performed by: PHYSICIAN ASSISTANT

## 2023-07-14 PROCEDURE — 85025 COMPLETE CBC W/AUTO DIFF WBC: CPT | Performed by: SURGERY

## 2023-07-14 PROCEDURE — 99024 POSTOP FOLLOW-UP VISIT: CPT | Performed by: SURGERY

## 2023-07-14 PROCEDURE — 82948 REAGENT STRIP/BLOOD GLUCOSE: CPT

## 2023-07-14 RX ORDER — AMOXICILLIN AND CLAVULANATE POTASSIUM 875; 125 MG/1; MG/1
1 TABLET, FILM COATED ORAL EVERY 12 HOURS SCHEDULED
Qty: 14 TABLET | Refills: 0 | Status: SHIPPED | OUTPATIENT
Start: 2023-07-14 | End: 2023-07-21

## 2023-07-14 RX ORDER — AMOXICILLIN AND CLAVULANATE POTASSIUM 875; 125 MG/1; MG/1
1 TABLET, FILM COATED ORAL EVERY 12 HOURS SCHEDULED
Status: DISCONTINUED | OUTPATIENT
Start: 2023-07-14 | End: 2023-07-14 | Stop reason: HOSPADM

## 2023-07-14 RX ORDER — OXYCODONE HYDROCHLORIDE 5 MG/1
5 TABLET ORAL EVERY 4 HOURS PRN
Qty: 25 TABLET | Refills: 0 | Status: SHIPPED | OUTPATIENT
Start: 2023-07-14 | End: 2023-07-24

## 2023-07-14 RX ORDER — ACETAMINOPHEN 325 MG/1
650 TABLET ORAL EVERY 6 HOURS PRN
Refills: 0
Start: 2023-07-14

## 2023-07-14 RX ORDER — ENOXAPARIN SODIUM 100 MG/ML
40 INJECTION SUBCUTANEOUS
Qty: 2.8 ML | Refills: 0 | Status: SHIPPED | OUTPATIENT
Start: 2023-07-15 | End: 2023-07-24

## 2023-07-14 RX ADMIN — INSULIN LISPRO 4 UNITS: 100 INJECTION, SOLUTION INTRAVENOUS; SUBCUTANEOUS at 08:32

## 2023-07-14 RX ADMIN — ACETAMINOPHEN 975 MG: 325 TABLET, FILM COATED ORAL at 05:33

## 2023-07-14 RX ADMIN — ACETAMINOPHEN 975 MG: 325 TABLET, FILM COATED ORAL at 14:41

## 2023-07-14 RX ADMIN — SENNOSIDES AND DOCUSATE SODIUM 1 TABLET: 50; 8.6 TABLET ORAL at 08:32

## 2023-07-14 RX ADMIN — ENOXAPARIN SODIUM 40 MG: 40 INJECTION SUBCUTANEOUS at 08:32

## 2023-07-14 RX ADMIN — SODIUM CHLORIDE 3 G: 9 INJECTION, SOLUTION INTRAVENOUS at 08:32

## 2023-07-14 RX ADMIN — SODIUM CHLORIDE 3 G: 9 INJECTION, SOLUTION INTRAVENOUS at 04:15

## 2023-07-14 RX ADMIN — AMOXICILLIN AND CLAVULANATE POTASSIUM 1 TABLET: 875; 125 TABLET, FILM COATED ORAL at 14:41

## 2023-07-14 NOTE — CASE MANAGEMENT
Case Management Discharge Planning Note    Patient name Ha [facility]  Location PPHP 921/PPHP 199-47 MRN [id number]  : 1960 Date 2023       Current Admission Date: 2023  Current Admission Diagnosis:Rohan's gangrene   Patient Active Problem List    Diagnosis Date Noted   • Suprapubic catheter ([address]) 2023   • Urethral disorder 2023   • Hyperglycemia 2023   • DM (diabetes mellitus) ([address]) 2023   • Lactic acidosis 2023   • HTN (hypertension) 2023   • Rohan's gangrene 2023      LOS (days): 15  Geometric Mean LOS (GMLOS) (days):   Days to GMLOS:     OBJECTIVE:  Risk of Unplanned Readmission Score: 16.64         Current admission status: Inpatient   Preferred Pharmacy:   PATIENT/FAMILY REPORTS NO PREFERRED PHARMACY  No address on file      [facility] [place] [facility] [place] [facility] [place] [address]  Phone: [phone] Fax: [phone]    Primary Care Provider: No primary care provider on file. Primary Insurance: BLUE CROSS  Secondary Insurance:     DISCHARGE DETAILS:      Treatment Team Recommendation: Home with [address]  Discharge Destination Plan[de-identified] Home with [address]           Additional Comments: Per provider, pt declining STR at this time and would like to go home with VNA services. CM sent blanket referral for VNA agencies for PT, OT & SN. Per provider, pt requires wound VAC. Provider filled out I wound VAC paperwork and submitted to discharge support. CM met with pt and wife to review referral process and to obtain address: [address] [place] [address], [address] [place] [address]. CM provided pt with [employer] card to schedule with a PCP as pt does not currently have one. Provider reported he can sign off on [facility] and will follow pt until PCP is established.

## 2023-07-14 NOTE — DISCHARGE SUMMARY
Discharge Summary - Ebony Edwards 57 y.o. 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 male who presents as a transfer from [facility] for management of rohan's gangrene. Per the patient, he has had abscesses in his groin in the past that have been drained and resolved without issue. However, this time it started about 2 days ago and the pain was worse than previous episodes so he presented for evaluation to an urgent care and was subsequently advise to present to the ED. There was subcutaneous emphysema concerning for rohan's gangrene so he was taken emergently for a debridement with urology and general surgery. Transferred to [facility] for further management."    Procedures Performed: No orders of the defined types were placed in this encounter. Summary of Hospital Course: Patient is a 58-year-old male presents as a trauma transfer secondary to Rohan's gangrene. Patient had a prolonged hospital course secondary to his significant infection upon arrival.  He required multiple consultants including urology, general surgery, plastic surgery, infectious disease. Ultimately the patient would undergo multiple washouts and debridements of his scrotal region and his perineum. He would require a skin graft that would be placed by plastic surgery and would go home with a VAC. Urology had assisted with placement of a suprapubic catheter which she will follow-up as an outpatient. General surgery did the initial debridements and then ultimately placed an ostomy. The patient would then follow-up with plastic surgery in the outpatient setting in 5 to 7 days for [facility].   The patient would require home VNA to assist with suprapubic catheter, colostomy, and VAC management. The VAC would not need to be changed for a total of his 7-day outpatient recovery and again would be taken down by the plastic surgery team in the office. Upon discharge per ID the patient would complete a 7-day course of Augmentin. The patient would also have a strict bedrest precautions for the first 5 days. The patient would also be assisted with home VNA for PT and OT. Incidental findings were discussed with patient at bedside prior to discharge. He expressed verbal understanding of the findings. The patient would follow-up as instructed above. Significant Findings, Care, Treatment and Services Provided:   No results found. Complications: no complications. Discharge Diagnosis:   Patient Active Problem List   Diagnosis   • Rohan's gangrene   • DM (diabetes mellitus) ([address])   • Lactic acidosis   • HTN (hypertension)   • Suprapubic catheter ([address])   • Urethral disorder   • Hyperglycemia         Medical Problems     Resolved Problems  Date Reviewed: 7/4/2023          Resolved    Septic shock ([address]) 7/2/2023     Resolved by  Danny Miller PA-C          Condition at Discharge: good         Discharge instructions/Information to patient and family:   See after visit summary for information provided to patient and family. Provisions for Follow-Up Care:  See after visit summary for information related to follow-up care and any pertinent home health orders. PCP: No primary care provider on file. Disposition: Home    Planned Readmission: Yes pending outpatient management of skin graft. Discharge Statement   I spent 26 minutes discharging the patient. This time was spent on the day of discharge. I had direct contact with the patient on the day of discharge. Additional documentation is required if more than 30 minutes were spent on discharge.      Discharge Medications:  See after visit summary for reconciled discharge medications provided to patient and family.

## 2023-07-14 NOTE — PROGRESS NOTES
Progress Note - Infectious Disease   Bonita Prieto 57 y.o. male MRN: [id number]  Unit/Bed#: [facility] 921-01 Encounter: [id number]      Impression/Recommendations:  1.  Severe sepsis, present on admission, secondary to Rohan's gangrene.  Patient is clinically much improved. Fever has resolved. WBC has normalized.  He had mild hypotension on admission, which resolved and not requiring any pressors. Antibiotic plan as in below. Monitor temperature/WBC. Monitor hemodynamic.     2.  Rohan's gangrene, status post multiple I&D.  At last I&D, there was just fibrinous exudate and only mild purulence.  Operative cultures with Actinomyces and Prevotella. Growth of coagulase-negative Staphylococcus is likely colonization/contamination. .  Patient is clinically much improved and is now status post STSG. Antibiotic to p.o. Augmentin. Treat x7 days post STSG.     3.  DM, poorly controlled, with hyperglycemia and elevated hemoglobin A1c.  This contributes to infection above. Management per primary service.     Discussed with patient and his wife in detail regarding the above plan. Discharge plan today per primary service noted.     Antibiotics:  Unasyn  Antibiotic # 17     Subjective:  Patient is comfortable. He has some pain left thigh donor site but minimal perineal pain. Temperature stays down.  No chills. He is tolerating antibiotics well.  No nausea, vomiting or diarrhea.     Objective:  Vitals:  Temp:  [98.1 °F (36.7 °C)-99.6 °F (37.6 °C)] 98.5 °F (36.9 °C)  HR:  [65-86] 68  Resp:  [14-18] 14  BP: (130-145)/(67-86) 130/72  SpO2:  [92 %-100 %] 95 %  Temp (24hrs), Av.6 °F (37 °C), Min:98.1 °F (36.7 °C), Max:99.6 °F (37.6 °C)  Current: Temperature: 98.5 °F (36.9 °C)    Physical Exam:     General: Awake, alert, cooperative, no distress. Neck:  Supple. No mass. No lymphadenopathy. Lungs: Expansion symmetric, no rales, no wheezing, respirations unlabored.    Heart:  Regular rate and rhythm, S1 and S2 normal, no murmur. Abdomen: Soft, nondistended, non-tender, bowel sounds active all four quadrants, no masses, no organomegaly. :  Wound with dressing in place. No erythema/warmth. Minimal tenderness. Extremities: No edema. No erythema/warmth. With dressing in place. Nontender to palpation. Skin:  No rash. Neuro: Moves all extremities. Invasive Devices     Peripheral Intravenous Line  Duration           Peripheral IV 07/12/23 Left Arm 2 days    Peripheral IV 07/12/23 Left Hand 2 days          Drain  Duration           Suprapubic Catheter 18 Fr. 10 days    Ileostomy Loop LUQ 9 days                Labs studies:   I have personally reviewed pertinent labs. Results from last 7 days   Lab Units 07/14/23  0603 07/13/23 0439 07/12/23  0652   POTASSIUM mmol/L 4.1 3.8 4.1   CHLORIDE mmol/L 113* 112* 113*   CO2 mmol/L 28 27 28   BUN mg/dL 7 6 6   CREATININE mg/dL 0.64 0.67 0.66   EGFR ml/min/1.73sq m 103 101 102   CALCIUM mg/dL 8.1* 8.2* 8.2*   AST U/L 12  --   --    ALT U/L 24  --   --    ALK PHOS U/L 73  --   --      Results from last 7 days   Lab Units 07/14/23  0603 07/13/23  0439 07/12/23  0652   WBC Thousand/uL 9.54 11.08* 11.06*   HEMOGLOBIN g/dL 7.4* 7.2* 7.5*   PLATELETS Thousands/uL 399* 414* 418*           Imaging Studies:   I have personally reviewed pertinent imaging study reports and images in PACS. EKG, Pathology, and Other Studies:   I have personally reviewed pertinent reports.

## 2023-07-14 NOTE — CASE MANAGEMENT
[facility] has received request for [employer] wound vac from Care Manager. Request submitted via [employer] website.    Pending order #: [id number]

## 2023-07-14 NOTE — DISCHARGE INSTR - OTHER ORDERS
1. Peel back pouch using push-pull method  2. Use warm water only to cleanse skin around the stoma (cara-stomal skin). 3. Make sure all adhesive residue is removed and skin is dry   4. Measure stoma size using measuring guide and trace correct measurements onto back of pouch (measure stoma at least weekly for the first 6-8 weeks)  5. Then cut or mold backing of pouch out to correct shape/size. 6. Use 3M no sting barrier film to prep the skin around the stoma. 7. Place pouch over stoma and onto skin. 8. Use warmth of hand to apply gentle pressure to help backing of pouch to adhere well to skin. Empty pouch when no more than 2/3 full to prevent leaking/lifting of the pouch    Your stoma measures approx: 2 inches     Currently using convatec flat cut to fit pouch #[id number]    Please ostomy nurse line if problems arise with pouching system: [phone]. Please leave a message if we do not answer, and we will call you back.

## 2023-07-14 NOTE — CASE MANAGEMENT
[address] [place] [address] has received approval to release wound vac to patient. Assigned vac #: [id number]   Proof of delivery PPW given to Care Manager to deliver to patient.

## 2023-07-14 NOTE — OCCUPATIONAL THERAPY NOTE
Occupational Therapy Cancel Note         Patient Name: Rian Harvey  HJLJP'T Date: 7/14/2023 07/14/23 1453   OT Last Visit   OT Visit Date 07/14/23   Note Type   Note type Cancelled Session   Cancel Reasons Medical status       OT orders received. Chart reviewed. Pt s/p skin graft. Per plastic sx, "Please keep on bed rest for 5 days to decrease shearing force & to promote graft take." Will d/c OT orders at this time. Please re-consult as appropriate. Thank you!       Анна Lackey MS, OTR/L       Анна Lackey MS, OTR/L

## 2023-07-14 NOTE — CONSULTS
Consultation - [facility] 57 y.o. male MRN: [id number]  Unit/Bed#: [facility] 921-01 Encounter: [id number]    Assessment:  Rohan's gangrene   Attention to Colostomy  Encounter for ostomy care education    Plan:  · Patient to be d/c today today. Ostomy education performed with patient and his wife at the bedside. • Topics reviewed: normal stoma appearance, expected stool characteristics, how and when to empty (1/3 full) to prevent pulling/lifting of the barrier, importance & how to clean the end of the pouch to prevent odor, gas/odor producing foods, frequency of changing of the pouch (every 3-4 days on a schedule or if leaking), burping, one piece/two piece pouching systems differences, gas valve functionality of one piece, Clothing- including avoiding placing belt-line/seat belts over the stoma, bathing, cara-stomal skin care, and how to obtain supplies. • Step-by-step pouch change done using 1 piece flat cut to fit convatec pouch. Reviewed with patient how and when to measure the stoma (weekly). Demonstrated how to trace & cut one piece barrier, and how to apply it to the skin using gentle pressure / warmth of the hands. Verbally reviewed moldable two piece convatec technology. Skin prep applied to cara-stomal skin, rationale explained to patient. Good seal obtained. • Additional ostomy supplies left at bedside  • Plan is for patient to have VNA at home per primary service. • Patient gave verbal permission to order sample kits from [employer] and [facility].  • Encouraged patient to continue to read the educational materials provided - "Life after colostomy Surgery" by [employer]. Wife has already read over the materials   • Patient and wife verbalized understanding of education and teaching. Patient and wife are active learners. All questions and concerns answered.    · Response to teaching: Patient and wife both verbalized increased comfort and understanding of the basic principles of ostomy care after the session. • Wound care team will continue to follow along with patient during hospital stay, if patient does not d/c today. • Discussed teaching outcome with primary service PA. History of Present Illness:  Patient is a 70-year-old male who was admitted to the hospitalist for Rohan's gangrene, initially transferred from [facility] [place]. History of DM. Patient has undergone several debridements and most recently a skin graft with plastics 7/13/23. Patient had loop diverting colostomy created on 7/4/23. Wound care consulted for ostomy teaching. Patient states he is going to be d/c today. Patient's wife at the bedside who is going to be also assisting the patient with his ostomy care. Patient and wife are both agreeable for teaching and education today. Subjective:    Review of Systems   Constitutional: Negative for chills and fever. HENT: Negative for congestion and sneezing. Respiratory: Negative for cough and shortness of breath. Psychiatric/Behavioral: Negative for agitation.        Historical Information   Past Medical History:   Diagnosis Date   • Diabetes mellitus ([address])      Past Surgical History:   Procedure Laterality Date   • CYSTOSCOPY N/A 7/4/2023    Procedure: CYSTOSCOPY and suprapubic tube exchange;  Surgeon: Maged Dee MD;  [facility];  Service: Urology   • EXAMINATION UNDER ANESTHESIA N/A 7/4/2023    Procedure: EUA, testicular fiation;  Surgeon: Maged Dee MD;  [facility];  Service: Urology   • INCISION AND DRAINAGE OF WOUND N/A 6/28/2023    Procedure: 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 ;  Surgeon: Celine Hernandez MD;  [facility];  Service: Urology   • INCISION AND DRAINAGE OF WOUND N/A 6/28/2023    Procedure: 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;  Surgeon: Dinone Hunter MD;  [facility];  Service: General   • DC CYSTOSTOMY CYSTOTOMY W/DRAINAGE N/A 7/1/2023 Procedure: CYSTOSCOPY; CYSTOSTOMY SUPRAPUBIC OPEN;  Surgeon: Efren South MD;  [facility];  Service: Urology   • SC LAPS ABD PRTM&OMENTUM DX W/WO SPEC BR/WA SPX N/A 7/4/2023    Procedure: LAPAROSCOPY DIAGNOSTIC WITH CREATION OF OSTOMY;  Surgeon: Gabriela Robles MD;  [facility];  Service: General   • ROTATOR CUFF REPAIR     • SPLIT THICKNESS SKIN GRAFT N/A 7/13/2023    Procedure: SKIN GRAFT SPLIT THICKNESS (STSG)  TRUNK;  Surgeon: Monae Palmer MD;  [facility];  Service: Plastics   • VAC DRESSING APPLICATION N/A 4/2/5665    Procedure: APPLICATION VAC DRESSING;  Surgeon: Gabriela Robles MD;  [facility];  Service: General   • VAC DRESSING APPLICATION N/A 3/78/7349    Procedure: APPLICATION VAC DRESSING;  Surgeon: Everette Sheffield MD;  [facility];  Service: General   • WOUND DEBRIDEMENT N/A 6/29/2023    Procedure: DEBRIDEMENT WOUND [facility] OUT); Surgeon: Sharon Lowry MD;  [facility];  Service: General   • WOUND DEBRIDEMENT N/A 7/2/2023    Procedure: DEBRIDEMENT PERINEAL WOUND AND DRESSING CHANGE [facility] OUT); Surgeon: Gabriela Robles MD;  [facility];  Service: General   • WOUND DEBRIDEMENT N/A 7/1/2023    Procedure: DEBRIDEMENT WOUND AND DRESSING CHANGE [facility] OUT); Surgeon: Everette Sheffield MD;  [facility];  Service: General   • WOUND DEBRIDEMENT N/A 6/30/2023    Procedure: DEBRIDEMENT WOUND [facility] OUT); Surgeon: Everette Sheffield MD;  [facility];  Service: General   • WOUND DEBRIDEMENT N/A 7/4/2023    Procedure: DEBRIDEMENT OF PERINEAL WOUND ([address] OUT); Surgeon: Gabriela Robles MD;  [facility];  Service: General   • WOUND DEBRIDEMENT N/A 7/6/2023    Procedure: DEBRIDEMENT WOUND, [address], AND WOUND VAC CHANGE;  Surgeon: Jesica Rizvi DO;  [facility];  Service: General   • WOUND DEBRIDEMENT N/A 7/8/2023    Procedure: DEBRIDEMENT WOUND AND DRESSING CHANGE, VAC change ([address] OUT);   Surgeon: Amina Milian DO;  [facility];  Service: General   • WOUND DEBRIDEMENT N/A 7/10/2023    Procedure: [facility] OUT AND DEBRIDEMENT OF PERINEUM WOUND, DRESSING CHANGE, PARTIAL CLOSURE;  Surgeon: Aldair Abbott MD;  [facility];  Service: General   • WOUND DEBRIDEMENT N/A 7/12/2023    Procedure: [facility] OUT), partial closure;  Surgeon: Aldair Abbott MD;  [facility];  Service: General   • WOUND DEBRIDEMENT N/A 7/13/2023    Procedure: Izaiah Edwards  ([facility]) & vac;  Surgeon: Kvng Grigsby MD;  [facility];  Service: Plastics     Social History   Social History     Substance and Sexual Activity   Alcohol Use Yes    Comment: socially     Social History     Substance and Sexual Activity   Drug Use Never     E-Cigarette/Vaping   • E-Cigarette Use Current Every Day User      E-Cigarette/Vaping Substances   • Nicotine Yes      Social History     Tobacco Use   Smoking Status Never   Smokeless Tobacco Never     Family History: History reviewed. No pertinent family history.     Meds/Allergies   current meds:   Current Facility-Administered Medications   Medication Dose Route Frequency   • acetaminophen (TYLENOL) tablet 975 mg  975 mg Oral Q8H [address]   • amoxicillin-clavulanate (AUGMENTIN) 875-125 mg per tablet 1 tablet  1 tablet Oral Q12H [address]   • enoxaparin (LOVENOX) subcutaneous injection 40 mg  40 mg Subcutaneous Q24H GEN   • HYDROmorphone (DILAUDID) injection 0.5 mg  0.5 mg Intravenous Q3H PRN   • insulin glargine (LANTUS) subcutaneous injection 8 Units 0.08 mL  8 Units Subcutaneous HS   • insulin lispro (HumaLOG) 100 units/mL subcutaneous injection 2-12 Units  2-12 Units Subcutaneous HS   • insulin lispro (HumaLOG) 100 units/mL subcutaneous injection 4-20 Units  4-20 Units Subcutaneous TID AC   • melatonin tablet 3 mg  3 mg Oral HS   • ondansetron (ZOFRAN) injection 4 mg  4 mg Intravenous Q6H PRN   • oxyCODONE (ROXICODONE) immediate release tablet 10 mg  10 mg Oral Q4H PRN   • oxyCODONE (ROXICODONE) IR tablet 5 mg  5 mg Oral Q4H PRN   • polyethylene glycol (MIRALAX) packet 17 g  17 g Oral Daily PRN   • QUEtiapine (SEROquel) tablet 25 mg  25 mg Oral HS   • senna-docusate sodium (SENOKOT S) 8.6-50 mg per tablet 1 tablet  1 tablet Oral BID     No Known Allergies    Objective   Vitals: Blood pressure 130/72, pulse 68, temperature 98.5 °F (36.9 °C), resp. rate 14, height 6' 2" (1.88 m), weight 92.2 kg (203 lb 4.2 oz), SpO2 95 %. Physical Exam  Constitutional:       General: He is not in acute distress. Appearance: He is not diaphoretic. HENT:      Head: Normocephalic and atraumatic. Right Ear: External ear normal.      Left Ear: External ear normal.   Eyes:      Conjunctiva/sclera: Conjunctivae normal.   Pulmonary:      Effort: Pulmonary effort is normal. No respiratory distress. Abdominal:      General: The ostomy site is clean. There is no distension. Palpations: Abdomen is soft. Comments: LLQ loop colostomy. Stoma is round shaped, well budded, and measures 2inches. Proximal os is noted distally at approx: 4 o'clock. Stoma is moist pink/red in color with edema and some subcutaneous tissue noted along the superior aspect. mucocutaneous junction is intact. No significant creasing noted to the cara-stomal skin. Cara-stomal skin is intact with no redness or wounds. Effluent = mushy semi-formed brown stool. Genitourinary:     Comments: Suprapubic catheter in place  Skin:     General: Skin is warm and dry. Neurological:      Mental Status: He is alert and oriented to person, place, and time. Psychiatric:         Mood and Affect: Mood normal.         Behavior: Behavior normal.                 Lab, Imaging and other studies:     Code Status: Level 1 - Full Code      Counseling / Coordination of Care  Total time spent today:    Total time (face-to-face and non-face-to-face) spent on today's visit was 40 minutes.  This includes preparation for the visits (H&P on 6/29/23, plastics note from 7/13/23, and general surgery note from 7/14/23 ) performance of a medically appropriate history and examination, and orders for medications/treatments or testing. Discussed assessment findings, and plan of care/recommendations with patients RN. DAMIAN Dye, JEREP-C, DARRIN      Portions of the record may have been created with voice recognition software. Occasional wrong word or "sound a like" substitutions may have occurred due to the inherent limitations of voice recognition software.   Read the chart carefully and recognize, using context, where substitutions have occurred

## 2023-07-14 NOTE — PHYSICAL THERAPY NOTE
PHYSICAL THERAPY NOTE          Patient Name: Nata Jones  BKQCE'C Date: 7/14/2023     Per plastics, pt to be kept on bed rest for 5 days to decrease shearing force as well as promote graft take s/p graft application on 4/41. PT to sign off at this time as pt on bedrest- please re-consult as appropriate.  Thank you     Naif Benton, PT, DPT

## 2023-07-14 NOTE — PROGRESS NOTES
Progress Note - General Surgery   Addie Rodrigues 57 y.o. male MRN: [id number]  Unit/Bed#: 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    6/28 Debridement - Juan/Y[facility])  6/29 Perineal debridement/washout - Mason  6/30 Perineal debridement/EUA - Basil/Uro  7/1 debridement, suprapubic tube - Basil/Uro  7/2 Perineal and scrotal debridement - Lucero   7/4 Laparoscopic loop diverting colostomy creation, perineal debridement, cysto with suprapubic tube exchange, EUA, testicular fixation, vac placement - Lucero/Saturnino  7/6 perineal washout/debridement, vac change - To  7/8 perineal washout/debridement, vac change Luis Maurice  7/10 Perineal washout/partial closure/vac change - Bonita Stearns  7/12 Perineal washout/partial closure/vac change - Bonita Stearns  7/13 Perineal/Genital wound skin graft, partial complex closure of posterior perineal wound, placement of VAC bolster, split thickness skin graft from right thigh - Cuadra    Plan:  Strict bedrest x 5 days as per plastic surgery team  Calorie/Carb controlled diet  Transition from IV to PO abx (Augmentin) as per ID   - treat x7 days post STSG  Strict blood glucose monitoring/control  Routine Ostomy Care  Trend UOP via suprapubic catheter  PRN pain/nausea control  Perineal wound vac management per plastic surgery team      Subjective/Objective     Subjective: Pt and wife report improved mood since yesterday, pt still hoping he can go home as soon as possible but is understanding of need for continued care/monitoring. Objective:    Blood pressure 132/67, pulse 86, temperature 98.1 °F (36.7 °C), resp. rate 14, height 6' 2" (1.88 m), weight 92.2 kg (203 lb 4.2 oz), SpO2 92 %. ,Body mass index is 26.1 kg/m².       Intake/Output Summary (Last 24 hours) at 7/14/2023 0625  Last data filed at 7/14/2023 0611  Gross per 24 hour   Intake 600 ml   Output 3175 ml   Net -2575 ml       Invasive Devices     Peripheral Intravenous Line  Duration Peripheral IV 07/12/23 Left Arm 1 day    Peripheral IV 07/12/23 Left Hand 1 day          Drain  Duration           Ileostomy Loop LUQ 9 days    Suprapubic Catheter 18 Fr. 9 days                Physical Exam:   General: Awake, calm, no acute distress  CV: HRR, 2+ Peripheral pulses, trace peripheral edema  Pulm: Respirations   GI: Abdomen soft, non-tender, surgical sites clean and dry, LLQ ostomy pink with brown stool and gas in bag  : Suprapubic catheter and VAC in place    Lab, Imaging and other studies:I have personally reviewed pertinent lab results.   , CBC: No results found for: "WBC", "HGB", "HCT", "MCV", "PLT", "ADJUSTEDWBC", "RBC", "MCH", "MCHC", "RDW", "MPV", "NRBC", CMP: No results found for: "SODIUM", "K", "CL", "CO2", "ANIONGAP", "BUN", "CREATININE", "GLUCOSE", "CALCIUM", "AST", "ALT", "ALKPHOS", "PROT", "BILITOT", "EGFR"  VTE Pharmacologic Prophylaxis: Enoxaparin (Lovenox)  VTE Mechanical Prophylaxis: sequential compression device

## 2023-07-14 NOTE — CASE MANAGEMENT
Case Management Discharge Planning Note    Patient name Brooke Postal  [facility] MRN [id number]  : 1960 Date 2023       Current Admission Date: 2023  Current Admission Diagnosis:Rohan's gangrene   Patient Active Problem List    Diagnosis Date Noted   • Suprapubic catheter ([address]) 2023   • Urethral disorder 2023   • Hyperglycemia 2023   • DM (diabetes mellitus) ([address]) 2023   • Lactic acidosis 2023   • HTN (hypertension) 2023   • Rohan's gangrene 2023      LOS (days): 15  Geometric Mean LOS (GMLOS) (days):   Days to GMLOS:     OBJECTIVE:  Risk of Unplanned Readmission Score: 17.21         Current admission status: Inpatient   Preferred Pharmacy:   PATIENT/FAMILY REPORTS NO PREFERRED PHARMACY  No address on file      [id number]  [address] [address]  Phone: [phone] Fax: [phone]    [address], [address] [facility] 1101 [facility] [address]  Phone: [phone] Fax: [phone]    Primary Care Provider: No primary care provider on file. Primary Insurance: BLUE CROSS  Secondary Insurance:     DISCHARGE DETAILS:      Additional Comments: Wound VAC delivered to pt and delivery ticket signed. Faxed to [facility] and  Discharge Support. Provider notified and VAC placed. Pt and wife declined RW as they have already ordered one. Pt is medically clear for discharge today.

## 2023-07-14 NOTE — PROGRESS NOTES
-- Patient:  -- MRN: [id number]  -- Aidin Request ID: [id number]  -- Level of care reserved: [address] [place], [place] [address]mic[address]  -- Partner Reserved: [facility], [facility] [phone]  -- Clinical needs requested: occupational therapy, skilled nursing, physical therapy  -- Geography searched: [id number]  -- Start of Service:  -- Request sent: 10:13am EDT on 7/14/2023 by Jose Carlos Bravo  -- Partner reserved: 3:06pm EDT on 7/14/2023 by Jose Carlos Bravo  -- Choice list shared: 3:01pm EDT on 7/14/2023 by Jose aCrlos Bravo

## 2023-07-14 NOTE — CASE MANAGEMENT
Case Management Discharge Planning Note    Patient name Ian Aguero  Location [facility] 921/[facility] 416-66 MRN [id number]  : 1960 Date 2023       Current Admission Date: 2023  Current Admission Diagnosis:Rohan's gangrene   Patient Active Problem List    Diagnosis Date Noted   • Suprapubic catheter ([address]) 2023   • Urethral disorder 2023   • Hyperglycemia 2023   • DM (diabetes mellitus) ([address]) 2023   • Lactic acidosis 2023   • HTN (hypertension) 2023   • Rohan's gangrene 2023      LOS (days): 15  Geometric Mean LOS (GMLOS) (days):   Days to GMLOS:     OBJECTIVE:  Risk of Unplanned Readmission Score: 17.21         Current admission status: Inpatient   Preferred Pharmacy:   PATIENT/FAMILY REPORTS NO PREFERRED PHARMACY  No address on file      [id number]  [address] [place] [address]  Phone: [phone] Fax: [phone]    [address], [address] [facility] 1101 [facility] [address]  Phone: [phone] Fax: [phone]    Primary Care Provider: No primary care provider on file.     Primary Insurance: BLUE CROSS  Secondary Insurance:     DISCHARGE DETAILS:      Requested [address] [facility]         Is the patient interested in [facility] at discharge?: Yes  [facility] requested[de-identified] Nursing, Occupational Therapy, Physical [facility] Name[de-identified] Other ([address] [place])  [address] Provider[de-identified] PCP  Home Health Services Needed[de-identified] Evaluate Functional Status and Safety, Gait/ADL Training, Restore Joint Function Post Joint Replacement, Wound/Ostomy Care (Wound VAC)  Homebound Criteria Met[de-identified] Requires the Assistance of Another Person for Safe Ambulation or to Leave the Home, Uses an Assist Device (i.e. cane, walker, etc)  Supporting Clincal Findings[de-identified] Fatigues Easliy in [employer], Limited Endurance    DME Referral Provided  Referral made for DME?: Yes  DME referral completed for the following items[de-identified] Other (Wound VAC)  DME Supplier Name[de-identified] Other (Add supplier name in comments) ([employer])    Other Referral/Resources/Interventions Provided:  Interventions: HHC  Referral Comments: [address] [place]    Additional Comments: [address] [place] able to accept pt. Reviewed with pt, agreeable. CM reserved in Aidin.

## 2023-07-14 NOTE — INCIDENTAL FINDINGS
The following findings require follow up:  Radiographic finding   Findin. There is a small fat-containing umbilical hernia. There is also a small fat-containing right inguinal hernia. 2. LYMPH NODES:  There are several mildly enlarged right-sided inguinal lymph nodes measuring up to 1.5 cm in short axis. There are several enlarged pelvic lymph nodes, most pronounced in the right external iliac chain. For instance there is a right external iliac lymph node measuring 1.7 cm in short axis (series 2 image 58). A distal right external iliac lymph node   measures 2.7 x 2.3 cm (series 2 image 67). There is a distal left external iliac lymph node measuring 1.3 cm in short axis (series 2 image 69). 3. 8.4 x 3.7 x 6.3 cm cystic structure in the left side of the scrotum adjacent to the left testicle, likely an epididymal cyst rather than a hydrocele. Follow up required: Yes   Follow up should be done within 2-4 week(s)    Please notify the following clinician to assist with the follow up:   Primary Care Provider. Discussed with patient at bedside. Patient and patient family expressed verbal understanding of the above findings.

## 2023-07-14 NOTE — UTILIZATION REVIEW
Continued Stay Review    Date: 7/14/2023                        Current Patient Class: inpatient  Current Level of Care: med/surg  HPI:62 y.o. 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     6/28 Debridement - Juan/Yuli ([address] [place] [address])  6/29 Perineal debridement/washout  6/30 Perineal debridement/EUA -  7/1 debridement, suprapubic tube   7/2 Perineal and scrotal debridement    7/4 Laparoscopic loop diverting colostomy creation, perineal debridement, cysto with suprapubic tube exchange, EUA, testicular fixation, vac placement   7/6 perineal washout/debridement, vac change  7/8 perineal washout/debridement, vac change -  7/10 Perineal washout/partial closure/vac change  7/12 Perineal washout/partial closure/vac change   7/13 Perineal/Genital wound skin graft, partial complex closure of posterior perineal wound, placement of VAC bolster, split thickness skin graft from right thigh        Assessment/Plan: Pt and wife report improved mood since yesterday, pt still hoping he can go home as soon as possible but is understanding of need for continued care/monitoring. Strict bedrest x 5 days as per plastic surgery team. Calorie/Carb controlled diet. Transition from IV to PO abx (Augmentin) as per ID - treat x7 days post STSG. Accuchecks w/ ssi. Routine Ostomy Care. Trend UOP via suprapubic catheter. PRN pain/nausea control. Perineal wound vac management per plastic surgery team. Plan to d/c to home with wound VAC when VAC available. Continue po meds, supportive care.        Vital Signs:   Time Temp Pulse Resp BP MAP (mmHg) SpO2 Calculated FIO2 (%) - Nasal Cannula O2 Flow Rate (L/min) Nasal Cannula O2 Flow Rate (L/min) O2 Device   07/14/23 15:17:40 99.3 °F (37.4 °C) -- 16 131/72 92 -- -- -- -- --   07/14/23 07:25:41 98.5 °F (36.9 °C) 68 -- 130/72 91 95 % -- -- -- --   07/13/23 22:11:37 98.1 °F (36.7 °C) 86 14 132/67 89 92 % -- -- -- --   07/13/23 2015 -- 80 18 -- -- 94 % 28 -- 2 L/min Nasal cannula 07/13/23 2000 -- 74 18 -- -- 93 % 28 -- 2 L/min Nasal cannula   07/13/23 1945 -- 76 18 145/86 133 93 % 28 -- 2 L/min Nasal cannula   07/13/23 1935 98.2 °F (36.8 °C) 70 18 145/86 -- 100 % -- 6 L/min -- Simple mask   07/13/23 15:29:13 99.6 °F (37.6 °C) 65 14 136/70 92 95 % -- -- -- --   07/13/23 0900 -- -- -- -- -- -- -- -- -- None (Room air)   07/13/23 07:34:01 98.1 °F (36.7 °C) 65 17 135/71 92 93 % -- -- -- --   07/13/23 04:47:31 98.6 °F (37 °C) 71 16 148/74 99 93 % -- -- -- --   07/13/23 0400 -- -- -- -- -- -- -- -- -- None (Room air)   07/12/23 22:39:58 98.4 °F (36.9 °C) 82 -- 122/64 83 95 % -- -- -- --   07/12/23 11:22:21 98.1 °F (36.7 °C) 72 17 138/82 101 98 % -- -- -- --   07/12/23 1100 98.3 °F (36.8 °C) -- 24 Abnormal  -- -- 93 % 28 -- 2 L/min Nasal cannula   07/12/23 1045 -- 74 16 166/90 115 96 % 36 -- 4 L/min Nasal cannula   07/12/23 1030 97.1 °F (36.2 °C) Abnormal  79 18 148/81 111 95 % 28 -- 2 L/min Nasal cannula   07/12/23 0745 -- -- -- -- -- -- -- -- -- None (Room air)   07/12/23 07:19:07 99 °F (37.2 °C) -- 22 131/74 93 -- -- -- -- --   07/12/23 04:24:53 99.3 °F (37.4 °C) 67 16 132/66 88 95 % -- -- -- --   07/12/23 0038 -- -- -- -- -- -- -- -- -- None (Room air)       Pertinent Labs/Diagnostic Results:     Results from last 7 days   Lab Units 07/14/23 0603 07/13/23 0439 07/12/23 0652 07/11/23  0630 07/10/23  0625   WBC Thousand/uL 9.54 11.08* 11.06* 9.68 11.66*   HEMOGLOBIN g/dL 7.4* 7.2* 7.5* 7.7* 7.3*   HEMATOCRIT % 24.7* 23.8* 24.5* 25.7* 24.4*   PLATELETS Thousands/uL 399* 414* 418* 458* 404*   NEUTROS ABS Thousands/µL 6.90  --  7.75* 6.66 8.16*       Results from last 7 days   Lab Units 07/14/23 0603 07/13/23 0439 07/12/23 0652 07/11/23  0630 07/10/23  0625 07/09/23  0515   SODIUM mmol/L 143 140 143 143 145 141   POTASSIUM mmol/L 4.1 3.8 4.1 4.3 3.7 4.3   CHLORIDE mmol/L 113* 112* 113* 112* 114* 112*   CO2 mmol/L 28 27 28 28 29 26   ANION GAP mmol/L 2 1 2 3 2 3   BUN mg/dL 7 6 6 5 5 7 CREATININE mg/dL 0.64 0.67 0.66 0.65 0.62 0.61   EGFR ml/min/1.73sq m 103 101 102 102 104 105   CALCIUM mg/dL 8.1* 8.2* 8.2* 8.4 8.1* 8.2*   MAGNESIUM mg/dL  --   --   --  2.2  --  2.3   PHOSPHORUS mg/dL  --   --   --  3.0  --  2.9     Results from last 7 days   Lab Units 07/14/23  0603   AST U/L 12   ALT U/L 24   ALK PHOS U/L 73   TOTAL PROTEIN g/dL 5.7*   ALBUMIN g/dL 1.5*   TOTAL BILIRUBIN mg/dL 0.12*     Results from last 7 days   Lab Units 07/14/23  1115 07/14/23  0741 07/13/23  2021 07/13/23  1602 07/13/23  1059 07/13/23  0823 07/12/23  2129 07/12/23  2048 07/12/23  1743 07/12/23  1131 07/12/23  1036 07/11/23  2127   POC GLUCOSE mg/dl 142* 171* 132 92 90 91 254* 276* 218* 162* 133 148*     Results from last 7 days   Lab Units 07/14/23  0603 07/13/23  0439 07/12/23  0652 07/11/23  0630 07/10/23  0625 07/09/23  0515 07/08/23  0713   GLUCOSE RANDOM mg/dL 203* 84 123 172* 104 220* 116       Medications:   Scheduled Medications:  acetaminophen, 975 mg, Oral, Q8H GEN  amoxicillin-clavulanate, 1 tablet, Oral, Q12H GEN  enoxaparin, 40 mg, Subcutaneous, Q24H [address]  insulin glargine, 8 Units, Subcutaneous, HS  insulin lispro, 2-12 Units, Subcutaneous, HS  insulin lispro, 4-20 Units, Subcutaneous, TID AC  melatonin, 3 mg, Oral, HS  QUEtiapine, 25 mg, Oral, HS  senna-docusate sodium, 1 tablet, Oral, BID    PRN Meds:  HYDROmorphone, 0.5 mg, Intravenous, Q3H PRN 7/13 x1  ondansetron, 4 mg, Intravenous, Q6H PRN  oxyCODONE, 10 mg, Oral, Q4H PRN 7/14 [id number], 5 mg, Oral, Q4H PRN  polyethylene glycol, 17 g, Oral, Daily PRN        Discharge Plan: home with [address] and wound VAC    Network Utilization Review Department  ATTENTION: Please call with any questions or concerns to [phone] and carefully listen to the prompts so that you are directed to the right person.  All voicemails are confidential.  Du Balderas all requests for admission clinical reviews, approved or denied determinations and any other requests to dedicated fax number below belonging to the campus where the patient is receiving treatment.  List of dedicated fax numbers for the Facilities:  [place] DENIALS (Administrative/Medical Necessity) [phone] [address] (Maternity/NICU/Pediatrics) [phone]   [address] [place] [address] [phone]   [facility] 1000 [facility] [phone]   [address] [phone]   [address] [address] [phone]

## 2023-07-14 NOTE — DISCHARGE INSTR - AVS FIRST PAGE
Acute Care Surgery Discharge Instructions    Please follow-up as instructed. If you do not already have a follow-up appointment, please call the office when you leave to schedule an appointment to be seen in 2-3 weeks for post-operative re-evaluation. Activity:  BED REST FOR FIRST 5 DAYS at home  - No lifting greater than 20 pounds or strenuous physical activity or exercise for 2 weeks. - Walking and normal light activities are encouraged. - Normal daily activities including climbing steps are okay. - No driving until no longer using pain medications. Return to work:    - You may return to work in 2 weeks or sooner if you are feeling well enough. Diet:    - You may resume your normal diet. Wound Care:  - May shower daily. No tub baths or swimming until cleared by your surgeon.  - Wash incision gently with soap and water and pat dry. - Do not apply any creams or ointments unless instructed to do so by your surgeon.  - Hui Coles may apply ice as needed (no longer than 20 minutes at a time) for the first 48 hours. - Bruising is not unusual and will go away with a little time. You may apply a warm, moist compress that may help the bruising resolve quicker. - You may remove the dressings the day after surgery (unless otherwise instructed). Leave any skin tapes (steri-strips) on the incision(s) in place until they fall off on their own. Any new dressings are optional.    Medications:    PLEASE COMPLETE ANTIBIOTICS AS PRESCRIBED  - You may resume all of your regular medications after discharge unless otherwise instructed. Please refer to your discharge medication list for further details. - Please take the pain medications as directed. - You are encouraged to use non-narcotic pain medications first and whenever possible. Reserve the use of narcotic pain medication for moderate to severe pain not controlled by non-narcotic medications.  - No driving while taking narcotic pain medications.   - You may become constipated, especially if taking pain medications. You may take any over the counter stool softeners or laxatives as needed. Examples: Milk of Magnesia, Colace, Senna. Additional Instructions:  - If you have any questions or concerns after discharge please call the office.  - Call office or return to ER if fever greater than 101, chills, persistent nausea/vomiting, worsening/uncontrollable pain, and/or increasing redness or purulent/foul smelling drainage from incision(s). Urology instructions:  Keep catheter follow-up outpatient. Call with questions or concerns    Plastics Instructions:  Bed rest for 5 days. VAC to come down at day 7. Keep monitoring of donor site. Call the office with questions or concerns.

## 2023-07-17 NOTE — UTILIZATION REVIEW
NOTIFICATION OF ADMISSION DISCHARGE   This is a Notification of Discharge from [facility]. Please be advised that this patient has been discharge from our facility. Below you will find the admission and discharge date and time including the patient’s disposition. UTILIZATION REVIEW CONTACT:  Julianna Brown  Utilization   Network Utilization Review Department  Phone: [phone] x carefully listen to the prompts. All voicemails are confidential.  Email: [email or web address]     ADMISSION INFORMATION  PRESENTATION DATE: 6/29/2023  2:00 AM  OBERVATION ADMISSION DATE:  INPATIENT ADMISSION DATE: 6/29/23  2:00 AM   DISCHARGE DATE: 7/14/2023  5:06 PM   DISPOSITION:Home with Home Health Care    IMPORTANT INFORMATION:  Send all requests for admission clinical reviews, approved or denied determinations and any other requests to dedicated fax number below belonging to the campus where the patient is receiving treatment.  List of dedicated fax numbers:  [place] DENIALS (Administrative/Medical Necessity) [phone] [facility] [facility] (Maternity/NICU/Pediatrics) [phone]   [facility] [phone]   [facility] [phone] 16[facility] [phone]   [address] [place] [address] [phone]   [facility] [phone]   [address] [place] [address] 608 [facility] [phone]   [address] [place] [address] [phone] 3[facility] [phone]   27[address] [facility] [address] [facility] [phone]

## 2023-07-19 ENCOUNTER — OFFICE VISIT (OUTPATIENT)
Dept: PLASTIC SURGERY | Facility: CLINIC | Age: 63
End: 2023-07-19

## 2023-07-19 DIAGNOSIS — N49.3 FOURNIER'S GANGRENE: Primary | ICD-10-CM

## 2023-07-19 NOTE — PROGRESS NOTES
Assessment/Plan:     Diagnoses and all orders for this visit:    Rohan's gangrene  He presented to the emergency room on 6/28 with a gluteal abscess. 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 He underwent numerous washout procedures and ultimately underwent split thickness skin graft from the right thigh to the perineal region with [facility] placement on 7/13 with Dr. Viridiana Vargas.  Dressings were removed and changed. He can remove the donor site dressing in 2 days and apply bacitracin. Would recommend continuing to pack the area as well as applying Xeroform, bacitracin and ABDs. We will see him back in 1 week for wound check as well as suture removal.            Subjective:      Patient ID: Osmin Cardoza is a 57 y.o. male. HPI     Patient is here for postop visit. He presented to the emergency room on 6/28 with a gluteal abscess. 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 He underwent numerous washout procedures and ultimately underwent split thickness skin graft from the right thigh to the perineal region with [facility] placement on 7/13 with Dr. Viridiana Vargas.  Patient also has diverting loop colostomy and suprapubic catheter. He complains of discomfort in that area but otherwise is doing well.     Patient Active Problem List   Diagnosis   • Rohan's gangrene   • DM (diabetes mellitus) ([address])   • Lactic acidosis   • HTN (hypertension)   • Suprapubic catheter (HCC)   • Urethral disorder   • Hyperglycemia     No Known Allergies  Current Outpatient Medications on File Prior to Visit   Medication Sig   • acetaminophen (TYLENOL) 325 mg tablet Take 2 tablets (650 mg total) by mouth every 6 (six) hours as needed for mild pain   • amoxicillin-clavulanate (AUGMENTIN) 875-125 mg per tablet Take 1 tablet by mouth every 12 (twelve) hours for 7 days   • aspirin (ECOTRIN LOW STRENGTH) 81 mg EC tablet Take 81 mg by mouth daily   • atorvastatin (LIPITOR) 20 mg tablet    • dulaglutide (Trulicity) 2.95 AZ/5.7SI injection    • enoxaparin (LOVENOX) 40 mg/0.4 mL Inject 0.4 mL (40 mg total) under the skin every 24 hours for 7 days Do not start before July 15, 2023. • lisinopril (ZESTRIL) 2.5 mg tablet    • metFORMIN (GLUCOPHAGE) 500 mg tablet    • oxyCODONE (ROXICODONE) 5 immediate release tablet Take 1 tablet (5 mg total) by mouth every 4 (four) hours as needed for moderate pain for up to 10 days Max Daily Amount: 30 mg     No current facility-administered medications on file prior to visit. No family history on file. Past Medical History:   Diagnosis Date   • Diabetes mellitus ([address])      Social History     Socioeconomic History   • Marital status: /Civil Union     Spouse name: Not on file   • Number of children: Not on file   • Years of education: Not on file   • Highest education level: Not on file   Occupational History   • Not on file   Tobacco Use   • Smoking status: Never   • Smokeless tobacco: Never   Vaping Use   • Vaping Use: Every day   • Substances: Nicotine   Substance and Sexual Activity   • Alcohol use: Yes     Comment: socially   • Drug use: Never   • Sexual activity: Not on file   Other Topics Concern   • Not on file   Social History Narrative   • Not on file     Social Determinants of Health     Financial Resource Strain: Not on file   Food Insecurity: No Food Insecurity (6/30/2023)    Hunger Vital Sign    • Worried About Running Out of Food in the Last Year: Never true    • Ran Out of Food in the Last Year: Never true   Transportation Needs: No Transportation Needs (6/30/2023)    PRAPARE - Transportation    • Lack of Transportation (Medical): No    • Lack of Transportation (Non-Medical):  No   Physical Activity: Not on file   Stress: Not on file   Social Connections: Not on file   Intimate Partner Violence: Not on file   Housing Stability: Unknown (6/30/2023)    Housing Stability Vital Sign    • Unable to Pay for Housing in the Last Year: No    • Number of Places Lived in the Last Year: Not on file    • Unstable Housing in the Last Year: No     Past Surgical History:   Procedure Laterality Date   • CYSTOSCOPY N/A 7/4/2023    Procedure: CYSTOSCOPY and suprapubic tube exchange;  Surgeon: Larisa Mckeon MD;  [facility];  Service: Urology   • EXAMINATION UNDER ANESTHESIA N/A 7/4/2023    Procedure: EUA, testicular fiation;  Surgeon: Larisa Mckeon MD;  [facility];  Service: Urology   • INCISION AND DRAINAGE OF WOUND N/A 6/28/2023    Procedure: 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 ;  Surgeon: Mary Lindquist MD;  [facility];  Service: Urology   • INCISION AND DRAINAGE OF WOUND N/A 6/28/2023    Procedure: 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;  Surgeon: Reji Perez MD;  [facility];  Service: General   • NE CYSTOSTOMY CYSTOTOMY W/DRAINAGE N/A 7/1/2023    Procedure: CYSTOSCOPY; CYSTOSTOMY SUPRAPUBIC OPEN;  Surgeon: Larisa Mckeon MD;  [facility];  Service: Urology   • NE LAPS ABD PRTM&OMENTUM DX W/WO [place] BR/WA SPX N/A 7/4/2023    Procedure: LAPAROSCOPY DIAGNOSTIC WITH CREATION OF OSTOMY;  Surgeon: Sim Edwards MD;  [facility];  Service: General   • ROTATOR CUFF REPAIR     • SPLIT THICKNESS SKIN GRAFT N/A 7/13/2023    Procedure: SKIN GRAFT SPLIT THICKNESS (STSG)  TRUNK;  Surgeon: Lalita Hsieh MD;  [facility];  Service: Plastics   • VAC DRESSING APPLICATION N/A 6/4/5680    Procedure: APPLICATION VAC DRESSING;  Surgeon: Sim Edwards MD;  [facility];  Service: General   • VAC DRESSING APPLICATION N/A 9/28/7999    Procedure: APPLICATION VAC DRESSING;  Surgeon: Evetta Gosselin, MD;  [facility];  Service: General   • WOUND DEBRIDEMENT N/A 6/29/2023    Procedure: DEBRIDEMENT WOUND [facility] OUT);   Surgeon: Horace Calvin MD;  [facility];  Service: General   • WOUND DEBRIDEMENT N/A 7/2/2023    Procedure: DEBRIDEMENT PERINEAL WOUND AND DRESSING CHANGE [facility] OUT); Surgeon: Chun Navarrete MD;  [facility];  Service: General   • WOUND DEBRIDEMENT N/A 7/1/2023    Procedure: DEBRIDEMENT WOUND AND DRESSING CHANGE [facility] OUT); Surgeon: Aldair Abbott MD;  [facility];  Service: General   • WOUND DEBRIDEMENT N/A 6/30/2023    Procedure: DEBRIDEMENT WOUND [facility] OUT); Surgeon: Aldair Abbott MD;  [facility];  Service: General   • WOUND DEBRIDEMENT N/A 7/4/2023    Procedure: DEBRIDEMENT OF PERINEAL WOUND ([address]); Surgeon: Chun Navarrete MD;  [facility];  Service: General   • WOUND DEBRIDEMENT N/A 7/6/2023    Procedure: DEBRIDEMENT WOUND, [address], AND WOUND VAC CHANGE;  Surgeon: West Rizvi DO;  [facility];  Service: General   • WOUND DEBRIDEMENT N/A 7/8/2023    Procedure: DEBRIDEMENT WOUND AND DRESSING CHANGE, VAC change ([address] OUT); Surgeon: Amina Milian DO;  [facility];  Service: General   • WOUND DEBRIDEMENT N/A 7/10/2023    Procedure: [address] OUT AND DEBRIDEMENT OF PERINEUM WOUND, DRESSING CHANGE, PARTIAL CLOSURE;  Surgeon: Aldair Abbott MD;  [facility];  Service: General   • WOUND DEBRIDEMENT N/A 7/12/2023    Procedure: DEBRIDEMENT WOUND [facility] OUT), partial closure;  Surgeon: Aldair Abbott MD;  [facility];  Service: General   • WOUND DEBRIDEMENT N/A 7/13/2023    Procedure: [facility]za[facility]laurie Edwards  ([facility]) & vac;  Surgeon: Kvng Grigsby MD;  [facility];  Service: Plastics         Review of Systems   All other systems reviewed and are negative. Objective: There were no vitals taken for this visit. Physical Exam  Constitutional:       Appearance: Normal appearance. He is well-developed. HENT:      Head: Normocephalic and atraumatic. Eyes:      Conjunctiva/sclera: Conjunctivae normal.   Pulmonary:      Effort: Pulmonary effort is normal.   Abdominal:      Palpations: Abdomen is soft. Tenderness:  There is no abdominal tenderness. Genitourinary:     Comments: Skin graft is intact and viable to most of the perineum however at the distal portion the graft did not take. This area was packed with Kerlix, Xeroform and ABDs were applied, see picture in media. Musculoskeletal:      Cervical back: Normal range of motion. Comments: Walks with walker; right thigh donor site dressing was removed, calcium alginate dressing was applied, see picture in media. Skin:     General: Skin is warm and dry. Neurological:      Mental Status: He is alert and oriented to person, place, and time.    Psychiatric:         Mood and Affect: Mood normal.         Behavior: Behavior normal.

## 2023-07-24 ENCOUNTER — OFFICE VISIT (OUTPATIENT)
Dept: FAMILY MEDICINE CLINIC | Facility: CLINIC | Age: 63
End: 2023-07-24
Payer: COMMERCIAL

## 2023-07-24 VITALS
DIASTOLIC BLOOD PRESSURE: 80 MMHG | TEMPERATURE: 97.4 F | HEART RATE: 58 BPM | BODY MASS INDEX: 21.87 KG/M2 | WEIGHT: 170.4 LBS | OXYGEN SATURATION: 92 % | RESPIRATION RATE: 14 BRPM | HEIGHT: 74 IN | SYSTOLIC BLOOD PRESSURE: 102 MMHG

## 2023-07-24 DIAGNOSIS — Z93.59 SUPRAPUBIC CATHETER (HCC): ICD-10-CM

## 2023-07-24 DIAGNOSIS — N49.3 FOURNIER'S GANGRENE: ICD-10-CM

## 2023-07-24 DIAGNOSIS — E78.2 MIXED HYPERLIPIDEMIA: ICD-10-CM

## 2023-07-24 DIAGNOSIS — D64.9 LOW HEMOGLOBIN AND LOW HEMATOCRIT: ICD-10-CM

## 2023-07-24 DIAGNOSIS — I10 PRIMARY HYPERTENSION: ICD-10-CM

## 2023-07-24 DIAGNOSIS — E11.628 TYPE 2 DIABETES MELLITUS WITH OTHER SKIN COMPLICATION, WITHOUT LONG-TERM CURRENT USE OF INSULIN (HCC): ICD-10-CM

## 2023-07-24 DIAGNOSIS — Z93.9 HISTORY OF CREATION OF OSTOMY (HCC): ICD-10-CM

## 2023-07-24 DIAGNOSIS — Z76.89 ENCOUNTER TO ESTABLISH CARE: Primary | ICD-10-CM

## 2023-07-24 PROBLEM — E44.0 MODERATE PROTEIN MALNUTRITION (HCC): Status: ACTIVE | Noted: 2023-07-24

## 2023-07-24 PROCEDURE — 99205 OFFICE O/P NEW HI 60 MIN: CPT

## 2023-07-24 RX ORDER — BLOOD-GLUCOSE METER
KIT MISCELLANEOUS
Qty: 1 KIT | Refills: 0 | Status: SHIPPED | OUTPATIENT
Start: 2023-07-24

## 2023-07-24 RX ORDER — BLOOD SUGAR DIAGNOSTIC
STRIP MISCELLANEOUS
Qty: 100 EACH | Refills: 3 | Status: SHIPPED | OUTPATIENT
Start: 2023-07-24

## 2023-07-24 RX ORDER — CHLORAL HYDRATE 500 MG
1000 CAPSULE ORAL DAILY
COMMUNITY

## 2023-07-24 RX ORDER — LANCETS 33 GAUGE
EACH MISCELLANEOUS
Qty: 100 EACH | Refills: 3 | Status: SHIPPED | OUTPATIENT
Start: 2023-07-24

## 2023-07-24 RX ORDER — CHOLECALCIFEROL (VITAMIN D3) 125 MCG
500 CAPSULE ORAL DAILY
COMMUNITY

## 2023-07-24 RX ORDER — LISINOPRIL 2.5 MG/1
2.5 TABLET ORAL DAILY
Qty: 90 TABLET | Refills: 1 | Status: SHIPPED | OUTPATIENT
Start: 2023-07-24

## 2023-07-24 RX ORDER — DULAGLUTIDE 0.75 MG/.5ML
0.75 INJECTION, SOLUTION SUBCUTANEOUS
Qty: 6 ML | Refills: 1 | Status: SHIPPED | OUTPATIENT
Start: 2023-07-24

## 2023-07-24 RX ORDER — MELATONIN
1000 DAILY
COMMUNITY

## 2023-07-24 NOTE — PLAN OF CARE
Problem: MOBILITY - ADULT  Goal: Maintain or return to baseline ADL function  Description: INTERVENTIONS:  -  Assess patient's ability to carry out ADLs; assess patient's baseline for ADL function and identify physical deficits which impact ability to perform ADLs (bathing, care of mouth/teeth, toileting, grooming, dressing, etc.)  - Assess/evaluate cause of self-care deficits   - Assess range of motion  - Assess patient's mobility; develop plan if impaired  - Assess patient's need for assistive devices and provide as appropriate  - Encourage maximum independence but intervene and supervise when necessary  - Involve family in performance of ADLs  - Assess for home care needs following discharge   - Consider OT consult to assist with ADL evaluation and planning for discharge  - Provide patient education as appropriate  7/4/2023 0806 by Deana Veronica RN  Outcome: Progressing  7/4/2023 0806 by Deana Veronica RN  Outcome: Progressing  Goal: Maintains/Returns to pre admission functional level  Description: INTERVENTIONS:  - Perform BMAT or MOVE assessment daily.   - Set and communicate daily mobility goal to care team and patient/family/caregiver. - Collaborate with rehabilitation services on mobility goals if consulted  - Perform Range of Motion  times a day. - Reposition patient every  hours.   - Dangle patient  times a day  - Stand patient  times a day  - Ambulate patient  times a day  - Out of bed to chair  times a day   - Out of bed for meals  times a day  - Out of bed for toileting  - Record patient progress and toleration of activity level   7/4/2023 0806 by Deana Veronica RN  Outcome: Progressing  7/4/2023 0806 by Deana Veronica RN  Outcome: Progressing     Problem: SKIN/TISSUE INTEGRITY - ADULT  Goal: Skin Integrity remains intact(Skin Breakdown Prevention)  Description: Assess:  -Perform Vinny assessment every   -Clean and moisturize skin every   -Inspect skin when repositioning, toileting, and assisting with ADLS  -Assess under medical devices such as  every   -Assess extremities for adequate circulation and sensation     Bed Management:  -Have minimal linens on bed & keep smooth, unwrinkled  -Change linens as needed when moist or perspiring  -Avoid sitting or lying in one position for more than  hours while in bed  -Keep HOB at degrees     Toileting:  -Offer bedside commode  -Assess for incontinence every   -Use incontinent care products after each incontinent episode such as     Activity:  -Mobilize patient  times a day  -Encourage activity and walks on unit  -Encourage or provide ROM exercises   -Turn and reposition patient every  Hours  -Use appropriate equipment to lift or move patient in bed  -Instruct/ Assist with weight shifting every  when out of bed in chair  -Consider limitation of chair time  hour intervals    Skin Care:  -Avoid use of baby powder, tape, friction and shearing, hot water or constrictive clothing  -Relieve pressure over bony prominences using   -Do not massage red bony areas    Next Steps:  -Teach patient strategies to minimize risks such as    -Consider consults to  interdisciplinary teams such as   7/4/2023 0806 by Ernesto Lowry RN  Outcome: Progressing  7/4/2023 0806 by Ernesto Lowry RN  Outcome: Progressing  Goal: Incision(s), wounds(s) or drain site(s) healing without S/S of infection  Description: INTERVENTIONS  - Assess and document dressing, incision, wound bed, drain sites and surrounding tissue  - Provide patient and family education  - Perform skin care/dressing changes every   7/4/2023 0806 by Ernesto Lowry RN  Outcome: Progressing  7/4/2023 0806 by Ernesto Lowry RN  Outcome: Progressing  Goal: Pressure injury heals and does not worsen  Description: Interventions:  - Implement low air loss mattress or specialty surface (Criteria met)  - Apply silicone foam dressing  - Instruct/assist with weight shifting every  minutes when in chair   - Limit chair time to  hour intervals  - Use room air special pressure reducing interventions such as  when in chair   - Apply fecal or urinary incontinence containment device   - Perform passive or active ROM every   - Turn and reposition patient & offload bony prominences every  hours   - Utilize friction reducing device or surface for transfers   - Consider consults to  interdisciplinary teams such as   - Use incontinent care products after each incontinent episode such as   - Consider nutrition services referral as needed  7/4/2023 0806 by Carlos Torres RN  Outcome: Progressing  7/4/2023 0806 by Carlos Torres RN  Outcome: Progressing     Problem: Prexisting or High Potential for Compromised Skin Integrity  Goal: Skin integrity is maintained or improved  Description: INTERVENTIONS:  - Identify patients at risk for skin breakdown  - Assess and monitor skin integrity  - Assess and monitor nutrition and hydration status  - Monitor labs   - Assess for incontinence   - Turn and reposition patient  - Assist with mobility/ambulation  - Relieve pressure over bony prominences  - Avoid friction and shearing  - Provide appropriate hygiene as needed including keeping skin clean and dry  - Evaluate need for skin moisturizer/barrier cream  - Collaborate with interdisciplinary team   - Patient/family teaching  - Consider wound care consult   7/4/2023 0806 by Carlos Torres RN  Outcome: Progressing  7/4/2023 0806 by Carlos Torres RN  Outcome: Progressing     Problem: Nutrition/Hydration-ADULT  Goal: Nutrient/Hydration intake appropriate for improving, restoring or maintaining nutritional needs  Description: Monitor and assess patient's nutrition/hydration status for malnutrition. Collaborate with interdisciplinary team and initiate plan and interventions as ordered. Monitor patient's weight and dietary intake as ordered or per policy. Utilize nutrition screening tool and intervene as necessary.  Determine patient's food preferences and provide high-protein, high-caloric foods as appropriate.      INTERVENTIONS:  - Monitor oral intake, urinary output, labs, and treatment plans  - Assess nutrition and hydration status and recommend course of action  - Evaluate amount of meals eaten  - Assist patient with eating if necessary   - Allow adequate time for meals  - Recommend/ encourage appropriate diets, oral nutritional supplements, and vitamin/mineral supplements  - Order, calculate, and assess calorie counts as needed  - Recommend, monitor, and adjust tube feedings and TPN/PPN based on assessed needs  - Assess need for intravenous fluids  - Provide specific nutrition/hydration education as appropriate  - Include patient/family/caregiver in decisions related to nutrition  7/4/2023 0806 by Anum Ortega RN  Outcome: Progressing  7/4/2023 0806 by Anum Ortega RN  Outcome: Progressing     Problem: PAIN - ADULT  Goal: Verbalizes/displays adequate comfort level or baseline comfort level  Description: Interventions:  - Encourage patient to monitor pain and request assistance  - Assess pain using appropriate pain scale  - Administer analgesics based on type and severity of pain and evaluate response  - Implement non-pharmacological measures as appropriate and evaluate response  - Consider cultural and social influences on pain and pain management  - Notify physician/advanced practitioner if interventions unsuccessful or patient reports new pain  Outcome: Progressing     Problem: INFECTION - ADULT  Goal: Absence or prevention of progression during hospitalization  Description: INTERVENTIONS:  - Assess and monitor for signs and symptoms of infection  - Monitor lab/diagnostic results  - Monitor all insertion sites, i.e. indwelling lines, tubes, and drains  - Monitor endotracheal if appropriate and nasal secretions for changes in amount and color  - [place] appropriate cooling/warming therapies per order  - Administer medications as ordered  - Instruct and encourage patient and family to use good hand hygiene technique  - Identify and instruct in appropriate isolation precautions for identified infection/condition  Outcome: Progressing  Goal: Absence of fever/infection during neutropenic period  Description: INTERVENTIONS:  - Monitor WBC    Outcome: Progressing     Problem: SAFETY ADULT  Goal: Maintain or return to baseline ADL function  Description: INTERVENTIONS:  -  Assess patient's ability to carry out ADLs; assess patient's baseline for ADL function and identify physical deficits which impact ability to perform ADLs (bathing, care of mouth/teeth, toileting, grooming, dressing, etc.)  - Assess/evaluate cause of self-care deficits   - Assess range of motion  - Assess patient's mobility; develop plan if impaired  - Assess patient's need for assistive devices and provide as appropriate  - Encourage maximum independence but intervene and supervise when necessary  - Involve family in performance of ADLs  - Assess for home care needs following discharge   - Consider OT consult to assist with ADL evaluation and planning for discharge  - Provide patient education as appropriate  7/4/2023 0806 by Paulino Jones RN  Outcome: Progressing  7/4/2023 0806 by Paulino Jones RN  Outcome: Progressing  Goal: Maintains/Returns to pre admission functional level  Description: INTERVENTIONS:  - Perform BMAT or MOVE assessment daily.   - Set and communicate daily mobility goal to care team and patient/family/caregiver. - Collaborate with rehabilitation services on mobility goals if consulted  - Perform Range of Motion  times a day. - Reposition patient every  hours.   - Dangle patient  times a day  - Stand patient  times a day  - Ambulate patient  times a day  - Out of bed to chair  times a day   - Out of bed for meals  times a day  - Out of bed for toileting  - Record patient progress and toleration of activity level   7/4/2023 0806 by Paulino Jones RN  Outcome: Progressing  7/4/2023 0806 by Paulino Jones RN  Outcome: Progressing  Goal: Patient will remain free of falls  Description: INTERVENTIONS:  - Educate patient/family on patient safety including physical limitations  - Instruct patient to call for assistance with activity   - Consult OT/PT to assist with strengthening/mobility   - Keep Call bell within reach  - Keep bed low and locked with side rails adjusted as appropriate  - Keep care items and personal belongings within reach  - Initiate and maintain comfort rounds  - Make Fall Risk Sign visible to staff  - Offer Toileting every  Hours, in advance of need  - Initiate/Maintain alarm  - Obtain necessary fall risk management equipment:  - Apply yellow socks and bracelet for high fall risk patients  - Consider moving patient to room near nurses station  Outcome: Progressing     Problem: DISCHARGE PLANNING  Goal: Discharge to home or other facility with appropriate resources  Description: INTERVENTIONS:  - Identify barriers to discharge w/patient and caregiver  - Arrange for needed discharge resources and transportation as appropriate  - Identify discharge learning needs (meds, wound care, etc.)  - Arrange for interpretive services to assist at discharge as needed  - Refer to Case Management Department for coordinating discharge planning if the patient needs post-hospital services based on physician/advanced practitioner order or complex needs related to functional status, cognitive ability, or social support system  Outcome: Progressing     Problem: Knowledge Deficit  Goal: Patient/family/caregiver demonstrates understanding of disease process, treatment plan, medications, and discharge instructions  Description: Complete learning assessment and assess knowledge base.   Interventions:  - Provide teaching at level of understanding  - Provide teaching via preferred learning methods  Outcome: Progressing

## 2023-07-24 NOTE — PROGRESS NOTES
Depression Screening and Follow-up Plan: Patient was screened for depression during today's encounter. They screened negative with a PHQ-2 score of 0. Assessment/Plan:         Problem List Items Addressed This Visit        Endocrine    DM (diabetes mellitus) ([address])     Discussed medicationdesired effects, potential side effects, and how to administer themedication. Non-pharmacological interventions such as low carb diet, high in vegetables and fruit discussed. Educated on importance of physical activity. Discussed signs and symptoms of hypoglycemiaand need to present to the ED. clysis sent to the pharmacy to start checking blood sugar at least once daily and with any signs and symptoms of hypo or hyperglycemia. Follow up in 1 months or sooner if needed. Patient verbalizes understanding regarding plan of careand all questions answered    Lab Results   Component Value Date    HGBA1C 7.3 (H) 06/30/2023            Relevant Medications    metFORMIN (GLUCOPHAGE) 500 mg tablet    dulaglutide (Trulicity) 6.73 GZ/5.3UT injection    Blood Glucose Monitoring Suppl (OneTouch Verio Reflect) w/Device KIT    glucose blood (OneTouch Verio) test strip    OneTouch Delica Lancets 92J MISC    Other Relevant Orders    Albumin / creatinine urine ratio       Cardiovascular and Mediastinum    HTN (hypertension)     Blood pressure well controlled. Continue on current medication regiment. 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 Relevant Orders    CBC and differential    Comprehensive metabolic panel       Other    Suprapubic catheter ([address])     Draining well. History of creation of ostomy ([address])     Left lower quadrant. No signs and symptoms of infection.          Low hemoglobin and low hematocrit Obtain blood work as discussed. Referral to hematology placed. Relevant Orders    Ambulatory Referral to Hematology / Oncology    Mixed hyperlipidemia     Continue on atorvastatin 20 mg daily. Relevant Orders    Lipid Panel with Direct LDL reflex   Other Visit Diagnoses     Encounter to establish care    -  Primary    Relevant Medications    metFORMIN (GLUCOPHAGE) 500 mg tablet    lisinopril (ZESTRIL) 2.5 mg tablet    dulaglutide (Trulicity) 9.45 NX/5.4ZX injection            Subjective:      Patient ID: Brooke Ivy is a 70 y.o. male. Patient presents to the office in order to establish care. Recently admitted to the hospital and underwent multiple surgeries secondary to  Rohan's  Gangrene. patient originally presented to [facility] secondary to gluteal abscess. Ultimately was a trauma transfer to [facility] secondary to Rohan's gangrene. Patient had a prolonged hospital course secondary to his significant infection upon arrival.  He required multiple consultants including urology, general surgery, plastic surgery, infectious disease. Ultimately the patient would undergo multiple washouts and debridements of his scrotal region and his perineum. He would require a skin graft that would be placed by plastic surgery and would go home with a VAC. Urology had assisted with placement of a suprapubic catheter which she will follow-up as an outpatient. General surgery did the initial debridements and then ultimately placed an ostomy. Patient has been following up with plastic surgery as instructed at discharge. Need to see urology outpatient.   Currently receiving VNA services to assist with suprapubic catheter, colostomy, gait abnormality      The following portions of the patient's history were reviewed and updated as appropriate:   Past Medical History:  He has a past medical history of Diabetes mellitus [facility]).,  _______________________________________________________________________  Medical Problems:  does not have any pertinent problems on file.,  _______________________________________________________________________  Past Surgical History:   has a past surgical history that includes Rotator cuff repair; Incision and drainage of wound (N/A, 6/28/2023); Incision and drainage of wound (N/A, 6/28/2023); Wound debridement (N/A, 6/29/2023); Wound debridement (N/A, 7/2/2023); pr cystostomy cystotomy w/drainage (N/A, 7/1/2023); Wound debridement (N/A, 7/1/2023); Wound debridement (N/A, 6/30/2023); pr laps abd prtm&omentum dx w/wo spec br/wa spx (N/A, 7/4/2023); Wound debridement (N/A, 7/4/2023); CYSTOSCOPY (N/A, 7/4/2023); Examination under anesthesia (N/A, 7/4/2023); VAC DRESSING APPLICATION (N/A, 0/9/2569); Wound debridement (N/A, 7/6/2023); Wound debridement (N/A, 7/8/2023); Wound debridement (N/A, 7/10/2023); Wound debridement (N/A, 7/12/2023); VAC DRESSING APPLICATION (N/A, 3/37/7863); SPLIT THICKNESS SKIN GRAFT (N/A, 7/13/2023); and Wound debridement (N/A, 7/13/2023). ,  _______________________________________________________________________  Family History:  family history is not on file.,  _______________________________________________________________________  Social History:   reports that he has never smoked. He has never used smokeless tobacco. He reports current alcohol use. He reports that he does not use drugs. ,  _______________________________________________________________________  Allergies:  has No Known Allergies. .  _______________________________________________________________________  Current Outpatient Medications   Medication Sig Dispense Refill   • aspirin (ECOTRIN LOW STRENGTH) 81 mg EC tablet Take 81 mg by mouth daily     • atorvastatin (LIPITOR) 20 mg tablet      • Blood Glucose Monitoring Suppl (OneTouch Verio Reflect) w/Device KIT Check blood sugars once daily.  Please substitute with appropriate alternative as covered by patient's insurance. Dx: E11.65 1 kit 0   • cholecalciferol (VITAMIN D3) 1,000 units tablet Take 1,000 Units by mouth daily     • dulaglutide (Trulicity) 6.85 WO/2.6CK injection Inject 0.5 mL (0.75 mg total) under the skin every 7 days 6 mL 1   • glucose blood (OneTouch Verio) test strip Check blood sugars once daily. Please substitute with appropriate alternative as covered by patient's insurance. Dx: E11.65 100 each 3   • lisinopril (ZESTRIL) 2.5 mg tablet Take 1 tablet (2.5 mg total) by mouth daily 90 tablet 1   • metFORMIN (GLUCOPHAGE) 500 mg tablet Take 1 tablet (500 mg total) by mouth daily with breakfast 90 tablet 1   • Omega-3 Fatty Acids (fish oil) 1,000 mg Take 1,000 mg by mouth daily     • OneTouch Delica Lancets 14T MISC Check blood sugars once daily. Please substitute with appropriate alternative as covered by patient's insurance. Dx: E11.65 100 each 3   • vitamin B-12 (VITAMIN B-12) 500 mcg tablet Take 500 mcg by mouth daily     • acetaminophen (TYLENOL) 325 mg tablet Take 2 tablets (650 mg total) by mouth every 6 (six) hours as needed for mild pain  0   • oxyCODONE (ROXICODONE) 5 immediate release tablet Take 1 tablet (5 mg total) by mouth every 4 (four) hours as needed for moderate pain for up to 10 days Max Daily Amount: 30 mg (Patient not taking: Reported on 7/24/2023) 25 tablet 0     No current facility-administered medications for this visit.     _______________________________________________________________________  Review of Systems   Constitutional: Positive for fatigue and unexpected weight change. Negative for chills and fever. Respiratory: Negative for cough and shortness of breath. Cardiovascular: Negative for chest pain and palpitations. Gastrointestinal: Negative for abdominal pain. Left lower quadrant ostomy   Genitourinary: Negative for dysuria and hematuria.         Suprapubic catheter in place   Musculoskeletal: Positive for arthralgias. Negative for back pain. Skin: Positive for wound (R thigh graft site). Neurological: Positive for weakness (Due to extended stay in hospital). Negative for headaches. All other systems reviewed and are negative. Objective:  Vitals:    07/24/23 0815   BP: 102/80   Pulse: 58   Resp: 14   Temp: (!) 97.4 °F (36.3 °C)   SpO2: 92%   Weight: 77.3 kg (170 lb 6.4 oz)   Height: 6' 2" (1.88 m)     Body mass index is 21.88 kg/m². Physical Exam  Vitals and nursing note reviewed. Constitutional:       General: He is not in acute distress. Appearance: Normal appearance. He is not ill-appearing. Cardiovascular:      Rate and Rhythm: Regular rhythm. Pulses: no weak pulses          Posterior tibial pulses are 2+ on the right side and 2+ on the left side. Heart sounds: Normal heart sounds. Pulmonary:      Effort: Pulmonary effort is normal. No respiratory distress. Breath sounds: Normal breath sounds. No wheezing. Abdominal:      Comments: Left lower quadrant ostomy   Genitourinary:     Comments: Suprapubic catheter in place, no signs and symptoms of infection, draining well. Musculoskeletal:         General: No tenderness. Cervical back: Normal range of motion. No tenderness. Right lower leg: No edema. Left lower leg: No edema. Comments: Ambulates with assistance of a walker. Feet:      Right foot:      Skin integrity: No ulcer, skin breakdown, erythema, warmth, callus or dry skin. Left foot:      Skin integrity: No ulcer, skin breakdown, erythema, warmth, callus or dry skin. Lymphadenopathy:      Cervical: No cervical adenopathy. Skin:     General: Skin is warm and dry. Findings: Wound present. Comments: Graft site right upper thigh, no signs and symptoms of infection. Neurological:      Mental Status: He is alert and oriented to person, place, and time.    Psychiatric:         Mood and Affect: Mood normal.         Behavior: Behavior normal.         Thought Content: Thought content normal.         Judgment: Judgment normal.           Patient's shoes and socks removed. Right Foot/Ankle   Right Foot Inspection  Skin Exam: skin normal and skin intact. No dry skin, no warmth, no callus, no erythema, no maceration, no abnormal color, no pre-ulcer, no ulcer and no callus. Toe Exam: ROM and strength within normal limits. Sensory   Monofilament testing: intact    Vascular  Capillary refills: < 3 seconds  The right PT pulse is 2+. Left Foot/Ankle  Left Foot Inspection  Skin Exam: skin normal and skin intact. No dry skin, no warmth, no erythema, no maceration, normal color, no pre-ulcer, no ulcer and no callus. Toe Exam: ROM and strength within normal limits. Sensory   Monofilament testing: intact    Vascular  Capillary refills: < 3 seconds  The left PT pulse is 2+. Assign Risk Category  No deformity present  No loss of protective sensation  No weak pulses  Risk: 0        Portions of the above record have been created with voice recognition software. Occasional wrong word or "sound alike" substitution may have occurred due to the inherent limitations of voice recognition software. Read the chart carefully and recognize, using context, where substitution may have occurred.

## 2023-07-24 NOTE — PATIENT INSTRUCTIONS
Type 2 Diabetes Management for Adults   AMBULATORY CARE:   Type 2 diabetes  is a disease that affects how your body uses glucose (sugar). Either your body cannot make enough insulin, or it cannot use the insulin correctly. It is important to keep diabetes controlled to prevent damage to your heart, blood vessels, and other organs. Management will help you feel well and enjoy your daily activities. Your diabetes care team providers can help you make a plan to fit diabetes care into your schedule. Your plan can change over time to fit your needs and your family's needs. Have someone call your local emergency number (911 in the [address]) if:   • You cannot be woken. • You have signs of diabetic ketoacidosis:     ? confusion, fatigue    ? vomiting    ? rapid heartbeat    ? fruity smelling breath    ? extreme thirst    ? dry mouth and skin    • You have any of the following signs of a heart attack:      ? Squeezing, pressure, or pain in your chest    ? You may  also have any of the following:     - Discomfort or pain in your back, neck, jaw, stomach, or arm    - Shortness of breath    - Nausea or vomiting    - Lightheadedness or a sudden cold sweat    • You have any of the following signs of a stroke:      ? Numbness or drooping on one side of your face     ? Weakness in an arm or leg    ? Confusion or difficulty speaking    ? Dizziness, a severe headache, or vision loss    Call your doctor or diabetes care team provider if:   • You have a sore or wound that will not heal.    • You have a change in the amount you urinate. • Your blood sugar levels are higher than your target goals. • You often have lower blood sugar levels than your target goals. • Your skin is red, dry, warm, or swollen. • You have trouble coping with diabetes, or you feel anxious or depressed. • You have questions or concerns about your condition or care.     What you need to know about high blood sugar levels:  High blood sugar levels may not cause any symptoms. You may feel more thirsty or urinate more often than usual. Over time, high blood sugar levels can damage your nerves, blood vessels, tissues, and organs. The following can increase your blood sugar levels:  • Large meals or large amounts of carbohydrates at one time    • Less physical activity    • Stress    • Illness    • A lower dose of diabetes medicine or insulin, or a late dose    What you need to know about low blood sugar levels:  Symptoms include feeling shaky, dizzy, irritable, or confused. You can prevent symptoms by keeping your blood sugar levels from going too low. • Treat a low blood sugar level right away:      ? Drink 4 ounces of juice or have 1 tube of glucose gel. ? Check your blood sugar level again 10 to 15 minutes later. ? When the level goes back to normal, eat a meal or snack to prevent another decrease. • Keep glucose gel, raisins, or hard candy with you at all times to treat a low blood sugar level. • Your blood sugar level can get too low if you take diabetes medicine or insulin and do not eat enough food. • If you use insulin, check your blood sugar level before you exercise. ? If your blood sugar level is below 100 mg/dL, eat 4 crackers or 2 ounces of raisins, or drink 4 ounces of juice. ? Check your level every 30 minutes if you exercise longer than 1 hour. ? You may need a snack during or after exercise. What you can do to manage your blood sugar levels:   • Check your blood sugar levels as directed and as needed. Several items are available to use to check your levels. You may need to check by testing a drop of blood in a glucose monitor. You may instead be given a continuous glucose monitoring (CGM) device. The device is worn at all times. The CGM checks your blood sugar level every 5 minutes. It sends results to an electronic device such as a smart phone. A CGM can be used with or without an insulin pump.  You and your diabetes care team providers will decide on the best method for you. The goal for blood sugar levels before meals  is between 80 and 130 mg/dL and 2 hours after eating  is lower than 180 mg/dL. • Make healthy food choices. Work with a dietitian to develop a meal plan that works for you and your schedule. A dietitian can help you learn how to eat the right amount of carbohydrates during your meals and snacks. Carbohydrates can raise your blood sugar level if you eat too many at one time. Examples of foods that contain carbohydrates are breads, cereals, rice, pasta, and sweets. • Eat high-fiber foods as directed. Fiber helps improve blood sugar levels. Fiber also lowers your risk for heart disease and other problems diabetes can cause. Examples of high-fiber foods include vegetables, whole-grain bread, and beans such as bush beans. Your dietitian can tell you how much fiber to have each day. • Get regular physical activity. Physical activity can help you get to your target blood sugar level goal and manage your weight. Get at least 150 minutes of moderate to vigorous aerobic physical activity each week. Do not miss more than 2 days in a row. Do not sit longer than 30 minutes at a time. Your healthcare provider can help you create an activity plan. The plan can include the best activities for you and can help you build your strength and endurance. • Maintain a healthy weight. Ask your team what a healthy weight is for you. A healthy weight can help you control diabetes and prevent heart disease. Ask your team to help you create a weight loss plan, if needed. Weight loss can help make a difference in managing diabetes. Your team will help you set a weight-loss goal, such as 10 to 15 pounds, or 5% of your extra weight. Together you and your team can set manageable weight loss goals. • Take your diabetes medicine or insulin as directed.   You may need diabetes medicine, insulin, or both to help control your blood sugar levels. Your healthcare provider will teach you how and when to take your diabetes medicine or insulin. You will also be taught about side effects oral diabetes medicine can cause. Insulin may be injected or given through a pump or pen. You and your providers will decide on the best method for you:    ? An insulin pump  is an implanted device that gives your insulin 24 hours a day. An insulin pump prevents the need for multiple insulin injections in a day. ? An insulin pen  is a device prefilled with the right amount of insulin. ? You and your family members will be taught how to draw up and give insulin  if this is the best method for you. Your providers will also teach you how to dispose of needles and syringes. ? You will learn how much insulin you need  and when to give it. You will be taught when not to give insulin. You will also be taught what to do if your blood sugar level drops too low. This may happen if you take insulin and do not eat the right amount of carbohydrates. More ways to manage type 2 diabetes:   • Wear medical alert identification. Wear medical alert jewelry or carry a card that says you have diabetes. Ask your provider where to get these items. • Do not smoke. Nicotine and other chemicals in cigarettes and cigars can cause lung and blood vessel damage. It also makes it more difficult to manage your diabetes. Ask your provider for information if you currently smoke and need help to quit. Do not use e-cigarettes or smokeless tobacco in place of cigarettes or to help you quit. They still contain nicotine. • Check your feet each day for cuts, scratches, calluses, or other wounds. Look for redness and swelling, and feel for warmth. Wear shoes that fit well. Check your shoes for rocks or other objects that can hurt your feet. Do not walk barefoot or wear shoes without socks.  Wear cotton socks to help keep your feet dry. • Ask about vaccines you may need. You have a higher risk for serious illness if you get the flu, pneumonia, COVID-19, or hepatitis. Ask your provider if you should get vaccines to prevent these or other diseases, and when to get the vaccines. • Talk to your provider if you become stressed about diabetes care. Sometimes being able to fit diabetes care into your life can cause increased stress. The stress can cause you not to take care of yourself properly. Your care team providers can help by offering tips about self-care. Your providers may suggest you talk to a mental health provider who can listen and offer help with self-care issues. • Have your A1c checked as directed. Your provider may check your A1c every 3 months, or 2 times each year if your diabetes is controlled. An A1c test shows the average amount of sugar in your blood over the past 2 to 3 months. Your provider will tell you what your A1c level should be. • Have screening tests as directed. Your provider may recommend screening for complications of diabetes and other conditions that may develop. Some screenings may begin right away and some may happen within the first 5 years of diagnosis:    ? Examples of diabetes complications  include kidney problems, high cholesterol, high blood pressure, blood vessel problems, eye problems, and sleep apnea. ? You may be screened for a low vitamin B level  if you take oral diabetes medicine for a long time. ? Women of childbearing years may be screened  for polycystic ovarian syndrome (PCOS). Follow up with your doctor or diabetes care team providers as directed: You may need to have blood tests done before your follow-up visit. The test results will show if changes need to be made in your treatment or self-care. Talk to your provider if you cannot afford your medicine. Write down your questions so you remember to ask them during your visits.   © Copyright [employer] 2022 Information is for End User's use only and may not be sold, redistributed or otherwise used for commercial purposes. The above information is an  only. It is not intended as medical advice for individual conditions or treatments. Talk to your doctor, nurse or pharmacist before following any medical regimen to see if it is safe and effective for you.

## 2023-07-24 NOTE — ASSESSMENT & PLAN NOTE
Discussed increasing protein in his diet in order to aid in wound healing. No weight gain by next appointment will consider sending to nutrition services. BMI Findings: Body mass index is 21.88 kg/m².

## 2023-07-24 NOTE — ASSESSMENT & PLAN NOTE
Discussed medicationdesired effects, potential side effects, and how to administer themedication. Non-pharmacological interventions such as low carb diet, high in vegetables and fruit discussed. Educated on importance of physical activity. Discussed signs and symptoms of hypoglycemiaand need to present to the ED. clysis sent to the pharmacy to start checking blood sugar at least once daily and with any signs and symptoms of hypo or hyperglycemia. Follow up in 1 months or sooner if needed.  Patient verbalizes understanding regarding plan of careand all questions answered    Lab Results   Component Value Date    HGBA1C 7.3 (H) 06/30/2023

## 2023-07-25 NOTE — PROGRESS NOTES
Marshall Fan  1960  [address] [facility] [place]  [address] [facility] [address]    No chief complaint on file. Multiple medical problems recent hospitalizations in the hospital sent to heme-onc for evaluation of anemia    Advance Care Planning/Advance Directives:  0    Oncology History    No history exists. History of Present Illness:  -DAMIAN Shine:  -Previous Therapy (if not listed in Oncology History):  -Current Therapy (if not listed in Oncology History):  -Disease Status:  -Interval History:  -Tumor Markers:    Review of Systems:  Review of Systems   Constitutional: Negative for chills and fever. HENT: Negative for ear pain and sore throat. Eyes: Negative for pain and visual disturbance. Respiratory: Negative for cough and shortness of breath. Cardiovascular: Negative for chest pain and palpitations. Gastrointestinal: Negative for abdominal pain and vomiting. Genitourinary: Negative for dysuria and hematuria. Musculoskeletal: Negative for arthralgias and back pain. Skin: Negative for color change and rash. Neurological: Negative for seizures and syncope. All other systems reviewed and are negative.       Patient Active Problem List   Diagnosis   • Rohan's gangrene   • DM (diabetes mellitus) ([address])   • Lactic acidosis   • HTN (hypertension)   • Suprapubic catheter (HCC)   • Urethral disorder   • Hyperglycemia   • History of creation of ostomy ([address])   • Low hemoglobin and low hematocrit   • Mixed hyperlipidemia   • Moderate protein malnutrition (HCC)     Past Medical History:   Diagnosis Date   • Diabetes mellitus ([address])      Past Surgical History:   Procedure Laterality Date   • CYSTOSCOPY N/A 7/4/2023    Procedure: CYSTOSCOPY and suprapubic tube exchange;  Surgeon: Rex Stearns MD;  [facility];  Service: Urology   • EXAMINATION UNDER ANESTHESIA N/A 7/4/2023    Procedure: EUA, testicular fiation;  Surgeon: Morgan Strickland MD;  [facility];  Service: Urology   • INCISION AND DRAINAGE OF WOUND N/A 6/28/2023    Procedure: 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 ;  Surgeon: Tan Ramsey MD;  [facility];  Service: Urology   • INCISION AND DRAINAGE OF WOUND N/A 6/28/2023    Procedure: 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;  Surgeon: Madhavi Hurst MD;  [facility];  Service: General   • NC CYSTOSTOMY CYSTOTOMY W/DRAINAGE N/A 7/1/2023    Procedure: CYSTOSCOPY; CYSTOSTOMY SUPRAPUBIC OPEN;  Surgeon: Morgan Strickland MD;  [facility];  Service: Urology   • NC LAPS ABD PRTM&OMENTUM DX W/WO [facility] BR/WA SPX N/A 7/4/2023    Procedure: LAPAROSCOPY DIAGNOSTIC WITH CREATION OF OSTOMY;  Surgeon: Eneida Lou MD;  [facility];  Service: General   • ROTATOR CUFF REPAIR     • SPLIT THICKNESS SKIN GRAFT N/A 7/13/2023    Procedure: SKIN GRAFT SPLIT THICKNESS (STSG)  TRUNK;  Surgeon: Scott Hatfield MD;  [facility];  Service: Plastics   • VAC DRESSING APPLICATION N/A 5/9/5709    Procedure: APPLICATION VAC DRESSING;  Surgeon: Eneida Lou MD;  [facility];  Service: General   • VAC DRESSING APPLICATION N/A 0/85/9290    Procedure: APPLICATION VAC DRESSING;  Surgeon: Jose Montalvo MD;  [facility];  Service: General   • WOUND DEBRIDEMENT N/A 6/29/2023    Procedure: DEBRIDEMENT WOUND [facility] OUT); Surgeon: Eddie Lima MD;  [facility];  Service: General   • WOUND DEBRIDEMENT N/A 7/2/2023    Procedure: DEBRIDEMENT PERINEAL WOUND AND DRESSING CHANGE [facility] OUT); Surgeon: Eneida Lou MD;  [facility];  Service: General   • WOUND DEBRIDEMENT N/A 7/1/2023    Procedure: DEBRIDEMENT WOUND AND DRESSING CHANGE [facility] OUT);   Surgeon: Jsoe Montalvo MD;  [facility];  Service: General   • WOUND DEBRIDEMENT N/A 6/30/2023    Procedure: DEBRIDEMENT WOUND [facility]); Surgeon: Lindsay Jose MD;  [facility];  Service: General   • WOUND DEBRIDEMENT N/A 7/4/2023    Procedure: DEBRIDEMENT OF PERINEAL WOUND ([address]); Surgeon: Debra Palafox MD;  [facility];  Service: General   • WOUND DEBRIDEMENT N/A 7/6/2023    Procedure: DEBRIDEMENT WOUND, [address] OUT, AND WOUND VAC CHANGE;  Surgeon: Janeth Rizvi DO;  [facility];  Service: General   • WOUND DEBRIDEMENT N/A 7/8/2023    Procedure: DEBRIDEMENT WOUND AND DRESSING CHANGE, VAC change ([address] OUT); Surgeon: Sharri Monzon DO;  [facility];  Service: General   • WOUND DEBRIDEMENT N/A 7/10/2023    Procedure: [address] OUT AND DEBRIDEMENT OF PERINEUM WOUND, DRESSING CHANGE, PARTIAL CLOSURE;  Surgeon: Lindsay Jose MD;  [facility];  Service: General   • WOUND DEBRIDEMENT N/A 7/12/2023    Procedure: DEBRIDEMENT WOUND [facility] OUT), partial closure;  Surgeon: Lindsay Jose MD;  [facility];  Service: General   • WOUND DEBRIDEMENT N/A 7/13/2023    Procedure: Burnis Border  ([facility]) & vac;  Surgeon: Annia Pascal MD;  [facility];  Service: Plastics     History reviewed. No pertinent family history.   Social History     Socioeconomic History   • Marital status: /Civil Union     Spouse name: Not on file   • Number of children: Not on file   • Years of education: Not on file   • Highest education level: Not on file   Occupational History   • Not on file   Tobacco Use   • Smoking status: Never   • Smokeless tobacco: Never   Vaping Use   • Vaping Use: Every day   • Substances: Nicotine   Substance and Sexual Activity   • Alcohol use: Yes     Comment: socially   • Drug use: Never   • Sexual activity: Not on file   Other Topics Concern   • Not on file   Social History Narrative   • Not on file     Social Determinants of Health     Financial Resource Strain: Not on file   Food Insecurity: No Food Insecurity (6/30/2023)    Hunger Vital Sign    • Worried About Running Out of Food in the Last Year: Never true    • Ran Out of Food in the Last Year: Never true   Transportation Needs: No Transportation Needs (6/30/2023)    PRAPARE - Transportation    • Lack of Transportation (Medical): No    • Lack of Transportation (Non-Medical): No   Physical Activity: Not on file   Stress: Not on file   Social Connections: Not on file   Intimate Partner Violence: Not on file   Housing Stability: Unknown (6/30/2023)    Housing Stability Vital Sign    • Unable to Pay for Housing in the Last Year: No    • Number of Places Lived in the Last Year: Not on file    • Unstable Housing in the Last Year: No       Current Outpatient Medications:   •  acetaminophen (TYLENOL) 325 mg tablet, Take 2 tablets (650 mg total) by mouth every 6 (six) hours as needed for mild pain, Disp: , Rfl: 0  •  aspirin (ECOTRIN LOW STRENGTH) 81 mg EC tablet, Take 81 mg by mouth daily, Disp: , Rfl:   •  atorvastatin (LIPITOR) 20 mg tablet, , Disp: , Rfl:   •  Blood Glucose Monitoring Suppl (OneTouch Verio Reflect) w/Device KIT, Check blood sugars once daily. Please substitute with appropriate alternative as covered by patient's insurance. Dx: E11.65, Disp: 1 kit, Rfl: 0  •  cholecalciferol (VITAMIN D3) 1,000 units tablet, Take 1,000 Units by mouth daily, Disp: , Rfl:   •  dulaglutide (Trulicity) 4.96 QP/4.7HM injection, Inject 0.5 mL (0.75 mg total) under the skin every 7 days, Disp: 6 mL, Rfl: 1  •  glucose blood (OneTouch Verio) test strip, Check blood sugars once daily. Please substitute with appropriate alternative as covered by patient's insurance.  Dx: E11.65, Disp: 100 each, Rfl: 3  •  lisinopril (ZESTRIL) 2.5 mg tablet, Take 1 tablet (2.5 mg total) by mouth daily, Disp: 90 tablet, Rfl: 1  •  metFORMIN (GLUCOPHAGE) 500 mg tablet, Take 1 tablet (500 mg total) by mouth daily with breakfast, Disp: 90 tablet, Rfl: 1  •  Omega-3 Fatty Acids (fish oil) 1,000 mg, Take 1,000 mg by mouth daily, Disp: , Rfl:   •  OneTouch Delica Lancets 11I MISC, Check blood sugars once daily. Please substitute with appropriate alternative as covered by patient's insurance. Dx: E11.65, Disp: 100 each, Rfl: 3  •  vitamin B-12 (VITAMIN B-12) 500 mcg tablet, Take 500 mcg by mouth daily, Disp: , Rfl:   No Known Allergies  Vitals:    07/26/23 1500   BP: 126/82   Pulse: 82   Resp: 18   Temp: 97.7 °F (36.5 °C)   SpO2: 100%         Physical Exam  Vitals reviewed. Constitutional:       Appearance: Normal appearance. HENT:      Head: Normocephalic. Mouth/Throat:      Pharynx: Oropharynx is clear. Eyes:      Pupils: Pupils are equal, round, and reactive to light. Cardiovascular:      Rate and Rhythm: Regular rhythm. Heart sounds: Normal heart sounds. Abdominal:      General: Bowel sounds are normal.      Palpations: Abdomen is soft. Musculoskeletal:         General: Normal range of motion. Cervical back: Neck supple. Skin:     General: Skin is warm. Neurological:      General: No focal deficit present.    Psychiatric:         Mood and Affect: Mood normal.             Performance Status: 1    Labs:   Latest Reference Range & Units 07/14/23 06:03   Sodium 135 - 147 mmol/L 143   Potassium 3.5 - 5.3 mmol/L 4.1   Chloride 96 - 108 mmol/L 113 (H)   CO2 21 - 32 mmol/L 28   Anion Gap mmol/L 2   BUN 5 - 25 mg/dL 7   Creatinine 0.60 - 1.30 mg/dL 0.64   Glucose, Random 65 - 140 mg/dL 203 (H)   Calcium 8.3 - 10.1 mg/dL 8.1 (L)   CORRECTED CALCIUM 8.3 - 10.1 mg/dL 10.1   AST 5 - 45 U/L 12   ALT 12 - 78 U/L 24   Alkaline Phosphatase 46 - 116 U/L 73   Total Protein 6.4 - 8.4 g/dL 5.7 (L)   Albumin 3.5 - 5.0 g/dL 1.5 (L)   TOTAL BILIRUBIN 0.20 - 1.00 mg/dL 0.12 (L)   eGFR ml/min/1.73sq m 103   WBC 4.31 - 10.16 Thousand/uL 9.54   Red Blood Cell Count 3.88 - 5.62 Million/uL 2.32 (L)   Hemoglobin 12.0 - 17.0 g/dL 7.4 (L)   HCT 36.5 - 49.3 % 24.7 (L)   MCV 82 - 98 fL 107 (H)   MCH 26.8 - 34.3 pg 31.9   MCHC 31.4 - 37.4 g/dL 30.0 (L)   RDW 11.6 - 15.1 % 14.5   Platelet Count 482 - 390 Thousands/uL 399 (H)   MPV 8.9 - 12.7 fL 8.7 (L)   nRBC /100 WBCs 0   (H): Data is abnormally high  (L): Data is abnormally low    Imaging  US scrotum and testicles    Result Date: 6/28/2023  Narrative: SCROTAL ULTRASOUND INDICATION:    severe swelling. Patient reports swelling for 2 days. COMPARISON: CT of the pelvis from earlier the same day. TECHNIQUE:   Ultrasound the scrotal contents was performed with a high frequency linear transducer utilizing volumetric sweep imaging as well as standard still image techniques. Imaging performed in longitudinal and transverse orientation. Color and spectral Doppler evaluation also performed bilaterally. FINDINGS: There is extensive air seen within the scrotum, which obscures the other structures. The right testicle is not clearly visualized. The left testicle is only partially visualized and measures approximately 4.1 x 2.4 x 2.3 cm (volume of 11.9 mL). It appears to be grossly within normal limits and demonstrates arterial and venous waveforms. A 8.4 x 3.7 x 6.3 cm cystic structure is seen in the left side of the scrotum adjacent to the left testicle, likely an epididymal cyst.     Impression: Extensive air throughout the scrotum as on the earlier CT. This obscures the testes and epididymides. Left testicle only partially imaged, however appears grossly within normal limits. 8.4 x 3.7 x 6.3 cm cystic structure in the left side of the scrotum adjacent to the left testicle, likely an epididymal cyst rather than a hydrocele. Workstation performed: [id number]     CT pelvis wo contrast    Result Date: 6/28/2023  Narrative: CT PELVIS WITHOUT IV CONTRAST INDICATION:   Scrotal swelling or edema. Please evaluate scrotum for r/o gas. COMPARISON:  None. TECHNIQUE: CT examination of the pelvis was performed without intravenous contrast. Multiplanar 2D reformatted images were created from the source data.  This examination, like all CT scans performed in the [facility], was performed utilizing techniques to minimize radiation dose exposure, including the use of iterative reconstruction and automated exposure control. Radiation dose length product (DLP) for this visit:  553 mGy-cm . Enteric contrast was not administered. FINDINGS: SCROTUM/PERINEAL REGION: There is edema of the subcutaneous tissues of the scrotum. There is extensive subcutaneous emphysema throughout the scrotum, extending into the perineal region, the right ischioanal fossa and the right aspect of the gluteal cleft  inferiorly. Some of the foci of air are seen in the right obturator internus muscle and in the right levator ani muscles. Some of the air also is seen extending into the presacral space. There are also several droplets of air within the subcutaneous tissues of the right thigh medially. There is a left-sided hydrocele. VISUALIZED KIDNEYS/URETERS:  No significant abnormality identified in the partially imaged kidneys and ureters. REPRODUCTIVE ORGANS:  The prostate gland is not enlarged. URINARY BLADDER: The urinary bladder is within normal limits. APPENDIX:  Normal appendix. VISUALIZED BOWEL:  No obstruction or convincing inflammation in the visualized bowel. ABDOMINOPELVIC CAVITY:  No ascites or pneumoperitoneum in the visualized pelvis. There are several mildly enlarged right-sided inguinal lymph nodes measuring up to 1.5 cm in short axis. There are several enlarged pelvic lymph nodes, most pronounced in the right external iliac chain. For instance there is a right external iliac lymph node measuring 1.7 cm in short axis (series 2 image 58). A distal right external iliac lymph node measures 2.7 x 2.3 cm (series 2 image 67). There is a distal left external iliac lymph node measuring 1.3 cm in short axis (series 2 image 69). VISUALIZED VESSELS:  Unremarkable for patient's age.  ABDOMINOPELVIC WALL/INGUINAL REGIONS: Subcutaneous air from the aforementioned process in the scrotum and perineum also extends into both inguinal regions and anterior abdominal wall more cephalad, mostly on the left. There is a small fat-containing umbilical hernia. There is also a small fat-containing right inguinal hernia. OSSEOUS STRUCTURES:  No acute fracture or destructive osseous lesion. Impression: Subcutaneous tissue edema in the scrotum and extensive subcutaneous emphysema throughout the scrotum, extending into the inguinal regions, anterior abdominal wall and the perineal region, compatible with Rohan's gangrene. Some of the aforementioned air extends into the presacral space, and is seen in the right levator ani muscle and the right obturator internus muscle there is also some air in the subcutaneous fat of the proximal right thigh medially. Small left-sided hydrocele. Mostly right-sided pelvic and inguinal lymphadenopathy measuring up to 2.7 x 2.3 cm, likely reactive. I personally discussed this study with JOYCE Gonzalez on 6/28/2023 at 7:36 PM who was already aware of the findings. Workstation performed: [id number]     I reviewed the above laboratory and imaging data.       Discussion/Summary:  Anemia most likely multifactorial  The elevated MCV will attempt to check B12 and folate  Is likely that this is a recovery phase from acute chronic acute to chronic inflammatory reaction  No Treatment would be indicated in this situation  Follow up in a few weeks  Continue wound care on the buttock

## 2023-07-26 ENCOUNTER — TELEPHONE (OUTPATIENT)
Dept: OTHER | Facility: OTHER | Age: 63
End: 2023-07-26

## 2023-07-26 ENCOUNTER — CONSULT (OUTPATIENT)
Dept: HEMATOLOGY ONCOLOGY | Facility: CLINIC | Age: 63
End: 2023-07-26
Payer: MEDICARE

## 2023-07-26 VITALS
TEMPERATURE: 97.7 F | HEART RATE: 82 BPM | WEIGHT: 170 LBS | BODY MASS INDEX: 21.82 KG/M2 | RESPIRATION RATE: 18 BRPM | SYSTOLIC BLOOD PRESSURE: 126 MMHG | HEIGHT: 74 IN | DIASTOLIC BLOOD PRESSURE: 82 MMHG | OXYGEN SATURATION: 100 %

## 2023-07-26 DIAGNOSIS — D50.8 IRON DEFICIENCY ANEMIA SECONDARY TO INADEQUATE DIETARY IRON INTAKE: Primary | ICD-10-CM

## 2023-07-26 DIAGNOSIS — E72.11 HOMOCYSTINURIA (HCC): ICD-10-CM

## 2023-07-26 DIAGNOSIS — E44.0 MODERATE PROTEIN MALNUTRITION (HCC): ICD-10-CM

## 2023-07-26 DIAGNOSIS — D52.0 DIETARY FOLATE DEFICIENCY ANEMIA: ICD-10-CM

## 2023-07-26 DIAGNOSIS — D64.9 LOW HEMOGLOBIN AND LOW HEMATOCRIT: ICD-10-CM

## 2023-07-26 PROCEDURE — 99204 OFFICE O/P NEW MOD 45 MIN: CPT | Performed by: INTERNAL MEDICINE

## 2023-07-26 PROCEDURE — 1124F ACP DISCUSS-NO DSCNMKR DOCD: CPT | Performed by: INTERNAL MEDICINE

## 2023-07-26 NOTE — TELEPHONE ENCOUNTER
Patient is calling regarding cancelling an appointment.     Date/Time:  07/26/23  /  9:00 am    Patient was rescheduled: YES [] NO [x]    Patient requesting call back to reschedule: YES [x] NO []

## 2023-07-31 ENCOUNTER — OFFICE VISIT (OUTPATIENT)
Dept: SURGERY | Facility: CLINIC | Age: 63
End: 2023-07-31

## 2023-07-31 VITALS — WEIGHT: 173.4 LBS | BODY MASS INDEX: 22.25 KG/M2 | HEIGHT: 74 IN | TEMPERATURE: 97.3 F

## 2023-07-31 DIAGNOSIS — N49.3 FOURNIER'S GANGRENE: Primary | ICD-10-CM

## 2023-07-31 PROCEDURE — 99024 POSTOP FOLLOW-UP VISIT: CPT | Performed by: SURGERY

## 2023-07-31 NOTE — PROGRESS NOTES
Office Visit - General Surgery  Javier Villanueva MRN: [id number]  Encounter: [id number]    Assessment and Plan  Problem List Items Addressed This Visit        Musculoskeletal and Integument    Rohan's gangrene - Primary     Rohan's gangrene - now resolved, clean and healthy looking wound bed without evidence of recurrent infection  S/p:  6/28 debridement - andria/cullen (cedillo)  6/29 Perineal debridement/washout - Sandra Moran   6/30 Perineal debridement/ EUA - Basil/Uro  7/1 Debridement, suprapubic tube - Basil/Uro  7/2 Perineal and scrotal debridement - Toney Hernandez  7/4 laparoscopic loop diverting colostomy creation, perineal debridement, cystoscopy with suprapubic tube exchange, EUA, testicular fixation, vac placement - Selin  7/6 Perineum washout/debridement- Dmitri  7/8 Perineal washout/ wound vac change - Abbe   7/10 Perineal washout/ partial closure / wound vac change - Basil   7/12 Perineal washout/ partial closure / wound vac change - Basil   7/13 STSG with plastic surgery    Feeling well, eating more and gaining some weight  Ostomy is functioning with firm stool  Denies fevers, chills, nausea, vomiting  Noticed darker urine in ramírez    Plan:  Unfortunately the graft did not take, but is following with plastic surgery as an outpatient and daily dressing changes per their instructions  Has urology appointment to follow up for suprapubic ramírez, flushed easily in clinic today, instructed to drink more water as urine is yuliana color and patient admits to rarely drinking fluids  Cont local wound care as instructed by plastic surgery  Cont outpatient follow up as directed  Follow up in general surgery clinic as needed            Chief Complaint:  Jaiver Villanueva is a 70 y.o. male who presents for Post-op (P/o skin graft and ostomy formation.)    Subjective      Past Medical History:   Diagnosis Date   • Diabetes mellitus ([address])        Past Surgical History:   Procedure Laterality Date   • CYSTOSCOPY N/A 7/4/2023    Procedure: CYSTOSCOPY and suprapubic tube exchange;  Surgeon: Karen Llamas MD;  [facility];  Service: Urology   • EXAMINATION UNDER ANESTHESIA N/A 7/4/2023    Procedure: EUA, testicular fiation;  Surgeon: Karen Llamas MD;  [facility];  Service: Urology   • INCISION AND DRAINAGE OF WOUND N/A 6/28/2023    Procedure: 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 ;  Surgeon: Paco Pierre MD;  [facility];  Service: Urology   • INCISION AND DRAINAGE OF WOUND N/A 6/28/2023    Procedure: 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;  Surgeon: Melisa Black MD;  [facility];  Service: General   • CT CYSTOSTOMY CYSTOTOMY W/DRAINAGE N/A 7/1/2023    Procedure: CYSTOSCOPY; CYSTOSTOMY SUPRAPUBIC OPEN;  Surgeon: Karen Llamas MD;  [facility];  Service: Urology   • CT LAPS ABD PRTM&OMENTUM DX W/WO [facility] BR/WA SPX N/A 7/4/2023    Procedure: LAPAROSCOPY DIAGNOSTIC WITH CREATION OF OSTOMY;  Surgeon: Negrita Anderson MD;  [facility];  Service: General   • ROTATOR CUFF REPAIR     • SPLIT THICKNESS SKIN GRAFT N/A 7/13/2023    Procedure: SKIN GRAFT SPLIT THICKNESS (STSG)  TRUNK;  Surgeon: Jahaira Aly MD;  [facility];  Service: Plastics   • VAC DRESSING APPLICATION N/A 6/3/9322    Procedure: APPLICATION VAC DRESSING;  Surgeon: Negrita Anderson MD;  [facility];  Service: General   • VAC DRESSING APPLICATION N/A 2/64/0246    Procedure: APPLICATION VAC DRESSING;  Surgeon: Debra Coronado MD;  [facility];  Service: General   • WOUND DEBRIDEMENT N/A 6/29/2023    Procedure: DEBRIDEMENT WOUND [facility] OUT); Surgeon: Beth Moon MD;  [facility];  Service: General   • WOUND DEBRIDEMENT N/A 7/2/2023    Procedure: DEBRIDEMENT PERINEAL WOUND AND DRESSING CHANGE [facility] OUT);   Surgeon: Negrita Anderson MD;  [facility];  Service: General   • WOUND DEBRIDEMENT N/A 7/1/2023 Procedure: DEBRIDEMENT WOUND AND DRESSING CHANGE [facility] OUT); Surgeon: Simona Carolina MD;  [facility];  Service: General   • WOUND DEBRIDEMENT N/A 6/30/2023    Procedure: DEBRIDEMENT WOUND [facility] OUT); Surgeon: Simona Carolina MD;  [facility];  Service: General   • WOUND DEBRIDEMENT N/A 7/4/2023    Procedure: DEBRIDEMENT OF PERINEAL WOUND ([address]); Surgeon: Floridalma Landers MD;  [facility];  Service: General   • WOUND DEBRIDEMENT N/A 7/6/2023    Procedure: DEBRIDEMENT WOUND, [address], AND WOUND VAC CHANGE;  Surgeon: Monet Rizvi, DO;  [facility];  Service: General   • WOUND DEBRIDEMENT N/A 7/8/2023    Procedure: DEBRIDEMENT WOUND AND DRESSING CHANGE, VAC change ([address]); Surgeon: Germaine Sandoval DO;  [facility];  Service: General   • WOUND DEBRIDEMENT N/A 7/10/2023    Procedure: [address] OUT AND DEBRIDEMENT OF PERINEUM WOUND, DRESSING CHANGE, PARTIAL CLOSURE;  Surgeon: Simona Carolina MD;  [facility];  Service: General   • WOUND DEBRIDEMENT N/A 7/12/2023    Procedure: DEBRIDEMENT WOUND [facility] OUT), partial closure;  Surgeon: Simona Carolina MD;  [facility];  Service: General   • WOUND DEBRIDEMENT N/A 7/13/2023    Procedure: Orvan Henderson  ([address] [place] [address]) & vac;  Surgeon: Helga Monroe MD;  [facility];  Service: Plastics       History reviewed. No pertinent family history.     Social History     Tobacco Use   • Smoking status: Never   • Smokeless tobacco: Never   Vaping Use   • Vaping Use: Every day   • Substances: Nicotine   Substance Use Topics   • Alcohol use: Yes     Comment: socially   • Drug use: Never        Medications  Current Outpatient Medications on File Prior to Visit   Medication Sig Dispense Refill   • acetaminophen (TYLENOL) 325 mg tablet Take 2 tablets (650 mg total) by mouth every 6 (six) hours as needed for mild pain  0   • aspirin (ECOTRIN LOW STRENGTH) 81 mg EC tablet Take 81 mg by mouth daily     • atorvastatin (LIPITOR) 20 mg tablet      • Blood Glucose Monitoring Suppl (OneTouch Verio Reflect) w/Device KIT Check blood sugars once daily. Please substitute with appropriate alternative as covered by patient's insurance. Dx: E11.65 1 kit 0   • cholecalciferol (VITAMIN D3) 1,000 units tablet Take 1,000 Units by mouth daily     • dulaglutide (Trulicity) 6.04 VJ/4.3XL injection Inject 0.5 mL (0.75 mg total) under the skin every 7 days 6 mL 1   • glucose blood (OneTouch Verio) test strip Check blood sugars once daily. Please substitute with appropriate alternative as covered by patient's insurance. Dx: E11.65 100 each 3   • lisinopril (ZESTRIL) 2.5 mg tablet Take 1 tablet (2.5 mg total) by mouth daily 90 tablet 1   • metFORMIN (GLUCOPHAGE) 500 mg tablet Take 1 tablet (500 mg total) by mouth daily with breakfast 90 tablet 1   • Omega-3 Fatty Acids (fish oil) 1,000 mg Take 1,000 mg by mouth daily     • OneTouch Delica Lancets 32B MISC Check blood sugars once daily. Please substitute with appropriate alternative as covered by patient's insurance. Dx: E11.65 100 each 3   • vitamin B-12 (VITAMIN B-12) 500 mcg tablet Take 500 mcg by mouth daily       No current facility-administered medications on file prior to visit. Allergies  No Known Allergies    Review of Systems   Constitutional: Negative for appetite change, chills and fever. HENT: Negative. Respiratory: Negative. Cardiovascular: Negative. Gastrointestinal: Negative. Musculoskeletal: Negative. Skin: Positive for wound. Neurological: Negative. Hematological: Negative. Psychiatric/Behavioral: Negative. Objective  Vitals:    07/31/23 0916   Temp: (!) 97.3 °F (36.3 °C)       Physical Exam  Vitals reviewed. HENT:      Head: Normocephalic.       Right Ear: External ear normal.      Left Ear: External ear normal.      Nose: Nose normal.      Mouth/Throat:      Mouth: Mucous membranes are moist.   Eyes:      Pupils: Pupils are equal, round, and reactive to light. Cardiovascular:      Rate and Rhythm: Normal rate. Pulmonary:      Effort: Pulmonary effort is normal.   Abdominal:      Palpations: Abdomen is soft. Comments: Ostomy with pink viable stoma and brown formed stool per bag   Musculoskeletal:         General: Normal range of motion. Cervical back: Normal range of motion. Skin:     General: Skin is warm. Comments: Perineum with pink healthy wound bed no fluctuance or induration, no purulent drainage, there is no evidence of skin graft. There is fibrinous exudate, no evidence of non-viable tissue seen   Neurological:      Mental Status: He is alert.

## 2023-07-31 NOTE — ASSESSMENT & PLAN NOTE
Rohan's gangrene - now resolved, clean and healthy looking wound bed without evidence of recurrent infection  S/p:  6/28 debridement - andria/cullen (mamadou)  6/29 Perineal debridement/washout - Mason   6/30 Perineal debridement/ EUA - Basil/Uro  7/1 Debridement, suprapubic tube - Basil/Uro  7/2 Perineal and scrotal debridement - Lucero  7/4 laparoscopic loop diverting colostomy creation, perineal debridement, cystoscopy with suprapubic tube exchange, EUA, testicular fixation, vac placement - Lucero/Saturnino  7/6 Perineum washout/debridement- Dmitri  7/8 Perineal washout/ wound vac change - Abbe   7/10 Perineal washout/ partial closure / wound vac change - Basil   7/12 Perineal washout/ partial closure / wound vac change - Basil   7/13 STSG with plastic surgery    Feeling well, eating more and gaining some weight  Ostomy is functioning with firm stool  Denies fevers, chills, nausea, vomiting  Noticed darker urine in ramírez    Plan:  Unfortunately the graft did not take, but is following with plastic surgery as an outpatient and daily dressing changes per their instructions  Has urology appointment to follow up for suprapubic ramírez, flushed easily in clinic today, instructed to drink more water as urine is yuliana color and patient admits to rarely drinking fluids  Cont local wound care as instructed by plastic surgery  Cont outpatient follow up as directed  Follow up in general surgery clinic as needed

## 2023-08-02 ENCOUNTER — PROCEDURE VISIT (OUTPATIENT)
Dept: UROLOGY | Facility: CLINIC | Age: 63
End: 2023-08-02
Payer: MEDICARE

## 2023-08-02 VITALS — HEIGHT: 74 IN | RESPIRATION RATE: 18 BRPM | BODY MASS INDEX: 22.46 KG/M2 | WEIGHT: 175 LBS | HEART RATE: 82 BPM

## 2023-08-02 DIAGNOSIS — N31.9 NEUROGENIC BLADDER: Primary | ICD-10-CM

## 2023-08-02 PROCEDURE — 51705 CHANGE OF BLADDER TUBE: CPT | Performed by: PHYSICIAN ASSISTANT

## 2023-08-02 NOTE — PROGRESS NOTES
Universal Protocol:  Consent given by: patient  Patient understanding: patient states understanding of the procedure being performed  Patient identity confirmed: verbally with patient      Bladder catheterization    Date/Time: 8/2/2023 10:00 AM    Performed by: Delano Box PA-C  Authorized by: Savannah Bustos PA-C    Consent:     Consent given by:  Patient  Universal protocol:     Procedure explained and questions answered to patient or proxy's satisfaction: yes      Patient identity confirmed:  Verbally with patient  Procedure details:     Catheter insertion:  Indwelling    Approach comment:  Suprapubic    Catheter type: Hodge    Catheter size:  18 Fr    Number of attempts:  1    Successful placement: yes      Urine characteristics:  Clear  Post-procedure details:     Patient tolerance of procedure:   Tolerated well, no immediate complications  Comments:      6 weeks follow-up with the nurse for his next catheter change

## 2023-08-03 ENCOUNTER — OFFICE VISIT (OUTPATIENT)
Dept: PLASTIC SURGERY | Facility: CLINIC | Age: 63
End: 2023-08-03

## 2023-08-03 DIAGNOSIS — N49.3 FOURNIER'S GANGRENE: Primary | ICD-10-CM

## 2023-08-04 NOTE — PROGRESS NOTES
Assessment/Plan:     Patient is a 69-year-old male status post skin grafting to the perineum due to Rohan's gangrene. Unfortunately, graft largely failed. Please see HPI. Patient presents to the office today for suture removal.  Patient's graft site has completely failed. Area is well granulated. Patient will continue with Xeroform, Kerlix packing and ABDs. He will return to the office next week for wound check and to discuss possible VAC placement. Diagnoses and all orders for this visit:    Rohan's gangrene          Subjective:     Patient ID: Marshall Fan is a 70 y.o. male. HPI    Patient reports that he has been doing well. He does have significant tenderness to the scrotum upon palpation sitting down. Review of Systems    See HPI    Objective:     Physical Exam      Patient's perineum and scrotum is well granulated. Mild exudate.

## 2023-08-07 ENCOUNTER — APPOINTMENT (OUTPATIENT)
Dept: LAB | Facility: CLINIC | Age: 63
End: 2023-08-07
Payer: COMMERCIAL

## 2023-08-07 ENCOUNTER — TELEPHONE (OUTPATIENT)
Dept: PLASTIC SURGERY | Facility: HOSPITAL | Age: 63
End: 2023-08-07

## 2023-08-07 DIAGNOSIS — E72.11 HOMOCYSTINURIA (HCC): ICD-10-CM

## 2023-08-07 DIAGNOSIS — D52.0 DIETARY FOLATE DEFICIENCY ANEMIA: ICD-10-CM

## 2023-08-07 DIAGNOSIS — E78.2 MIXED HYPERLIPIDEMIA: ICD-10-CM

## 2023-08-07 DIAGNOSIS — E11.628 TYPE 2 DIABETES MELLITUS WITH OTHER SKIN COMPLICATION, WITHOUT LONG-TERM CURRENT USE OF INSULIN (HCC): ICD-10-CM

## 2023-08-07 DIAGNOSIS — I10 PRIMARY HYPERTENSION: ICD-10-CM

## 2023-08-07 DIAGNOSIS — D50.8 IRON DEFICIENCY ANEMIA SECONDARY TO INADEQUATE DIETARY IRON INTAKE: ICD-10-CM

## 2023-08-07 DIAGNOSIS — N49.3 FOURNIER'S GANGRENE: ICD-10-CM

## 2023-08-07 LAB
ALBUMIN SERPL BCP-MCNC: 3 G/DL (ref 3.5–5)
ALP SERPL-CCNC: 82 U/L (ref 46–116)
ALT SERPL W P-5'-P-CCNC: 33 U/L (ref 12–78)
ANION GAP SERPL CALCULATED.3IONS-SCNC: 4 MMOL/L
AST SERPL W P-5'-P-CCNC: 15 U/L (ref 5–45)
BASOPHILS # BLD AUTO: 0.04 THOUSANDS/ÂΜL (ref 0–0.1)
BASOPHILS NFR BLD AUTO: 1 % (ref 0–1)
BILIRUB SERPL-MCNC: 0.32 MG/DL (ref 0.2–1)
BUN SERPL-MCNC: 11 MG/DL (ref 5–25)
CALCIUM ALBUM COR SERPL-MCNC: 10.7 MG/DL (ref 8.3–10.1)
CALCIUM SERPL-MCNC: 9.9 MG/DL (ref 8.3–10.1)
CHLORIDE SERPL-SCNC: 106 MMOL/L (ref 96–108)
CHOLEST SERPL-MCNC: 142 MG/DL
CO2 SERPL-SCNC: 27 MMOL/L (ref 21–32)
CREAT SERPL-MCNC: 0.65 MG/DL (ref 0.6–1.3)
EOSINOPHIL # BLD AUTO: 0.13 THOUSAND/ÂΜL (ref 0–0.61)
EOSINOPHIL NFR BLD AUTO: 2 % (ref 0–6)
ERYTHROCYTE [DISTWIDTH] IN BLOOD BY AUTOMATED COUNT: 13.2 % (ref 11.6–15.1)
FERRITIN SERPL-MCNC: 195 NG/ML (ref 24–336)
FOLATE SERPL-MCNC: 8.2 NG/ML
GFR SERPL CREATININE-BSD FRML MDRD: 102 ML/MIN/1.73SQ M
GLUCOSE P FAST SERPL-MCNC: 145 MG/DL (ref 65–99)
HCT VFR BLD AUTO: 38.1 % (ref 36.5–49.3)
HDLC SERPL-MCNC: 52 MG/DL
HGB BLD-MCNC: 11.7 G/DL (ref 12–17)
IMM GRANULOCYTES # BLD AUTO: 0.04 THOUSAND/UL (ref 0–0.2)
IMM GRANULOCYTES NFR BLD AUTO: 1 % (ref 0–2)
LDH SERPL-CCNC: 200 U/L (ref 81–234)
LDLC SERPL CALC-MCNC: 71 MG/DL (ref 0–100)
LYMPHOCYTES # BLD AUTO: 2.47 THOUSANDS/ÂΜL (ref 0.6–4.47)
LYMPHOCYTES NFR BLD AUTO: 29 % (ref 14–44)
MCH RBC QN AUTO: 30.5 PG (ref 26.8–34.3)
MCHC RBC AUTO-ENTMCNC: 30.7 G/DL (ref 31.4–37.4)
MCV RBC AUTO: 100 FL (ref 82–98)
MONOCYTES # BLD AUTO: 0.83 THOUSAND/ÂΜL (ref 0.17–1.22)
MONOCYTES NFR BLD AUTO: 10 % (ref 4–12)
NEUTROPHILS # BLD AUTO: 5.16 THOUSANDS/ÂΜL (ref 1.85–7.62)
NEUTS SEG NFR BLD AUTO: 57 % (ref 43–75)
NRBC BLD AUTO-RTO: 0 /100 WBCS
PLATELET # BLD AUTO: 334 THOUSANDS/UL (ref 149–390)
PMV BLD AUTO: 9.3 FL (ref 8.9–12.7)
POTASSIUM SERPL-SCNC: 4.3 MMOL/L (ref 3.5–5.3)
PROT SERPL-MCNC: 7.6 G/DL (ref 6.4–8.4)
RBC # BLD AUTO: 3.83 MILLION/UL (ref 3.88–5.62)
RETICS # AUTO: ABNORMAL 10*3/UL (ref 14356–105094)
RETICS # CALC: 2.14 % (ref 0.37–1.87)
SODIUM SERPL-SCNC: 137 MMOL/L (ref 135–147)
TRIGL SERPL-MCNC: 96 MG/DL
VIT B12 SERPL-MCNC: 476 PG/ML (ref 180–914)
WBC # BLD AUTO: 8.67 THOUSAND/UL (ref 4.31–10.16)

## 2023-08-07 PROCEDURE — 85045 AUTOMATED RETICULOCYTE COUNT: CPT

## 2023-08-07 PROCEDURE — 82728 ASSAY OF FERRITIN: CPT

## 2023-08-07 PROCEDURE — 80061 LIPID PANEL: CPT

## 2023-08-07 PROCEDURE — 80053 COMPREHEN METABOLIC PANEL: CPT

## 2023-08-07 PROCEDURE — 36415 COLL VENOUS BLD VENIPUNCTURE: CPT

## 2023-08-07 PROCEDURE — 82746 ASSAY OF FOLIC ACID SERUM: CPT

## 2023-08-07 PROCEDURE — 85025 COMPLETE CBC W/AUTO DIFF WBC: CPT

## 2023-08-07 PROCEDURE — 82607 VITAMIN B-12: CPT

## 2023-08-07 PROCEDURE — 82570 ASSAY OF URINE CREATININE: CPT

## 2023-08-07 PROCEDURE — 83615 LACTATE (LD) (LDH) ENZYME: CPT

## 2023-08-07 PROCEDURE — 82043 UR ALBUMIN QUANTITATIVE: CPT

## 2023-08-10 ENCOUNTER — OFFICE VISIT (OUTPATIENT)
Dept: PLASTIC SURGERY | Facility: CLINIC | Age: 63
End: 2023-08-10

## 2023-08-10 ENCOUNTER — PREP FOR PROCEDURE (OUTPATIENT)
Dept: PLASTIC SURGERY | Facility: CLINIC | Age: 63
End: 2023-08-10

## 2023-08-10 DIAGNOSIS — N49.3 FOURNIER DISEASE: Primary | ICD-10-CM

## 2023-08-10 DIAGNOSIS — N49.3 FOURNIER'S GANGRENE: Primary | ICD-10-CM

## 2023-08-10 NOTE — PROGRESS NOTES
POST-OP EVALUATION  8/10/2023    Myron Guerrero is a 70 y.o. male is here today for routine post-operative follow up. Patient is a 57-year-old male status post 7/13/23 skin grafting to the perineum due to Rohan's gangrene. Unfortunately, graft largely failed. He has been applying Xeroform, Kerlix packing and ABDs.    Past Medical History:   Diagnosis Date   • Diabetes mellitus [facility])      Past Surgical History:   Procedure Laterality Date   • CYSTOSCOPY N/A 7/4/2023    Procedure: CYSTOSCOPY and suprapubic tube exchange;  Surgeon: Lawson Crowder MD;  [facility];  Service: Urology   • EXAMINATION UNDER ANESTHESIA N/A 7/4/2023    Procedure: EUA, testicular fiation;  Surgeon: Lawson Crowder MD;  [facility];  Service: Urology   • INCISION AND DRAINAGE OF WOUND N/A 6/28/2023    Procedure: 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 ;  Surgeon: Lucien Murcia MD;  [facility];  Service: Urology   • INCISION AND DRAINAGE OF WOUND N/A 6/28/2023    Procedure: 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;  Surgeon: Zenaida Burgos MD;  [facility];  Service: General   • ME CYSTOSTOMY CYSTOTOMY W/DRAINAGE N/A 7/1/2023    Procedure: CYSTOSCOPY; CYSTOSTOMY SUPRAPUBIC OPEN;  Surgeon: Lawson Crowder MD;  [facility];  Service: Urology   • ME LAPS ABD PRTM&OMENTUM DX W/WO [facility] BR/WA SPX N/A 7/4/2023    Procedure: LAPAROSCOPY DIAGNOSTIC WITH CREATION OF OSTOMY;  Surgeon: Mihai Jones MD;  [facility];  Service: General   • ROTATOR CUFF REPAIR     • SPLIT THICKNESS SKIN GRAFT N/A 7/13/2023    Procedure: SKIN GRAFT SPLIT THICKNESS (STSG)  TRUNK;  Surgeon: Iwona Prieto MD;  [facility];  Service: Plastics   • VAC DRESSING APPLICATION N/A 5/2/3979    Procedure: APPLICATION VAC DRESSING;  Surgeon: Mihai Jones MD;  [facility];  Service: General   • VAC DRESSING APPLICATION N/A 5/41/3944    Procedure: APPLICATION VAC DRESSING;  Surgeon: Glory Peabody, MD;  [facility];  Service: General   • WOUND DEBRIDEMENT N/A 6/29/2023    Procedure: DEBRIDEMENT WOUND [facility] OUT); Surgeon: Dovie Klinefelter, MD;  [facility];  Service: General   • WOUND DEBRIDEMENT N/A 7/2/2023    Procedure: DEBRIDEMENT PERINEAL WOUND AND DRESSING CHANGE [facility] OUT); Surgeon: Sylvia Tyler MD;  [facility];  Service: General   • WOUND DEBRIDEMENT N/A 7/1/2023    Procedure: DEBRIDEMENT WOUND AND DRESSING CHANGE [facility] OUT); Surgeon: Glory Peabody, MD;  [facility];  Service: General   • WOUND DEBRIDEMENT N/A 6/30/2023    Procedure: DEBRIDEMENT WOUND [facility] OUT); Surgeon: Glory Peabody, MD;  [facility];  Service: General   • WOUND DEBRIDEMENT N/A 7/4/2023    Procedure: DEBRIDEMENT OF PERINEAL WOUND ([address]); Surgeon: Sylvia Tyler MD;  [facility];  Service: General   • WOUND DEBRIDEMENT N/A 7/6/2023    Procedure: DEBRIDEMENT WOUND, [address], AND WOUND VAC CHANGE;  Surgeon: Raven Rizvi DO;  [facility];  Service: General   • WOUND DEBRIDEMENT N/A 7/8/2023    Procedure: DEBRIDEMENT WOUND AND DRESSING CHANGE, VAC change ([address] OUT);   Surgeon: Catina Rouse DO;  [facility];  Service: General   • WOUND DEBRIDEMENT N/A 7/10/2023    Procedure: [address] OUT AND DEBRIDEMENT OF PERINEUM WOUND, DRESSING CHANGE, PARTIAL CLOSURE;  Surgeon: Glory Peabody, MD;  [facility];  Service: General   • WOUND DEBRIDEMENT N/A 7/12/2023    Procedure: Kodi Burt [facility] OUT), partial closure;  Surgeon: Glory Peabody, MD;  [facility];  Service: General   • WOUND DEBRIDEMENT N/A 7/13/2023    Procedure: Mariee Javed  ([address] [place] [address]) & vac;  Surgeon: Blanca Norton MD;  [facility];  Service: Plastics        Current Outpatient Medications:   •  acetaminophen (TYLENOL) 325 mg tablet, Take 2 tablets (650 mg total) by mouth every 6 (six) hours as needed for mild pain, Disp: , Rfl: 0  •  aspirin (ECOTRIN LOW STRENGTH) 81 mg EC tablet, Take 81 mg by mouth daily, Disp: , Rfl:   •  atorvastatin (LIPITOR) 20 mg tablet, , Disp: , Rfl:   •  Blood Glucose Monitoring Suppl (OneTouch Verio Reflect) w/Device KIT, Check blood sugars once daily. Please substitute with appropriate alternative as covered by patient's insurance. Dx: E11.65, Disp: 1 kit, Rfl: 0  •  cholecalciferol (VITAMIN D3) 1,000 units tablet, Take 1,000 Units by mouth daily, Disp: , Rfl:   •  dulaglutide (Trulicity) 2.74 HR/9.1ZZ injection, Inject 0.5 mL (0.75 mg total) under the skin every 7 days, Disp: 6 mL, Rfl: 1  •  glucose blood (OneTouch Verio) test strip, Check blood sugars once daily. Please substitute with appropriate alternative as covered by patient's insurance. Dx: E11.65, Disp: 100 each, Rfl: 3  •  lisinopril (ZESTRIL) 2.5 mg tablet, Take 1 tablet (2.5 mg total) by mouth daily, Disp: 90 tablet, Rfl: 1  •  metFORMIN (GLUCOPHAGE) 500 mg tablet, Take 1 tablet (500 mg total) by mouth daily with breakfast, Disp: 90 tablet, Rfl: 1  •  Omega-3 Fatty Acids (fish oil) 1,000 mg, Take 1,000 mg by mouth daily, Disp: , Rfl:   •  OneTouch Delica Lancets 10Y MISC, Check blood sugars once daily. Please substitute with appropriate alternative as covered by patient's insurance. Dx: E11.65, Disp: 100 each, Rfl: 3  •  vitamin B-12 (VITAMIN B-12) 500 mcg tablet, Take 500 mcg by mouth daily, Disp: , Rfl:   Allergies: Patient has no known allergies. Objective      Anterior scrotum skin graft is well integrated. Posterior scrotum and perineum tender, open shallow wounds. Copious serous fluid    Assessment/Plan   Diagnoses and all orders for this visit:    Rohan's gangrene    Dr. Padmini Carver discussed options for closing the open wounds. It was agreed that he wound undergo skin graft to the area.   All risks, benefits, and options, including but not limited to, pain, scarring, bleeding, infection, asymmetry, contour deformity, damage to adjacent nerves, vessels, and organs, hematoma, seroma, paresthesias, anesthesia (temporary versus permanent), skin necrosis, fat necrosis, flap necrosis, wound dehiscence with need for healing by secondary intention and postoperative dressing changes, hypertrophic and/or keloid scar formation, deep venous thrombosis, pulmonary embolism, death, recurrence, and need for revision and/or reoperation were fully explained to the patient. Pt expressed understanding, would like to undergo the procedure, and signed the consent today. Wound Care: Soap and water to the area. Pack saline moistened gauze into the open areas. Cover with ABD's. Change 2-3 x per day. Followup in one week or after surgery.     Ashley Encarnacion PA-C

## 2023-08-15 ENCOUNTER — ANESTHESIA EVENT (OUTPATIENT)
Dept: PERIOP | Facility: HOSPITAL | Age: 63
End: 2023-08-15
Payer: MEDICARE

## 2023-08-15 NOTE — PRE-PROCEDURE INSTRUCTIONS
Pre-Surgery Instructions:   Medication Instructions   • acetaminophen (TYLENOL) 325 mg tablet Uses PRN- OK to take day of surgery   • atorvastatin (LIPITOR) 20 mg tablet Take day of surgery. • cholecalciferol (VITAMIN D3) 1,000 units tablet Hold day of surgery. • dulaglutide (Trulicity) 2.41 KA/8.7QV injection LD 8/12-msg to soc   • lisinopril (ZESTRIL) 2.5 mg tablet Hold day of surgery. • metFORMIN (GLUCOPHAGE) 500 mg tablet Hold day of surgery. • Omega-3 Fatty Acids (fish oil) 1,000 mg Stop taking 2 days prior to surgery. • vitamin B-12 (VITAMIN B-12) 500 mcg tablet Hold day of surgery. Medication instructions for day surgery reviewed. Please use only a sip of water to take your instructed medications. Avoid all over the counter vitamins, supplements and NSAIDS for one week prior to surgery per anesthesia guidelines. Tylenol is ok to take as needed. You will receive a call one business day prior to surgery with an arrival time and hospital directions. If your surgery is scheduled on a Monday, the hospital will be calling you on the Friday prior to your surgery. If you have not heard from anyone by 8pm, please call the hospital supervisor through the hospital  at [phone]. Mark Padgett [phone]). Do not eat or drink anything after midnight the night before your surgery, including candy, mints, lifesavers, or chewing gum. Do not drink alcohol 24hrs before your surgery. Try not to smoke at least 24hrs before your surgery. Follow the pre surgery showering instructions as listed in the [facility] Surgical Experience Booklet” or otherwise provided by your surgeon's office. Do not shave the surgical area 24 hours before surgery. Do not apply any lotions, creams, including makeup, cologne, deodorant, or perfumes after showering on the day of your surgery. No contact lenses, eye make-up, or artificial eyelashes.  Remove nail polish, including gel polish, and any artificial, gel, or acrylic nails if possible. Remove all jewelry including rings and body piercing jewelry. Wear causal clothing that is easy to take on and off. Consider your type of surgery. Keep any valuables, jewelry, piercings at home. Please bring any specially ordered equipment (sling, braces) if indicated. Arrange for a responsible person to drive you to and from the hospital on the day of your surgery. Visitor Guidelines discussed. Call the surgeon's office with any new illnesses, exposures, or additional questions prior to surgery. Please reference your [facility] Surgical Experience Booklet” for additional information to prepare for your upcoming surgery.

## 2023-08-16 PROCEDURE — NC001 PR NO CHARGE: Performed by: PHYSICIAN ASSISTANT

## 2023-08-16 NOTE — H&P
H&P - Plastic Surgery   Rian Harvey 70 y.o. male MRN: [id number]  Unit/Bed#:  Encounter: [id number]           Assessment:  Rohan disease  Plan:  WOUND WASHOUT, DEBRIDEMENT, STSG OF PERINEAL WOUND (Penis)       STSG OF PERINEAL WOUND (Penis)        HPI:   Rian Harvey is a 70y.o. year old male who presents status post 7/13/23 skin grafting to the perineum due to Rohan's gangrene.  Unfortunately, graft partially failed. He discussed the situation with Dr. Erasmo Mazariegos and they agreed that a repeat skin graft is worth attempting. REVIEW OF SYSTEMS    GENERAL/CONSTITUTIONAL: The patient denies fever, fatigue, weakness, weight gain or weight loss. HEAD, EYES, EARS, NOSE AND THROAT: Eyes - The patient denies pain, redness, loss of vision, double or blurred vision and denies wearing glasses. The patient denies ringing in the ears, nosebleeds sinusitis, post nasal drip. Also denies frequent sore throats, hoarseness, painful swallowing. CARDIOVASCULAR: The patient denies chest pain, irregular heartbeats, palpitations, shortness of breath, heart murmurs, high blood pressure, cramps in his legs with walking, pain in his feet or toes at night or varicose veins. RESPIRATORY: The patient denies chronic cough, wheezing or night sweats. GASTROINTESTINAL: The patient denies decreased appetite, nausea, vomiting, diarrhea, constipation, blood in the stools. GENITOURINARY: The patient denies difficult urination, pain or burning with urination, blood in the urine. MUSCULOSKELETAL: The patient denies arm, thigh or calf cramps. No joint or muscle pain. No muscle weakness or tenderness. No joint swelling, neck pain, back pain or major orthopedic injuries. SKIN AND BREASTS: see hpi  The patient denies easy bruising, skin redness, skin rash, hives. NEUROLOGIC: The patient denies headache, dizziness, fainting, memory loss. PSYCHIATRIC: The patient denies depression anxiety.   ENDOCRINE: The patient denies intolerance to hot or cold temperature, flushing, fingernail changes, increased thirst, increased salt intake or decreased sexual desire. HEMATOLOGIC/LYMPHATIC: The patient denies anemia, bleeding tendency or clotting tendency. ALLERGIC/IMMUNOLOGIC: The patient denies rhinitis, asthma, skin sensitivity, latex allergies or sensitivity.       Historical Information   Past Medical History:   Diagnosis Date   • Diabetes mellitus ([place] [facility])      Past Surgical History:   Procedure Laterality Date   • CYSTOSCOPY N/A 7/4/2023    Procedure: CYSTOSCOPY and suprapubic tube exchange;  Surgeon: Ricardo Andrews MD;  [facility];  Service: Urology   • EXAMINATION UNDER ANESTHESIA N/A 7/4/2023    Procedure: EUA, testicular fiation;  Surgeon: Ricardo Andrews MD;  [facility];  Service: Urology   • INCISION AND DRAINAGE OF WOUND N/A 6/28/2023    Procedure: 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 ;  Surgeon: Felicia Bustamante MD;  [facility];  Service: Urology   • INCISION AND DRAINAGE OF WOUND N/A 6/28/2023    Procedure: 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;  Surgeon: Nomi Toth MD;  [facility];  Service: General   • OR CYSTOSTOMY CYSTOTOMY W/DRAINAGE N/A 7/1/2023    Procedure: CYSTOSCOPY; CYSTOSTOMY SUPRAPUBIC OPEN;  Surgeon: Ricardo Andrews MD;  [facility];  Service: Urology   • OR LAPS ABD PRTM&OMENTUM DX W/WO [facility] BR/WA SPX N/A 7/4/2023    Procedure: LAPAROSCOPY DIAGNOSTIC WITH CREATION OF OSTOMY;  Surgeon: Campos Reese MD;  [facility];  Service: General   • ROTATOR CUFF REPAIR     • SPLIT THICKNESS SKIN GRAFT N/A 7/13/2023    Procedure: SKIN GRAFT SPLIT THICKNESS (STSG)  TRUNK;  Surgeon: Supriya Guadarrama MD;  [facility];  Service: Plastics   • VAC DRESSING APPLICATION N/A 6/1/3852    Procedure: APPLICATION VAC DRESSING;  Surgeon: Campos Reese MD;  [facility];  Service: General   • VAC DRESSING APPLICATION N/A 7/12/2023    Procedure: APPLICATION VAC DRESSING;  Surgeon: Nani Don MD;  [facility];  Service: General   • WOUND DEBRIDEMENT N/A 6/29/2023    Procedure: DEBRIDEMENT WOUND [facility] OUT); Surgeon: Jace Guzman MD;  [facility];  Service: General   • WOUND DEBRIDEMENT N/A 7/2/2023    Procedure: DEBRIDEMENT PERINEAL WOUND AND DRESSING CHANGE [facility] OUT); Surgeon: Fabienne Lester MD;  [facility];  Service: General   • WOUND DEBRIDEMENT N/A 7/1/2023    Procedure: DEBRIDEMENT WOUND AND DRESSING CHANGE [facility] OUT); Surgeon: Nani Don MD;  [facility];  Service: General   • WOUND DEBRIDEMENT N/A 6/30/2023    Procedure: DEBRIDEMENT WOUND [facility] OUT); Surgeon: Nani Don MD;  [facility];  Service: General   • WOUND DEBRIDEMENT N/A 7/4/2023    Procedure: DEBRIDEMENT OF PERINEAL WOUND ([address]); Surgeon: Fabienne Lester MD;  [facility];  Service: General   • WOUND DEBRIDEMENT N/A 7/6/2023    Procedure: DEBRIDEMENT WOUND, [address], AND WOUND VAC CHANGE;  Surgeon: Akshat Rizvi DO;  [facility];  Service: General   • WOUND DEBRIDEMENT N/A 7/8/2023    Procedure: DEBRIDEMENT WOUND AND DRESSING CHANGE, VAC change ([address] OUT);   Surgeon: Marty Crowley DO;  [facility];  Service: General   • WOUND DEBRIDEMENT N/A 7/10/2023    Procedure: [address] OUT AND DEBRIDEMENT OF PERINEUM WOUND, DRESSING CHANGE, PARTIAL CLOSURE;  Surgeon: Nani Don MD;  [facility];  Service: General   • WOUND DEBRIDEMENT N/A 7/12/2023    Procedure: DEBRIDEMENT WOUND [facility] OUT), partial closure;  Surgeon: Nani Don MD;  [facility];  Service: General   • WOUND DEBRIDEMENT N/A 7/13/2023    Procedure: Jesus Kaminski  ([facility]) & vac;  Surgeon: Clifford Hinkle MD;  [facility];  Service: Plastics     Social History   Social History     Substance and Sexual Activity   Alcohol Use Yes    Comment: socially Social History     Substance and Sexual Activity   Drug Use Never     Social History     Tobacco Use   Smoking Status Never   Smokeless Tobacco Never     Family History: History reviewed. No pertinent family history. Meds/Allergies   No current facility-administered medications for this encounter. Current Outpatient Medications:   •  acetaminophen (TYLENOL) 325 mg tablet, Take 2 tablets (650 mg total) by mouth every 6 (six) hours as needed for mild pain, Disp: , Rfl: 0  •  atorvastatin (LIPITOR) 20 mg tablet, , Disp: , Rfl:   •  cholecalciferol (VITAMIN D3) 1,000 units tablet, Take 1,000 Units by mouth daily, Disp: , Rfl:   •  dulaglutide (Trulicity) 9.31 NB/9.7CQ injection, Inject 0.5 mL (0.75 mg total) under the skin every 7 days, Disp: 6 mL, Rfl: 1  •  lisinopril (ZESTRIL) 2.5 mg tablet, Take 1 tablet (2.5 mg total) by mouth daily, Disp: 90 tablet, Rfl: 1  •  metFORMIN (GLUCOPHAGE) 500 mg tablet, Take 1 tablet (500 mg total) by mouth daily with breakfast, Disp: 90 tablet, Rfl: 1  •  Omega-3 Fatty Acids (fish oil) 1,000 mg, Take 1,000 mg by mouth daily, Disp: , Rfl:   •  vitamin B-12 (VITAMIN B-12) 500 mcg tablet, Take 500 mcg by mouth daily, Disp: , Rfl:   •  aspirin (ECOTRIN LOW STRENGTH) 81 mg EC tablet, Take 81 mg by mouth daily (Patient not taking: Reported on 8/15/2023), Disp: , Rfl:   •  Blood Glucose Monitoring Suppl (OneTouch Verio Reflect) w/Device KIT, Check blood sugars once daily. Please substitute with appropriate alternative as covered by patient's insurance. Dx: E11.65, Disp: 1 kit, Rfl: 0  •  glucose blood (OneTouch Verio) test strip, Check blood sugars once daily. Please substitute with appropriate alternative as covered by patient's insurance. Dx: E11.65, Disp: 100 each, Rfl: 3  •  OneTouch Delica Lancets 20C MISC, Check blood sugars once daily. Please substitute with appropriate alternative as covered by patient's insurance.  Dx: E11.65, Disp: 100 each, Rfl: 3      Objective General appearance: alert and oriented, in no acute distress  Head: Normocephalic, without obvious abnormality, atraumatic  Eyes: negative  Lungs: clear to auscultation bilaterally  Heart: regular rate and rhythm  Abdomen: normal findings: soft, non-tender  Male genitalia: Anterior scrotum skin graft is well integrated. Extremities: extremities normal, warm and well-perfused; no cyanosis, clubbing, or edema  Neurologic: Grossly normal    Lab Results:   Lab Results   Component Value Date    WBC 8.67 08/07/2023    HGB 11.7 (L) 08/07/2023    HCT 38.1 08/07/2023     (H) 08/07/2023     08/07/2023          Lab Results   Component Value Date/Time    TISSUECULT (A) 06/29/2023 09:24 AM     1+ Growth of - Actinomyces radingae - Actinomyces species    TISSUECULT Few [place] of 06/29/2023 09:24 AM         Imaging Studies:   No results found.     EKG, Pathology, and Other Studies:   No results found for: "FINALDX"    No intake or output data in the 24 hours ending 08/16/23 1918    Invasive Devices     Drain  Duration           Ileostomy Loop LUQ 43 days    Suprapubic Catheter 18 Fr. 43 days    Suprapubic Catheter 14 days                VTE Prophylaxis: Sequential compression device (Venodyne)

## 2023-08-17 ENCOUNTER — HOSPITAL ENCOUNTER (OUTPATIENT)
Facility: HOSPITAL | Age: 63
Setting detail: OUTPATIENT SURGERY
Discharge: HOME | End: 2023-08-18
Attending: STUDENT IN AN ORGANIZED HEALTH CARE EDUCATION/TRAINING PROGRAM | Admitting: STUDENT IN AN ORGANIZED HEALTH CARE EDUCATION/TRAINING PROGRAM
Payer: MEDICARE

## 2023-08-17 ENCOUNTER — ANESTHESIA (OUTPATIENT)
Dept: PERIOP | Facility: HOSPITAL | Age: 63
End: 2023-08-17
Payer: MEDICARE

## 2023-08-17 DIAGNOSIS — N36.9 URETHRAL DISORDER: ICD-10-CM

## 2023-08-17 DIAGNOSIS — N49.3 FOURNIER'S GANGRENE: Primary | ICD-10-CM

## 2023-08-17 LAB
GLUCOSE SERPL-MCNC: 147 MG/DL (ref 65–140)
GLUCOSE SERPL-MCNC: 174 MG/DL (ref 65–140)
GLUCOSE SERPL-MCNC: 209 MG/DL (ref 65–140)
GLUCOSE SERPL-MCNC: 422 MG/DL (ref 65–140)

## 2023-08-17 PROCEDURE — 15005 WND PREP F/N/HF/G ADDL CM: CPT | Performed by: PHYSICIAN ASSISTANT

## 2023-08-17 PROCEDURE — 15120 SPLT AGRFT F/S/N/H/F/G/M 1ST: CPT | Performed by: STUDENT IN AN ORGANIZED HEALTH CARE EDUCATION/TRAINING PROGRAM

## 2023-08-17 PROCEDURE — 15004 WOUND PREP F/N/HF/G: CPT | Performed by: PHYSICIAN ASSISTANT

## 2023-08-17 PROCEDURE — 15121 SPLT AGRFT F/S/N/H/F/G/M EA: CPT | Performed by: PHYSICIAN ASSISTANT

## 2023-08-17 PROCEDURE — 15120 SPLT AGRFT F/S/N/H/F/G/M 1ST: CPT | Performed by: PHYSICIAN ASSISTANT

## 2023-08-17 PROCEDURE — 82948 REAGENT STRIP/BLOOD GLUCOSE: CPT

## 2023-08-17 PROCEDURE — 15004 WOUND PREP F/N/HF/G: CPT | Performed by: STUDENT IN AN ORGANIZED HEALTH CARE EDUCATION/TRAINING PROGRAM

## 2023-08-17 PROCEDURE — 15121 SPLT AGRFT F/S/N/H/F/G/M EA: CPT | Performed by: STUDENT IN AN ORGANIZED HEALTH CARE EDUCATION/TRAINING PROGRAM

## 2023-08-17 PROCEDURE — 15005 WND PREP F/N/HF/G ADDL CM: CPT | Performed by: STUDENT IN AN ORGANIZED HEALTH CARE EDUCATION/TRAINING PROGRAM

## 2023-08-17 RX ORDER — HYDROMORPHONE HCL/PF 1 MG/ML
0.5 SYRINGE (ML) INJECTION EVERY 4 HOURS PRN
Status: DISCONTINUED | OUTPATIENT
Start: 2023-08-17 | End: 2023-08-18 | Stop reason: HOSPADM

## 2023-08-17 RX ORDER — FENTANYL CITRATE 50 UG/ML
INJECTION, SOLUTION INTRAMUSCULAR; INTRAVENOUS AS NEEDED
Status: DISCONTINUED | OUTPATIENT
Start: 2023-08-17 | End: 2023-08-17

## 2023-08-17 RX ORDER — ATORVASTATIN CALCIUM 20 MG/1
20 TABLET, FILM COATED ORAL
Status: DISCONTINUED | OUTPATIENT
Start: 2023-08-17 | End: 2023-08-18 | Stop reason: HOSPADM

## 2023-08-17 RX ORDER — CEFAZOLIN SODIUM 1 G/50ML
1000 SOLUTION INTRAVENOUS ONCE
Status: COMPLETED | OUTPATIENT
Start: 2023-08-17 | End: 2023-08-17

## 2023-08-17 RX ORDER — ONDANSETRON 2 MG/ML
4 INJECTION INTRAMUSCULAR; INTRAVENOUS EVERY 6 HOURS PRN
Status: DISCONTINUED | OUTPATIENT
Start: 2023-08-17 | End: 2023-08-18 | Stop reason: HOSPADM

## 2023-08-17 RX ORDER — DEXAMETHASONE SODIUM PHOSPHATE 10 MG/ML
INJECTION, SOLUTION INTRAMUSCULAR; INTRAVENOUS AS NEEDED
Status: DISCONTINUED | OUTPATIENT
Start: 2023-08-17 | End: 2023-08-17

## 2023-08-17 RX ORDER — ONDANSETRON 2 MG/ML
INJECTION INTRAMUSCULAR; INTRAVENOUS AS NEEDED
Status: DISCONTINUED | OUTPATIENT
Start: 2023-08-17 | End: 2023-08-17

## 2023-08-17 RX ORDER — FENTANYL CITRATE/PF 50 MCG/ML
50 SYRINGE (ML) INJECTION
Status: DISCONTINUED | OUTPATIENT
Start: 2023-08-17 | End: 2023-08-17 | Stop reason: HOSPADM

## 2023-08-17 RX ORDER — ENOXAPARIN SODIUM 100 MG/ML
40 INJECTION SUBCUTANEOUS DAILY
Status: DISCONTINUED | OUTPATIENT
Start: 2023-08-18 | End: 2023-08-18 | Stop reason: HOSPADM

## 2023-08-17 RX ORDER — GABAPENTIN 300 MG/1
300 CAPSULE ORAL 3 TIMES DAILY
Status: DISCONTINUED | OUTPATIENT
Start: 2023-08-17 | End: 2023-08-18 | Stop reason: HOSPADM

## 2023-08-17 RX ORDER — ACETAMINOPHEN 325 MG/1
975 TABLET ORAL ONCE
Status: COMPLETED | OUTPATIENT
Start: 2023-08-17 | End: 2023-08-17

## 2023-08-17 RX ORDER — LIDOCAINE HYDROCHLORIDE AND EPINEPHRINE 10; 10 MG/ML; UG/ML
INJECTION, SOLUTION INFILTRATION; PERINEURAL AS NEEDED
Status: DISCONTINUED | OUTPATIENT
Start: 2023-08-17 | End: 2023-08-17 | Stop reason: HOSPADM

## 2023-08-17 RX ORDER — LIDOCAINE HYDROCHLORIDE 10 MG/ML
INJECTION, SOLUTION EPIDURAL; INFILTRATION; INTRACAUDAL; PERINEURAL AS NEEDED
Status: DISCONTINUED | OUTPATIENT
Start: 2023-08-17 | End: 2023-08-17

## 2023-08-17 RX ORDER — MINERAL OIL
OIL (ML) MISCELLANEOUS AS NEEDED
Status: DISCONTINUED | OUTPATIENT
Start: 2023-08-17 | End: 2023-08-17 | Stop reason: HOSPADM

## 2023-08-17 RX ORDER — CEFAZOLIN SODIUM 1 G/50ML
1000 SOLUTION INTRAVENOUS EVERY 8 HOURS
Status: COMPLETED | OUTPATIENT
Start: 2023-08-17 | End: 2023-08-18

## 2023-08-17 RX ORDER — METOCLOPRAMIDE HYDROCHLORIDE 5 MG/ML
5 INJECTION INTRAMUSCULAR; INTRAVENOUS ONCE AS NEEDED
Status: DISCONTINUED | OUTPATIENT
Start: 2023-08-17 | End: 2023-08-17 | Stop reason: HOSPADM

## 2023-08-17 RX ORDER — MELATONIN
1000 DAILY
Status: DISCONTINUED | OUTPATIENT
Start: 2023-08-17 | End: 2023-08-18 | Stop reason: HOSPADM

## 2023-08-17 RX ORDER — OXYCODONE HYDROCHLORIDE 10 MG/1
10 TABLET ORAL EVERY 4 HOURS PRN
Status: DISCONTINUED | OUTPATIENT
Start: 2023-08-17 | End: 2023-08-18 | Stop reason: HOSPADM

## 2023-08-17 RX ORDER — SODIUM CHLORIDE, SODIUM LACTATE, POTASSIUM CHLORIDE, CALCIUM CHLORIDE 600; 310; 30; 20 MG/100ML; MG/100ML; MG/100ML; MG/100ML
125 INJECTION, SOLUTION INTRAVENOUS CONTINUOUS
Status: DISCONTINUED | OUTPATIENT
Start: 2023-08-17 | End: 2023-08-18 | Stop reason: HOSPADM

## 2023-08-17 RX ORDER — DIPHENHYDRAMINE HYDROCHLORIDE 50 MG/ML
25 INJECTION INTRAMUSCULAR; INTRAVENOUS EVERY 6 HOURS PRN
Status: DISCONTINUED | OUTPATIENT
Start: 2023-08-17 | End: 2023-08-18 | Stop reason: HOSPADM

## 2023-08-17 RX ORDER — ONDANSETRON 2 MG/ML
4 INJECTION INTRAMUSCULAR; INTRAVENOUS ONCE AS NEEDED
Status: DISCONTINUED | OUTPATIENT
Start: 2023-08-17 | End: 2023-08-17 | Stop reason: HOSPADM

## 2023-08-17 RX ORDER — PROPOFOL 10 MG/ML
INJECTION, EMULSION INTRAVENOUS AS NEEDED
Status: DISCONTINUED | OUTPATIENT
Start: 2023-08-17 | End: 2023-08-17

## 2023-08-17 RX ORDER — MAGNESIUM HYDROXIDE 1200 MG/15ML
LIQUID ORAL AS NEEDED
Status: DISCONTINUED | OUTPATIENT
Start: 2023-08-17 | End: 2023-08-17 | Stop reason: HOSPADM

## 2023-08-17 RX ORDER — MIDAZOLAM HYDROCHLORIDE 2 MG/2ML
INJECTION, SOLUTION INTRAMUSCULAR; INTRAVENOUS AS NEEDED
Status: DISCONTINUED | OUTPATIENT
Start: 2023-08-17 | End: 2023-08-17

## 2023-08-17 RX ORDER — LISINOPRIL 2.5 MG/1
2.5 TABLET ORAL DAILY
Status: DISCONTINUED | OUTPATIENT
Start: 2023-08-17 | End: 2023-08-18 | Stop reason: HOSPADM

## 2023-08-17 RX ORDER — CHLORAL HYDRATE 500 MG
1000 CAPSULE ORAL DAILY
Status: DISCONTINUED | OUTPATIENT
Start: 2023-08-17 | End: 2023-08-18 | Stop reason: HOSPADM

## 2023-08-17 RX ORDER — ACETAMINOPHEN 325 MG/1
650 TABLET ORAL EVERY 6 HOURS PRN
Status: DISCONTINUED | OUTPATIENT
Start: 2023-08-17 | End: 2023-08-18 | Stop reason: HOSPADM

## 2023-08-17 RX ORDER — HYDROMORPHONE HCL/PF 1 MG/ML
0.5 SYRINGE (ML) INJECTION
Status: DISCONTINUED | OUTPATIENT
Start: 2023-08-17 | End: 2023-08-17 | Stop reason: HOSPADM

## 2023-08-17 RX ORDER — OXYCODONE HYDROCHLORIDE 5 MG/1
5 TABLET ORAL EVERY 4 HOURS PRN
Status: DISCONTINUED | OUTPATIENT
Start: 2023-08-17 | End: 2023-08-18 | Stop reason: HOSPADM

## 2023-08-17 RX ORDER — GABAPENTIN 300 MG/1
300 CAPSULE ORAL ONCE
Status: COMPLETED | OUTPATIENT
Start: 2023-08-17 | End: 2023-08-17

## 2023-08-17 RX ADMIN — GABAPENTIN 300 MG: 300 CAPSULE ORAL at 11:48

## 2023-08-17 RX ADMIN — ATORVASTATIN CALCIUM 20 MG: 20 TABLET, FILM COATED ORAL at 16:34

## 2023-08-17 RX ADMIN — CYANOCOBALAMIN TAB 500 MCG 500 MCG: 500 TAB at 16:37

## 2023-08-17 RX ADMIN — CEFAZOLIN SODIUM 2000 MG: 1 SOLUTION INTRAVENOUS at 12:35

## 2023-08-17 RX ADMIN — Medication 1000 UNITS: at 16:34

## 2023-08-17 RX ADMIN — ASPIRIN 81 MG: 81 TABLET, COATED ORAL at 16:37

## 2023-08-17 RX ADMIN — SODIUM CHLORIDE, SODIUM LACTATE, POTASSIUM CHLORIDE, AND CALCIUM CHLORIDE: .6; .31; .03; .02 INJECTION, SOLUTION INTRAVENOUS at 13:03

## 2023-08-17 RX ADMIN — CEFAZOLIN SODIUM 1000 MG: 1 SOLUTION INTRAVENOUS at 22:02

## 2023-08-17 RX ADMIN — GABAPENTIN 300 MG: 300 CAPSULE ORAL at 16:34

## 2023-08-17 RX ADMIN — FENTANYL CITRATE 25 MCG: 50 INJECTION, SOLUTION INTRAMUSCULAR; INTRAVENOUS at 14:31

## 2023-08-17 RX ADMIN — ONDANSETRON 4 MG: 2 INJECTION INTRAMUSCULAR; INTRAVENOUS at 12:44

## 2023-08-17 RX ADMIN — OMEGA-3 FATTY ACIDS CAP 1000 MG 1000 MG: 1000 CAP at 16:34

## 2023-08-17 RX ADMIN — GABAPENTIN 300 MG: 300 CAPSULE ORAL at 22:02

## 2023-08-17 RX ADMIN — PROPOFOL 200 MG: 10 INJECTION, EMULSION INTRAVENOUS at 12:39

## 2023-08-17 RX ADMIN — FENTANYL CITRATE 50 MCG: 50 INJECTION, SOLUTION INTRAMUSCULAR; INTRAVENOUS at 13:50

## 2023-08-17 RX ADMIN — ACETAMINOPHEN 975 MG: 325 TABLET, FILM COATED ORAL at 11:47

## 2023-08-17 RX ADMIN — LIDOCAINE HYDROCHLORIDE 50 MG: 10 INJECTION, SOLUTION EPIDURAL; INFILTRATION; INTRACAUDAL; PERINEURAL at 12:40

## 2023-08-17 RX ADMIN — DEXAMETHASONE SODIUM PHOSPHATE 10 MG: 10 INJECTION, SOLUTION INTRAMUSCULAR; INTRAVENOUS at 12:44

## 2023-08-17 RX ADMIN — FENTANYL CITRATE 25 MCG: 50 INJECTION, SOLUTION INTRAMUSCULAR; INTRAVENOUS at 12:39

## 2023-08-17 RX ADMIN — SODIUM CHLORIDE, SODIUM LACTATE, POTASSIUM CHLORIDE, AND CALCIUM CHLORIDE: .6; .31; .03; .02 INJECTION, SOLUTION INTRAVENOUS at 11:44

## 2023-08-17 RX ADMIN — MIDAZOLAM HYDROCHLORIDE 2 MG: 1 INJECTION, SOLUTION INTRAMUSCULAR; INTRAVENOUS at 12:32

## 2023-08-17 NOTE — ANESTHESIA POSTPROCEDURE EVALUATION
Post-Op Assessment Note    CV Status:  Stable  Pain Score: 0    Pain management: adequate     Mental Status:  Sleepy   Hydration Status:  Euvolemic   PONV Controlled:  Controlled   Airway Patency:  Patent      Post Op Vitals Reviewed: Yes      Staff: CRNA   Comments: vss sv nonobstructed uneventful        No notable events documented.     BP   129/60   Temp 98.0   Pulse 83   Resp 24   SpO2 99

## 2023-08-17 NOTE — CASE MANAGEMENT
Case Management Discharge Planning Note    Patient name Addie Doll S /S -52 MRN [id number]  : 1960 Date 2023       Current Admission Date: 2023  Current Admission Diagnosis:Rohan disease   Patient Active Problem List    Diagnosis Date Noted   • History of creation of ostomy ([address]) 2023   • Low hemoglobin and low hematocrit 2023   • Mixed hyperlipidemia 2023   • Moderate protein malnutrition ([address]) 2023   • Suprapubic catheter ([address]) 2023   • Urethral disorder 2023   • Hyperglycemia 2023   • DM (diabetes mellitus) ([address]) 2023   • Lactic acidosis 2023   • HTN (hypertension) 2023   • Rohan's gangrene 2023      LOS (days): 0  Geometric Mean LOS (GMLOS) (days):   Days to GMLOS:     OBJECTIVE:            Current admission status: Outpatient Surgery   Preferred Pharmacy:   [facility] [address] [place] [address]  Phone: [phone] Fax: [phone]    Primary Care Provider: DAMIAN Bone    Primary Insurance: MEDICARE  Secondary Insurance: BLUE CROSS    DISCHARGE DETAILS:    Discharge planning discussed with[de-identified] pt and his wife Bassam Hu of Choice: Yes  Comments - Freedom of Choice: Wound VAC and [address]    Contacts  Patient Contacts: Paula Verdin, wife  Relationship to Patient[de-identified] Family  Contact Method: In Person  Reason/Outcome: Continuity of Care, Emergency Contact, Discharge Planning    Other Referral/Resources/Interventions Provided:  Interventions: HHC, Wound Vac  Referral Comments: CM notified by surgery that a wound vac is needed for d/c and HHC. Plastics AP to complete VAC paperwork and email to CM. CM met with pt and his wife, Deb Alejandra. They confirmed their address is: [address] [place] [address] in [place]. They are ok with [address] again as they had them last admission. Referral in [place].

## 2023-08-17 NOTE — ANESTHESIA PREPROCEDURE EVALUATION
Procedure:  WOUND WASHOUT, DEBRIDEMENT, STSG OF PERINEAL WOUND (Penis)  STSG OF PERINEAL WOUND (Penis)    Relevant Problems   CARDIO   (+) HTN (hypertension)   (+) Mixed hyperlipidemia      Endocrine   (+) DM (diabetes mellitus) ([address])      Other   (+) History of creation of ostomy [facility])        Physical Exam    Airway    Mallampati score: I  TM Distance: >3 FB  Neck ROM: full     Dental       Cardiovascular  Cardiovascular exam normal    Pulmonary  Pulmonary exam normal     Other Findings        Anesthesia Plan  ASA Score- 3     Anesthesia Type- general with ASA Monitors. Additional Monitors:   Airway Plan: LMA. Plan Factors-Exercise tolerance (METS): >4 METS. Chart reviewed. EKG reviewed. Imaging results reviewed. Existing labs reviewed. Patient summary reviewed. Patient is not a current smoker. Patient did not smoke on day of surgery. Obstructive sleep apnea risk education given perioperatively. Induction- intravenous. Postoperative Plan- Plan for postoperative opioid use. Planned trial extubation    Informed Consent- Anesthetic plan and risks discussed with patient. I personally reviewed this patient with the CRNA. Discussed and agreed on the Anesthesia Plan with the CRNA. Kathryn Alexander

## 2023-08-17 NOTE — OP NOTE
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 Surgical preparation of genital and perineal wound (17x6 cm, total 102 cm2)  2. Split-thickness skin graft to genital and perineal wound (17x6 cm, total 102 cm2)  3. VAC to perineal wound (17x6 cm)      Specimen(s):  * No specimens in log *    Estimated Blood Loss:   Minimal    Drains:  Ileostomy Loop LUQ (Active)   Number of days: 44       Suprapubic Catheter 18 Fr. 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 After undergoing general anesthesia, a timeout was performed at which point all patient identifiers were deemed to be correct. The perineum and genital areas were prepped and draped in the normal sterile fashion. I first began by surgically debriding the fibrinous debris using curettage from the entire wound base. There was no sign of infection and well vascularized tissue at wound base after debridement. I then proceeded with harvesting a split-thickness skin graft from right thigh using norbert dermatome at 12/1000th of an inch, meshed 2:1, and secured to the genital and perineal defect with 3-0 chromic. Xeroform was applied over the skin graft followed by [facility] with good suction, no leak.  The right thigh dressing was placed with megan, optilock, and tegederm, followed by ace-wrap on his right thigh. This concluded the procedure. Patient tolerated the procedure well without complications. At the end of the case, all sponge, needle, and instrument counts were correct. Patient was awakened from anesthesia and taken to the PACU in stable condition.     I was present for the entire procedure, A qualified resident physician was not available and A physician assistant was required during the procedure for retraction, tissue handling, dissection and suturing        Patient Disposition:  PACU         SIGNATURE: Blanca Norton MD  DATE: August 17, 2023  TIME: 2:31 PM

## 2023-08-17 NOTE — PLAN OF CARE
Problem: MOBILITY - ADULT  Goal: Maintain or return to baseline ADL function  Description: INTERVENTIONS:  -  Assess patient's ability to carry out ADLs; assess patient's baseline for ADL function and identify physical deficits which impact ability to perform ADLs (bathing, care of mouth/teeth, toileting, grooming, dressing, etc.)  - Assess/evaluate cause of self-care deficits   - Assess range of motion  - Assess patient's mobility; develop plan if impaired  - Assess patient's need for assistive devices and provide as appropriate  - Encourage maximum independence but intervene and supervise when necessary  - Involve family in performance of ADLs  - Assess for home care needs following discharge   - Consider OT consult to assist with ADL evaluation and planning for discharge  - Provide patient education as appropriate  Outcome: Progressing  Goal: Maintains/Returns to pre admission functional level  Description: INTERVENTIONS:  - Perform BMAT or MOVE assessment daily.   - Set and communicate daily mobility goal to care team and patient/family/caregiver.    - Collaborate with rehabilitation services on mobility goals if consulted  - Out of bed for toileting  - Record patient progress and toleration of activity level   Outcome: Progressing     Problem: PAIN - ADULT  Goal: Verbalizes/displays adequate comfort level or baseline comfort level  Description: Interventions:  - Encourage patient to monitor pain and request assistance  - Assess pain using appropriate pain scale  - Administer analgesics based on type and severity of pain and evaluate response  - Implement non-pharmacological measures as appropriate and evaluate response  - Consider cultural and social influences on pain and pain management  - Notify physician/advanced practitioner if interventions unsuccessful or patient reports new pain  Outcome: Progressing     Problem: INFECTION - ADULT  Goal: Absence or prevention of progression during hospitalization  Description: INTERVENTIONS:  - Assess and monitor for signs and symptoms of infection  - Monitor lab/diagnostic results  - Monitor all insertion sites, i.e. indwelling lines, tubes, and drains  - Monitor endotracheal if appropriate and nasal secretions for changes in amount and color  - [place] appropriate cooling/warming therapies per order  - Administer medications as ordered  - Instruct and encourage patient and family to use good hand hygiene technique  - Identify and instruct in appropriate isolation precautions for identified infection/condition  Outcome: Progressing  Goal: Absence of fever/infection during neutropenic period  Description: INTERVENTIONS:  - Monitor WBC    Outcome: Progressing     Problem: SAFETY ADULT  Goal: Maintain or return to baseline ADL function  Description: INTERVENTIONS:  -  Assess patient's ability to carry out ADLs; assess patient's baseline for ADL function and identify physical deficits which impact ability to perform ADLs (bathing, care of mouth/teeth, toileting, grooming, dressing, etc.)  - Assess/evaluate cause of self-care deficits   - Assess range of motion  - Assess patient's mobility; develop plan if impaired  - Assess patient's need for assistive devices and provide as appropriate  - Encourage maximum independence but intervene and supervise when necessary  - Involve family in performance of ADLs  - Assess for home care needs following discharge   - Consider OT consult to assist with ADL evaluation and planning for discharge  - Provide patient education as appropriate  Outcome: Progressing  Goal: Maintains/Returns to pre admission functional level  Description: INTERVENTIONS:  - Perform BMAT or MOVE assessment daily.   - Set and communicate daily mobility goal to care team and patient/family/caregiver.    - Collaborate with rehabilitation services on mobility goals if consulted  - Out of bed for toileting  - Record patient progress and toleration of activity level   Outcome: Progressing  Goal: Patient will remain free of falls  Description: INTERVENTIONS:  - Educate patient/family on patient safety including physical limitations  - Instruct patient to call for assistance with activity   - Consult OT/PT to assist with strengthening/mobility   - Keep Call bell within reach  - Keep bed low and locked with side rails adjusted as appropriate  - Keep care items and personal belongings within reach  - Initiate and maintain comfort rounds  - Make Fall Risk Sign visible to staff  - Apply yellow socks and bracelet for high fall risk patients  - Consider moving patient to room near nurses station  Outcome: Progressing     Problem: DISCHARGE PLANNING  Goal: Discharge to home or other facility with appropriate resources  Description: INTERVENTIONS:  - Identify barriers to discharge w/patient and caregiver  - Arrange for needed discharge resources and transportation as appropriate  - Identify discharge learning needs (meds, wound care, etc.)  - Arrange for interpretive services to assist at discharge as needed  - Refer to Case Management Department for coordinating discharge planning if the patient needs post-hospital services based on physician/advanced practitioner order or complex needs related to functional status, cognitive ability, or social support system  Outcome: Progressing     Problem: Knowledge Deficit  Goal: Patient/family/caregiver demonstrates understanding of disease process, treatment plan, medications, and discharge instructions  Description: Complete learning assessment and assess knowledge base.   Interventions:  - Provide teaching at level of understanding  - Provide teaching via preferred learning methods  Outcome: Progressing

## 2023-08-18 VITALS
HEART RATE: 77 BPM | HEIGHT: 73 IN | TEMPERATURE: 98.1 F | OXYGEN SATURATION: 94 % | DIASTOLIC BLOOD PRESSURE: 63 MMHG | SYSTOLIC BLOOD PRESSURE: 106 MMHG | BODY MASS INDEX: 23.86 KG/M2 | RESPIRATION RATE: 18 BRPM | WEIGHT: 180 LBS

## 2023-08-18 LAB
ANION GAP SERPL CALCULATED.3IONS-SCNC: 6 MMOL/L
BUN SERPL-MCNC: 11 MG/DL (ref 5–25)
CALCIUM SERPL-MCNC: 9.1 MG/DL (ref 8.4–10.2)
CHLORIDE SERPL-SCNC: 104 MMOL/L (ref 96–108)
CO2 SERPL-SCNC: 26 MMOL/L (ref 21–32)
CREAT SERPL-MCNC: 0.51 MG/DL (ref 0.6–1.3)
ERYTHROCYTE [DISTWIDTH] IN BLOOD BY AUTOMATED COUNT: 13.1 % (ref 11.6–15.1)
GFR SERPL CREATININE-BSD FRML MDRD: 112 ML/MIN/1.73SQ M
GLUCOSE SERPL-MCNC: 234 MG/DL (ref 65–140)
GLUCOSE SERPL-MCNC: 237 MG/DL (ref 65–140)
GLUCOSE SERPL-MCNC: 247 MG/DL (ref 65–140)
HCT VFR BLD AUTO: 34.1 % (ref 36.5–49.3)
HGB BLD-MCNC: 10.7 G/DL (ref 12–17)
MAGNESIUM SERPL-MCNC: 1.8 MG/DL (ref 1.9–2.7)
MCH RBC QN AUTO: 30.4 PG (ref 26.8–34.3)
MCHC RBC AUTO-ENTMCNC: 31.4 G/DL (ref 31.4–37.4)
MCV RBC AUTO: 97 FL (ref 82–98)
PLATELET # BLD AUTO: 278 THOUSANDS/UL (ref 149–390)
PMV BLD AUTO: 9 FL (ref 8.9–12.7)
POTASSIUM SERPL-SCNC: 4.2 MMOL/L (ref 3.5–5.3)
RBC # BLD AUTO: 3.52 MILLION/UL (ref 3.88–5.62)
SODIUM SERPL-SCNC: 136 MMOL/L (ref 135–147)
WBC # BLD AUTO: 11.68 THOUSAND/UL (ref 4.31–10.16)

## 2023-08-18 PROCEDURE — 80048 BASIC METABOLIC PNL TOTAL CA: CPT | Performed by: STUDENT IN AN ORGANIZED HEALTH CARE EDUCATION/TRAINING PROGRAM

## 2023-08-18 PROCEDURE — 82948 REAGENT STRIP/BLOOD GLUCOSE: CPT

## 2023-08-18 PROCEDURE — 99024 POSTOP FOLLOW-UP VISIT: CPT | Performed by: PHYSICIAN ASSISTANT

## 2023-08-18 PROCEDURE — 83735 ASSAY OF MAGNESIUM: CPT | Performed by: STUDENT IN AN ORGANIZED HEALTH CARE EDUCATION/TRAINING PROGRAM

## 2023-08-18 PROCEDURE — NC001 PR NO CHARGE: Performed by: UROLOGY

## 2023-08-18 PROCEDURE — 85027 COMPLETE CBC AUTOMATED: CPT | Performed by: STUDENT IN AN ORGANIZED HEALTH CARE EDUCATION/TRAINING PROGRAM

## 2023-08-18 RX ORDER — AMOXICILLIN AND CLAVULANATE POTASSIUM 875; 125 MG/1; MG/1
1 TABLET, FILM COATED ORAL EVERY 12 HOURS SCHEDULED
Qty: 14 TABLET | Refills: 0 | Status: SHIPPED | OUTPATIENT
Start: 2023-08-18 | End: 2023-08-25

## 2023-08-18 RX ADMIN — Medication 1000 UNITS: at 08:24

## 2023-08-18 RX ADMIN — ATORVASTATIN CALCIUM 20 MG: 20 TABLET, FILM COATED ORAL at 18:01

## 2023-08-18 RX ADMIN — CEFAZOLIN SODIUM 1000 MG: 1 SOLUTION INTRAVENOUS at 04:10

## 2023-08-18 RX ADMIN — ENOXAPARIN SODIUM 40 MG: 40 INJECTION SUBCUTANEOUS at 08:24

## 2023-08-18 RX ADMIN — GABAPENTIN 300 MG: 300 CAPSULE ORAL at 18:01

## 2023-08-18 RX ADMIN — SODIUM CHLORIDE, SODIUM LACTATE, POTASSIUM CHLORIDE, AND CALCIUM CHLORIDE 125 ML/HR: .6; .31; .03; .02 INJECTION, SOLUTION INTRAVENOUS at 03:31

## 2023-08-18 RX ADMIN — CYANOCOBALAMIN TAB 500 MCG 500 MCG: 500 TAB at 08:24

## 2023-08-18 RX ADMIN — OMEGA-3 FATTY ACIDS CAP 1000 MG 1000 MG: 1000 CAP at 08:24

## 2023-08-18 RX ADMIN — ASPIRIN 81 MG: 81 TABLET, COATED ORAL at 08:25

## 2023-08-18 RX ADMIN — METFORMIN HYDROCHLORIDE 500 MG: 500 TABLET ORAL at 08:24

## 2023-08-18 RX ADMIN — GABAPENTIN 300 MG: 300 CAPSULE ORAL at 08:24

## 2023-08-18 NOTE — PROGRESS NOTES
Progress Note - Plastic Surgery   [place] Dinning 70 y.o. male MRN: [id number]  Unit/Bed#: S -01 Encounter: [id number]    Assessment:  Patient is a 70 y.o. male who presented with Rohan disease, now POD1 surgical preparation of genital and perineal wound and split-thickness skin graft to genital and perineal wound with VAC placement. Plan:  - Remain bedrest, keep legs closed. - home vac ordered  - ok for discharge home today if home vac available. Subjective/Objective     Subjective:   No acute events overnight. Objective:    Blood pressure 103/67, pulse 74, temperature 98.2 °F (36.8 °C), resp. rate 18, height 6' 1" (1.854 m), weight 81.6 kg (180 lb), SpO2 97 %. ,Body mass index is 23.75 kg/m². Intake/Output Summary (Last 24 hours) at 8/18/2023 1125  Last data filed at 8/18/2023 1019  Gross per 24 hour   Intake 2000 ml   Output 1600 ml   Net 400 ml       Invasive Devices     Peripheral Intravenous Line  Duration           Peripheral IV 08/17/23 Left Arm <1 day          Drain  Duration           Suprapubic Catheter 18 Fr. 45 days    Ileostomy Loop LUQ 44 days    Suprapubic Catheter 16 days                Physical Exam:   Gen:  Well-developed, well-nourished male in NAD  CV: RRR  Lungs: Normal respiratory effort  Abd: soft, nontender, nondistended  Neuro:  AxO x3  Vac dressing intact and functioning      Results from last 7 days   Lab Units 08/18/23  0507   WBC Thousand/uL 11.68*   HEMOGLOBIN g/dL 10.7*   HEMATOCRIT % 34.1*   PLATELETS Thousands/uL 278     Results from last 7 days   Lab Units 08/18/23  0507   POTASSIUM mmol/L 4.2   CHLORIDE mmol/L 104   CO2 mmol/L 26   BUN mg/dL 11   CREATININE mg/dL 0.51*   CALCIUM mg/dL 9.1

## 2023-08-18 NOTE — DISCHARGE INSTR - AVS FIRST PAGE
Strict bed rest.    Please take prescribed antibiotics until they are complete. Please see  in the office in 1 week. Please do not remove or adjust wound vac until seen, if you have any issues with the vac please call 's office.

## 2023-08-18 NOTE — CASE MANAGEMENT
Case Management Discharge Planning Note    Patient name [facility] S /S -60 MRN [id number]  : 1960 Date 2023       Current Admission Date: 2023  Current Admission Diagnosis:Rohan's gangrene   Patient Active Problem List    Diagnosis Date Noted   • History of creation of ostomy ([address]) 2023   • Low hemoglobin and low hematocrit 2023   • Mixed hyperlipidemia 2023   • Moderate protein malnutrition ([address]) 2023   • Suprapubic catheter ([address]) 2023   • Urethral disorder 2023   • Hyperglycemia 2023   • DM (diabetes mellitus) ([address]) 2023   • Lactic acidosis 2023   • HTN (hypertension) 2023   • Rohan's gangrene 2023      LOS (days): 0  Geometric Mean LOS (GMLOS) (days):   Days to GMLOS:     OBJECTIVE:            Current admission status: Outpatient Surgery   [facility]erred Pharmacy:   [facility] [address], [address]  [place]  [address] [address]  Phone: [phone] Fax: [address] [place] [address]  Phone: [phone] Fax: [phone]    Primary Care Provider: DAMIAN Duarte    Primary Insurance: MEDICARE  Secondary Insurance: BLUE CROSS    DISCHARGE DETAILS:    Discharge planning discussed with[de-identified] Patient and wife, Layman Maze of Choice: Yes  Comments - Freedom of Choice: CM reviewed discharge plans. Patient will not require HHC as he will not require wound vac changes. Patient aware his wound vac will be kept in place for one week and he'll need to follow up with plastics within a week.   CM contacted family/caregiver?: Yes  Were Treatment Team discharge recommendations reviewed with patient/caregiver?: Yes  Did patient/caregiver verbalize understanding of patient care needs?: Yes  Were patient/caregiver advised of the risks associated with not following Treatment Team discharge recommendations?: Yes    Contacts  Patient Contacts: Jose Camacho, wife  Relationship to Patient[de-identified] Family  Contact Method: In Person  Reason/Outcome: Discharge [address]         Is the patient interested in [facility] at discharge?: No    DME Referral Provided  Referral made for DME?: Yes  DME referral completed for the following items[de-identified] Other ([employer] wound vac)  DME Supplier Name[de-identified] Other (Add supplier name in comments) ([employer] wound vac)    Other Referral/Resources/Interventions Provided:  Referral Comments: CM reviewed discharge plans. Patient will not require HHC as he will not require wound vac changes. Patient aware his wound vac will be kept in place for one week and he'll need to follow up with plastics within a week. Would you like to participate in our [facility] service program?  : No - Declined    Treatment Team Recommendation: Home  Discharge Destination Plan[de-identified] Home  Transport at Discharge : Family           ETA of Transport (Date): 08/18/23  ETA of Transport (Time): 1930     Transfer Mode: Wheelchair  Accompanied by: Family member     IMM Given (Date):: 08/18/23  IMM Given to[de-identified] Patient   . Bud Portillo IMM reviewed with patient, patient agrees with discharge determination. LUIS has been working with surgery team on [employer] form throughout the day. The form required revision late this afternoon which was completed by Rodríguez Khoury for approval. Cm waiting to hear back if approved so that the resident can attach the vac and send patient home. Cm will have nurse contact the surgical resident once approved.

## 2023-08-18 NOTE — CONSULTS
UROLOGY CONSULTATION NOTE     Patient Identifiers: Rhett Le (MRN [id number])  Service Requesting Consultation: Plastic surgery  Service Providing Consultation:  Urology, Angie Griffin PA-C    Date of Service: 8/18/2023    Reason for Consultation: Suprapubic tube    History of Present Illness:     Rhett Le is a 70 y.o. 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 Patient's initial debridement was 6/28/2023. On subsequent washout, patient had a suprapubic tube placed 7/1/2023. Patient's suprapubic tube was then exchanged in the office 8/2/23 in the office, [address] [place] [address]. Patient underwent skin graft with plastic surgery yesterday. VAC placed on perineal wound. Patient has reportedly been having leakage around suprapubic tube and through penis. This happened 1-2 times. No significant pelvic cramping. No dysuria, gross hematuria.  SPT still draining    Past Medical, Past Surgical History:     Past Medical History:   Diagnosis Date   • Diabetes mellitus ([address])    :    Past Surgical History:   Procedure Laterality Date   • CYSTOSCOPY N/A 7/4/2023    Procedure: CYSTOSCOPY and suprapubic tube exchange;  Surgeon: Kevin Carver MD;  [facility];  Service: Urology   • EXAMINATION UNDER ANESTHESIA N/A 7/4/2023    Procedure: EUA, testicular fiation;  Surgeon: Kevin Carver MD;  [facility];  Service: Urology   • INCISION AND DRAINAGE OF WOUND N/A 6/28/2023    Procedure: 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 ;  Surgeon: Ning Juan MD;  [facility];  Service: Urology   • INCISION AND DRAINAGE OF WOUND N/A 6/28/2023    Procedure: 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;  Surgeon: Berta Schuler MD;  [facility];  Service: General   • IN CYSTOSTOMY CYSTOTOMY W/DRAINAGE N/A 7/1/2023    Procedure: CYSTOSCOPY; CYSTOSTOMY SUPRAPUBIC OPEN;  Surgeon: Hillary Mcpherson MD;  [facility];  Service: Urology   • AZ LAPS ABD PRTM&OMENTUM DX W/WO SPEC BR/WA SPX N/A 7/4/2023    Procedure: LAPAROSCOPY DIAGNOSTIC WITH CREATION OF OSTOMY;  Surgeon: Jay Mccoy MD;  [facility];  Service: General   • ROTATOR CUFF REPAIR     • SPLIT THICKNESS SKIN GRAFT N/A 7/13/2023    Procedure: SKIN GRAFT SPLIT THICKNESS (STSG)  TRUNK;  Surgeon: Orville Gregg MD;  [facility];  Service: Plastics   • VAC DRESSING APPLICATION N/A 5/9/8085    Procedure: APPLICATION VAC DRESSING;  Surgeon: aJy Mccoy MD;  [facility];  Service: General   • VAC DRESSING APPLICATION N/A 9/78/4476    Procedure: APPLICATION VAC DRESSING;  Surgeon: Fernando Marte MD;  [facility];  Service: General   • WOUND DEBRIDEMENT N/A 6/29/2023    Procedure: DEBRIDEMENT WOUND [facility] OUT); Surgeon: Masoud Olmedo MD;  [facility];  Service: General   • WOUND DEBRIDEMENT N/A 7/2/2023    Procedure: DEBRIDEMENT PERINEAL WOUND AND DRESSING CHANGE [facility] OUT); Surgeon: Jay Mccoy MD;  [facility];  Service: General   • WOUND DEBRIDEMENT N/A 7/1/2023    Procedure: DEBRIDEMENT WOUND AND DRESSING CHANGE [facility] OUT); Surgeon: Fernando Marte MD;  [facility];  Service: General   • WOUND DEBRIDEMENT N/A 6/30/2023    Procedure: DEBRIDEMENT WOUND [facility] OUT); Surgeon: Fernando Marte MD;  [facility];  Service: General   • WOUND DEBRIDEMENT N/A 7/4/2023    Procedure: DEBRIDEMENT OF PERINEAL WOUND ([address] OUT); Surgeon: Jay Mccoy MD;  [facility];  Service: General   • WOUND DEBRIDEMENT N/A 7/6/2023    Procedure: DEBRIDEMENT WOUND, [address] OUT, AND WOUND VAC CHANGE;  Surgeon: Stiven Rizvi DO;  [facility];  Service: General   • WOUND DEBRIDEMENT N/A 7/8/2023    Procedure: DEBRIDEMENT WOUND AND DRESSING CHANGE, VAC change ([address] OUT);   Surgeon: Jesse Cisneros DO;  [facility]; Service: General   • WOUND DEBRIDEMENT N/A 7/10/2023    Procedure: [facility] OUT AND DEBRIDEMENT OF PERINEUM WOUND, DRESSING CHANGE, PARTIAL CLOSURE;  Surgeon: Liz Ta MD;  [facility];  Service: General   • WOUND DEBRIDEMENT N/A 7/12/2023    Procedure: DEBRIDEMENT WOUND ([facility]), partial closure;  Surgeon: Liz Ta MD;  [facility];  Service: General   • WOUND DEBRIDEMENT N/A 7/13/2023    Procedure: [address]  ([facility]) & vac;  Surgeon: Adrian Clemons MD;  [facility];  Service: Plastics   :    Medications, Allergies:     Current Facility-Administered Medications:   •  acetaminophen (TYLENOL) tablet 650 mg, 650 mg, Oral, Q6H PRN, Adrian Clemons MD  •  aspirin (ECOTRIN LOW STRENGTH) EC tablet 81 mg, 81 mg, Oral, Daily, Adrian Clemons MD, 81 mg at 08/18/23 0825  •  atorvastatin (LIPITOR) tablet 20 mg, 20 mg, Oral, Daily With Larissa Cannon MD, 20 mg at 08/17/23 1634  •  cholecalciferol (VITAMIN D3) tablet 1,000 Units, 1,000 Units, Oral, Daily, Adrian Clemons MD, 1,000 Units at 08/18/23 0885  •  cyanocobalamin (VITAMIN B-12) tablet 500 mcg, 500 mcg, Oral, Daily, Adrian Clemons MD, 500 mcg at 08/18/23 9725  •  diphenhydrAMINE (BENADRYL) injection 25 mg, 25 mg, Intravenous, Q6H PRN, Adrian Clemons MD  •  dulaglutide (Trulicity) injection 5.57 mg, 0.75 mg, Subcutaneous, Q7 Days, Adrian Clemons MD  •  enoxaparin (LOVENOX) subcutaneous injection 40 mg, 40 mg, Subcutaneous, Daily, Adrian Clemons MD, 40 mg at 08/18/23 6954  •  fish oil capsule 1,000 mg, 1,000 mg, Oral, Daily, Adrian Clemons MD, 1,000 mg at 08/18/23 2308  •  gabapentin (NEURONTIN) capsule 300 mg, 300 mg, Oral, TID, [place] Kaci, MD, 300 mg at 08/18/23 1694  •  HYDROmorphone (DILAUDID) injection 0.5 mg, 0.5 mg, Intravenous, Q4H PRN, Adrian Clemons MD  •  lactated ringers infusion, 125 mL/hr, Intravenous, Continuous, Adrian Clemons MD, Last Rate: 125 mL/hr at 08/18/23 0331, 125 mL/hr at 08/18/23 0331  •  lactated ringers infusion, 125 mL/hr, Intravenous, Continuous, Timothy Grier CRNA  •  lisinopril (ZESTRIL) tablet 2.5 mg, 2.5 mg, Oral, Daily, Stephenie Hunter MD  •  metFORMIN (GLUCOPHAGE) tablet 500 mg, 500 mg, Oral, Daily With Breakfast, Stephenie Hunter MD, 500 mg at 08/18/23 3011  •  ondansetron (ZOFRAN) injection 4 mg, 4 mg, Intravenous, Q6H PRN, Stephenie Hunter MD  •  oxyCODONE (ROXICODONE) immediate release tablet 10 mg, 10 mg, Oral, Q4H PRN, Stephenie Hunter MD  •  oxyCODONE (ROXICODONE) IR tablet 5 mg, 5 mg, Oral, Q4H PRN, Stephenie Hunter MD    Allergies:  No Known Allergies:    Social and Family History:   Social History:   Social History     Tobacco Use   • Smoking status: Never   • Smokeless tobacco: Never   Vaping Use   • Vaping Use: Every day   • Substances: Nicotine   Substance Use Topics   • Alcohol use: Yes     Comment: socially   • Drug use: Never   . Social History     Tobacco Use   Smoking Status Never   Smokeless Tobacco Never       Family History:  History reviewed. No pertinent family history.:     Review of Systems:     General: Fever, chills, or night sweats: negative  Cardiac: Negative for chest pain. Pulmonary: Negative for shortness of breath. Gastrointestinal: Abdominal pain negative. Nausea, vomiting, or diarrhea negative,  Genitourinary: See HPI above. Patient does not have hematuria. All other systems queried were negative. Physical Exam:   General: Patient is pleasant and in NAD. Awake and alert  /67   Pulse 74   Temp 98.2 °F (36.8 °C)   Resp 18   Ht 6' 1" (1.854 m)   Wt 81.6 kg (180 lb)   SpO2 97%   BMI 23.75 kg/m²   Cardiac: Peripheral edema: negative  Pulmonary: Non-labored breathing  Abdomen: Soft, non-tender, non-distended. Genitourinary: SPT in place draining clear yellow urine  (Male): Penis circumcised, phallus normal, meatus patent.     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 Please do not hesitate to call with any additional questions.   Christina Kaminski PA-C

## 2023-08-18 NOTE — CASE MANAGEMENT
Case Management Discharge Planning Note    Patient name Eyad Doll S /S -11 MRN [id number]  : 1960 Date 2023       Current Admission Date: 2023  Current Admission Diagnosis:Rohan's gangrene   Patient Active Problem List    Diagnosis Date Noted   • History of creation of ostomy ([address]) 2023   • Low hemoglobin and low hematocrit 2023   • Mixed hyperlipidemia 2023   • Moderate protein malnutrition ([address]) 2023   • Suprapubic catheter ([address]) 2023   • Urethral disorder 2023   • Hyperglycemia 2023   • DM (diabetes mellitus) ([address]) 2023   • Lactic acidosis 2023   • HTN (hypertension) 2023   • Rohan's gangrene 2023      LOS (days): 0  Geometric Mean LOS (GMLOS) (days):   Days to GMLOS:     OBJECTIVE:            Current admission status: Outpatient Surgery   Preferred Pharmacy:   [facility] [address], [address]  [place]  [address] [address]  Phone: [phone] Fax: [address] ([place] [address]  Phone: [phone] Fax: [phone]    Primary Care Provider: DAMIAN Massey    Primary Insurance: MEDICARE  Secondary Insurance: BLUE CROSS    DISCHARGE DETAILS:     Wound vac was approved by [facility] and it was placed by surgical resident. Patient signed I consents and it was emailed to [employer], Rep for [facility] [employer]. Patient's wife at bedside and will transport patient home - nurse aware.

## 2023-08-18 NOTE — CASE MANAGEMENT
[facility] has received request for [employer] wound vac from Care Manager. Request submitted via [employer] website.    Pending order #: [id number]

## 2023-08-20 ENCOUNTER — NURSE TRIAGE (OUTPATIENT)
Dept: OTHER | Facility: OTHER | Age: 63
End: 2023-08-20

## 2023-08-20 NOTE — TELEPHONE ENCOUNTER
Reason for Disposition  • [1] Caller has URGENT question AND [2] triager unable to answer question    Answer Assessment - Initial Assessment Questions  1. SYMPTOM: "What's the main symptom you're concerned about?" (e.g., pain, fever, vomiting)      Wound vacuum fell out     2. ONSET: "When did problem  start?"      Around 1845 wound vacuum fell out while patient was sitting in his recliner    3. SURGERY: "What surgery was performed?"      WOUND WASHOUT, DEBRIDEMENT, STSG OF PERINEAL WOUND (Penis)       STSG OF PERINEAL WOUND (Penis)    4. DATE of SURGERY: "When was surgery performed?"       8/17/23    5. ANESTHESIA: " What type of anesthesia did you have?" (e.g., general, spinal, epidural, local)      General     6. PAIN: "Is there any pain?" If Yes, ask: "How bad is it?"  (Scale 1-10; or mild, moderate, severe)      Pain is controlled    7. FEVER: "Do you have a fever?" If Yes, ask: "What is your temperature, how was it measured, and when did it start?"      Denies     8. VOMITING: "Is there any vomiting?" If yes, ask: "How many times?"      Denies     9. BLEEDING: "Is there any bleeding?" If Yes, ask: "How much?" and "Where?"      Denies     10.  OTHER SYMPTOMS: "Do you have any other symptoms?" (e.g., drainage from wound, painful urination, constipation)        Bandage around the area is loose    Protocols used: POST-OP SYMPTOMS AND QUESTIONS-[facility]

## 2023-08-20 NOTE — TELEPHONE ENCOUNTER
Patient's wife calling in this evening reporting that the patient's wound vacuum fell out around 1845 while the patient was just sitting in his recliner. She stated it also looks the bandage around the area is loose. She stated the patient has been very careful with wound vacuum is unsure how or why it came out. She denies any other symptoms or problems but is unsure if he needs to be seen in the emergency department tonight. On call provider contacted, stated he would call the patient directly.

## 2023-08-20 NOTE — TELEPHONE ENCOUNTER
Regarding: Issue after surgery  ----- Message from Luiza Orozco sent at 8/20/2023  6:55 PM EDT -----  " My  had surgery by doctor blanca. But the wound is no longer tied to him "

## 2023-08-21 ENCOUNTER — OFFICE VISIT (OUTPATIENT)
Dept: PLASTIC SURGERY | Facility: CLINIC | Age: 63
End: 2023-08-21

## 2023-08-21 DIAGNOSIS — N49.3 FOURNIER'S GANGRENE: Primary | ICD-10-CM

## 2023-08-21 NOTE — PROGRESS NOTES
Assessment/Plan:     Diagnoses and all orders for this visit:    Rohan's gangrene  See HPI. Pt is s/p split thickness skin graft x 2, most recent on 8/17. The tubing to the sponge was accidentally ripped off over the weekend. Dressings & vac removed. Graft does not appear viable. VAC was reapplied. When he is sitting able to maintain 125mmHg, however when he stands it drops to 0. Hopefully once he gets home & is sitting or laying down it will continue to function. We will see him back in 1 week or sooner if needed. Subjective:      Patient ID: Shelby Haskins is a 70 y.o. male. HPI     Patient is here for postop visit. He presented to the emergency room on 6/28 with a gluteal abscess. 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 He underwent numerous washout procedures and ultimately underwent split thickness skin graft from the right thigh to the perineal region with [facility] placement on 7/13 with Dr. Ricky Navarrete.  Patient also has diverting loop colostomy and suprapubic catheter. He returned to the OR because of graft failure on 8/17 with Dr. Ricky Navarrete & again had a split thickness skin graft & VAC placed. Pt called over the weekend b/c the tubing attached to sponge ripped off.        Patient Active Problem List   Diagnosis   • Rohan's gangrene   • DM (diabetes mellitus) ([address])   • Lactic acidosis   • HTN (hypertension)   • Suprapubic catheter (HCC)   • Urethral disorder   • Hyperglycemia   • History of creation of ostomy ([address])   • Low hemoglobin and low hematocrit   • Mixed hyperlipidemia   • Moderate protein malnutrition (HCC)     No Known Allergies  Current Outpatient Medications on File Prior to Visit   Medication Sig   • acetaminophen (TYLENOL) 325 mg tablet Take 2 tablets (650 mg total) by mouth every 6 (six) hours as needed for mild pain   • amoxicillin-clavulanate (AUGMENTIN) 875-125 mg per tablet Take 1 tablet by mouth every 12 (twelve) hours for 7 days   • aspirin (ECOTRIN LOW STRENGTH) 81 mg EC tablet Take 81 mg by mouth daily   • atorvastatin (LIPITOR) 20 mg tablet    • Blood Glucose Monitoring Suppl (OneTouch Verio Reflect) w/Device KIT Check blood sugars once daily. Please substitute with appropriate alternative as covered by patient's insurance. Dx: E11.65   • cholecalciferol (VITAMIN D3) 1,000 units tablet Take 1,000 Units by mouth daily   • dulaglutide (Trulicity) 2.52 LF/2.3XZ injection Inject 0.5 mL (0.75 mg total) under the skin every 7 days   • glucose blood (OneTouch Verio) test strip Check blood sugars once daily. Please substitute with appropriate alternative as covered by patient's insurance. Dx: E11.65   • lisinopril (ZESTRIL) 2.5 mg tablet Take 1 tablet (2.5 mg total) by mouth daily   • metFORMIN (GLUCOPHAGE) 500 mg tablet Take 1 tablet (500 mg total) by mouth daily with breakfast   • Omega-3 Fatty Acids (fish oil) 1,000 mg Take 1,000 mg by mouth daily   • OneTouch Delica Lancets 60X MISC Check blood sugars once daily. Please substitute with appropriate alternative as covered by patient's insurance. Dx: E11.65   • vitamin B-12 (VITAMIN B-12) 500 mcg tablet Take 500 mcg by mouth daily     No current facility-administered medications on file prior to visit. No family history on file.   Past Medical History:   Diagnosis Date   • Diabetes mellitus ([address])      Social History     Socioeconomic History   • Marital status: /Civil Union     Spouse name: Not on file   • Number of children: Not on file   • Years of education: Not on file   • Highest education level: Not on file   Occupational History   • Not on file   Tobacco Use   • Smoking status: Never   • Smokeless tobacco: Never   Vaping Use   • Vaping Use: Every day   • Substances: Nicotine   Substance and Sexual Activity   • Alcohol use: Yes     Comment: socially   • Drug use: Never   • Sexual activity: Not on file   Other Topics Concern   • Not on file   Social History Narrative   • Not on file     Social Determinants of Health Financial Resource Strain: Not on file   Food Insecurity: No Food Insecurity (6/30/2023)    Hunger Vital Sign    • Worried About Running Out of Food in the Last Year: Never true    • Ran Out of Food in the Last Year: Never true   Transportation Needs: No Transportation Needs (6/30/2023)    PRAPARE - Transportation    • Lack of Transportation (Medical): No    • Lack of Transportation (Non-Medical):  No   Physical Activity: Not on file   Stress: Not on file   Social Connections: Not on file   Intimate Partner Violence: Not on file   Housing Stability: Unknown (6/30/2023)    Housing Stability Vital Sign    • Unable to Pay for Housing in the Last Year: No    • Number of Places Lived in the Last Year: Not on file    • Unstable Housing in the Last Year: No     Past Surgical History:   Procedure Laterality Date   • CYSTOSCOPY N/A 7/4/2023    Procedure: CYSTOSCOPY and suprapubic tube exchange;  Surgeon: Hulen Cooks, MD;  [facility];  Service: Urology   • EXAMINATION UNDER ANESTHESIA N/A 7/4/2023    Procedure: EUA, testicular fiation;  Surgeon: Hulen Cooks, MD;  [facility];  Service: Urology   • INCISION AND DRAINAGE OF WOUND N/A 6/28/2023    Procedure: 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 ;  Surgeon: Kathy Stoddard MD;  [facility];  Service: Urology   • INCISION AND DRAINAGE OF WOUND N/A 6/28/2023    Procedure: 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;  Surgeon: Leanna Rodriguez MD;  [facility];  Service: General   • IA CYSTOSTOMY CYSTOTOMY W/DRAINAGE N/A 7/1/2023    Procedure: CYSTOSCOPY; CYSTOSTOMY SUPRAPUBIC OPEN;  Surgeon: Hulen Cooks, MD;  [facility];  Service: Urology   • IA DEBRIDEMENT OPEN WOUND 20 SQ CM/< N/A 8/17/2023    Procedure: WOUND WASHOUT, DEBRIDEMENT, STSG OF PERINEAL WOUND;  Surgeon: Lela Casas MD;  [facility];  Service: Plastics   • IA LAPS ABD PRTM&OMENTUM DX W/WO SPEC BR/WA SPX N/A 7/4/2023    Procedure: LAPAROSCOPY DIAGNOSTIC WITH CREATION OF OSTOMY;  Surgeon: Chase Patel MD;  [facility];  Service: General   • ROTATOR CUFF REPAIR     • SPLIT THICKNESS SKIN GRAFT N/A 7/13/2023    Procedure: SKIN GRAFT SPLIT THICKNESS (STSG)  TRUNK;  Surgeon: Aiden Vann MD;  [facility];  Service: Plastics   • SPLIT THICKNESS SKIN GRAFT N/A 8/17/2023    Procedure: STSG OF PERINEAL WOUND;  Surgeon: Aiden Vann MD;  [facility];  Service: Plastics   • VAC DRESSING APPLICATION N/A 9/6/1159    Procedure: APPLICATION VAC DRESSING;  Surgeon: Chase Patel MD;  [facility];  Service: General   • VAC DRESSING APPLICATION N/A 5/92/7247    Procedure: APPLICATION VAC DRESSING;  Surgeon: Edilberto Carlisle MD;  [facility];  Service: General   • WOUND DEBRIDEMENT N/A 6/29/2023    Procedure: DEBRIDEMENT WOUND [facility] OUT); Surgeon: Yoandy Lugo MD;  [facility];  Service: General   • WOUND DEBRIDEMENT N/A 7/2/2023    Procedure: DEBRIDEMENT PERINEAL WOUND AND DRESSING CHANGE [facility] OUT); Surgeon: Chase Patel MD;  [facility];  Service: General   • WOUND DEBRIDEMENT N/A 7/1/2023    Procedure: DEBRIDEMENT WOUND AND DRESSING CHANGE [facility] OUT); Surgeon: Edilberto Carlisle MD;  [facility];  Service: General   • WOUND DEBRIDEMENT N/A 6/30/2023    Procedure: DEBRIDEMENT WOUND [facility] OUT); Surgeon: Edilberto Carlisle MD;  [facility];  Service: General   • WOUND DEBRIDEMENT N/A 7/4/2023    Procedure: DEBRIDEMENT OF PERINEAL WOUND ([address]); Surgeon: Chase Patel MD;  [facility];  Service: General   • WOUND DEBRIDEMENT N/A 7/6/2023    Procedure: DEBRIDEMENT WOUND, [address], AND WOUND VAC CHANGE;  Surgeon: Dale Rizvi DO;  [facility];  Service: General   • WOUND DEBRIDEMENT N/A 7/8/2023    Procedure: DEBRIDEMENT WOUND AND DRESSING CHANGE, VAC change ([address] OUT);   Surgeon: Abdelrahman Mayorga DO; [facility];  Service: General   • WOUND DEBRIDEMENT N/A 7/10/2023    Procedure: [address] AND DEBRIDEMENT OF PERINEUM WOUND, DRESSING CHANGE, PARTIAL CLOSURE;  Surgeon: Priya Walter MD;  [facility];  Service: General   • WOUND DEBRIDEMENT N/A 7/12/2023    Procedure: DEBRIDEMENT WOUND [facility] OUT), partial closure;  Surgeon: Priya Walter MD;  [facility];  Service: General   • WOUND DEBRIDEMENT N/A 7/13/2023    Procedure: Murl Sirmaria  ([address]) & vac;  Surgeon: Marco Diehl MD;  [facility];  Service: Plastics         Review of Systems   All other systems reviewed and are negative. Objective: There were no vitals taken for this visit. Physical Exam  Constitutional:       Appearance: Normal appearance. He is well-developed. HENT:      Head: Normocephalic and atraumatic. Eyes:      Conjunctiva/sclera: Conjunctivae normal.   Pulmonary:      Effort: Pulmonary effort is normal.   Genitourinary:     Comments: Graft failure, see picture in media; adaptic was placed over the wound & vac was reapplied. Musculoskeletal:         General: Normal range of motion. Cervical back: Normal range of motion. Skin:     General: Skin is warm and dry. Neurological:      Mental Status: He is alert and oriented to person, place, and time.    Psychiatric:         Mood and Affect: Mood normal.         Behavior: Behavior normal.

## 2023-08-28 ENCOUNTER — RA CDI HCC (OUTPATIENT)
Dept: OTHER | Facility: HOSPITAL | Age: 63
End: 2023-08-28

## 2023-09-18 ENCOUNTER — PROCEDURE VISIT (OUTPATIENT)
Dept: UROLOGY | Facility: CLINIC | Age: 63
End: 2023-09-18
Payer: MEDICARE

## 2023-09-18 ENCOUNTER — OFFICE VISIT (OUTPATIENT)
Dept: PLASTIC SURGERY | Facility: CLINIC | Age: 63
End: 2023-09-18

## 2023-09-18 DIAGNOSIS — N49.3 FOURNIER'S GANGRENE: Primary | ICD-10-CM

## 2023-09-18 DIAGNOSIS — Z93.59 SUPRAPUBIC CATHETER (HCC): Primary | ICD-10-CM

## 2023-09-18 PROCEDURE — 99211 OFF/OP EST MAY X REQ PHY/QHP: CPT

## 2023-09-18 PROCEDURE — 99024 POSTOP FOLLOW-UP VISIT: CPT | Performed by: STUDENT IN AN ORGANIZED HEALTH CARE EDUCATION/TRAINING PROGRAM

## 2023-09-18 NOTE — PROGRESS NOTES
Patient originally scheduled for SPT exchange today. Patient came into appointment earlier than scheduled after meeting with plastic surgeon this morning. Per Dr. Pete Dominguez he did not see the continued need for the SPT as long as urology signs off. Reviewed with provider in office. We cannot remove SPT without a capping trial.     Educated patient and wife on capping trial and provided SPT diary, cap and graduate to measure urine. Patient will come back on Thursday with diary and then will determine if SPT can be removed VS exchanged.

## 2023-09-18 NOTE — PROGRESS NOTES
PRS Note    Patient presents for wound check after failed STSG to perineal region on 8/17/23. In brief, patient had fourniers gangrene and after debridements underwent initial STSG over testicles and perineal region with 70% take. Remaining wound was subsequently skin grafted on 8/17/23 but with VAC failure, complete failure of the skin graft occurred. Since that time the wound has healed secondarily. Upon evaluation, he has small areas of granulation tissue at the junction of base of penis to testicles and within perineal region. These areas are considerably smaller than before and have healed well secondarily. He has minimal pain from the wounds. His biggest complaint and issue is with suprapubic catheter. At this time, I do not see continued need for suprapubic catheter and can be removed if urology is okay to remove. I instructed patient that colostomy can be reversed when wounds are completely healed. No surgical intervention at this time. Wound will likely heal in secondarily. Instructed to dress open areas with gauze TID to wick away moisture. Will follow-up in 6 weeks for wound check. Instructed to follow-up sooner if needed.     Ludwig Garner MD   [facility] Plastic and Reconstructive Surgery   [facility] Steven Weinberg   Office: [phone]

## 2023-09-21 ENCOUNTER — PROCEDURE VISIT (OUTPATIENT)
Dept: UROLOGY | Facility: CLINIC | Age: 63
End: 2023-09-21
Payer: MEDICARE

## 2023-09-21 DIAGNOSIS — Z93.59 SUPRAPUBIC CATHETER (HCC): Primary | ICD-10-CM

## 2023-09-21 PROCEDURE — 99211 OFF/OP EST MAY X REQ PHY/QHP: CPT

## 2023-09-21 NOTE — PROGRESS NOTES
9/21/2023  Julián Beasley is a 70 y.o. male  [id number]    Diagnosis:    Chief Complaint    neurogenic bladder         Patient presents for SPT capping trial history of Rohan's Gangrene of scrotum and perineum   managed by our office    Plan:  -follow up in 3 months with AP     History:  Patient with history of Rohan's Gangrene of scrotum and perineum. SPT placed and was to be maintained until healed. Assessment:    Edd Burkett reviewed SPT capping diary. Fausto Jones agreeable to have SPT removed at this visit. Diary scanned in under media. Procedure:    10mL sterile water drained from ramírez balloon. SPT removed without difficulty. Placed 4x4 with Band-Aid over site. Reviewed healing process with patient and wife. Patient verbalized understanding.         Amairani Majano LPN

## 2024-01-13 DIAGNOSIS — E11.628 TYPE 2 DIABETES MELLITUS WITH OTHER SKIN COMPLICATION, WITHOUT LONG-TERM CURRENT USE OF INSULIN (HCC): ICD-10-CM

## 2024-01-13 DIAGNOSIS — Z76.89 ENCOUNTER TO ESTABLISH CARE: ICD-10-CM

## 2024-01-15 RX ORDER — DULAGLUTIDE 0.75 MG/.5ML
0.75 INJECTION, SOLUTION SUBCUTANEOUS
Qty: 2 ML | Refills: 1 | Status: SHIPPED | OUTPATIENT
Start: 2024-01-15

## 2024-01-19 DIAGNOSIS — E11.628 TYPE 2 DIABETES MELLITUS WITH OTHER SKIN COMPLICATION, WITHOUT LONG-TERM CURRENT USE OF INSULIN (HCC): ICD-10-CM

## 2024-01-19 DIAGNOSIS — Z76.89 ENCOUNTER TO ESTABLISH CARE: ICD-10-CM

## 2024-02-05 ENCOUNTER — HOSPITAL ENCOUNTER (EMERGENCY)
Facility: HOSPITAL | Age: 64
Discharge: HOME | End: 2024-02-05
Attending: EMERGENCY MEDICINE
Payer: MEDICARE

## 2024-02-05 ENCOUNTER — TELEPHONE (OUTPATIENT)
Age: 64
End: 2024-02-05

## 2024-02-05 ENCOUNTER — APPOINTMENT (EMERGENCY)
Dept: CT IMAGING | Facility: HOSPITAL | Age: 64
End: 2024-02-05
Payer: MEDICARE

## 2024-02-05 VITALS
RESPIRATION RATE: 18 BRPM | OXYGEN SATURATION: 96 % | SYSTOLIC BLOOD PRESSURE: 112 MMHG | DIASTOLIC BLOOD PRESSURE: 61 MMHG | HEART RATE: 81 BPM | TEMPERATURE: 98.8 F

## 2024-02-05 DIAGNOSIS — E27.8 ADRENAL NODULE (HCC): ICD-10-CM

## 2024-02-05 DIAGNOSIS — R53.1 GENERALIZED WEAKNESS: ICD-10-CM

## 2024-02-05 DIAGNOSIS — R50.9 FEVER: ICD-10-CM

## 2024-02-05 DIAGNOSIS — R53.83 FATIGUE: ICD-10-CM

## 2024-02-05 DIAGNOSIS — N39.0 UTI (URINARY TRACT INFECTION): Primary | ICD-10-CM

## 2024-02-05 DIAGNOSIS — R11.0 NAUSEA: ICD-10-CM

## 2024-02-05 LAB
ALBUMIN SERPL BCP-MCNC: 4.3 G/DL (ref 3.5–5)
ALP SERPL-CCNC: 76 U/L (ref 34–104)
ALT SERPL W P-5'-P-CCNC: 17 U/L (ref 7–52)
ANION GAP SERPL CALCULATED.3IONS-SCNC: 9 MMOL/L
APTT PPP: 27 SECONDS (ref 23–37)
AST SERPL W P-5'-P-CCNC: 17 U/L (ref 13–39)
BACTERIA UR QL AUTO: ABNORMAL /HPF
BASOPHILS # BLD AUTO: 0.03 THOUSANDS/ÂΜL (ref 0–0.1)
BASOPHILS NFR BLD AUTO: 0 % (ref 0–1)
BILIRUB SERPL-MCNC: 0.63 MG/DL (ref 0.2–1)
BILIRUB UR QL STRIP: NEGATIVE
BUN SERPL-MCNC: 13 MG/DL (ref 5–25)
CALCIUM SERPL-MCNC: 10.1 MG/DL (ref 8.4–10.2)
CHLORIDE SERPL-SCNC: 99 MMOL/L (ref 96–108)
CLARITY UR: ABNORMAL
CO2 SERPL-SCNC: 23 MMOL/L (ref 21–32)
COLOR UR: YELLOW
CREAT SERPL-MCNC: 0.89 MG/DL (ref 0.6–1.3)
EOSINOPHIL # BLD AUTO: 0 THOUSAND/ÂΜL (ref 0–0.61)
EOSINOPHIL NFR BLD AUTO: 0 % (ref 0–6)
ERYTHROCYTE [DISTWIDTH] IN BLOOD BY AUTOMATED COUNT: 12.6 % (ref 11.6–15.1)
FLUAV RNA RESP QL NAA+PROBE: NEGATIVE
FLUBV RNA RESP QL NAA+PROBE: NEGATIVE
GFR SERPL CREATININE-BSD FRML MDRD: 89 ML/MIN/1.73SQ M
GLUCOSE SERPL-MCNC: 220 MG/DL (ref 65–140)
GLUCOSE UR STRIP-MCNC: ABNORMAL MG/DL
HCT VFR BLD AUTO: 44.8 % (ref 36.5–49.3)
HGB BLD-MCNC: 15.5 G/DL (ref 12–17)
HGB UR QL STRIP.AUTO: ABNORMAL
IMM GRANULOCYTES # BLD AUTO: 0.04 THOUSAND/UL (ref 0–0.2)
IMM GRANULOCYTES NFR BLD AUTO: 0 % (ref 0–2)
INR PPP: 0.87 (ref 0.84–1.19)
KETONES UR STRIP-MCNC: NEGATIVE MG/DL
LACTATE SERPL-SCNC: 1.4 MMOL/L (ref 0.5–2)
LEUKOCYTE ESTERASE UR QL STRIP: ABNORMAL
LYMPHOCYTES # BLD AUTO: 1.46 THOUSANDS/ÂΜL (ref 0.6–4.47)
LYMPHOCYTES NFR BLD AUTO: 14 % (ref 14–44)
MCH RBC QN AUTO: 33 PG (ref 26.8–34.3)
MCHC RBC AUTO-ENTMCNC: 34.6 G/DL (ref 31.4–37.4)
MCV RBC AUTO: 95 FL (ref 82–98)
MONOCYTES # BLD AUTO: 0.89 THOUSAND/ÂΜL (ref 0.17–1.22)
MONOCYTES NFR BLD AUTO: 8 % (ref 4–12)
MUCOUS THREADS UR QL AUTO: ABNORMAL
NEUTROPHILS # BLD AUTO: 8.13 THOUSANDS/ÂΜL (ref 1.85–7.62)
NEUTS SEG NFR BLD AUTO: 78 % (ref 43–75)
NITRITE UR QL STRIP: POSITIVE
NON-SQ EPI CELLS URNS QL MICRO: ABNORMAL /HPF
NRBC BLD AUTO-RTO: 0 /100 WBCS
PH UR STRIP.AUTO: 6 [PH]
PLATELET # BLD AUTO: 163 THOUSANDS/UL (ref 149–390)
PMV BLD AUTO: 9.3 FL (ref 8.9–12.7)
POTASSIUM SERPL-SCNC: 4.4 MMOL/L (ref 3.5–5.3)
PROCALCITONIN SERPL-MCNC: 1.49 NG/ML
PROT SERPL-MCNC: 8.5 G/DL (ref 6.4–8.4)
PROT UR STRIP-MCNC: ABNORMAL MG/DL
PROTHROMBIN TIME: 12.4 SECONDS (ref 11.6–14.5)
RBC # BLD AUTO: 4.7 MILLION/UL (ref 3.88–5.62)
RBC #/AREA URNS AUTO: ABNORMAL /HPF
RSV RNA RESP QL NAA+PROBE: NEGATIVE
SARS-COV-2 RNA RESP QL NAA+PROBE: NEGATIVE
SODIUM SERPL-SCNC: 131 MMOL/L (ref 135–147)
SP GR UR STRIP.AUTO: >=1.05 (ref 1–1.03)
UROBILINOGEN UR STRIP-ACNC: <2 MG/DL
WBC # BLD AUTO: 10.55 THOUSAND/UL (ref 4.31–10.16)
WBC #/AREA URNS AUTO: ABNORMAL /HPF
WBC CLUMPS # UR AUTO: PRESENT /UL

## 2024-02-05 PROCEDURE — 80053 COMPREHEN METABOLIC PANEL: CPT | Performed by: EMERGENCY MEDICINE

## 2024-02-05 PROCEDURE — 84145 PROCALCITONIN (PCT): CPT | Performed by: EMERGENCY MEDICINE

## 2024-02-05 PROCEDURE — 96375 TX/PRO/DX INJ NEW DRUG ADDON: CPT

## 2024-02-05 PROCEDURE — 96365 THER/PROPH/DIAG IV INF INIT: CPT

## 2024-02-05 PROCEDURE — 99285 EMERGENCY DEPT VISIT HI MDM: CPT | Performed by: EMERGENCY MEDICINE

## 2024-02-05 PROCEDURE — 83605 ASSAY OF LACTIC ACID: CPT | Performed by: EMERGENCY MEDICINE

## 2024-02-05 PROCEDURE — 96361 HYDRATE IV INFUSION ADD-ON: CPT

## 2024-02-05 PROCEDURE — 81001 URINALYSIS AUTO W/SCOPE: CPT | Performed by: EMERGENCY MEDICINE

## 2024-02-05 PROCEDURE — 85610 PROTHROMBIN TIME: CPT | Performed by: EMERGENCY MEDICINE

## 2024-02-05 PROCEDURE — 87040 BLOOD CULTURE FOR BACTERIA: CPT | Performed by: EMERGENCY MEDICINE

## 2024-02-05 PROCEDURE — 85025 COMPLETE CBC W/AUTO DIFF WBC: CPT | Performed by: EMERGENCY MEDICINE

## 2024-02-05 PROCEDURE — 87077 CULTURE AEROBIC IDENTIFY: CPT | Performed by: EMERGENCY MEDICINE

## 2024-02-05 PROCEDURE — 36415 COLL VENOUS BLD VENIPUNCTURE: CPT

## 2024-02-05 PROCEDURE — 87086 URINE CULTURE/COLONY COUNT: CPT | Performed by: EMERGENCY MEDICINE

## 2024-02-05 PROCEDURE — 0241U HB NFCT DS VIR RESP RNA 4 TRGT: CPT | Performed by: EMERGENCY MEDICINE

## 2024-02-05 PROCEDURE — G1004 CDSM NDSC: HCPCS

## 2024-02-05 PROCEDURE — 99283 EMERGENCY DEPT VISIT LOW MDM: CPT

## 2024-02-05 PROCEDURE — 85730 THROMBOPLASTIN TIME PARTIAL: CPT | Performed by: EMERGENCY MEDICINE

## 2024-02-05 PROCEDURE — 74177 CT ABD & PELVIS W/CONTRAST: CPT

## 2024-02-05 PROCEDURE — 87186 SC STD MICRODIL/AGAR DIL: CPT | Performed by: EMERGENCY MEDICINE

## 2024-02-05 RX ORDER — CEFDINIR 300 MG/1
300 CAPSULE ORAL EVERY 12 HOURS SCHEDULED
Qty: 14 CAPSULE | Refills: 0 | Status: SHIPPED | OUTPATIENT
Start: 2024-02-05 | End: 2024-02-12

## 2024-02-05 RX ORDER — ACETAMINOPHEN 325 MG/1
975 TABLET ORAL ONCE
Status: COMPLETED | OUTPATIENT
Start: 2024-02-05 | End: 2024-02-05

## 2024-02-05 RX ORDER — KETOROLAC TROMETHAMINE 30 MG/ML
15 INJECTION, SOLUTION INTRAMUSCULAR; INTRAVENOUS ONCE
Status: COMPLETED | OUTPATIENT
Start: 2024-02-05 | End: 2024-02-05

## 2024-02-05 RX ORDER — ONDANSETRON 4 MG/1
4 TABLET, FILM COATED ORAL EVERY 6 HOURS PRN
Qty: 12 TABLET | Refills: 0 | Status: SHIPPED | OUTPATIENT
Start: 2024-02-05

## 2024-02-05 RX ORDER — CEFTRIAXONE 1 G/50ML
1000 INJECTION, SOLUTION INTRAVENOUS ONCE
Status: COMPLETED | OUTPATIENT
Start: 2024-02-05 | End: 2024-02-05

## 2024-02-05 RX ADMIN — KETOROLAC TROMETHAMINE 15 MG: 30 INJECTION, SOLUTION INTRAMUSCULAR at 16:08

## 2024-02-05 RX ADMIN — ACETAMINOPHEN 975 MG: 325 TABLET, FILM COATED ORAL at 16:07

## 2024-02-05 RX ADMIN — SODIUM CHLORIDE 1000 ML: 0.9 INJECTION, SOLUTION INTRAVENOUS at 15:10

## 2024-02-05 RX ADMIN — CEFTRIAXONE 1000 MG: 1 INJECTION, SOLUTION INTRAVENOUS at 17:25

## 2024-02-05 RX ADMIN — IOHEXOL 100 ML: 350 INJECTION, SOLUTION INTRAVENOUS at 15:59

## 2024-02-05 NOTE — DISCHARGE INSTRUCTIONS
Please follow up PCP. Recommend tylenol 650 mg and ibuprofen 600 mg every 6 hours as needed for pain. Please return for severe chest pain, significant shortness of breath, severely worsening symptoms, or any other concerning signs or symptoms. Please refer to the following documents for additional instructions and return precautions.     Indeterminate 1.7 cm left adrenal gland nodule Although its imaging features are indeterminate, it does not have suspicious imaging features (heterogeneity, necrosis, irregular margins), therefore this is likely benign, and can be followed by non-contrast abdomen CT or MRI in 12 months. Please note that for adrenal nodule > 1 cm,  biochemical evaluation is suggested to rule out functioning adenoma, if not already performed.  Adrenal recommendation based on institutional consensus and Journal of American College of Radiology 2017;14:3688-7085.

## 2024-02-05 NOTE — TELEPHONE ENCOUNTER
Patients wife called bc he was scheduled for an appt today for a follow up from s in August.  Wife said they on the way to the ER bc surgical site is bleeding, he has a fever, and feels very weak and she thinks he may have another abscess. Wife said she will call back to schedule follow up appt

## 2024-02-05 NOTE — ED PROVIDER NOTES
History  Chief Complaint   Patient presents with    Wound Check     Gangrene of scrotum and penis in June, multiple debridement since. Drainage yesterday. Fevers overnight. Instructed to come here for eval.      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  Reports symptoms worsening since Saturday.  Mainly complaining of generalized weakness and fatigue.  Denies any URI-like symptoms.  Denies any urinary changes such as pain or pressure.  Reports nausea vomiting yesterday.  Denies any diarrhea or changes with his ostomy.  Denies any abdominal pain.  Patient reports that his Rohan's gangrene wounds are healing well but he did notice a little bit of bleeding and skin irritation the other day.  Denies any chest pain shortness of breath.  Denies any other complaints.  Chart reviewed.    Past Medical History:  No date: Diabetes mellitus (HCC)  Family History: non-contributory  Social History          Prior to Admission Medications   Prescriptions Last Dose Informant Patient Reported? Taking?   Blood Glucose Monitoring Suppl (OneTouch Verio Reflect) w/Device KIT  Self No No   Sig: Check blood sugars once daily. Please substitute with appropriate alternative as covered by patient's insurance. Dx: E11.65   Omega-3 Fatty Acids (fish oil) 1,000 mg  Self Yes No   Sig: Take 1,000 mg by mouth daily   OneTouch Delica Lancets 33G MISC  Self No No   Sig: Check blood sugars once daily. Please substitute with appropriate alternative as covered by patient's insurance. Dx: E11.65   acetaminophen (TYLENOL) 325 mg tablet  Self No No   Sig: Take 2 tablets (650 mg total) by mouth every 6 (six) hours as needed for mild pain   aspirin (ECOTRIN LOW STRENGTH) 81 mg EC tablet  Self Yes No   Sig: Take 81 mg by mouth daily   atorvastatin (LIPITOR) 20 mg tablet  Self Yes No   cholecalciferol (VITAMIN D3) 1,000 units tablet  Self Yes No   Sig: Take  1,000 Units by mouth daily   dulaglutide (Trulicity) 0.75 MG/0.5ML injection   No No   Sig: INJECT 0.5 ML (0.75 MG TOTAL) UNDER THE SKIN EVERY 7 DAYS   glucose blood (OneTouch Verio) test strip  Self No No   Sig: Check blood sugars once daily. Please substitute with appropriate alternative as covered by patient's insurance. Dx: E11.65   lisinopril (ZESTRIL) 2.5 mg tablet  Self No No   Sig: Take 1 tablet (2.5 mg total) by mouth daily   metFORMIN (GLUCOPHAGE) 500 mg tablet   No No   Sig: TAKE 1 TABLET BY MOUTH EVERY DAY WITH BREAKFAST   vitamin B-12 (VITAMIN B-12) 500 mcg tablet  Self Yes No   Sig: Take 500 mcg by mouth daily      Facility-Administered Medications: None       Past Medical History:   Diagnosis Date    Diabetes mellitus (HCC)        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 ;  Surgeon: René Roldan MD;  [facility];  Service: Urology    INCISION AND DRAINAGE OF WOUND N/A 6/28/2023    Procedure: 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;  Surgeon: Elian Martinez MD;  [facility];  Service: General    MD CYSTOSTOMY CYSTOTOMY W/DRAINAGE N/A 7/1/2023    Procedure: CYSTOSCOPY; CYSTOSTOMY SUPRAPUBIC OPEN;  Surgeon: Ivett Matamoros MD;  [facility];  Service: Urology    MD DEBRIDEMENT OPEN WOUND FIRST 20 SQ CM/< N/A 8/17/2023    Procedure: WOUND WASHOUT, DEBRIDEMENT, STSG OF PERINEAL WOUND;  Surgeon: Guru Cuadra MD;  [facility];  Service: Plastics    MD LAPS ABD PRTM&OMENTUM DX W/WO SPEC BR/WA SPX N/A 7/4/2023     Procedure: LAPAROSCOPY DIAGNOSTIC WITH CREATION OF OSTOMY;  Surgeon: Narciso Barker MD;  [facility];  Service: General    ROTATOR CUFF REPAIR      SPLIT THICKNESS SKIN GRAFT N/A 7/13/2023    Procedure: SKIN GRAFT SPLIT THICKNESS (STSG)  TRUNK;  Surgeon: Guru Cuadra MD;  [facility];  Service: Plastics    SPLIT THICKNESS SKIN GRAFT N/A 8/17/2023    Procedure: STSG OF PERINEAL WOUND;  Surgeon: Guru Cuadra MD;  [facility];  Service: Plastics    VAC DRESSING APPLICATION N/A 7/4/2023    Procedure: APPLICATION VAC DRESSING;  Surgeon: Narciso Barker MD;  [facility];  Service: General    VAC DRESSING APPLICATION N/A 7/12/2023    Procedure: APPLICATION VAC DRESSING;  Surgeon: Eri Gracia MD;  [facility];  Service: General    WOUND DEBRIDEMENT N/A 6/29/2023    Procedure: DEBRIDEMENT WOUND (WASH OUT);  Surgeon: Syd Cuevas MD;  [facility];  Service: General    WOUND DEBRIDEMENT N/A 7/2/2023    Procedure: DEBRIDEMENT PERINEAL WOUND AND DRESSING CHANGE (WASH OUT);  Surgeon: Narciso Barker MD;  [facility];  Service: General    WOUND DEBRIDEMENT N/A 7/1/2023    Procedure: DEBRIDEMENT WOUND AND DRESSING CHANGE (WASH OUT);  Surgeon: Eri Gracia MD;  [facility];  Service: General    WOUND DEBRIDEMENT N/A 6/30/2023    Procedure: DEBRIDEMENT WOUND (WASH OUT);  Surgeon: Eri Gracia MD;  [facility];  Service: General    WOUND DEBRIDEMENT N/A 7/4/2023    Procedure: DEBRIDEMENT OF PERINEAL WOUND (WASH OUT);  Surgeon: Narciso Barker MD;  [facility];  Service: General    WOUND DEBRIDEMENT N/A 7/6/2023    Procedure: DEBRIDEMENT WOUND, WASH OUT, AND WOUND VAC CHANGE;  Surgeon: Nuria Rizvi DO;  [facility];  Service: General    WOUND DEBRIDEMENT N/A 7/8/2023    Procedure: DEBRIDEMENT WOUND AND DRESSING CHANGE, VAC change (WASH OUT);  Surgeon: Sunitha Mcadams DO;  [facility];   Service: General    WOUND DEBRIDEMENT N/A 7/10/2023    Procedure: WASH OUT AND DEBRIDEMENT OF PERINEUM WOUND, DRESSING CHANGE, PARTIAL CLOSURE;  Surgeon: Eri Gracia MD;  [facility];  Service: General    WOUND DEBRIDEMENT N/A 7/12/2023    Procedure: DEBRIDEMENT WOUND (WASH OUT), partial closure;  Surgeon: Eri Gracia MD;  [facility];  Service: General    WOUND DEBRIDEMENT N/A 7/13/2023    Procedure: DEBRIDEMENT WOUND  (WASH OUT) & vac;  Surgeon: Guru Cuadra MD;  [facility];  Service: Plastics       History reviewed. No pertinent family history.  I have reviewed and agree with the history as documented.    E-Cigarette/Vaping    E-Cigarette Use Current Every Day User      E-Cigarette/Vaping Substances    Nicotine Yes      Social History     Tobacco Use    Smoking status: Never    Smokeless tobacco: Never   Vaping Use    Vaping status: Every Day    Substances: Nicotine   Substance Use Topics    Alcohol use: Yes     Comment: socially    Drug use: Never       Review of Systems   Constitutional:  Positive for fatigue and fever. Negative for appetite change, chills, diaphoresis and unexpected weight change.   HENT:  Negative for congestion and rhinorrhea.    Eyes:  Negative for photophobia and visual disturbance.   Respiratory:  Negative for cough, chest tightness and shortness of breath.    Cardiovascular:  Negative for chest pain, palpitations and leg swelling.   Gastrointestinal:  Positive for nausea and vomiting. Negative for abdominal distention, abdominal pain, blood in stool, constipation and diarrhea.   Genitourinary:  Negative for dysuria and hematuria.   Musculoskeletal:  Negative for back pain, joint swelling, neck pain and neck stiffness.   Skin:  Negative for color change, pallor, rash and wound.   Neurological:  Positive for weakness. Negative for dizziness, syncope, light-headedness and headaches.   Psychiatric/Behavioral:  Negative for agitation.    All  other systems reviewed and are negative.      Physical Exam  Physical Exam  Vitals and nursing note reviewed.   Constitutional:       General: He is not in acute distress.     Appearance: Normal appearance. He is well-developed. He is not ill-appearing, toxic-appearing or diaphoretic.   HENT:      Head: Normocephalic and atraumatic.      Nose: Nose normal. No congestion or rhinorrhea.      Mouth/Throat:      Mouth: Mucous membranes are moist.      Pharynx: Oropharynx is clear. No oropharyngeal exudate or posterior oropharyngeal erythema.   Eyes:      General: No scleral icterus.        Right eye: No discharge.         Left eye: No discharge.      Extraocular Movements: Extraocular movements intact.      Conjunctiva/sclera: Conjunctivae normal.      Pupils: Pupils are equal, round, and reactive to light.   Neck:      Vascular: No JVD.      Trachea: No tracheal deviation.      Comments: Supple. Normal range of motion.   Cardiovascular:      Rate and Rhythm: Normal rate and regular rhythm.      Heart sounds: Normal heart sounds. No murmur heard.     No friction rub. No gallop.      Comments: Normal rate and regular rhythm  Pulmonary:      Effort: Pulmonary effort is normal. No respiratory distress.      Breath sounds: Normal breath sounds. No stridor. No wheezing or rales.      Comments: Clear to auscultation bilaterally  Chest:      Chest wall: No tenderness.   Abdominal:      General: Bowel sounds are normal. There is no distension.      Palpations: Abdomen is soft.      Tenderness: There is no abdominal tenderness. There is no right CVA tenderness, left CVA tenderness, guarding or rebound.      Comments: Soft, nontender, nondistended.  Normal bowel sounds throughout   Genitourinary:     Comments: Skin irritation and erythema with scant bright red blood near irritated skin  Musculoskeletal:         General: No swelling, tenderness, deformity or signs of injury. Normal range of motion.      Cervical back: Normal range  of motion and neck supple. No rigidity. No muscular tenderness.      Right lower leg: No edema.      Left lower leg: No edema.   Lymphadenopathy:      Cervical: No cervical adenopathy.   Skin:     General: Skin is warm and dry.      Coloration: Skin is not pale.      Findings: No erythema or rash.   Neurological:      General: No focal deficit present.      Mental Status: He is alert. Mental status is at baseline.      Sensory: No sensory deficit.      Motor: No weakness or abnormal muscle tone.      Coordination: Coordination normal.      Gait: Gait normal.      Comments: Alert.  Strength and sensation grossly intact.  Ambulatory without difficulty at baseline.    Psychiatric:         Behavior: Behavior normal.         Thought Content: Thought content normal.         Vital Signs  ED Triage Vitals [02/05/24 1350]   Temperature Pulse Respirations Blood Pressure SpO2   (!) 100.6 °F (38.1 °C) (!) 109 18 139/87 96 %      Temp Source Heart Rate Source Patient Position - Orthostatic VS BP Location FiO2 (%)   Oral Monitor Sitting Left arm --      Pain Score       No Pain           Vitals:    02/05/24 1515 02/05/24 1611 02/05/24 1725 02/05/24 1751   BP: 136/81 130/74 113/62 112/61   Pulse: 89 85 81 81   Patient Position - Orthostatic VS:             Visual Acuity      ED Medications  Medications   acetaminophen (TYLENOL) tablet 975 mg (975 mg Oral Given 2/5/24 1607)   ketorolac (TORADOL) injection 15 mg (15 mg Intravenous Given 2/5/24 1608)   sodium chloride 0.9 % bolus 1,000 mL (0 mL Intravenous Stopped 2/5/24 1633)   iohexol (OMNIPAQUE) 350 MG/ML injection (MULTI-DOSE) 100 mL (100 mL Intravenous Given 2/5/24 1559)   cefTRIAXone (ROCEPHIN) IVPB (premix in dextrose) 1,000 mg 50 mL (0 mg Intravenous Stopped 2/5/24 1746)       Diagnostic Studies  Results Reviewed       Procedure Component Value Units Date/Time    Urine Microscopic [[id number]]  (Abnormal) Collected: 02/05/24 1633    Lab Status: Final result Specimen: Urine,  Clean Catch Updated: 02/05/24 1649     RBC, UA None Seen /hpf      WBC, UA Innumerable /hpf      Epithelial Cells Occasional /hpf      Bacteria, UA Occasional /hpf      MUCUS THREADS Occasional     WBC Clumps Present    Urine culture [[id number]] Collected: 02/05/24 1633    Lab Status: In process Specimen: Urine, Clean Catch Updated: 02/05/24 1649    UA w Reflex to Microscopic w Reflex to Culture [[id number]]  (Abnormal) Collected: 02/05/24 1633    Lab Status: Final result Specimen: Urine, Clean Catch Updated: 02/05/24 1649     Color, UA Yellow     Clarity, UA Turbid     Specific Gravity, UA >=1.050     pH, UA 6.0     Leukocytes, UA Large     Nitrite, UA Positive     Protein, UA 70 (1+) mg/dl      Glucose, UA Trace mg/dl      Ketones, UA Negative mg/dl      Urobilinogen, UA <2.0 mg/dl      Bilirubin, UA Negative     Occult Blood, UA Small    FLU/RSV/COVID - if FLU/RSV clinically relevant [[id number]]  (Normal) Collected: 02/05/24 1510    Lab Status: Final result Specimen: Nares from Nose Updated: 02/05/24 1556     SARS-CoV-2 Negative     INFLUENZA A PCR Negative     INFLUENZA B PCR Negative     RSV PCR Negative    Narrative:      FOR PEDIATRIC PATIENTS - copy/paste COVID Guidelines URL to browser: [email or web address]    SARS-CoV-2 assay is a Nucleic Acid Amplification assay intended for the  qualitative detection of nucleic acid from SARS-CoV-2 in nasopharyngeal  swabs. Results are for the presumptive identification of SARS-CoV-2 RNA.    Positive results are indicative of infection with SARS-CoV-2, the virus  causing COVID-19, but do not rule out bacterial infection or co-infection  with other viruses. Laboratories within the [place] and its  territories are required to report all positive results to the appropriate  public health authorities. Negative results do not preclude SARS-CoV-2  infection and should not be used as the sole basis for treatment or  other  patient management decisions. Negative results must be combined with  clinical observations, patient history, and epidemiological information.  This test has not been FDA cleared or approved.    This test has been authorized by FDA under an Emergency Use Authorization  (EUA). This test is only authorized for the duration of time the  declaration that circumstances exist justifying the authorization of the  emergency use of an in vitro diagnostic tests for detection of SARS-CoV-2  virus and/or diagnosis of COVID-19 infection under section 564(b)(1) of  the Act, 21 U.S.C. 360bbb-3(b)(1), unless the authorization is terminated  or revoked sooner. The test has been validated but independent review by FDA  and CLIA is pending.    Test performed using Dashwirepert: This RT-PCR assay targets N2,  a region unique to SARS-CoV-2. A conserved region in the E-gene was chosen  for pan-Sarbecovirus detection which includes SARS-CoV-2.    According to CMS-2020-01-R, this platform meets the definition of high-throughput technology.    Blood culture #2 [[id number]] Collected: 02/05/24 1514    Lab Status: In process Specimen: Blood from Hand, Right Updated: 02/05/24 1516    Procalcitonin [[id number]]  (Abnormal) Collected: 02/05/24 1354    Lab Status: Final result Specimen: Blood from Arm, Right Updated: 02/05/24 1439     Procalcitonin 1.49 ng/ml     Comprehensive metabolic panel [[id number]]  (Abnormal) Collected: 02/05/24 1354    Lab Status: Final result Specimen: Blood from Arm, Right Updated: 02/05/24 1427     Sodium 131 mmol/L      Potassium 4.4 mmol/L      Chloride 99 mmol/L      CO2 23 mmol/L      ANION GAP 9 mmol/L      BUN 13 mg/dL      Creatinine 0.89 mg/dL      Glucose 220 mg/dL      Calcium 10.1 mg/dL      AST 17 U/L      ALT 17 U/L      Alkaline Phosphatase 76 U/L      Total Protein 8.5 g/dL      Albumin 4.3 g/dL      Total Bilirubin 0.63 mg/dL      eGFR 89 ml/min/1.73sq m     Narrative:      National Kidney  Disease Foundation guidelines for Chronic Kidney Disease (CKD):     Stage 1 with normal or high GFR (GFR > 90 mL/min/1.73 square meters)    Stage 2 Mild CKD (GFR = 60-89 mL/min/1.73 square meters)    Stage 3A Moderate CKD (GFR = 45-59 mL/min/1.73 square meters)    Stage 3B Moderate CKD (GFR = 30-44 mL/min/1.73 square meters)    Stage 4 Severe CKD (GFR = 15-29 mL/min/1.73 square meters)    Stage 5 End Stage CKD (GFR <15 mL/min/1.73 square meters)  Note: GFR calculation is accurate only with a steady state creatinine    APTT [[id number]]  (Normal) Collected: 02/05/24 1354    Lab Status: Final result Specimen: Blood from Arm, Right Updated: 02/05/24 1426     PTT 27 seconds     Protime-INR [[id number]]  (Normal) Collected: 02/05/24 1354    Lab Status: Final result Specimen: Blood from Arm, Right Updated: 02/05/24 1426     Protime 12.4 seconds      INR 0.87    Lactic acid, plasma (w/reflex if result > 2.0) [[id number]]  (Normal) Collected: 02/05/24 1354    Lab Status: Final result Specimen: Blood from Arm, Right Updated: 02/05/24 1426     LACTIC ACID 1.4 mmol/L     Narrative:      Result may be elevated if tourniquet was used during collection.    CBC and differential [[id number]]  (Abnormal) Collected: 02/05/24 1354    Lab Status: Final result Specimen: Blood from Arm, Right Updated: 02/05/24 1404     WBC 10.55 Thousand/uL      RBC 4.70 Million/uL      Hemoglobin 15.5 g/dL      Hematocrit 44.8 %      MCV 95 fL      MCH 33.0 pg      MCHC 34.6 g/dL      RDW 12.6 %      MPV 9.3 fL      Platelets 163 Thousands/uL      nRBC 0 /100 WBCs      Neutrophils Relative 78 %      Immat GRANS % 0 %      Lymphocytes Relative 14 %      Monocytes Relative 8 %      Eosinophils Relative 0 %      Basophils Relative 0 %      Neutrophils Absolute 8.13 Thousands/µL      Immature Grans Absolute 0.04 Thousand/uL      Lymphocytes Absolute 1.46 Thousands/µL      Monocytes Absolute 0.89 Thousand/µL      Eosinophils Absolute 0.00 Thousand/µL       Basophils Absolute 0.03 Thousands/µL     Blood culture #1 [[id number]] Collected: 02/05/24 1354    Lab Status: In process Specimen: Blood from Arm, Left Updated: 02/05/24 4090                   CT abdomen pelvis with contrast   Final Result by Valdo Palma MD (02/05 1651)      Hypodense area in the cortex of the right kidney with adjacent perinephric fluid raising the possibility of pyelonephritis.      Indeterminate 1.7 cm left adrenal gland nodule Although its imaging features are indeterminate, it does not have suspicious imaging features (heterogeneity, necrosis, irregular margins), therefore this is likely benign, and can be followed by    non-contrast abdomen CT or MRI in 12 months. Please note that for adrenal nodule > 1 cm,  biochemical evaluation is suggested to rule out functioning adenoma, if not already performed.   Adrenal recommendation based on institutional consensus and Journal of American College of Radiology 2017;14:9100-2407.         Workstation performed: [id number]                    Procedures  Procedures         ED Course                            Initial Sepsis Screening       Row Name 02/05/24 1710                Is the patient's history suggestive of a new or worsening infection? Yes (Proceed)  -CE        Suspected source of infection urinary tract infection  -CE        Indicate SIRS criteria Tachycardia > 90 bpm  -CE        Are two or more of the above signs & symptoms of infection both present and new to the patient? No  -CE                  User Key  (r) = Recorded By, (t) = Taken By, (c) = Cosigned By      Initials Name Provider Type    CE Conrad Goodman MD Physician                    SBIRT 20yo+      Flowsheet Row Most Recent Value   Initial Alcohol Screen: US AUDIT-C     1. How often do you have a drink containing alcohol? 0 Filed at: 02/05/2024 1425   2. How many drinks containing alcohol do you have on a typical day you are drinking?  0 Filed at: 02/05/2024 1425   3a. Male UNDER  65: How often do you have five or more drinks on one occasion? 0 Filed at: 02/05/2024 1425   3b. FEMALE Any Age, or MALE 65+: How often do you have 4 or more drinks on one occassion? 0 Filed at: 02/05/2024 1425   Audit-C Score 0 Filed at: 02/05/2024 1425   ROB: How many times in the past year have you...    Used an illegal drug or used a prescription medication for non-medical reasons? 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  Overall well-appearing with well-appearing surgical wounds.  Suspect likely wound bleeding from skin irritation.  However patient high risk given recent significant infection now with nausea vomiting and fever.  Likely viral but plan for infectious evaluation including CT imaging of abdomen pelvis.  Symptom management with oral and IV antipyretics and fluids.  Reassess.    Labs interpreted by me without significant acute process.  White count of 10.  CT imaging concerning for possible kidney infection and UA with innumerable white blood cells and some bacteria.  Possible urine infection.  Also notable for adrenal nodule and informed patient of results and provided patient with follow-up recommendations.  Patient very well-appearing.  Shared decision-making with patient opting for discharge home.  Will give dose of IV antibiotics and prescription sent to pharmacy.  Instructions return precautions.  Infectious precautions. Discussed results and recommendations. Advised follow up PCP. Medication recommendations. Given instructions and return precautions. Patient/family at bedside acknowledged understanding of all written and verbal instructions and return precautions. Discharged.     Amount and/or Complexity of Data Reviewed  Labs: ordered.  Radiology: ordered.    Risk  OTC drugs.  Prescription drug management.              Disposition  Final diagnoses:   Fever   Generalized weakness   Fatigue   UTI (urinary tract infection)   Adrenal nodule (HCC)   Nausea     Time reflects when diagnosis was documented in both MDM as applicable and the Disposition within this note       Time User Action Codes Description Comment    2/5/2024  2:45 PM Erne, Conrad Add [R50.9] Fever     2/5/2024  2:46 PM Erne, Conrad Add [R53.1] Generalized weakness     2/5/2024  2:46 PM Erne, Conrad Add [R53.83] Fatigue     2/5/2024  5:11 PM Erne, Conrad Add [N39.0] UTI (urinary tract infection)     2/5/2024  5:11 PM Erne, Conrad Modify [R50.9] Fever     2/5/2024  5:11 PM Erne, Conrad Modify [N39.0] UTI (urinary tract infection)     2/5/2024  5:13 PM Erne, Conrad Add [E27.8] Adrenal nodule (HCC)     2/5/2024  5:13 PM Erne, Conrad Add [R11.0] Nausea           ED Disposition       ED Disposition   Discharge    Condition   Stable    Date/Time   Mon Feb 5, 2024 1711    Comment   George Chavez discharge to home/self care.                   Follow-up Information       Follow up With Specialties Details Why Contact DAMIAN Davis Nurse Practitioner Schedule an appointment as soon as possible for a visit in 1 week  [address] [place] [id number]  [phone]              Discharge Medication List as of 2/5/2024  5:13 PM        START taking these medications    Details   cefdinir (OMNICEF) 300 mg capsule Take 1 capsule (300 mg total) by mouth every 12 (twelve) hours for 7 days, Starting Mon 2/5/2024, Until Mon 2/12/2024, Normal      ondansetron (ZOFRAN) 4 mg tablet Take 1 tablet (4 mg total) by mouth every 6 (six) hours as needed for nausea or vomiting, Starting Mon 2/5/2024, Normal           CONTINUE these medications which have NOT CHANGED    Details   acetaminophen (TYLENOL) 325 mg tablet Take 2 tablets (650 mg total) by mouth every 6 (six) hours as needed for mild pain, Starting Fri 7/14/2023, No Print      aspirin (ECOTRIN LOW STRENGTH) 81 mg EC tablet  Take 81 mg by mouth daily, Historical Med      atorvastatin (LIPITOR) 20 mg tablet Starting Wed 5/10/2023, Historical Med      Blood Glucose Monitoring Suppl (OneTouch Verio Reflect) w/Device KIT Check blood sugars once daily. Please substitute with appropriate alternative as covered by patient's insurance. Dx: E11.65, Normal      cholecalciferol (VITAMIN D3) 1,000 units tablet Take 1,000 Units by mouth daily, Historical Med      dulaglutide (Trulicity) 0.75 MG/0.5ML injection INJECT 0.5 ML (0.75 MG TOTAL) UNDER THE SKIN EVERY 7 DAYS, Starting Mon 1/15/2024, Normal      glucose blood (OneTouch Verio) test strip Check blood sugars once daily. Please substitute with appropriate alternative as covered by patient's insurance. Dx: E11.65, Normal      lisinopril (ZESTRIL) 2.5 mg tablet Take 1 tablet (2.5 mg total) by mouth daily, Starting Mon 7/24/2023, Normal      metFORMIN (GLUCOPHAGE) 500 mg tablet TAKE 1 TABLET BY MOUTH EVERY DAY WITH BREAKFAST, Starting Fri 1/19/2024, Normal      Omega-3 Fatty Acids (fish oil) 1,000 mg Take 1,000 mg by mouth daily, Historical Med      OneTouch Delica Lancets 33G MISC Check blood sugars once daily. Please substitute with appropriate alternative as covered by patient's insurance. Dx: E11.65, Normal      vitamin B-12 (VITAMIN B-12) 500 mcg tablet Take 500 mcg by mouth daily, Historical Med             No discharge procedures on file.    PDMP Review       None            ED Provider  Electronically Signed by             Conrad Goodman MD  02/05/24 [id number]

## 2024-02-07 LAB — BACTERIA UR CULT: ABNORMAL

## 2024-02-10 LAB
BACTERIA BLD CULT: NORMAL
BACTERIA BLD CULT: NORMAL

## 2024-03-22 DIAGNOSIS — Z76.89 ENCOUNTER TO ESTABLISH CARE: ICD-10-CM

## 2024-03-22 DIAGNOSIS — E11.628 TYPE 2 DIABETES MELLITUS WITH OTHER SKIN COMPLICATION, WITHOUT LONG-TERM CURRENT USE OF INSULIN (HCC): ICD-10-CM

## 2024-03-22 RX ORDER — DULAGLUTIDE 0.75 MG/.5ML
0.75 INJECTION, SOLUTION SUBCUTANEOUS
Qty: 6 ML | Refills: 1 | Status: SHIPPED | OUTPATIENT
Start: 2024-03-22

## 2024-04-23 ENCOUNTER — OFFICE VISIT (OUTPATIENT)
Dept: FAMILY MEDICINE CLINIC | Facility: CLINIC | Age: 64
End: 2024-04-23
Payer: MEDICARE

## 2024-04-23 ENCOUNTER — TELEPHONE (OUTPATIENT)
Dept: ADMINISTRATIVE | Facility: OTHER | Age: 64
End: 2024-04-23

## 2024-04-23 VITALS
OXYGEN SATURATION: 99 % | HEART RATE: 77 BPM | WEIGHT: 219.4 LBS | HEIGHT: 73 IN | SYSTOLIC BLOOD PRESSURE: 128 MMHG | TEMPERATURE: 97.8 F | DIASTOLIC BLOOD PRESSURE: 78 MMHG | BODY MASS INDEX: 29.08 KG/M2

## 2024-04-23 DIAGNOSIS — E78.2 MIXED HYPERLIPIDEMIA: ICD-10-CM

## 2024-04-23 DIAGNOSIS — Z12.5 SCREENING FOR PROSTATE CANCER: ICD-10-CM

## 2024-04-23 DIAGNOSIS — I10 PRIMARY HYPERTENSION: ICD-10-CM

## 2024-04-23 DIAGNOSIS — Z12.11 SCREENING FOR COLON CANCER: ICD-10-CM

## 2024-04-23 DIAGNOSIS — E11.628 TYPE 2 DIABETES MELLITUS WITH OTHER SKIN COMPLICATION, WITHOUT LONG-TERM CURRENT USE OF INSULIN (HCC): Primary | ICD-10-CM

## 2024-04-23 PROBLEM — E44.0 MODERATE PROTEIN MALNUTRITION (HCC): Status: RESOLVED | Noted: 2023-07-24 | Resolved: 2024-04-23

## 2024-04-23 PROBLEM — Z93.59 SUPRAPUBIC CATHETER (HCC): Status: RESOLVED | Noted: 2023-07-02 | Resolved: 2024-04-23

## 2024-04-23 PROBLEM — Z93.9 HISTORY OF CREATION OF OSTOMY (HCC): Status: RESOLVED | Noted: 2023-07-24 | Resolved: 2024-04-23

## 2024-04-23 LAB — SL AMB POCT HEMOGLOBIN AIC: 8 (ref ?–6.5)

## 2024-04-23 PROCEDURE — 83036 HEMOGLOBIN GLYCOSYLATED A1C: CPT | Performed by: FAMILY MEDICINE

## 2024-04-23 PROCEDURE — 99215 OFFICE O/P EST HI 40 MIN: CPT | Performed by: FAMILY MEDICINE

## 2024-04-23 RX ORDER — DULAGLUTIDE 0.75 MG/.5ML
0.75 INJECTION, SOLUTION SUBCUTANEOUS WEEKLY
Qty: 6 ML | Refills: 1 | Status: SHIPPED | OUTPATIENT
Start: 2024-04-23

## 2024-04-23 RX ORDER — KETOROLAC TROMETHAMINE 30 MG/ML
1 INJECTION, SOLUTION INTRAMUSCULAR; INTRAVENOUS ONCE
Qty: 1 EACH | Refills: 0 | Status: SHIPPED | OUTPATIENT
Start: 2024-04-23 | End: 2024-04-23

## 2024-04-23 RX ORDER — ATORVASTATIN CALCIUM 20 MG/1
20 TABLET, FILM COATED ORAL DAILY
Qty: 90 TABLET | Refills: 1 | Status: SHIPPED | OUTPATIENT
Start: 2024-04-23

## 2024-04-23 RX ORDER — BLOOD-GLUCOSE SENSOR
1 EACH MISCELLANEOUS
Qty: 6 EACH | Refills: 3 | Status: SHIPPED | OUTPATIENT
Start: 2024-04-23

## 2024-04-23 NOTE — TELEPHONE ENCOUNTER
Upon review of the In Basket request and the patient's chart, initial outreach has been made via fax to facility. Please see Contacts section for details.     Thank you  Demetrice Erazo MA    Paramedian Forehead Flap Text: A decision was made to reconstruct the defect utilizing an interpolation axial flap and a staged reconstruction.  A telfa template was made of the defect.  This telfa template was then used to outline the paramedian forehead pedicle flap.  The donor area for the pedicle flap was then injected with anesthesia.  The flap was excised through the skin and subcutaneous tissue down to the layer of the underlying musculature.  The pedicle flap was carefully excised within this deep plane to maintain its blood supply.  The edges of the donor site were undermined.   The donor site was closed in a primary fashion.  The pedicle was then rotated into position and sutured.  Once the tube was sutured into place, adequate blood supply was confirmed with blanching and refill.  The pedicle was then wrapped with xeroform gauze and dressed appropriately with a telfa and gauze bandage to ensure continued blood supply and protect the attached pedicle.

## 2024-04-23 NOTE — TELEPHONE ENCOUNTER
----- Message from Franchesca Lindo sent at 4/23/2024  9:26 AM EDT -----  Regarding: care gap request  04/23/24 9:26 AM    Hello, our patient attached above has had Diabetic Eye Exam completed/performed. Please assist in updating the patient chart by making an External outreach to [facility] facility located in [address] [place] [address] Cathy COOK [phone]. The date of service is 02/2024.    Thank you,  Franchesca MINER [address]

## 2024-04-23 NOTE — LETTER
2nd Request      Diabetic Eye Exam Form    Date Requested: 24  Patient: George Chavez  Patient : 1960   Referring Provider: DAMIAN Crockett      DIABETIC Eye Exam Date _______________________________      Type of Exam MUST be documented for Diabetic Eye Exams. Please CHECK ONE.     Retinal Exam       Dilated Retinal Exam       OCT       Optomap-Iris Exam      Fundus Photography       Left Eye - Please check Retinopathy or No Retinopathy        Exam did show retinopathy    Exam did not show retinopathy       Right Eye - Please check Retinopathy or No Retinopathy       Exam did show retinopathy    Exam did not show retinopathy       Comments __________________________________________________________    Practice Providing Exam ______________________________________________    Exam Performed By (print name) _______________________________________      Provider Signature ___________________________________________________      These reports are needed for  compliance.  Please fax this completed form and a copy of the Diabetic Eye Exam report to our office located at [address] [place] [address] as soon as possible via Fax [phone] attention Demetrice: Phone [phone]  We thank you for your assistance in treating our mutual patient.

## 2024-04-23 NOTE — LETTER
Diabetic Eye Exam Form    Date Requested: 24  Patient: George Chavez  Patient : 1960   Referring Provider: DAMIAN Crockett      DIABETIC Eye Exam Date _______________________________      Type of Exam MUST be documented for Diabetic Eye Exams. Please CHECK ONE.     Retinal Exam       Dilated Retinal Exam       OCT       Optomap-Iris Exam      Fundus Photography       Left Eye - Please check Retinopathy or No Retinopathy        Exam did show retinopathy    Exam did not show retinopathy       Right Eye - Please check Retinopathy or No Retinopathy       Exam did show retinopathy    Exam did not show retinopathy       Comments __________________________________________________________    Practice Providing Exam ______________________________________________    Exam Performed By (print name) _______________________________________      Provider Signature ___________________________________________________      These reports are needed for  compliance.  Please fax this completed form and a copy of the Diabetic Eye Exam report to our office located at [address] [place] [address] as soon as possible via Fax [phone] attention Demetrice: Phone [phone]  We thank you for your assistance in treating our mutual patient.

## 2024-04-23 NOTE — ASSESSMENT & PLAN NOTE
Uncontrolled, presently off medications stressed the importance of attaining hemoglobin A1c is less than 7 discussed long-term consequences of poorly controlled diabetes, Rx for CGM refill on Trulicity increase metformin 500 twice daily  We discussed the importance of controlling blood glucose (hemoglobin A1c less than 7.0)Premeal , even better <110  2hr after a meal <170, even better <140  A1C <7%, even better <6.5%.  blood pressure control, and cholesterol to lower the risk for complications. Encouraged advise to check home blood sugars discussed goals in range of therapy. Reviewed recommendations of annual eye exams (ophthalmology) on long foot exam (Podiatry)  Lab Results   Component Value Date    HGBA1C 8.0 (A) 04/23/2024

## 2024-04-23 NOTE — PROGRESS NOTES
Depression Screening and Follow-up Plan: Patient was screened for depression during today's encounter. They screened negative with a PHQ-2 score of 0.    Tobacco Cessation Counseling: Tobacco cessation counseling was provided. The patient is sincerely urged to quit consumption of tobacco. He is ready to quit tobacco.       Assessment/Plan:     Chronic Problems:  DM (diabetes mellitus) ([facility])  Uncontrolled, presently off medications stressed the importance of attaining hemoglobin A1c is less than 7 discussed long-term consequences of poorly controlled diabetes, Rx for CGM refill on Trulicity increase metformin 500 twice daily  We discussed the importance of controlling blood glucose (hemoglobin A1c less than 7.0)Premeal , even better <110  2hr after a meal <170, even better <140  A1C <7%, even better <6.5%.  blood pressure control, and cholesterol to lower the risk for complications. Encouraged advise to check home blood sugars discussed goals in range of therapy. Reviewed recommendations of annual eye exams (ophthalmology) on long foot exam (Podiatry)  Lab Results   Component Value Date    HGBA1C 8.0 (A) 04/23/2024       Visit Diagnosis:  Diagnoses and all orders for this visit:    Type 2 diabetes mellitus with other skin complication, without long-term current use of insulin ([facility])  -     POCT hemoglobin A1c  -     UA w Reflex to Microscopic w Reflex to Culture; Future  -     Continuous Blood Gluc Sensor (FreeStyle Tanvi 3 Sensor) MISC; Use 1 each every 14 (fourteen) days  -     Continuous Blood Gluc  (FreeStyle Tanvi 3 [place]) JANE; Use 1 each 1 (one) time for 1 dose  -     Albumin / creatinine urine ratio; Future  -     Comprehensive metabolic panel; Future  -     Hemoglobin A1C; Future  -     TSH, 3rd generation with Free T4 reflex; Future  -     Lipid Panel with Direct LDL reflex; Future  -     Albumin / creatinine urine ratio; Future  -     dulaglutide (Trulicity) 0.75 MG/0.5ML injection; Inject  "0.5 mL (0.75 mg total) under the skin once a week    Screening for colon cancer  -     Ambulatory referral to Gastroenterology; Future    Screening for prostate cancer  -     PSA, Total Screen; Future    Mixed hyperlipidemia  -     atorvastatin (LIPITOR) 20 mg tablet; Take 1 tablet (20 mg total) by mouth daily    Primary hypertension          Subjective:    Patient ID: George Chavez is a 63 y.o. male.    [place] for f/u   Last seen =   Pcp=  SUBJECTIVE:  63 y.o. male for follow up of diabetes. 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  htn hyperlipidemia continues with medications.  Previously prescribed  Denies any myalgias any chest pain palpitation shortness of breath difficulty breathing activity intolerance swelling to extremities vision changes headache    Current Outpatient Medications:  acetaminophen (TYLENOL) 325 mg tablet, Take 2 tablets (650 mg total) by mouth every 6 (six) hours as needed for mild pain, Disp: , Rfl: 0  aspirin (ECOTRIN LOW STRENGTH) 81 mg EC tablet, Take 81 mg by mouth daily, Disp: , Rfl:   atorvastatin (LIPITOR) 20 mg tablet, , Disp: , Rfl:   Blood Glucose Monitoring Suppl (OneTouch Verio Reflect) w/Device KIT, Check blood sugars once daily. Please substitute with appropriate alternative as covered by patient's insurance. Dx: " E11.65, Disp: 1 kit, Rfl: 0  cholecalciferol (VITAMIN D3) 1,000 units tablet, Take 1,000 Units by mouth daily, Disp: , Rfl:   dulaglutide (Trulicity) 0.75 MG/0.5ML injection, INJECT 0.5 ML (0.75 MG TOTAL) UNDER THE SKIN EVERY 7 DAYS, Disp: 6 mL, Rfl: 1  glucose blood (OneTouch Verio) test strip, Check blood sugars once daily. Please substitute with appropriate alternative as covered by patient's insurance. Dx: E11.65, Disp: 100 each, Rfl: 3  lisinopril (ZESTRIL) 2.5 mg tablet, Take 1 tablet (2.5 mg total) by mouth daily, Disp: 90 tablet, Rfl: 1  metFORMIN (GLUCOPHAGE) 500 mg tablet, TAKE 1 TABLET BY MOUTH EVERY DAY WITH BREAKFAST, Disp: 90 tablet, Rfl: 1  Omega-3 Fatty Acids (fish oil) 1,000 mg, Take 1,000 mg by mouth daily, Disp: , Rfl:   ondansetron (ZOFRAN) 4 mg tablet, Take 1 tablet (4 mg total) by mouth every 6 (six) hours as needed for nausea or vomiting, Disp: 12 tablet, Rfl: 0  OneTouch Delica Lancets 33G MISC, Check blood sugars once daily. Please substitute with appropriate alternative as covered by patient's insurance. Dx: E11.65, Disp: 100 each, Rfl: 3  vitamin B-12 (VITAMIN B-12) 500 mcg tablet, Take 500 mcg by mouth daily, Disp: , Rfl:     No current facility-administered medications for this visit.                        The following portions of the patient's history were reviewed and updated as appropriate: allergies, current medications, past family history, past medical history, past social history, past surgical history and problem list.    Review of Systems   Constitutional:  Negative for appetite change, chills, fever and unexpected weight change.   HENT:  Negative for congestion, dental problem, ear pain, hearing loss, postnasal drip, rhinorrhea, sinus pressure, sinus pain, sneezing, sore throat, tinnitus and voice change.    Eyes:  Negative for visual disturbance.   Respiratory:  Negative for apnea, cough, chest tightness and shortness of breath.    Cardiovascular:  Negative for chest pain,  "palpitations and leg swelling.   Gastrointestinal:  Negative for abdominal pain, blood in stool, constipation, diarrhea, nausea and vomiting.   Endocrine: Negative for cold intolerance, heat intolerance, polydipsia, polyphagia and polyuria.   Genitourinary:  Negative for decreased urine volume, difficulty urinating, dysuria, frequency and hematuria.   Musculoskeletal:  Negative for arthralgias, back pain, gait problem, joint swelling and myalgias.   Skin:  Negative for color change, rash and wound.   Allergic/Immunologic: Negative for environmental allergies and food allergies.   Neurological:  Negative for dizziness, syncope, weakness, light-headedness, numbness and headaches.   Hematological:  Negative for adenopathy. Does not bruise/bleed easily.   Psychiatric/Behavioral:  Negative for sleep disturbance and suicidal ideas. The patient is not nervous/anxious.          /78 (BP Location: Left arm, Patient Position: Sitting)   Pulse 77   Temp 97.8 °F (36.6 °C)   Ht 6' 1\" (1.854 m)   Wt 99.5 kg (219 lb 6.4 oz)   SpO2 99%   BMI 28.95 kg/m²   Social History     Socioeconomic History    Marital status: /Civil Union     Spouse name: Not on file    Number of children: Not on file    Years of education: Not on file    Highest education level: Not on file   Occupational History    Not on file   Tobacco Use    Smoking status: Never    Smokeless tobacco: Never   Vaping Use    Vaping status: Every Day    Substances: Nicotine   Substance and Sexual Activity    Alcohol use: Yes     Comment: socially    Drug use: Never    Sexual activity: Not on file   Other Topics Concern    Not on file   Social History Narrative    Not on file     Social Determinants of Health     Financial Resource Strain: Not on file   Food Insecurity: No Food Insecurity (6/30/2023)    Hunger Vital Sign     Worried About Running Out of Food in the Last Year: Never true     Ran Out of Food in the Last Year: Never true   Transportation Needs: " No Transportation Needs (6/30/2023)    PRAPARE - Transportation     Lack of Transportation (Medical): No     Lack of Transportation (Non-Medical): No   Physical Activity: Not on file   Stress: Not on file   Social Connections: Not on file   Intimate Partner Violence: Not on file   Housing Stability: Unknown (6/30/2023)    Housing Stability Vital Sign     Unable to Pay for Housing in the Last Year: No     Number of Places Lived in the Last Year: Not on file     Unstable Housing in the Last Year: No     Past Medical History:   Diagnosis Date    Diabetes mellitus (HCC)      No family history on file.  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 ;  Surgeon: René Roldan MD;  [facility];  Service: Urology    INCISION AND DRAINAGE OF WOUND N/A 6/28/2023    Procedure: 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;  Surgeon: Elian Martinez MD;  [facility];  Service: General    NV CYSTOSTOMY CYSTOTOMY W/DRAINAGE N/A 7/1/2023    Procedure: CYSTOSCOPY; CYSTOSTOMY SUPRAPUBIC OPEN;  Surgeon: Ivett Matamoros MD;  [facility];  Service: Urology    NV DEBRIDEMENT OPEN WOUND FIRST 20 SQ CM/< N/A 8/17/2023    Procedure: WOUND WASHOUT, DEBRIDEMENT, STSG OF PERINEAL WOUND;  Surgeon: Guru Cuadra MD;  [facility];  Service: Plastics    NV LAPS ABD PRTM&OMENTUM DX W/WO SPEC BR/WA SPX N/A 7/4/2023    Procedure: LAPAROSCOPY DIAGNOSTIC WITH CREATION OF OSTOMY;  Surgeon: Narciso Barker MD;  [facility];  Service: General    ROTATOR CUFF  REPAIR      SPLIT THICKNESS SKIN GRAFT N/A 7/13/2023    Procedure: SKIN GRAFT SPLIT THICKNESS (STSG)  TRUNK;  Surgeon: Guru Cuadra MD;  [facility];  Service: Plastics    SPLIT THICKNESS SKIN GRAFT N/A 8/17/2023    Procedure: STSG OF PERINEAL WOUND;  Surgeon: Guru Cuadra MD;  [facility];  Service: Plastics    VAC DRESSING APPLICATION N/A 7/4/2023    Procedure: APPLICATION VAC DRESSING;  Surgeon: Narciso Barker MD;  [facility];  Service: General    VAC DRESSING APPLICATION N/A 7/12/2023    Procedure: APPLICATION VAC DRESSING;  Surgeon: Eri Gracia MD;  [facility];  Service: General    WOUND DEBRIDEMENT N/A 6/29/2023    Procedure: DEBRIDEMENT WOUND (WASH OUT);  Surgeon: Syd Cuevas MD;  [facility];  Service: General    WOUND DEBRIDEMENT N/A 7/2/2023    Procedure: DEBRIDEMENT PERINEAL WOUND AND DRESSING CHANGE (WASH OUT);  Surgeon: Narciso Barker MD;  [facility];  Service: General    WOUND DEBRIDEMENT N/A 7/1/2023    Procedure: DEBRIDEMENT WOUND AND DRESSING CHANGE (WASH OUT);  Surgeon: Eri Gracia MD;  [facility];  Service: General    WOUND DEBRIDEMENT N/A 6/30/2023    Procedure: DEBRIDEMENT WOUND (WASH OUT);  Surgeon: Eri Gracia MD;  [facility];  Service: General    WOUND DEBRIDEMENT N/A 7/4/2023    Procedure: DEBRIDEMENT OF PERINEAL WOUND (WASH OUT);  Surgeon: Narciso Barker MD;  [facility];  Service: General    WOUND DEBRIDEMENT N/A 7/6/2023    Procedure: DEBRIDEMENT WOUND, WASH OUT, AND WOUND VAC CHANGE;  Surgeon: Nuria Rizvi DO;  [facility];  Service: General    WOUND DEBRIDEMENT N/A 7/8/2023    Procedure: DEBRIDEMENT WOUND AND DRESSING CHANGE, VAC change (WASH OUT);  Surgeon: Sunitha Mcadams DO;  [facility];  Service: General    WOUND DEBRIDEMENT N/A 7/10/2023    Procedure: WASH OUT AND DEBRIDEMENT OF PERINEUM WOUND, DRESSING CHANGE, PARTIAL CLOSURE;  Surgeon:  Eri Gracia MD;  [facility];  Service: General    WOUND DEBRIDEMENT N/A 7/12/2023    Procedure: DEBRIDEMENT WOUND (WASH OUT), partial closure;  Surgeon: Eri Gracia MD;  [facility];  Service: General    WOUND DEBRIDEMENT N/A 7/13/2023    Procedure: DEBRIDEMENT WOUND  (WASH OUT) & vac;  Surgeon: Guru Cuadra MD;  [facility];  Service: Plastics       Current Outpatient Medications:     acetaminophen (TYLENOL) 325 mg tablet, Take 2 tablets (650 mg total) by mouth every 6 (six) hours as needed for mild pain, Disp: , Rfl: 0    aspirin (ECOTRIN LOW STRENGTH) 81 mg EC tablet, Take 81 mg by mouth daily, Disp: , Rfl:     atorvastatin (LIPITOR) 20 mg tablet, Take 1 tablet (20 mg total) by mouth daily, Disp: 90 tablet, Rfl: 1    Blood Glucose Monitoring Suppl (OneTouch Verio Reflect) w/Device KIT, Check blood sugars once daily. Please substitute with appropriate alternative as covered by patient's insurance. Dx: E11.65, Disp: 1 kit, Rfl: 0    cholecalciferol (VITAMIN D3) 1,000 units tablet, Take 1,000 Units by mouth daily, Disp: , Rfl:     Continuous Blood Gluc  (FreeStyle Tanvi 3 [place]) [facility], Use 1 each 1 (one) time for 1 dose, Disp: 1 each, Rfl: 0    Continuous Blood Gluc Sensor (FreeStyle Tanvi 3 Sensor) MISC, Use 1 each every 14 (fourteen) days, Disp: 6 each, Rfl: 3    dulaglutide (Trulicity) 0.75 MG/0.5ML injection, INJECT 0.5 ML (0.75 MG TOTAL) UNDER THE SKIN EVERY 7 DAYS, Disp: 6 mL, Rfl: 1    dulaglutide (Trulicity) 0.75 MG/0.5ML injection, Inject 0.5 mL (0.75 mg total) under the skin once a week, Disp: 6 mL, Rfl: 1    glucose blood (OneTouch Verio) test strip, Check blood sugars once daily. Please substitute with appropriate alternative as covered by patient's insurance. Dx: E11.65, Disp: 100 each, Rfl: 3    lisinopril (ZESTRIL) 2.5 mg tablet, Take 1 tablet (2.5 mg total) by mouth daily, Disp: 90 tablet, Rfl: 1    metFORMIN (GLUCOPHAGE) 500 mg tablet, TAKE 1  TABLET BY MOUTH EVERY DAY WITH BREAKFAST, Disp: 90 tablet, Rfl: 1    Omega-3 Fatty Acids (fish oil) 1,000 mg, Take 1,000 mg by mouth daily, Disp: , Rfl:     ondansetron (ZOFRAN) 4 mg tablet, Take 1 tablet (4 mg total) by mouth every 6 (six) hours as needed for nausea or vomiting, Disp: 12 tablet, Rfl: 0    OneTouch Delica Lancets 33G MISC, Check blood sugars once daily. Please substitute with appropriate alternative as covered by patient's insurance. Dx: E11.65, Disp: 100 each, Rfl: 3    vitamin B-12 (VITAMIN B-12) 500 mcg tablet, Take 500 mcg by mouth daily, Disp: , Rfl:     No Known Allergies       Lab Review   Admission on 02/05/2024, Discharged on 02/05/2024   Component Date Value    PTT 02/05/2024 27     Protime 02/05/2024 12.4     INR 02/05/2024 0.87     Blood Culture 02/05/2024 No Growth After 5 Days.     Blood Culture 02/05/2024 No Growth After 5 Days.     WBC 02/05/2024 10.55 (H)     RBC 02/05/2024 4.70     Hemoglobin 02/05/2024 15.5     Hematocrit 02/05/2024 44.8     MCV 02/05/2024 95     MCH 02/05/2024 33.0     MCHC 02/05/2024 34.6     RDW 02/05/2024 12.6     MPV 02/05/2024 9.3     Platelets 02/05/2024 163     nRBC 02/05/2024 0     Segmented % 02/05/2024 78 (H)     Immature Grans % 02/05/2024 0     Lymphocytes % 02/05/2024 14     Monocytes % 02/05/2024 8     Eosinophils Relative 02/05/2024 0     Basophils Relative 02/05/2024 0     Absolute Neutrophils 02/05/2024 8.13 (H)     Absolute Immature Grans 02/05/2024 0.04     Absolute Lymphocytes 02/05/2024 1.46     Absolute Monocytes 02/05/2024 0.89     Eosinophils Absolute 02/05/2024 0.00     Basophils Absolute 02/05/2024 0.03     Sodium 02/05/2024 131 (L)     Potassium 02/05/2024 4.4     Chloride 02/05/2024 99     CO2 02/05/2024 23     ANION GAP 02/05/2024 9     BUN 02/05/2024 13     Creatinine 02/05/2024 0.89     Glucose 02/05/2024 220 (H)     Calcium 02/05/2024 10.1     AST 02/05/2024 17     ALT 02/05/2024 17     Alkaline Phosphatase 02/05/2024 76     Total  Protein 02/05/2024 8.5 (H)     Albumin 02/05/2024 4.3     Total Bilirubin 02/05/2024 0.63     eGFR 02/05/2024 89     LACTIC ACID 02/05/2024 1.4     Procalcitonin 02/05/2024 1.49 (H)     Color, UA 02/05/2024 Yellow     Clarity, UA 02/05/2024 Turbid     Specific Gravity, UA 02/05/2024 >=1.050 (H)     pH, UA 02/05/2024 6.0     Leukocytes, UA 02/05/2024 Large (A)     Nitrite, UA 02/05/2024 Positive (A)     Protein, UA 02/05/2024 70 (1+) (A)     Glucose, UA 02/05/2024 Trace (A)     Ketones, UA 02/05/2024 Negative     Urobilinogen, UA 02/05/2024 <2.0     Bilirubin, UA 02/05/2024 Negative     Occult Blood, UA 02/05/2024 Small (A)     SARS-CoV-2 02/05/2024 Negative     INFLUENZA A PCR 02/05/2024 Negative     INFLUENZA B PCR 02/05/2024 Negative     RSV PCR 02/05/2024 Negative     RBC, UA 02/05/2024 None Seen     WBC, UA 02/05/2024 Innumerable (A)     Epithelial Cells 02/05/2024 Occasional     Bacteria, UA 02/05/2024 Occasional     MUCUS THREADS 02/05/2024 Occasional (A)     WBC Clumps 02/05/2024 Present     Urine Culture 02/05/2024 >100,000 cfu/ml Escherichia coli (A)         Imaging: No results found.    Objective:     Physical Exam  Constitutional:       General: He is not in acute distress.     Appearance: He is well-developed. He is not ill-appearing or toxic-appearing.   HENT:      Head: Normocephalic and atraumatic.   Neck:      Vascular: No carotid bruit.   Cardiovascular:      Rate and Rhythm: Normal rate and regular rhythm.      Heart sounds: Normal heart sounds.   Pulmonary:      Effort: Pulmonary effort is normal.      Breath sounds: Normal breath sounds.   Abdominal:      General: Bowel sounds are normal.      Palpations: Abdomen is soft.      Tenderness: There is no right CVA tenderness or left CVA tenderness.      Comments: Ostomy in place   Musculoskeletal:         General: Normal range of motion.      Cervical back: Normal range of motion and neck supple.   Lymphadenopathy:      Cervical: No cervical  "adenopathy.   Skin:     General: Skin is warm and dry.   Neurological:      Mental Status: He is alert and oriented to person, place, and time.      Deep Tendon Reflexes: Reflexes are normal and symmetric.   Psychiatric:         Behavior: Behavior normal.         Thought Content: Thought content normal.         Judgment: Judgment normal.           There are no Patient Instructions on file for this visit.    DAMIAN Crockett    Portions of the record may have been created with voice recognition software.  Occasional wrong word or \"sound a like\" substitutions may have occurred due to the inherent limitations of voice recognition software.  Read the chart carefully and recognize, using context, where substitutions have occurred.  "

## 2024-04-25 NOTE — TELEPHONE ENCOUNTER
As a follow-up, a second attempt has been made for outreach via fax to facility. Please see Contacts section for details.    Thank you  Demetrice Erazo MA

## 2024-05-02 ENCOUNTER — OFFICE VISIT (OUTPATIENT)
Age: 64
End: 2024-05-02
Payer: MEDICARE

## 2024-05-02 VITALS
SYSTOLIC BLOOD PRESSURE: 140 MMHG | HEIGHT: 73 IN | BODY MASS INDEX: 29.03 KG/M2 | WEIGHT: 219 LBS | HEART RATE: 81 BPM | OXYGEN SATURATION: 98 % | DIASTOLIC BLOOD PRESSURE: 70 MMHG

## 2024-05-02 DIAGNOSIS — Z12.11 SCREENING FOR COLON CANCER: ICD-10-CM

## 2024-05-02 DIAGNOSIS — N49.3 FOURNIER DISEASE: Primary | ICD-10-CM

## 2024-05-02 PROCEDURE — 99203 OFFICE O/P NEW LOW 30 MIN: CPT | Performed by: INTERNAL MEDICINE

## 2024-05-02 NOTE — PROGRESS NOTES
[facility] - Outpatient Note  George Chavez 63 y.o. male MRN: [id number]  Encounter: [id number]      ASSESSMENT AND PLAN:    George Chavez is a 63 y.o. 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  His ostomy is functioning well with no signs of infection and appropriate stooling.  He has no output from his rectum.  He states he was told this could be reversed after his Rohan gangrene has healed and I agree he probably can get his loop diverting colostomy reversed.  Referral to surgery Dr. Barker for reversal placed    Colon cancer screening-no previous colonoscopy.  No family history of colon cancer.  He will need colonoscopy however this can be done after his colostomy reversal.  He will call back to schedule colonoscopy directly after his colostomy reversal.          1. Screening for colon cancer    - Ambulatory referral to Gastroenterology    2. Rohan disease    - Ambulatory Referral to General Surgery; Future    ______________________________________________________________________    SUBJECTIVE: Patient with history of Rohan's gangrene with multiple surgeries at [facility] including loop diverting colostomy on 7/4 presenting to discuss reversal of ostomy.  He has been making appropriate stool from ostomy with no discharge or bleeding and has no symptoms currently.  He denies abdominal pain nausea vomiting.  He has no output from the rectum.  He states he was told he can have his ostomy reversed once Rohan's gangrene heals.    Answers submitted by the patient for this visit:  Abdominal Pain Questionnaire (Submitted on 5/1/2024)  Chief Complaint: Abdominal pain  Chronicity: recurrent  Onset: more than 1 month ago  Onset  quality: undetermined  Frequency: rarely  Episode duration: 0.25 Hours  Progression since onset: unchanged  Pain location: LLQ  Pain - numeric: 2/10  Pain quality: dull  Radiates to: does not radiate  anorexia: No  arthralgias: No  belching: No  constipation: No  diarrhea: No  dysuria: No  fever: No  flatus: No  frequency: No  headaches: No  hematochezia: No  hematuria: No  melena: No  myalgias: No  nausea: No  weight loss: No  vomiting: No  Aggravated by: nothing  Relieved by: nothing    I reviewed prior external notes    I reviewed previous lab results and images      REVIEW OF SYSTEMS:     REVIEW OF ALL OTHER SYSTEMS IS OTHERWISE NEGATIVE.      Historical Information   Past Medical History:   Diagnosis Date    Diabetes mellitus (HCC)      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 ;  Surgeon: René Roldan MD;  [facility];  Service: Urology    INCISION AND DRAINAGE OF WOUND N/A 6/28/2023    Procedure: 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;  Surgeon: Elian Martinez MD;  [facility];  Service: General    IN CYSTOSTOMY CYSTOTOMY W/DRAINAGE N/A 7/1/2023    Procedure: CYSTOSCOPY; CYSTOSTOMY SUPRAPUBIC OPEN;  Surgeon: Ivett Matamoros MD;  [facility];  Service: Urology    IN DEBRIDEMENT OPEN WOUND FIRST 20 SQ CM/< N/A 8/17/2023    Procedure: WOUND WASHOUT, DEBRIDEMENT, STSG OF PERINEAL WOUND;  Surgeon: Guru Cuadra MD;  [facility];  Service: Plastics    IN LAPS ABD PRTM&OMENTUM DX W/WO SPEC BR/WA SPX N/A 7/4/2023     Procedure: LAPAROSCOPY DIAGNOSTIC WITH CREATION OF OSTOMY;  Surgeon: Narciso Barker MD;  [facility];  Service: General    ROTATOR CUFF REPAIR      SPLIT THICKNESS SKIN GRAFT N/A 7/13/2023    Procedure: SKIN GRAFT SPLIT THICKNESS (STSG)  TRUNK;  Surgeon: Guru Cuadra MD;  [facility];  Service: Plastics    SPLIT THICKNESS SKIN GRAFT N/A 8/17/2023    Procedure: STSG OF PERINEAL WOUND;  Surgeon: Guru Cuadra MD;  [facility];  Service: Plastics    VAC DRESSING APPLICATION N/A 7/4/2023    Procedure: APPLICATION VAC DRESSING;  Surgeon: Narciso Barker MD;  [facility];  Service: General    VAC DRESSING APPLICATION N/A 7/12/2023    Procedure: APPLICATION VAC DRESSING;  Surgeon: Eri Gracia MD;  [facility];  Service: General    WOUND DEBRIDEMENT N/A 6/29/2023    Procedure: DEBRIDEMENT WOUND (WASH OUT);  Surgeon: Syd Cuevas MD;  [facility];  Service: General    WOUND DEBRIDEMENT N/A 7/2/2023    Procedure: DEBRIDEMENT PERINEAL WOUND AND DRESSING CHANGE (WASH OUT);  Surgeon: Narciso Barker MD;  [facility];  Service: General    WOUND DEBRIDEMENT N/A 7/1/2023    Procedure: DEBRIDEMENT WOUND AND DRESSING CHANGE (WASH OUT);  Surgeon: Eri Gracia MD;  [facility];  Service: General    WOUND DEBRIDEMENT N/A 6/30/2023    Procedure: DEBRIDEMENT WOUND (WASH OUT);  Surgeon: Eri Gracia MD;  [facility];  Service: General    WOUND DEBRIDEMENT N/A 7/4/2023    Procedure: DEBRIDEMENT OF PERINEAL WOUND (WASH OUT);  Surgeon: Narciso Barker MD;  [facility];  Service: General    WOUND DEBRIDEMENT N/A 7/6/2023    Procedure: DEBRIDEMENT WOUND, WASH OUT, AND WOUND VAC CHANGE;  Surgeon: Nuria Rizvi DO;  [facility];  Service: General    WOUND DEBRIDEMENT N/A 7/8/2023    Procedure: DEBRIDEMENT WOUND AND DRESSING CHANGE, VAC change (WASH OUT);  Surgeon: Sunitha Mcadams DO;  [facility];  Service:  "General    WOUND DEBRIDEMENT N/A 7/10/2023    Procedure: WASH OUT AND DEBRIDEMENT OF PERINEUM WOUND, DRESSING CHANGE, PARTIAL CLOSURE;  Surgeon: Eri Gracia MD;  [facility];  Service: General    WOUND DEBRIDEMENT N/A 7/12/2023    Procedure: DEBRIDEMENT WOUND (WASH OUT), partial closure;  Surgeon: Eri Gracia MD;  [facility];  Service: General    WOUND DEBRIDEMENT N/A 7/13/2023    Procedure: DEBRIDEMENT WOUND  (WASH OUT) & vac;  Surgeon: Guru Cuadra MD;  [facility];  Service: Plastics     Social History   Social History     Substance and Sexual Activity   Alcohol Use Yes    Comment: socially     Social History     Substance and Sexual Activity   Drug Use Never     Social History     Tobacco Use   Smoking Status Former    Current packs/day: 0.50    Average packs/day: 0.5 packs/day for 35.0 years (17.5 ttl pk-yrs)    Types: Cigarettes   Smokeless Tobacco Never     History reviewed. No pertinent family history.    Meds/Allergies       Current Outpatient Medications:     acetaminophen (TYLENOL) 325 mg tablet    aspirin (ECOTRIN LOW STRENGTH) 81 mg EC tablet    atorvastatin (LIPITOR) 20 mg tablet    Blood Glucose Monitoring Suppl (OneTouch Verio Reflect) w/Device KIT    cholecalciferol (VITAMIN D3) 1,000 units tablet    Continuous Blood Gluc Sensor (FreeStyle Tanvi 3 Sensor) [facility] [place]    dulaglutide (Trulicity) 0.75 MG/0.5ML injection    dulaglutide (Trulicity) 0.75 MG/0.5ML injection    glucose blood (OneTouch Verio) test strip    lisinopril (ZESTRIL) 2.5 mg tablet    metFORMIN (GLUCOPHAGE) 500 mg tablet    Omega-3 Fatty Acids (fish oil) 1,000 mg    ondansetron (ZOFRAN) 4 mg tablet    OneTouch Delica Lancets 33G MISC    vitamin B-12 (VITAMIN B-12) 500 mcg tablet    No Known Allergies        Objective     Blood pressure 140/70, pulse 81, height 6' 1\" (1.854 m), weight 99.3 kg (219 lb), SpO2 98%. Body mass index is 28.89 kg/m².      PHYSICAL EXAM:      General Appearance:   " Alert, cooperative, no distress   HEENT:   Normocephalic, atraumatic, anicteric.     Neck:  Supple, symmetrical, trachea midline   Lungs:   Clear to auscultation bilaterally; no rales, rhonchi or wheezing; respirations unlabored    Heart::   Regular rate and rhythm; no murmur, rub, or gallop.   Abdomen:   Soft, non-tender, non-distended; normal bowel sounds; no masses, no organomegaly. Ostomy healthy pink with small amount of brown stool in ostomy bad.   Genitalia:   Deferred    Rectal:   Deferred    Extremities:  No cyanosis, clubbing or edema    Pulses:  2+ and symmetric    Skin:  No jaundice, rashes, or lesions    Lymph nodes:  No palpable cervical lymphadenopathy        Lab Results:   No visits with results within 1 Day(s) from this visit.   Latest known visit with results is:   Office Visit on 04/23/2024   Component Date Value    Hemoglobin A1C 04/23/2024 8.0 (A)          Radiology Results:   No results found.

## 2024-05-03 ENCOUNTER — TELEPHONE (OUTPATIENT)
Age: 64
End: 2024-05-03

## 2024-05-07 ENCOUNTER — OFFICE VISIT (OUTPATIENT)
Dept: SURGERY | Facility: CLINIC | Age: 64
End: 2024-05-07
Payer: MEDICARE

## 2024-05-07 VITALS — TEMPERATURE: 97.7 F | WEIGHT: 219 LBS | HEIGHT: 73 IN | BODY MASS INDEX: 29.03 KG/M2

## 2024-05-07 DIAGNOSIS — N49.3 FOURNIER DISEASE: ICD-10-CM

## 2024-05-07 DIAGNOSIS — Z93.3 COLOSTOMY IN PLACE (HCC): Primary | ICD-10-CM

## 2024-05-07 PROBLEM — E87.20 LACTIC ACIDOSIS: Status: RESOLVED | Noted: 2023-06-29 | Resolved: 2024-05-07

## 2024-05-07 PROBLEM — D64.9 LOW HEMOGLOBIN AND LOW HEMATOCRIT: Status: RESOLVED | Noted: 2023-07-24 | Resolved: 2024-05-07

## 2024-05-07 PROCEDURE — 99214 OFFICE O/P EST MOD 30 MIN: CPT | Performed by: SURGERY

## 2024-05-07 RX ORDER — METRONIDAZOLE 500 MG/1
TABLET ORAL
Qty: 3 TABLET | Refills: 0 | Status: SHIPPED | OUTPATIENT
Start: 2024-05-07 | End: 2024-07-03

## 2024-05-07 RX ORDER — HEPARIN SODIUM 5000 [USP'U]/ML
5000 INJECTION, SOLUTION INTRAVENOUS; SUBCUTANEOUS ONCE
OUTPATIENT
Start: 2024-05-07 | End: 2024-05-07

## 2024-05-07 RX ORDER — NEOMYCIN SULFATE 500 MG/1
TABLET ORAL
Qty: 6 TABLET | Refills: 0 | Status: SHIPPED | OUTPATIENT
Start: 2024-05-07 | End: 2024-07-03

## 2024-05-07 NOTE — TELEPHONE ENCOUNTER
Upon review of the In Basket request we have found as a result of outreach that patient did not have the requested item(s) completed.      Any additional questions or concerns should be emailed to the Practice Liaisons via the appropriate education email address, please do not reply via In Basket.    Thank you  Demetrice Erazo MA

## 2024-05-07 NOTE — PROGRESS NOTES
Office Visit - General Surgery  George Chavez MRN: [id number]  Encounter: [id number]  Assessment/Plan    Problem List Items Addressed This Visit        Surgery/Wound/Pain    Colostomy in place (HCC) - Primary     I talked to him about reversal of colostomy.  Discussed the procedure in detail including risks, benefits, alternatives, and what to expect postoperatively.  Did also talk about risks of leak from the anastomosis.  He understands and is agreeable to go ahead.    Plan: Colostomy reversal.         Relevant Medications    metroNIDAZOLE (FLAGYL) 500 mg tablet    neomycin (MYCIFRADIN) 500 mg tablet   Other Visit Diagnoses     Rohan disease        Relevant Medications    neomycin (MYCIFRADIN) 500 mg tablet          Subjective    George Chavez is a 63 y.o. male who presents to discuss reversal of colostomy.  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  Is been doing fairly well at this point in time.    Pt  has a past medical history of Diabetes mellitus ([facility]).    Pt  has a past surgical history that includes Rotator cuff repair; Incision and drainage of wound (N/A, 6/28/2023); Incision and drainage of wound (N/A, 6/28/2023); Wound debridement (N/A, 6/29/2023); Wound debridement (N/A, 7/2/2023); pr cystostomy cystotomy w/drainage (N/A, 7/1/2023); Wound debridement (N/A, 7/1/2023); Wound debridement (N/A, 6/30/2023); pr laps abd prtm&omentum dx w/wo spec br/wa spx (N/A, 7/4/2023); Wound debridement (N/A, 7/4/2023); CYSTOSCOPY (N/A, 7/4/2023); Examination under anesthesia (N/A, 7/4/2023); VAC DRESSING APPLICATION (N/A, 7/4/2023); Wound debridement (N/A, 7/6/2023); Wound debridement (N/A, 7/8/2023); Wound debridement (N/A, 7/10/2023); Wound debridement (N/A, 7/12/2023); VAC DRESSING APPLICATION (N/A, 7/12/2023); SPLIT THICKNESS SKIN GRAFT (N/A, 7/13/2023); Wound debridement (N/A, 7/13/2023); pr debridement open wound first 20 sq cm/< (N/A, 8/17/2023); and SPLIT  THICKNESS SKIN GRAFT (N/A, 8/17/2023).    family history is not on file.    George Chavez reports that he has quit smoking. His smoking use included cigarettes. He has a 17.5 pack-year smoking history. He has never used smokeless tobacco. He reports current alcohol use. He reports that he does not use drugs.      Current Outpatient Medications on File Prior to Visit   Medication Sig Dispense Refill   • acetaminophen (TYLENOL) 325 mg tablet Take 2 tablets (650 mg total) by mouth every 6 (six) hours as needed for mild pain  0   • aspirin (ECOTRIN LOW STRENGTH) 81 mg EC tablet Take 81 mg by mouth daily     • atorvastatin (LIPITOR) 20 mg tablet Take 1 tablet (20 mg total) by mouth daily 90 tablet 1   • Blood Glucose Monitoring Suppl (OneTouch Verio Reflect) w/Device KIT Check blood sugars once daily. Please substitute with appropriate alternative as covered by patient's insurance. Dx: E11.65 1 kit 0   • cholecalciferol (VITAMIN D3) 1,000 units tablet Take 1,000 Units by mouth daily     • Continuous Blood Gluc Sensor (FreeStyle Tanvi 3 Sensor) [facility] Use 1 each every 14 (fourteen) days 6 each 3   • dulaglutide (Trulicity) 0.75 MG/0.5ML injection INJECT 0.5 ML (0.75 MG TOTAL) UNDER THE SKIN EVERY 7 DAYS 6 mL 1   • dulaglutide (Trulicity) 0.75 MG/0.5ML injection Inject 0.5 mL (0.75 mg total) under the skin once a week 6 mL 1   • glucose blood (OneTouch Verio) test strip Check blood sugars once daily. Please substitute with appropriate alternative as covered by patient's insurance. Dx: E11.65 100 each 3   • lisinopril (ZESTRIL) 2.5 mg tablet Take 1 tablet (2.5 mg total) by mouth daily 90 tablet 1   • metFORMIN (GLUCOPHAGE) 500 mg tablet TAKE 1 TABLET BY MOUTH EVERY DAY WITH BREAKFAST 90 tablet 1   • Omega-3 Fatty Acids (fish oil) 1,000 mg Take 1,000 mg by mouth daily     • ondansetron (ZOFRAN) 4 mg tablet Take 1 tablet (4 mg total) by mouth every 6 (six) hours as needed for nausea or vomiting 12 tablet 0   • OneTouch  Delica Lancets 33G MISC Check blood sugars once daily. Please substitute with appropriate alternative as covered by patient's insurance. Dx: E11.65 100 each 3   • vitamin B-12 (VITAMIN B-12) 500 mcg tablet Take 500 mcg by mouth daily       No current facility-administered medications on file prior to visit.     No Known Allergies    Review of Systems   Constitutional:  Negative for chills, fatigue and fever.   HENT:  Negative for congestion, ear pain, sneezing, trouble swallowing and voice change.    Eyes:  Negative for pain and discharge.   Respiratory:  Negative for cough, shortness of breath and wheezing.    Cardiovascular:  Negative for palpitations and leg swelling.   Gastrointestinal:  Negative for abdominal pain, constipation, diarrhea, nausea and vomiting.   Endocrine: Negative for cold intolerance, heat intolerance and polyuria.   Genitourinary:  Negative for decreased urine volume, difficulty urinating and dysuria.   Musculoskeletal:  Negative for arthralgias and back pain.   Skin:  Negative for rash and wound.   Allergic/Immunologic: Negative for environmental allergies and food allergies.   Neurological:  Negative for dizziness and weakness.   Hematological:  Negative for adenopathy. Does not bruise/bleed easily.   Psychiatric/Behavioral:  Negative for confusion and sleep disturbance. The patient is not nervous/anxious.    All other systems reviewed and are negative.    Objective    Vitals:    05/07/24 1053   Temp: 97.7 °F (36.5 °C)       Physical Exam  Constitutional:       General: He is not in acute distress.     Appearance: He is well-developed. He is not diaphoretic.   HENT:      Head: Normocephalic and atraumatic.      Right Ear: External ear normal.      Left Ear: External ear normal.   Eyes:      General: No scleral icterus.     Conjunctiva/sclera: Conjunctivae normal.   Neck:      Thyroid: No thyromegaly.      Trachea: No tracheal deviation.   Cardiovascular:      Rate and Rhythm: Normal rate  and regular rhythm.      Heart sounds: Normal heart sounds. No murmur heard.  Pulmonary:      Effort: Pulmonary effort is normal. No respiratory distress.      Breath sounds: Normal breath sounds. No wheezing or rales.   Abdominal:      General: There is no distension.      Palpations: Abdomen is soft. There is no mass.      Tenderness: There is no abdominal tenderness. There is no guarding or rebound.      Comments: Colostomy left side otherwise soft nontender   Musculoskeletal:         General: Normal range of motion.      Cervical back: Normal range of motion and neck supple.   Lymphadenopathy:      Cervical: No cervical adenopathy.   Skin:     General: Skin is warm and dry.   Neurological:      Mental Status: He is alert and oriented to person, place, and time.   Psychiatric:         Behavior: Behavior normal.         Thought Content: Thought content normal.         Judgment: Judgment normal.

## 2024-05-07 NOTE — ASSESSMENT & PLAN NOTE
I talked to him about reversal of colostomy.  Discussed the procedure in detail including risks, benefits, alternatives, and what to expect postoperatively.  Did also talk about risks of leak from the anastomosis.  He understands and is agreeable to go ahead.    Plan: Colostomy reversal.

## 2024-05-07 NOTE — TELEPHONE ENCOUNTER
As a final attempt, a third outreach has been made via telephone call to facility. Please see Contacts section for details. This encounter will be closed and completed by end of day. Should we receive the requested information because of previous outreach attempts, the requested patient's chart will be updated appropriately.     Thank you  Demetrice Erazo MA    23 yo male with CP presents with several weeks of pruritis, jaundice, RUQ pain.  Seen at hospital in [place] for same but family took patient out of the hospital there to be evaluated in the [place] (reportedly, OSH was planning ?MRCP vs ERCP following a RUQ sono showing hydrops without CBD dilatation or stones).  Will repeat u/s here, lab work and reassess, eval for cholecystitis.

## 2024-05-16 ENCOUNTER — APPOINTMENT (OUTPATIENT)
Dept: LAB | Facility: CLINIC | Age: 64
End: 2024-05-16
Payer: COMMERCIAL

## 2024-05-16 DIAGNOSIS — Z12.5 SCREENING FOR PROSTATE CANCER: ICD-10-CM

## 2024-05-16 DIAGNOSIS — E11.628 TYPE 2 DIABETES MELLITUS WITH OTHER SKIN COMPLICATION, WITHOUT LONG-TERM CURRENT USE OF INSULIN (HCC): ICD-10-CM

## 2024-05-16 LAB
ALBUMIN SERPL BCP-MCNC: 4.2 G/DL (ref 3.5–5)
ALP SERPL-CCNC: 73 U/L (ref 34–104)
ALT SERPL W P-5'-P-CCNC: 21 U/L (ref 7–52)
ANION GAP SERPL CALCULATED.3IONS-SCNC: 9 MMOL/L (ref 4–13)
AST SERPL W P-5'-P-CCNC: 18 U/L (ref 13–39)
BILIRUB SERPL-MCNC: 0.43 MG/DL (ref 0.2–1)
BUN SERPL-MCNC: 13 MG/DL (ref 5–25)
CALCIUM SERPL-MCNC: 9.4 MG/DL (ref 8.4–10.2)
CHLORIDE SERPL-SCNC: 104 MMOL/L (ref 96–108)
CHOLEST SERPL-MCNC: 170 MG/DL
CO2 SERPL-SCNC: 25 MMOL/L (ref 21–32)
CREAT SERPL-MCNC: 0.73 MG/DL (ref 0.6–1.3)
EST. AVERAGE GLUCOSE BLD GHB EST-MCNC: 174 MG/DL
GFR SERPL CREATININE-BSD FRML MDRD: 97 ML/MIN/1.73SQ M
GLUCOSE P FAST SERPL-MCNC: 180 MG/DL (ref 65–99)
HBA1C MFR BLD: 7.7 %
HDLC SERPL-MCNC: 64 MG/DL
LDLC SERPL CALC-MCNC: 81 MG/DL (ref 0–100)
POTASSIUM SERPL-SCNC: 4.5 MMOL/L (ref 3.5–5.3)
PROT SERPL-MCNC: 7.4 G/DL (ref 6.4–8.4)
PSA SERPL-MCNC: 0.68 NG/ML (ref 0–4)
SODIUM SERPL-SCNC: 138 MMOL/L (ref 135–147)
TRIGL SERPL-MCNC: 125 MG/DL
TSH SERPL DL<=0.05 MIU/L-ACNC: 1.96 UIU/ML (ref 0.45–4.5)

## 2024-05-16 PROCEDURE — G0103 PSA SCREENING: HCPCS

## 2024-05-16 PROCEDURE — 84443 ASSAY THYROID STIM HORMONE: CPT

## 2024-05-16 PROCEDURE — 83036 HEMOGLOBIN GLYCOSYLATED A1C: CPT

## 2024-05-16 PROCEDURE — 36415 COLL VENOUS BLD VENIPUNCTURE: CPT

## 2024-05-16 PROCEDURE — 80053 COMPREHEN METABOLIC PANEL: CPT

## 2024-05-16 PROCEDURE — 80061 LIPID PANEL: CPT

## 2024-05-21 ENCOUNTER — OFFICE VISIT (OUTPATIENT)
Dept: FAMILY MEDICINE CLINIC | Facility: CLINIC | Age: 64
End: 2024-05-21
Payer: MEDICARE

## 2024-05-21 VITALS
DIASTOLIC BLOOD PRESSURE: 80 MMHG | HEART RATE: 74 BPM | WEIGHT: 219 LBS | HEIGHT: 73 IN | OXYGEN SATURATION: 98 % | SYSTOLIC BLOOD PRESSURE: 130 MMHG | BODY MASS INDEX: 29.03 KG/M2

## 2024-05-21 DIAGNOSIS — E11.628 TYPE 2 DIABETES MELLITUS WITH OTHER SKIN COMPLICATION, WITHOUT LONG-TERM CURRENT USE OF INSULIN (HCC): ICD-10-CM

## 2024-05-21 DIAGNOSIS — Z93.3 COLOSTOMY IN PLACE (HCC): ICD-10-CM

## 2024-05-21 DIAGNOSIS — E78.2 MIXED HYPERLIPIDEMIA: ICD-10-CM

## 2024-05-21 DIAGNOSIS — I10 PRIMARY HYPERTENSION: Primary | ICD-10-CM

## 2024-05-21 PROCEDURE — 99214 OFFICE O/P EST MOD 30 MIN: CPT | Performed by: FAMILY MEDICINE

## 2024-05-21 PROCEDURE — G2211 COMPLEX E/M VISIT ADD ON: HCPCS | Performed by: FAMILY MEDICINE

## 2024-05-21 NOTE — PROGRESS NOTES
Assessment/Plan:     Chronic Problems:  No problem-specific Assessment & Plan notes found for this encounter.      Visit Diagnosis:  Diagnoses and all orders for this visit:    Primary hypertension  Comments:  Stable continue current medications encourage low-salt sodium diet home monitor    Type 2 diabetes mellitus with other skin complication, without long-term current use of insulin (HCC)  Comments:  Above goal, has missed a few doses of the Trulicity, encouraged him home monitoring dietary guidelines encouraged  Orders:  -     Albumin / creatinine urine ratio; Standing  -     Comprehensive metabolic panel; Standing  -     Hemoglobin A1C; Standing  -     TSH, 3rd generation with Free T4 reflex; Standing  -     CBC and differential; Standing  -     Microalbumin, Random Urine (W/Creatinine) ([facility] ONLY); Standing  -     Microalbumin, Random Urine (W/Creatinine) ([facility] ONLY)    Colostomy in place ([facility])    Mixed hyperlipidemia  Comments:  Tolerating statin to be continued reviewed lipid profile          Subjective:    Patient ID: George Chavez is a 63 y.o. male.      63 y.o. male for follow up of htn, hyperlipidemiadiabetes. Diabetic, did not obtain urinalysis? review of Systems - medication compliance: compliant all of the time, diabetic diet compliance: compliant most of the time, home glucose monitoring: is performed sporadically, further diabetic ROS: no polyuria or polydipsia, no chest pain, dyspnea or TIA's, no numbness, tingling or pain in extremities, no unusual visual symptoms, no hypoglycemia, no medication side effects noted, last eye exam approximately presently doing well looking forward to reversal of colostomy, denies any chest pain palpitations negative shortness of breath difficulty breathing activity level main unchanged weight gain or weight loss ago.  Hyperlipidemia continues with statin tolerating 20 mg Lipitor  Scheduled for colostomy reversal  Current Outpatient  Medications:  acetaminophen (TYLENOL) 325 mg tablet, Take 2 tablets (650 mg total) by mouth every 6 (six) hours as needed for mild pain, Disp: , Rfl: 0  aspirin (ECOTRIN LOW STRENGTH) 81 mg EC tablet, Take 81 mg by mouth daily, Disp: , Rfl:   atorvastatin (LIPITOR) 20 mg tablet, Take 1 tablet (20 mg total) by mouth daily, Disp: 90 tablet, Rfl: 1  Blood Glucose Monitoring Suppl (OneTouch Verio Reflect) w/Device KIT, Check blood sugars once daily. Please substitute with appropriate alternative as covered by patient's insurance. Dx: E11.65, Disp: 1 kit, Rfl: 0  cholecalciferol (VITAMIN D3) 1,000 units tablet, Take 1,000 Units by mouth daily, Disp: , Rfl:   Continuous Blood Gluc Sensor (FreeStyle Tanvi 3 Sensor) MISC, Use 1 each every 14 (fourteen) days, Disp: 6 each, Rfl: 3  dulaglutide (Trulicity) 0.75 MG/0.5ML injection, INJECT 0.5 ML (0.75 MG TOTAL) UNDER THE SKIN EVERY 7 DAYS, Disp: 6 mL, Rfl: 1  dulaglutide (Trulicity) 0.75 MG/0.5ML injection, Inject 0.5 mL (0.75 mg total) under the skin once a week, Disp: 6 mL, Rfl: 1  glucose blood (OneTouch Verio) test strip, Check blood sugars once daily. Please substitute with appropriate alternative as covered by patient's insurance. Dx: E11.65, Disp: 100 each, Rfl: 3  lisinopril (ZESTRIL) 2.5 mg tablet, Take 1 tablet (2.5 mg total) by mouth daily, Disp: 90 tablet, Rfl: 1  metFORMIN (GLUCOPHAGE) 500 mg tablet, TAKE 1 TABLET BY MOUTH EVERY DAY WITH BREAKFAST, Disp: 90 tablet, Rfl: 1  metroNIDAZOLE (FLAGYL) 500 mg tablet, 1 tab po at 2, 3, 10 pm the day before surgery, Disp: 3 tablet, Rfl: 0  neomycin (MYCIFRADIN) 500 mg tablet, 2 tablets po at 2,3,10 pm the day before surgery, Disp: 6 tablet, Rfl: 0  Omega-3 Fatty Acids (fish oil) 1,000 mg, Take 1,000 mg by mouth daily, Disp: , Rfl:   ondansetron (ZOFRAN) 4 mg tablet, Take 1 tablet (4 mg total) by mouth every 6 (six) hours as needed for nausea or vomiting, Disp: 12 tablet, Rfl: 0  OneTouch Delica Lancets 33G MISC, Check blood  "sugars once daily. Please substitute with appropriate alternative as covered by patient's insurance. Dx: E11.65, Disp: 100 each, Rfl: 3  vitamin B-12 (VITAMIN B-12) 500 mcg tablet, Take 500 mcg by mouth daily, Disp: , Rfl:     No current facility-administered medications for this visit.              The following portions of the patient's history were reviewed and updated as appropriate: allergies, current medications, past family history, past medical history, past social history, past surgical history and problem list.    Review of Systems   Constitutional:  Negative for appetite change, chills, fever and unexpected weight change.   HENT:  Negative for congestion, dental problem, ear pain, hearing loss, postnasal drip, rhinorrhea, sinus pressure, sinus pain, sneezing, sore throat, tinnitus and voice change.    Eyes:  Negative for visual disturbance.   Respiratory:  Negative for apnea, cough, chest tightness and shortness of breath.    Cardiovascular:  Negative for chest pain, palpitations and leg swelling.   Gastrointestinal:  Negative for abdominal pain, blood in stool, constipation, diarrhea, nausea and vomiting.   Endocrine: Negative for cold intolerance, heat intolerance, polydipsia, polyphagia and polyuria.   Genitourinary:  Negative for decreased urine volume, difficulty urinating, dysuria, frequency and hematuria.   Musculoskeletal:  Negative for arthralgias, back pain, gait problem, joint swelling and myalgias.   Skin:  Negative for color change, rash and wound.   Allergic/Immunologic: Negative for environmental allergies and food allergies.   Neurological:  Negative for dizziness, syncope, weakness, light-headedness, numbness and headaches.   Hematological:  Negative for adenopathy. Does not bruise/bleed easily.   Psychiatric/Behavioral:  Negative for sleep disturbance and suicidal ideas. The patient is not nervous/anxious.          /80   Pulse 74   Ht 6' 1\" (1.854 m)   Wt 99.3 kg (219 lb)   " SpO2 98%   BMI 28.89 kg/m²   Social History     Socioeconomic History    Marital status: /Civil Union     Spouse name: Not on file    Number of children: Not on file    Years of education: Not on file    Highest education level: Not on file   Occupational History    Not on file   Tobacco Use    Smoking status: Former     Current packs/day: 0.50     Average packs/day: 0.5 packs/day for 35.0 years (17.5 ttl pk-yrs)     Types: Cigarettes    Smokeless tobacco: Never   Vaping Use    Vaping status: Every Day    Substances: Nicotine   Substance and Sexual Activity    Alcohol use: Yes     Comment: socially    Drug use: Never    Sexual activity: Not Currently     Partners: Female     Birth control/protection: None   Other Topics Concern    Not on file   Social History Narrative    Not on file     Social Determinants of Health     Financial Resource Strain: Not on file   Food Insecurity: No Food Insecurity (6/30/2023)    Hunger Vital Sign     Worried About Running Out of Food in the Last Year: Never true     Ran Out of Food in the Last Year: Never true   Transportation Needs: No Transportation Needs (6/30/2023)    PRAPARE - Transportation     Lack of Transportation (Medical): No     Lack of Transportation (Non-Medical): No   Physical Activity: Not on file   Stress: Not on file   Social Connections: Not on file   Intimate Partner Violence: Not on file   Housing Stability: Unknown (6/30/2023)    Housing Stability Vital Sign     Unable to Pay for Housing in the Last Year: No     Number of Places Lived in the Last Year: Not on file     Unstable Housing in the Last Year: No     Past Medical History:   Diagnosis Date    Diabetes mellitus (HCC)      History reviewed. No pertinent family history.  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 ;  Surgeon: René Roldan MD;  [facility];  Service: Urology    INCISION AND DRAINAGE OF WOUND N/A 6/28/2023    Procedure: 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;  Surgeon: Elian Martinez MD;  [facility];  Service: General    NH CYSTOSTOMY CYSTOTOMY W/DRAINAGE N/A 7/1/2023    Procedure: CYSTOSCOPY; CYSTOSTOMY SUPRAPUBIC OPEN;  Surgeon: Ivett Matamoros MD;  [facility];  Service: Urology    NH DEBRIDEMENT OPEN WOUND FIRST 20 SQ CM/< N/A 8/17/2023    Procedure: WOUND WASHOUT, DEBRIDEMENT, STSG OF PERINEAL WOUND;  Surgeon: Guru Cuadra MD;  [facility];  Service: Plastics    NH LAPS ABD PRTM&OMENTUM DX W/WO SPEC BR/WA SPX N/A 7/4/2023    Procedure: LAPAROSCOPY DIAGNOSTIC WITH CREATION OF OSTOMY;  Surgeon: Narciso Barker MD;  [facility];  Service: General    ROTATOR CUFF REPAIR      SPLIT THICKNESS SKIN GRAFT N/A 7/13/2023    Procedure: SKIN GRAFT SPLIT THICKNESS (STSG)  TRUNK;  Surgeon: Guru Cuadra MD;  [facility];  Service: Plastics    SPLIT THICKNESS SKIN GRAFT N/A 8/17/2023    Procedure: STSG OF PERINEAL WOUND;  Surgeon: Guru Cuadra MD;  [facility];  Service: Plastics    VAC DRESSING APPLICATION N/A 7/4/2023    Procedure: APPLICATION VAC DRESSING;  Surgeon: Narciso Barker MD;  [facility];  Service: General    VAC DRESSING APPLICATION N/A 7/12/2023    Procedure: APPLICATION VAC DRESSING;  Surgeon: Eri Gracia MD;  [facility];  Service: General    WOUND DEBRIDEMENT N/A 6/29/2023    Procedure: DEBRIDEMENT WOUND (WASH OUT);  Surgeon: Syd Cuevas MD;  [facility];  Service: General    WOUND DEBRIDEMENT N/A 7/2/2023     Procedure: DEBRIDEMENT PERINEAL WOUND AND DRESSING CHANGE (WASH OUT);  Surgeon: Narciso Barker MD;  [facility];  Service: General    WOUND DEBRIDEMENT N/A 7/1/2023    Procedure: DEBRIDEMENT WOUND AND DRESSING CHANGE (WASH OUT);  Surgeon: Eri Gracia MD;  [facility];  Service: General    WOUND DEBRIDEMENT N/A 6/30/2023    Procedure: DEBRIDEMENT WOUND (WASH OUT);  Surgeon: Eri Gracia MD;  [facility];  Service: General    WOUND DEBRIDEMENT N/A 7/4/2023    Procedure: DEBRIDEMENT OF PERINEAL WOUND (WASH OUT);  Surgeon: Narciso Barker MD;  [facility];  Service: General    WOUND DEBRIDEMENT N/A 7/6/2023    Procedure: DEBRIDEMENT WOUND, WASH OUT, AND WOUND VAC CHANGE;  Surgeon: Nuria Rizvi DO;  [facility];  Service: General    WOUND DEBRIDEMENT N/A 7/8/2023    Procedure: DEBRIDEMENT WOUND AND DRESSING CHANGE, VAC change (WASH OUT);  Surgeon: Sunitha Mcadams DO;  [facility];  Service: General    WOUND DEBRIDEMENT N/A 7/10/2023    Procedure: WASH OUT AND DEBRIDEMENT OF PERINEUM WOUND, DRESSING CHANGE, PARTIAL CLOSURE;  Surgeon: Eri Gracia MD;  [facility];  Service: General    WOUND DEBRIDEMENT N/A 7/12/2023    Procedure: DEBRIDEMENT WOUND (WASH OUT), partial closure;  Surgeon: Eri Gracia MD;  [facility];  Service: General    WOUND DEBRIDEMENT N/A 7/13/2023    Procedure: DEBRIDEMENT WOUND  (WASH OUT) & vac;  Surgeon: Guru Cuadra MD;  [facility];  Service: Plastics       Current Outpatient Medications:     acetaminophen (TYLENOL) 325 mg tablet, Take 2 tablets (650 mg total) by mouth every 6 (six) hours as needed for mild pain, Disp: , Rfl: 0    aspirin (ECOTRIN LOW STRENGTH) 81 mg EC tablet, Take 81 mg by mouth daily, Disp: , Rfl:     atorvastatin (LIPITOR) 20 mg tablet, Take 1 tablet (20 mg total) by mouth daily, Disp: 90 tablet, Rfl: 1    Blood Glucose Monitoring Suppl (OneTouch Verio Reflect)  w/Device KIT, Check blood sugars once daily. Please substitute with appropriate alternative as covered by patient's insurance. Dx: E11.65, Disp: 1 kit, Rfl: 0    cholecalciferol (VITAMIN D3) 1,000 units tablet, Take 1,000 Units by mouth daily, Disp: , Rfl:     Continuous Blood Gluc Sensor (FreeStyle Tanvi 3 Sensor) MISC, Use 1 each every 14 (fourteen) days, Disp: 6 each, Rfl: 3    dulaglutide (Trulicity) 0.75 MG/0.5ML injection, INJECT 0.5 ML (0.75 MG TOTAL) UNDER THE SKIN EVERY 7 DAYS, Disp: 6 mL, Rfl: 1    dulaglutide (Trulicity) 0.75 MG/0.5ML injection, Inject 0.5 mL (0.75 mg total) under the skin once a week, Disp: 6 mL, Rfl: 1    glucose blood (OneTouch Verio) test strip, Check blood sugars once daily. Please substitute with appropriate alternative as covered by patient's insurance. Dx: E11.65, Disp: 100 each, Rfl: 3    lisinopril (ZESTRIL) 2.5 mg tablet, Take 1 tablet (2.5 mg total) by mouth daily, Disp: 90 tablet, Rfl: 1    metFORMIN (GLUCOPHAGE) 500 mg tablet, TAKE 1 TABLET BY MOUTH EVERY DAY WITH BREAKFAST, Disp: 90 tablet, Rfl: 1    metroNIDAZOLE (FLAGYL) 500 mg tablet, 1 tab po at 2, 3, 10 pm the day before surgery, Disp: 3 tablet, Rfl: 0    neomycin (MYCIFRADIN) 500 mg tablet, 2 tablets po at 2,3,10 pm the day before surgery, Disp: 6 tablet, Rfl: 0    Omega-3 Fatty Acids (fish oil) 1,000 mg, Take 1,000 mg by mouth daily, Disp: , Rfl:     ondansetron (ZOFRAN) 4 mg tablet, Take 1 tablet (4 mg total) by mouth every 6 (six) hours as needed for nausea or vomiting, Disp: 12 tablet, Rfl: 0    OneTouch Delica Lancets 33G MISC, Check blood sugars once daily. Please substitute with appropriate alternative as covered by patient's insurance. Dx: E11.65, Disp: 100 each, Rfl: 3    vitamin B-12 (VITAMIN B-12) 500 mcg tablet, Take 500 mcg by mouth daily, Disp: , Rfl:     No Known Allergies       Lab Review   Appointment on 05/16/2024   Component Date Value    PSA 05/16/2024 0.68     Sodium 05/16/2024 138     Potassium  05/16/2024 4.5     Chloride 05/16/2024 104     CO2 05/16/2024 25     ANION GAP 05/16/2024 9     BUN 05/16/2024 13     Creatinine 05/16/2024 0.73     Glucose, Fasting 05/16/2024 180 (H)     Calcium 05/16/2024 9.4     AST 05/16/2024 18     ALT 05/16/2024 21     Alkaline Phosphatase 05/16/2024 73     Total Protein 05/16/2024 7.4     Albumin 05/16/2024 4.2     Total Bilirubin 05/16/2024 0.43     eGFR 05/16/2024 97     Hemoglobin A1C 05/16/2024 7.7 (H)     EAG 05/16/2024 174     TSH 3RD GENERATON 05/16/2024 1.959     Cholesterol 05/16/2024 170     Triglycerides 05/16/2024 125     HDL, Direct 05/16/2024 64     LDL Calculated 05/16/2024 81    Office Visit on 04/23/2024   Component Date Value    Hemoglobin A1C 04/23/2024 8.0 (A)    Admission on 02/05/2024, Discharged on 02/05/2024   Component Date Value    PTT 02/05/2024 27     Protime 02/05/2024 12.4     INR 02/05/2024 0.87     Blood Culture 02/05/2024 No Growth After 5 Days.     Blood Culture 02/05/2024 No Growth After 5 Days.     WBC 02/05/2024 10.55 (H)     RBC 02/05/2024 4.70     Hemoglobin 02/05/2024 15.5     Hematocrit 02/05/2024 44.8     MCV 02/05/2024 95     MCH 02/05/2024 33.0     MCHC 02/05/2024 34.6     RDW 02/05/2024 12.6     MPV 02/05/2024 9.3     Platelets 02/05/2024 163     nRBC 02/05/2024 0     Segmented % 02/05/2024 78 (H)     Immature Grans % 02/05/2024 0     Lymphocytes % 02/05/2024 14     Monocytes % 02/05/2024 8     Eosinophils Relative 02/05/2024 0     Basophils Relative 02/05/2024 0     Absolute Neutrophils 02/05/2024 8.13 (H)     Absolute Immature Grans 02/05/2024 0.04     Absolute Lymphocytes 02/05/2024 1.46     Absolute Monocytes 02/05/2024 0.89     Eosinophils Absolute 02/05/2024 0.00     Basophils Absolute 02/05/2024 0.03     Sodium 02/05/2024 131 (L)     Potassium 02/05/2024 4.4     Chloride 02/05/2024 99     CO2 02/05/2024 23     ANION GAP 02/05/2024 9     BUN 02/05/2024 13     Creatinine 02/05/2024 0.89     Glucose 02/05/2024 220 (H)      Calcium 02/05/2024 10.1     AST 02/05/2024 17     ALT 02/05/2024 17     Alkaline Phosphatase 02/05/2024 76     Total Protein 02/05/2024 8.5 (H)     Albumin 02/05/2024 4.3     Total Bilirubin 02/05/2024 0.63     eGFR 02/05/2024 89     LACTIC ACID 02/05/2024 1.4     Procalcitonin 02/05/2024 1.49 (H)     Color, UA 02/05/2024 Yellow     Clarity, UA 02/05/2024 Turbid     Specific Gravity, UA 02/05/2024 >=1.050 (H)     pH, UA 02/05/2024 6.0     Leukocytes, UA 02/05/2024 Large (A)     Nitrite, UA 02/05/2024 Positive (A)     Protein, UA 02/05/2024 70 (1+) (A)     Glucose, UA 02/05/2024 Trace (A)     Ketones, UA 02/05/2024 Negative     Urobilinogen, UA 02/05/2024 <2.0     Bilirubin, UA 02/05/2024 Negative     Occult Blood, UA 02/05/2024 Small (A)     SARS-CoV-2 02/05/2024 Negative     INFLUENZA A PCR 02/05/2024 Negative     INFLUENZA B PCR 02/05/2024 Negative     RSV PCR 02/05/2024 Negative     RBC, UA 02/05/2024 None Seen     WBC, UA 02/05/2024 Innumerable (A)     Epithelial Cells 02/05/2024 Occasional     Bacteria, UA 02/05/2024 Occasional     MUCUS THREADS 02/05/2024 Occasional (A)     WBC Clumps 02/05/2024 Present     Urine Culture 02/05/2024 >100,000 cfu/ml Escherichia coli (A)         Imaging: No results found.    Objective:     Physical Exam  Constitutional:       General: He is not in acute distress.     Appearance: He is well-developed. He is not ill-appearing.   HENT:      Head: Normocephalic and atraumatic.      Right Ear: Tympanic membrane normal.      Left Ear: Tympanic membrane normal.   Neck:      Vascular: No carotid bruit.   Cardiovascular:      Rate and Rhythm: Normal rate and regular rhythm.      Heart sounds: Normal heart sounds.   Pulmonary:      Effort: Pulmonary effort is normal.      Breath sounds: Normal breath sounds.   Abdominal:      General: Bowel sounds are normal.      Palpations: Abdomen is soft.      Comments: Ostomy    Musculoskeletal:         General: Normal range of motion.      Cervical  "back: Normal range of motion and neck supple.      Right lower leg: No edema.      Left lower leg: No edema.   Lymphadenopathy:      Cervical: No cervical adenopathy.   Skin:     General: Skin is warm and dry.      Findings: No rash.   Neurological:      Mental Status: He is alert and oriented to person, place, and time.      Deep Tendon Reflexes: Reflexes are normal and symmetric.   Psychiatric:         Behavior: Behavior normal.         Thought Content: Thought content normal.         Judgment: Judgment normal.           There are no Patient Instructions on file for this visit.    DAMIAN Crockett    Portions of the record may have been created with voice recognition software.  Occasional wrong word or \"sound a like\" substitutions may have occurred due to the inherent limitations of voice recognition software.  Read the chart carefully and recognize, using context, where substitutions have occurred.  "

## 2024-06-07 ENCOUNTER — ANESTHESIA EVENT (OUTPATIENT)
Dept: PERIOP | Facility: HOSPITAL | Age: 64
DRG: 331 | End: 2024-06-07
Payer: MEDICARE

## 2024-06-13 NOTE — PRE-PROCEDURE INSTRUCTIONS
Pre-Surgery Instructions:   Medication Instructions    acetaminophen (TYLENOL) 325 mg tablet Uses PRN- OK to take day of surgery    aspirin (ECOTRIN LOW STRENGTH) 81 mg EC tablet Stop taking 7 days prior to surgery.    atorvastatin (LIPITOR) 20 mg tablet Take night before surgery    cholecalciferol (VITAMIN D3) 1,000 units tablet Stop taking 7 days prior to surgery.    dulaglutide (Trulicity) 0.75 MG/0.5ML injection Stop taking 7 days prior to surgery.    lisinopril (ZESTRIL) 2.5 mg tablet Hold day of surgery.    metFORMIN (GLUCOPHAGE) 500 mg tablet Hold day of surgery.    metroNIDAZOLE (FLAGYL) 500 mg tablet Instructions provided by MD    neomycin (MYCIFRADIN) 500 mg tablet Instructions provided by MD    Omega-3 Fatty Acids (fish oil) 1,000 mg Stop taking 7 days prior to surgery.    ondansetron (ZOFRAN) 4 mg tablet Uses PRN- OK to take day of surgery    vitamin B-12 (VITAMIN B-12) 500 mcg tablet Stop taking 7 days prior to surgery.      Pt has preop med instructions, 3 carb drinks    Medication instructions for day surgery reviewed. Please use only a sip of water to take your instructed medications. Avoid all over the counter vitamins, supplements and NSAIDS for one week prior to surgery per anesthesia guidelines. Tylenol is ok to take as needed.     You will receive a call one business day prior to surgery with an arrival time and hospital directions. If your surgery is scheduled on a Monday, the hospital will be calling you on the Friday prior to your surgery. If you have not heard from anyone by 8pm, please call the hospital supervisor through the hospital  at [phone]. ([place] [phone] or [place] [phone]).    Do not eat or drink anything after midnight the night before your surgery, including candy, mints, lifesavers, or chewing gum. Do not drink alcohol 24hrs before your surgery. Try not to smoke at least 24hrs before your surgery.       Follow the pre surgery showering instructions as  listed in the “My Surgical Experience Booklet” or otherwise provided by your surgeon's office. Do not use a blade to shave the surgical area 1 week before surgery. It is okay to use a clean electric clippers up to 24 hours before surgery. Do not apply any lotions, creams, including makeup, cologne, deodorant, or perfumes after showering on the day of your surgery. Do not use dry shampoo, hair spray, hair gel, or any type of hair products.     No contact lenses, eye make-up, or artificial eyelashes. Remove nail polish, including gel polish, and any artificial, gel, or acrylic nails if possible. Remove all jewelry including rings and body piercing jewelry.     Wear causal clothing that is easy to take on and off. Consider your type of surgery.    Keep any valuables, jewelry, piercings at home. Please bring any specially ordered equipment (sling, braces) if indicated.    Arrange for a responsible person to drive you to and from the hospital on the day of your surgery. Please confirm the visitor policy for the day of your procedure when you receive your phone call with an arrival time.     Call the surgeon's office with any new illnesses, exposures, or additional questions prior to surgery.    Please reference your “My Surgical Experience Booklet” for additional information to prepare for your upcoming surgery.

## 2024-06-20 ENCOUNTER — ANESTHESIA (OUTPATIENT)
Dept: PERIOP | Facility: HOSPITAL | Age: 64
DRG: 331 | End: 2024-06-20
Payer: MEDICARE

## 2024-06-20 ENCOUNTER — HOSPITAL ENCOUNTER (INPATIENT)
Facility: HOSPITAL | Age: 64
LOS: 1 days | Discharge: HOME | DRG: 331 | End: 2024-06-21
Attending: SURGERY | Admitting: SURGERY
Payer: MEDICARE

## 2024-06-20 DIAGNOSIS — Z93.3 COLOSTOMY IN PLACE (HCC): Primary | ICD-10-CM

## 2024-06-20 LAB
EST. AVERAGE GLUCOSE BLD GHB EST-MCNC: 180 MG/DL
GLUCOSE SERPL-MCNC: 246 MG/DL (ref 65–140)
GLUCOSE SERPL-MCNC: 248 MG/DL (ref 65–140)
GLUCOSE SERPL-MCNC: 258 MG/DL (ref 65–140)
GLUCOSE SERPL-MCNC: 319 MG/DL (ref 65–140)
HBA1C MFR BLD: 7.9 %
PLATELET # BLD AUTO: 195 THOUSANDS/UL (ref 149–390)
PMV BLD AUTO: 9.7 FL (ref 8.9–12.7)

## 2024-06-20 PROCEDURE — 85049 AUTOMATED PLATELET COUNT: CPT

## 2024-06-20 PROCEDURE — NC001 PR NO CHARGE: Performed by: SURGERY

## 2024-06-20 PROCEDURE — 83036 HEMOGLOBIN GLYCOSYLATED A1C: CPT

## 2024-06-20 PROCEDURE — 82948 REAGENT STRIP/BLOOD GLUCOSE: CPT

## 2024-06-20 PROCEDURE — 44620 REPAIR BOWEL OPENING: CPT | Performed by: SURGERY

## 2024-06-20 PROCEDURE — 0DBE0ZZ EXCISION OF LARGE INTESTINE, OPEN APPROACH: ICD-10-PCS | Performed by: SURGERY

## 2024-06-20 RX ORDER — ONDANSETRON 2 MG/ML
4 INJECTION INTRAMUSCULAR; INTRAVENOUS EVERY 6 HOURS PRN
Status: DISCONTINUED | OUTPATIENT
Start: 2024-06-20 | End: 2024-06-21 | Stop reason: HOSPADM

## 2024-06-20 RX ORDER — LIDOCAINE HYDROCHLORIDE 20 MG/ML
INJECTION, SOLUTION EPIDURAL; INFILTRATION; INTRACAUDAL; PERINEURAL AS NEEDED
Status: DISCONTINUED | OUTPATIENT
Start: 2024-06-20 | End: 2024-06-20

## 2024-06-20 RX ORDER — ONDANSETRON 2 MG/ML
INJECTION INTRAMUSCULAR; INTRAVENOUS AS NEEDED
Status: DISCONTINUED | OUTPATIENT
Start: 2024-06-20 | End: 2024-06-20

## 2024-06-20 RX ORDER — DEXAMETHASONE SODIUM PHOSPHATE 10 MG/ML
INJECTION, SOLUTION INTRAMUSCULAR; INTRAVENOUS AS NEEDED
Status: DISCONTINUED | OUTPATIENT
Start: 2024-06-20 | End: 2024-06-20

## 2024-06-20 RX ORDER — MAGNESIUM HYDROXIDE 1200 MG/15ML
LIQUID ORAL AS NEEDED
Status: DISCONTINUED | OUTPATIENT
Start: 2024-06-20 | End: 2024-06-20 | Stop reason: HOSPADM

## 2024-06-20 RX ORDER — HEPARIN SODIUM 5000 [USP'U]/ML
5000 INJECTION, SOLUTION INTRAVENOUS; SUBCUTANEOUS ONCE
Status: COMPLETED | OUTPATIENT
Start: 2024-06-20 | End: 2024-06-20

## 2024-06-20 RX ORDER — MIDAZOLAM HYDROCHLORIDE 2 MG/2ML
INJECTION, SOLUTION INTRAMUSCULAR; INTRAVENOUS AS NEEDED
Status: DISCONTINUED | OUTPATIENT
Start: 2024-06-20 | End: 2024-06-20

## 2024-06-20 RX ORDER — SODIUM CHLORIDE, SODIUM GLUCONATE, SODIUM ACETATE, POTASSIUM CHLORIDE, MAGNESIUM CHLORIDE, SODIUM PHOSPHATE, DIBASIC, AND POTASSIUM PHOSPHATE .53; .5; .37; .037; .03; .012; .00082 G/100ML; G/100ML; G/100ML; G/100ML; G/100ML; G/100ML; G/100ML
75 INJECTION, SOLUTION INTRAVENOUS CONTINUOUS
Status: DISCONTINUED | OUTPATIENT
Start: 2024-06-20 | End: 2024-06-21

## 2024-06-20 RX ORDER — PROPOFOL 10 MG/ML
INJECTION, EMULSION INTRAVENOUS AS NEEDED
Status: DISCONTINUED | OUTPATIENT
Start: 2024-06-20 | End: 2024-06-20

## 2024-06-20 RX ORDER — INSULIN LISPRO 100 [IU]/ML
1-6 INJECTION, SOLUTION INTRAVENOUS; SUBCUTANEOUS
Status: DISCONTINUED | OUTPATIENT
Start: 2024-06-20 | End: 2024-06-21 | Stop reason: HOSPADM

## 2024-06-20 RX ORDER — HYDROMORPHONE HCL/PF 1 MG/ML
0.5 SYRINGE (ML) INJECTION
Status: DISCONTINUED | OUTPATIENT
Start: 2024-06-20 | End: 2024-06-20 | Stop reason: HOSPADM

## 2024-06-20 RX ORDER — CEFAZOLIN SODIUM 2 G/50ML
2000 SOLUTION INTRAVENOUS
Status: DISCONTINUED | OUTPATIENT
Start: 2024-06-21 | End: 2024-06-20 | Stop reason: HOSPADM

## 2024-06-20 RX ORDER — HYDROMORPHONE HCL/PF 1 MG/ML
0.2 SYRINGE (ML) INJECTION EVERY 4 HOURS PRN
Status: DISCONTINUED | OUTPATIENT
Start: 2024-06-20 | End: 2024-06-21 | Stop reason: HOSPADM

## 2024-06-20 RX ORDER — HYDROMORPHONE HCL/PF 1 MG/ML
SYRINGE (ML) INJECTION AS NEEDED
Status: DISCONTINUED | OUTPATIENT
Start: 2024-06-20 | End: 2024-06-20

## 2024-06-20 RX ORDER — CEFAZOLIN SODIUM 1 G/3ML
INJECTION, POWDER, FOR SOLUTION INTRAMUSCULAR; INTRAVENOUS AS NEEDED
Status: DISCONTINUED | OUTPATIENT
Start: 2024-06-20 | End: 2024-06-20

## 2024-06-20 RX ORDER — OXYCODONE HYDROCHLORIDE 5 MG/1
5 TABLET ORAL EVERY 4 HOURS PRN
Status: DISCONTINUED | OUTPATIENT
Start: 2024-06-20 | End: 2024-06-21 | Stop reason: HOSPADM

## 2024-06-20 RX ORDER — FENTANYL CITRATE 50 UG/ML
INJECTION, SOLUTION INTRAMUSCULAR; INTRAVENOUS AS NEEDED
Status: DISCONTINUED | OUTPATIENT
Start: 2024-06-20 | End: 2024-06-20

## 2024-06-20 RX ORDER — FENTANYL CITRATE/PF 50 MCG/ML
25 SYRINGE (ML) INJECTION
Status: DISCONTINUED | OUTPATIENT
Start: 2024-06-20 | End: 2024-06-20 | Stop reason: HOSPADM

## 2024-06-20 RX ORDER — SODIUM CHLORIDE, SODIUM LACTATE, POTASSIUM CHLORIDE, CALCIUM CHLORIDE 600; 310; 30; 20 MG/100ML; MG/100ML; MG/100ML; MG/100ML
INJECTION, SOLUTION INTRAVENOUS CONTINUOUS PRN
Status: DISCONTINUED | OUTPATIENT
Start: 2024-06-20 | End: 2024-06-20

## 2024-06-20 RX ORDER — ONDANSETRON 2 MG/ML
4 INJECTION INTRAMUSCULAR; INTRAVENOUS ONCE AS NEEDED
Status: DISCONTINUED | OUTPATIENT
Start: 2024-06-20 | End: 2024-06-20 | Stop reason: HOSPADM

## 2024-06-20 RX ORDER — HEPARIN SODIUM 5000 [USP'U]/ML
5000 INJECTION, SOLUTION INTRAVENOUS; SUBCUTANEOUS EVERY 8 HOURS SCHEDULED
Status: DISCONTINUED | OUTPATIENT
Start: 2024-06-20 | End: 2024-06-21 | Stop reason: HOSPADM

## 2024-06-20 RX ORDER — ACETAMINOPHEN 325 MG/1
975 TABLET ORAL EVERY 8 HOURS SCHEDULED
Status: DISCONTINUED | OUTPATIENT
Start: 2024-06-20 | End: 2024-06-21 | Stop reason: HOSPADM

## 2024-06-20 RX ORDER — ROCURONIUM BROMIDE 10 MG/ML
INJECTION, SOLUTION INTRAVENOUS AS NEEDED
Status: DISCONTINUED | OUTPATIENT
Start: 2024-06-20 | End: 2024-06-20

## 2024-06-20 RX ORDER — METRONIDAZOLE 500 MG/100ML
500 INJECTION, SOLUTION INTRAVENOUS ONCE
Status: COMPLETED | OUTPATIENT
Start: 2024-06-20 | End: 2024-06-20

## 2024-06-20 RX ADMIN — FENTANYL CITRATE 25 MCG: 50 INJECTION INTRAMUSCULAR; INTRAVENOUS at 15:37

## 2024-06-20 RX ADMIN — INSULIN LISPRO 3 UNITS: 100 INJECTION, SOLUTION INTRAVENOUS; SUBCUTANEOUS at 17:46

## 2024-06-20 RX ADMIN — SUGAMMADEX 200 MG: 100 INJECTION, SOLUTION INTRAVENOUS at 15:17

## 2024-06-20 RX ADMIN — SODIUM CHLORIDE, SODIUM GLUCONATE, SODIUM ACETATE, POTASSIUM CHLORIDE, MAGNESIUM CHLORIDE, SODIUM PHOSPHATE, DIBASIC, AND POTASSIUM PHOSPHATE 75 ML/HR: .53; .5; .37; .037; .03; .012; .00082 INJECTION, SOLUTION INTRAVENOUS at 16:45

## 2024-06-20 RX ADMIN — CEFAZOLIN 2000 MG: 1 INJECTION, POWDER, FOR SOLUTION INTRAMUSCULAR; INTRAVENOUS at 14:14

## 2024-06-20 RX ADMIN — DEXAMETHASONE SODIUM PHOSPHATE 10 MG: 10 INJECTION INTRAMUSCULAR; INTRAVENOUS at 14:10

## 2024-06-20 RX ADMIN — INSULIN LISPRO 5 UNITS: 100 INJECTION, SOLUTION INTRAVENOUS; SUBCUTANEOUS at 23:22

## 2024-06-20 RX ADMIN — ROCURONIUM 50 MG: 50 INJECTION, SOLUTION INTRAVENOUS at 14:03

## 2024-06-20 RX ADMIN — PHENYLEPHRINE HYDROCHLORIDE 30 MCG/MIN: 10 INJECTION INTRAVENOUS at 14:21

## 2024-06-20 RX ADMIN — FENTANYL CITRATE 25 MCG: 50 INJECTION INTRAMUSCULAR; INTRAVENOUS at 15:43

## 2024-06-20 RX ADMIN — HYDROMORPHONE HYDROCHLORIDE 0.5 MG: 1 INJECTION, SOLUTION INTRAMUSCULAR; INTRAVENOUS; SUBCUTANEOUS at 14:32

## 2024-06-20 RX ADMIN — ACETAMINOPHEN 975 MG: 325 TABLET, FILM COATED ORAL at 17:09

## 2024-06-20 RX ADMIN — HEPARIN SODIUM 5000 UNITS: 5000 INJECTION INTRAVENOUS; SUBCUTANEOUS at 13:10

## 2024-06-20 RX ADMIN — ACETAMINOPHEN 975 MG: 325 TABLET, FILM COATED ORAL at 23:21

## 2024-06-20 RX ADMIN — FENTANYL CITRATE 50 MCG: 50 INJECTION INTRAMUSCULAR; INTRAVENOUS at 14:01

## 2024-06-20 RX ADMIN — METRONIDAZOLE: 500 INJECTION, SOLUTION INTRAVENOUS at 14:15

## 2024-06-20 RX ADMIN — HEPARIN SODIUM 5000 UNITS: 5000 INJECTION INTRAVENOUS; SUBCUTANEOUS at 23:22

## 2024-06-20 RX ADMIN — FENTANYL CITRATE 25 MCG: 50 INJECTION INTRAMUSCULAR; INTRAVENOUS at 15:51

## 2024-06-20 RX ADMIN — MIDAZOLAM 2 MG: 1 INJECTION INTRAMUSCULAR; INTRAVENOUS at 13:57

## 2024-06-20 RX ADMIN — SODIUM CHLORIDE, SODIUM LACTATE, POTASSIUM CHLORIDE, AND CALCIUM CHLORIDE: .6; .31; .03; .02 INJECTION, SOLUTION INTRAVENOUS at 13:49

## 2024-06-20 RX ADMIN — LIDOCAINE HYDROCHLORIDE 100 MG: 20 INJECTION, SOLUTION EPIDURAL; INFILTRATION; INTRACAUDAL; PERINEURAL at 14:01

## 2024-06-20 RX ADMIN — ONDANSETRON 4 MG: 2 INJECTION INTRAMUSCULAR; INTRAVENOUS at 15:03

## 2024-06-20 RX ADMIN — PHENYLEPHRINE HYDROCHLORIDE 200 MCG: 10 INJECTION INTRAVENOUS at 14:01

## 2024-06-20 RX ADMIN — PROPOFOL 200 MG: 10 INJECTION, EMULSION INTRAVENOUS at 14:01

## 2024-06-20 RX ADMIN — FENTANYL CITRATE 50 MCG: 50 INJECTION INTRAMUSCULAR; INTRAVENOUS at 14:22

## 2024-06-20 RX ADMIN — OXYCODONE HYDROCHLORIDE 5 MG: 5 TABLET ORAL at 20:06

## 2024-06-20 NOTE — ANESTHESIA PREPROCEDURE EVALUATION
Procedure:  REVERSAL COLOSTOMY LOOP (Abdomen)    Relevant Problems   CARDIO   (+) HTN (hypertension)   (+) Mixed hyperlipidemia        Physical Exam    Airway    Mallampati score: II         Dental   No notable dental hx     Cardiovascular      Pulmonary      Other Findings        Anesthesia Plan  ASA Score- 2     Anesthesia Type- general with ASA Monitors.         Additional Monitors:     Airway Plan: ETT.    Comment: I, Dr. Stephens, the attending physician, have personally seen and evaluated the patient prior to anesthetic care.  I have reviewed the pre-anesthetic record, and other medical records if appropriate to the anesthetic care.  If a CRNA is involved in the case, I have reviewed the CRNA assessment, if present, and agree.  The patient is in a suitable condition to proceed with my formulated anesthetic plan.  .       Plan Factors-    Chart reviewed.                      Induction- intravenous.    Postoperative Plan-         Informed Consent- Anesthetic plan and risks discussed with patient.  I personally reviewed this patient with the CRNA. Discussed and agreed on the Anesthesia Plan with the CRNA..

## 2024-06-20 NOTE — H&P
Surgery/Wound/Pain      Colostomy in place (HCC) - Primary       I talked to him about reversal of colostomy.  Discussed the procedure in detail including risks, benefits, alternatives, and what to expect postoperatively.  Did also talk about risks of leak from the anastomosis.  He understands and is agreeable to go ahead.     Plan: Colostomy reversal.                                                     Subjective    George Chavez is a 63 y.o. male who presents to discuss reversal of colostomy.  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  Is been doing fairly well at this point in time.     Pt  has a past medical history of Diabetes mellitus (HCC).     Pt  has a past surgical history that includes Rotator cuff repair; Incision and drainage of wound (N/A, 6/28/2023); Incision and drainage of wound (N/A, 6/28/2023); Wound debridement (N/A, 6/29/2023); Wound debridement (N/A, 7/2/2023); pr cystostomy cystotomy w/drainage (N/A, 7/1/2023); Wound debridement (N/A, 7/1/2023); Wound debridement (N/A, 6/30/2023); pr laps abd prtm&omentum dx w/wo spec br/wa spx (N/A, 7/4/2023); Wound debridement (N/A, 7/4/2023); CYSTOSCOPY (N/A, 7/4/2023); Examination under anesthesia (N/A, 7/4/2023); VAC DRESSING APPLICATION (N/A, 7/4/2023); Wound debridement (N/A, 7/6/2023); Wound debridement (N/A, 7/8/2023); Wound debridement (N/A, 7/10/2023); Wound debridement (N/A, 7/12/2023); VAC DRESSING APPLICATION (N/A, 7/12/2023); SPLIT THICKNESS SKIN GRAFT (N/A, 7/13/2023); Wound debridement (N/A, 7/13/2023); pr debridement open wound first 20 sq cm/< (N/A, 8/17/2023); and SPLIT THICKNESS SKIN GRAFT (N/A, 8/17/2023).     family history is not on file.     George Chavez reports that he has quit smoking. His smoking use included cigarettes. He has a 17.5 pack-year smoking history. He has never used smokeless tobacco. He reports current alcohol use. He reports that he does not use drugs.         Medications Ordered Prior to Encounter          Current Outpatient Medications on File Prior to Visit   Medication Sig Dispense Refill    acetaminophen (TYLENOL) 325 mg tablet Take 2 tablets (650 mg total) by mouth every 6 (six) hours as needed for mild pain   0    aspirin (ECOTRIN LOW STRENGTH) 81 mg EC tablet Take 81 mg by mouth daily        atorvastatin (LIPITOR) 20 mg tablet Take 1 tablet (20 mg total) by mouth daily 90 tablet 1    Blood Glucose Monitoring Suppl (OneTouch Verio Reflect) w/Device KIT Check blood sugars once daily. Please substitute with appropriate alternative as covered by patient's insurance. Dx: E11.65 1 kit 0    cholecalciferol (VITAMIN D3) 1,000 units tablet Take 1,000 Units by mouth daily        Continuous Blood Gluc Sensor (FreeStyle Tanvi 3 Sensor) MISC Use 1 each every 14 (fourteen) days 6 each 3    dulaglutide (Trulicity) 0.75 MG/0.5ML injection INJECT 0.5 ML (0.75 MG TOTAL) UNDER THE SKIN EVERY 7 DAYS 6 mL 1    dulaglutide (Trulicity) 0.75 MG/0.5ML injection Inject 0.5 mL (0.75 mg total) under the skin once a week 6 mL 1    glucose blood (OneTouch Verio) test strip Check blood sugars once daily. Please substitute with appropriate alternative as covered by patient's insurance. Dx: E11.65 100 each 3    lisinopril (ZESTRIL) 2.5 mg tablet Take 1 tablet (2.5 mg total) by mouth daily 90 tablet 1    metFORMIN (GLUCOPHAGE) 500 mg tablet TAKE 1 TABLET BY MOUTH EVERY DAY WITH BREAKFAST 90 tablet 1    Omega-3 Fatty Acids (fish oil) 1,000 mg Take 1,000 mg by mouth daily        ondansetron (ZOFRAN) 4 mg tablet Take 1 tablet (4 mg total) by mouth every 6 (six) hours as needed for nausea or vomiting 12 tablet 0    OneTouch Delica Lancets 33G MISC Check blood sugars once daily. Please substitute with appropriate alternative as covered by patient's insurance. Dx: E11.65 100 each 3    vitamin B-12 (VITAMIN B-12) 500 mcg tablet Take 500 mcg by mouth daily          No current facility-administered  medications on file prior to visit.         Allergies   No Known Allergies        Review of Systems   Constitutional:  Negative for chills, fatigue and fever.   HENT:  Negative for congestion, ear pain, sneezing, trouble swallowing and voice change.    Eyes:  Negative for pain and discharge.   Respiratory:  Negative for cough, shortness of breath and wheezing.    Cardiovascular:  Negative for palpitations and leg swelling.   Gastrointestinal:  Negative for abdominal pain, constipation, diarrhea, nausea and vomiting.   Endocrine: Negative for cold intolerance, heat intolerance and polyuria.   Genitourinary:  Negative for decreased urine volume, difficulty urinating and dysuria.   Musculoskeletal:  Negative for arthralgias and back pain.   Skin:  Negative for rash and wound.   Allergic/Immunologic: Negative for environmental allergies and food allergies.   Neurological:  Negative for dizziness and weakness.   Hematological:  Negative for adenopathy. Does not bruise/bleed easily.   Psychiatric/Behavioral:  Negative for confusion and sleep disturbance. The patient is not nervous/anxious.    All other systems reviewed and are negative.     Objective        Vitals:     05/07/24 1053   Temp: 97.7 °F (36.5 °C)         Physical Exam  Constitutional:       General: He is not in acute distress.     Appearance: He is well-developed. He is not diaphoretic.   HENT:      Head: Normocephalic and atraumatic.      Right Ear: External ear normal.      Left Ear: External ear normal.   Eyes:      General: No scleral icterus.     Conjunctiva/sclera: Conjunctivae normal.   Neck:      Thyroid: No thyromegaly.      Trachea: No tracheal deviation.   Cardiovascular:      Rate and Rhythm: Normal rate and regular rhythm.      Heart sounds: Normal heart sounds. No murmur heard.  Pulmonary:      Effort: Pulmonary effort is normal. No respiratory distress.      Breath sounds: Normal breath sounds. No wheezing or rales.   Abdominal:      General:  "There is no distension.      Palpations: Abdomen is soft. There is no mass.      Tenderness: There is no abdominal tenderness. There is no guarding or rebound.      Comments: Colostomy left side otherwise soft nontender   Musculoskeletal:         General: Normal range of motion.      Cervical back: Normal range of motion and neck supple.   Lymphadenopathy:      Cervical: No cervical adenopathy.   Skin:     General: Skin is warm and dry.   Neurological:      Mental Status: He is alert and oriented to person, place, and time.   Psychiatric:         Behavior: Behavior normal.         Thought Content: Thought content normal.         Judgment: Judgment normal.   /88   Pulse 66   Temp (!) 97.2 °F (36.2 °C) (Temporal)   Resp 18   Ht 6' 1\" (1.854 m)   Wt 99.8 kg (220 lb)   SpO2 95%   BMI 29.03 kg/m²     "

## 2024-06-20 NOTE — ANESTHESIA POSTPROCEDURE EVALUATION
Post-Op Assessment Note    CV Status:  Stable  Pain Score: 0    Pain management: adequate       Mental Status:  Alert and awake   Hydration Status:  Euvolemic   PONV Controlled:  Controlled   Airway Patency:  Patent and adequate     Post Op Vitals Reviewed: Yes    No anethesia notable event occurred.    Staff: Anesthesiologist, CRNA, other anesthesia staff               /82 (06/20/24 1530)    Temp 97.6 °F (36.4 °C) (06/20/24 1530)    Pulse 71 (06/20/24 1530)   Resp 16 (06/20/24 1530)    SpO2   98%

## 2024-06-20 NOTE — OP NOTE
OPERATIVE REPORT  PATIENT NAME: George Chavez    :  1960  MRN: [id number]  Pt Location:  OR ROOM 03    SURGERY DATE: 2024    Surgeons and Role:     * Eric Contreras MD - Primary     * Rima Shrestha MD - Assisting    Preop Diagnosis:  Colostomy in place (HCC) [Z93.3]    Post-Op Diagnosis Codes:     * Colostomy in place (HCC) [Z93.3]    Procedure(s):  REVERSAL COLOSTOMY LOOP    Specimen(s):  * No specimens in log *    Estimated Blood Loss:   Minimal    Drains:  Ileostomy Loop LUQ (Active)   Number of days: 352       Anesthesia Type:   General    Operative Indications:  Colostomy in place (HCC) [Z93.3]    Operative Findings:  - Transvere loop colostomy  - Healthy appearing bowel   - Handsewn colonic anastomosis performed    Complications:   None    Procedure and Technique:  The patient was taken to the OR and placed in a supine position. General anesthesia was induced and the patient was prepped and draped in the usual sterile fashion. A time out was performed to verify correct site and procedure.    Circumferential incision was made surrounding the patient's existing ostomy using electrocautery.  Dissection was carried down through the subcutaneous tissues to the fascia.  The colostomy was freed from surrounding attachments, taking care not to injure the bowel wall in the process.  The bowel was reanastomosed in a handsewn fashion, using full-thickness running 2-0 Vicryl and seromuscular interrupted 3-0 silk Lembert sutures.  The bowel was then returned to the abdomen.  At this time, additional draping and fresh gown and gloves were donned.  New instruments were then used for the remainder of the case.  The fascia was closed with 0 PDS.  Subcutaneous tissue was reapproximated using 2-0 Vicryl in a pursestring fashion.  Telfa wick was placed.  Sterile gauze and ABD were taped over the surgical site.    The patient was awakened and taken to the PACU without any immediate post op complications.      Dr Contreras was present for the entire procedure.    Patient Disposition:  PACU     This procedure was not performed to treat colon cancer through resection    SIGNATURE: Rima Shrestha MD  DATE: June 20, 2024  TIME: 3:54 PM

## 2024-06-20 NOTE — PLAN OF CARE
Problem: PAIN - ADULT  Goal: Verbalizes/displays adequate comfort level or baseline comfort level  Description: Interventions:  - Encourage patient to monitor pain and request assistance  - Assess pain using appropriate pain scale  - Administer analgesics based on type and severity of pain and evaluate response  - Implement non-pharmacological measures as appropriate and evaluate response  - Consider cultural and social influences on pain and pain management  - Notify physician/advanced practitioner if interventions unsuccessful or patient reports new pain  Outcome: Progressing     Problem: INFECTION - ADULT  Goal: Absence or prevention of progression during hospitalization  Description: INTERVENTIONS:  - Assess and monitor for signs and symptoms of infection  - Monitor lab/diagnostic results  - Monitor all insertion sites, i.e. indwelling lines, tubes, and drains  - Monitor endotracheal if appropriate and nasal secretions for changes in amount and color  - [place] appropriate cooling/warming therapies per order  - Administer medications as ordered  - Instruct and encourage patient and family to use good hand hygiene technique  - Identify and instruct in appropriate isolation precautions for identified infection/condition  Outcome: Progressing     Problem: SAFETY ADULT  Goal: Patient will remain free of falls  Description: INTERVENTIONS:  - Educate patient/family on patient safety including physical limitations  - Instruct patient to call for assistance with activity   - Consult OT/PT to assist with strengthening/mobility   - Keep Call bell within reach  - Keep bed low and locked with side rails adjusted as appropriate  - Keep care items and personal belongings within reach  - Initiate and maintain comfort rounds  - Make Fall Risk Sign visible to staff  - Apply yellow socks and bracelet for high fall risk patients  - Consider moving patient to room near nurses station  Outcome: Progressing  Goal: Maintain or  return to baseline ADL function  Description: INTERVENTIONS:  -  Assess patient's ability to carry out ADLs; assess patient's baseline for ADL function and identify physical deficits which impact ability to perform ADLs (bathing, care of mouth/teeth, toileting, grooming, dressing, etc.)  - Assess/evaluate cause of self-care deficits   - Assess range of motion  - Assess patient's mobility; develop plan if impaired  - Assess patient's need for assistive devices and provide as appropriate  - Encourage maximum independence but intervene and supervise when necessary  - Involve family in performance of ADLs  - Assess for home care needs following discharge   - Consider OT consult to assist with ADL evaluation and planning for discharge  - Provide patient education as appropriate  Outcome: Progressing     Problem: Knowledge Deficit  Goal: Patient/family/caregiver demonstrates understanding of disease process, treatment plan, medications, and discharge instructions  Description: Complete learning assessment and assess knowledge base.  Interventions:  - Provide teaching at level of understanding  - Provide teaching via preferred learning methods  Outcome: Progressing

## 2024-06-21 VITALS
BODY MASS INDEX: 29.16 KG/M2 | WEIGHT: 220 LBS | TEMPERATURE: 98 F | DIASTOLIC BLOOD PRESSURE: 72 MMHG | RESPIRATION RATE: 12 BRPM | OXYGEN SATURATION: 94 % | HEIGHT: 73 IN | SYSTOLIC BLOOD PRESSURE: 122 MMHG | HEART RATE: 69 BPM

## 2024-06-21 LAB
ANION GAP SERPL CALCULATED.3IONS-SCNC: 11 MMOL/L (ref 4–13)
BASOPHILS # BLD AUTO: 0.01 THOUSANDS/ÂΜL (ref 0–0.1)
BASOPHILS NFR BLD AUTO: 0 % (ref 0–1)
BUN SERPL-MCNC: 10 MG/DL (ref 5–25)
CALCIUM SERPL-MCNC: 8.8 MG/DL (ref 8.4–10.2)
CHLORIDE SERPL-SCNC: 104 MMOL/L (ref 96–108)
CO2 SERPL-SCNC: 23 MMOL/L (ref 21–32)
CREAT SERPL-MCNC: 0.7 MG/DL (ref 0.6–1.3)
EOSINOPHIL # BLD AUTO: 0 THOUSAND/ÂΜL (ref 0–0.61)
EOSINOPHIL NFR BLD AUTO: 0 % (ref 0–6)
ERYTHROCYTE [DISTWIDTH] IN BLOOD BY AUTOMATED COUNT: 12.5 % (ref 11.6–15.1)
GFR SERPL CREATININE-BSD FRML MDRD: 99 ML/MIN/1.73SQ M
GLUCOSE SERPL-MCNC: 214 MG/DL (ref 65–140)
GLUCOSE SERPL-MCNC: 215 MG/DL (ref 65–140)
GLUCOSE SERPL-MCNC: 224 MG/DL (ref 65–140)
HCT VFR BLD AUTO: 42.3 % (ref 36.5–49.3)
HGB BLD-MCNC: 14.3 G/DL (ref 12–17)
IMM GRANULOCYTES # BLD AUTO: 0.07 THOUSAND/UL (ref 0–0.2)
IMM GRANULOCYTES NFR BLD AUTO: 1 % (ref 0–2)
LYMPHOCYTES # BLD AUTO: 1 THOUSANDS/ÂΜL (ref 0.6–4.47)
LYMPHOCYTES NFR BLD AUTO: 8 % (ref 14–44)
MCH RBC QN AUTO: 33.4 PG (ref 26.8–34.3)
MCHC RBC AUTO-ENTMCNC: 33.8 G/DL (ref 31.4–37.4)
MCV RBC AUTO: 99 FL (ref 82–98)
MONOCYTES # BLD AUTO: 0.66 THOUSAND/ÂΜL (ref 0.17–1.22)
MONOCYTES NFR BLD AUTO: 5 % (ref 4–12)
NEUTROPHILS # BLD AUTO: 10.98 THOUSANDS/ÂΜL (ref 1.85–7.62)
NEUTS SEG NFR BLD AUTO: 86 % (ref 43–75)
NRBC BLD AUTO-RTO: 0 /100 WBCS
PLATELET # BLD AUTO: 201 THOUSANDS/UL (ref 149–390)
PMV BLD AUTO: 9.8 FL (ref 8.9–12.7)
POTASSIUM SERPL-SCNC: 4.2 MMOL/L (ref 3.5–5.3)
RBC # BLD AUTO: 4.28 MILLION/UL (ref 3.88–5.62)
SODIUM SERPL-SCNC: 138 MMOL/L (ref 135–147)
WBC # BLD AUTO: 12.72 THOUSAND/UL (ref 4.31–10.16)

## 2024-06-21 PROCEDURE — 82948 REAGENT STRIP/BLOOD GLUCOSE: CPT

## 2024-06-21 PROCEDURE — 99024 POSTOP FOLLOW-UP VISIT: CPT | Performed by: NURSE PRACTITIONER

## 2024-06-21 PROCEDURE — 85025 COMPLETE CBC W/AUTO DIFF WBC: CPT

## 2024-06-21 PROCEDURE — 80048 BASIC METABOLIC PNL TOTAL CA: CPT

## 2024-06-21 PROCEDURE — 99024 POSTOP FOLLOW-UP VISIT: CPT | Performed by: SURGERY

## 2024-06-21 RX ORDER — OXYCODONE HYDROCHLORIDE 5 MG/1
2.5 TABLET ORAL EVERY 4 HOURS PRN
Qty: 20 TABLET | Refills: 0 | Status: SHIPPED | OUTPATIENT
Start: 2024-06-21 | End: 2024-07-01

## 2024-06-21 RX ADMIN — ACETAMINOPHEN 975 MG: 325 TABLET, FILM COATED ORAL at 05:05

## 2024-06-21 RX ADMIN — INSULIN LISPRO 2 UNITS: 100 INJECTION, SOLUTION INTRAVENOUS; SUBCUTANEOUS at 07:43

## 2024-06-21 RX ADMIN — SODIUM CHLORIDE, SODIUM GLUCONATE, SODIUM ACETATE, POTASSIUM CHLORIDE, MAGNESIUM CHLORIDE, SODIUM PHOSPHATE, DIBASIC, AND POTASSIUM PHOSPHATE 75 ML/HR: .53; .5; .37; .037; .03; .012; .00082 INJECTION, SOLUTION INTRAVENOUS at 04:58

## 2024-06-21 RX ADMIN — INSULIN LISPRO 2 UNITS: 100 INJECTION, SOLUTION INTRAVENOUS; SUBCUTANEOUS at 11:50

## 2024-06-21 RX ADMIN — ACETAMINOPHEN 975 MG: 325 TABLET, FILM COATED ORAL at 14:23

## 2024-06-21 RX ADMIN — HEPARIN SODIUM 5000 UNITS: 5000 INJECTION INTRAVENOUS; SUBCUTANEOUS at 05:05

## 2024-06-21 NOTE — PLAN OF CARE
Problem: PAIN - ADULT  Goal: Verbalizes/displays adequate comfort level or baseline comfort level  Description: Interventions:  - Encourage patient to monitor pain and request assistance  - Assess pain using appropriate pain scale  - Administer analgesics based on type and severity of pain and evaluate response  - Implement non-pharmacological measures as appropriate and evaluate response  - Consider cultural and social influences on pain and pain management  - Notify physician/advanced practitioner if interventions unsuccessful or patient reports new pain  Outcome: Progressing     Problem: INFECTION - ADULT  Goal: Absence or prevention of progression during hospitalization  Description: INTERVENTIONS:  - Assess and monitor for signs and symptoms of infection  - Monitor lab/diagnostic results  - Monitor all insertion sites, i.e. indwelling lines, tubes, and drains  - Monitor endotracheal if appropriate and nasal secretions for changes in amount and color  - [place] appropriate cooling/warming therapies per order  - Administer medications as ordered  - Instruct and encourage patient and family to use good hand hygiene technique  - Identify and instruct in appropriate isolation precautions for identified infection/condition  Outcome: Progressing  Goal: Absence of fever/infection during neutropenic period  Description: INTERVENTIONS:  - Monitor WBC    Outcome: Progressing

## 2024-06-21 NOTE — PROGRESS NOTES
"Progress Note - General Surgery   George Chavez 63 y.o. male MRN: [id number]  Unit/Bed#: [facility] 830-01 Encounter: [id number]    Assessment:  George Chavez is a 63 y.o. male patient who is POD1 for reversal of loop colostomy.    Plan:  - Continue CLD, advance as tolerated  - OOB  - PRN analgesic and antiemetic  - Possible discharge today    Subjective/Objective   Chief Complaint: \"No pain\"    Subjective: Patient appears to be doing well. Denies fever, chills, chest pain, shortness of breath, nausea, and vomiting. Patient has tolerated CLD well. Patient states that he would prefer advancing diet to solids, and feels comfortable and ready to do so. Patient denies bowel movements, but endorses passing flatus. Patient denies pain, no significant tenderness on exam.    Objective:   Blood pressure 129/76, pulse 82, temperature 98.1 °F (36.7 °C), temperature source Oral, resp. rate 12, height 6' 1\" (1.854 m), weight 99.8 kg (220 lb), SpO2 96%.,Body mass index is 29.03 kg/m².      Intake/Output Summary (Last 24 hours) at 6/21/2024 0412  Last data filed at 6/21/2024 0330  Gross per 24 hour   Intake 580 ml   Output 825 ml   Net -245 ml     Invasive Devices       Peripheral Intravenous Line  Duration             Peripheral IV 06/20/24 Left Hand <1 day              Drain  Duration             Ileostomy Loop  days                  Physical Exam: Abdomen: soft, non-tender; bowel sounds normal; no masses,  no organomegaly    Lab, Imaging and other studies:I have personally reviewed pertinent lab results.  , CBC:   Lab Results   Component Value Date     06/20/2024    MPV 9.7 06/20/2024   , CMP: No results found for: \"SODIUM\", \"K\", \"CL\", \"CO2\", \"ANIONGAP\", \"BUN\", \"CREATININE\", \"GLUCOSE\", \"CALCIUM\", \"AST\", \"ALT\", \"ALKPHOS\", \"PROT\", \"BILITOT\", \"EGFR\"  VTE Pharmacologic Prophylaxis: Heparin  VTE Mechanical Prophylaxis: sequential compression device  "

## 2024-06-21 NOTE — PLAN OF CARE
Problem: PAIN - ADULT  Goal: Verbalizes/displays adequate comfort level or baseline comfort level  Description: Interventions:  - Encourage patient to monitor pain and request assistance  - Assess pain using appropriate pain scale  - Administer analgesics based on type and severity of pain and evaluate response  - Implement non-pharmacological measures as appropriate and evaluate response  - Consider cultural and social influences on pain and pain management  - Notify physician/advanced practitioner if interventions unsuccessful or patient reports new pain  Outcome: Progressing     Problem: INFECTION - ADULT  Goal: Absence or prevention of progression during hospitalization  Description: INTERVENTIONS:  - Assess and monitor for signs and symptoms of infection  - Monitor lab/diagnostic results  - Monitor all insertion sites, i.e. indwelling lines, tubes, and drains  - Monitor endotracheal if appropriate and nasal secretions for changes in amount and color  - [place] appropriate cooling/warming therapies per order  - Administer medications as ordered  - Instruct and encourage patient and family to use good hand hygiene technique  - Identify and instruct in appropriate isolation precautions for identified infection/condition  Outcome: Progressing  Goal: Absence of fever/infection during neutropenic period  Description: INTERVENTIONS:  - Monitor WBC    Outcome: Progressing     Problem: SAFETY ADULT  Goal: Patient will remain free of falls  Description: INTERVENTIONS:  - Educate patient/family on patient safety including physical limitations  - Instruct patient to call for assistance with activity   - Consult OT/PT to assist with strengthening/mobility   - Keep Call bell within reach  - Keep bed low and locked with side rails adjusted as appropriate  - Keep care items and personal belongings within reach  - Initiate and maintain comfort rounds  - Make Fall Risk Sign visible to staff  - Obtain necessary fall risk  management equipment: walker  - Apply yellow socks and bracelet for high fall risk patients  - Consider moving patient to room near nurses station  Outcome: Progressing  Goal: Maintain or return to baseline ADL function  Description: INTERVENTIONS:  -  Assess patient's ability to carry out ADLs; assess patient's baseline for ADL function and identify physical deficits which impact ability to perform ADLs (bathing, care of mouth/teeth, toileting, grooming, dressing, etc.)  - Assess/evaluate cause of self-care deficits   - Assess range of motion  - Assess patient's mobility; develop plan if impaired  - Assess patient's need for assistive devices and provide as appropriate  - Encourage maximum independence but intervene and supervise when necessary  - Involve family in performance of ADLs  - Assess for home care needs following discharge   - Consider OT consult to assist with ADL evaluation and planning for discharge  - Provide patient education as appropriate  Outcome: Progressing  Goal: Maintains/Returns to pre admission functional level  Description: INTERVENTIONS:  - Perform AM-PAC 6 Click Basic Mobility/ Daily Activity assessment daily.  - Set and communicate daily mobility goal to care team and patient/family/caregiver.   - Collaborate with rehabilitation services on mobility goals if consulted  - Stand patient 3 times a day  - Ambulate patient 3 times a day  - Out of bed to chair 3 times a day   - Out of bed for meals 3 times a day  - Out of bed for toileting  - Record patient progress and toleration of activity level   Outcome: Progressing     Problem: DISCHARGE PLANNING  Goal: Discharge to home or other facility with appropriate resources  Description: INTERVENTIONS:  - Identify barriers to discharge w/patient and caregiver  - Arrange for needed discharge resources and transportation as appropriate  - Identify discharge learning needs (meds, wound care, etc.)  - Arrange for interpretive services to assist at  discharge as needed  - Refer to Case Management Department for coordinating discharge planning if the patient needs post-hospital services based on physician/advanced practitioner order or complex needs related to functional status, cognitive ability, or social support system  Outcome: Progressing     Problem: Knowledge Deficit  Goal: Patient/family/caregiver demonstrates understanding of disease process, treatment plan, medications, and discharge instructions  Description: Complete learning assessment and assess knowledge base.  Interventions:  - Provide teaching at level of understanding  - Provide teaching via preferred learning methods  Outcome: Progressing

## 2024-06-21 NOTE — DISCHARGE SUMMARY
"  Discharge Summary - George Chavez 63 y.o. male MRN: [id number]    [facility] Encounter: [id number]    Admission Date:   Admission Orders (From admission, onward)       Ordered        06/20/24 1548  Inpatient Admission  Once            Pending  Inpatient Admission  Once                            Admitting Diagnosis: Colostomy in place (HCC) [Z93.3]    HPI: per Dr. Contreras:  \"George Chavez is a 63 y.o. male who presents to discuss reversal of colostomy.  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  Is been doing fairly well at this point in time.     Pt  has a past medical history of Diabetes mellitus ([facility]).\"    Procedures Performed: No orders of the defined types were placed in this encounter.      Summary of Hospital Course:  62 y/o male brought back in for transverse loop colostomy.  Colostomy taken down and patient doing well.  Pain controlled, advised as to care of the OStomy site.  Wife also aware of dressings to ostomy site location.  He nick follow up with PCP, Also will follow up with Dr. Thurman in the office in 1-2 weeks.  For moredetails of his stay, please refer to medicatl records.  Prior to discharge I removed the packing withou any difficulty      Significant Findings, Care, Treatment and Services Provided: No results found.      Complications: none    Discharge Diagnosis: Transverse Loop Colostomy  Handsewn colonic anastomosis performed    Medical Problems       Resolved Problems  Date Reviewed: 7/26/2023   None         Condition at Discharge: stable         Discharge instructions/Information to patient and family:   See after visit summary for information provided to patient and family.      Provisions for Follow-Up Care:  See after visit summary for information related to follow-up care and any pertinent home health orders.      PCP: DAMIAN Crockett    Disposition: Home    Planned Readmission: No      Discharge Statement   I spent 20 " minutes discharging the patient. This time was spent on the day of discharge. I had direct contact with the patient on the day of discharge. Additional documentation is required if more than 30 minutes were spent on discharge.     Discharge Medications:  See after visit summary for reconciled discharge medications provided to patient and family.            DISPLAY PLAN FREE TEXT

## 2024-06-21 NOTE — QUICK NOTE
General Surgery Postop Check:    Procedure: Reversal of loop colostomy    Subjective: No acute distress.  Resting comfortably in bed.  Tolerating clear liquid diet.  Patient denies nausea, vomiting, fever, chills, shortness of breath, chest pain.  Has not yet voided or passed flatus.     Objective  Vitals:    06/20/24 1700 06/20/24 1805 06/20/24 1955 06/20/24 2109   BP: 154/84 131/76 128/72 127/72   Pulse: 66 84 79 84   Resp:   17 17   Temp:  98.2 °F (36.8 °C) 98.9 °F (37.2 °C) 98.4 °F (36.9 °C)   TempSrc:       SpO2:  95% 93% 94%   Weight:       Height:           GENERAL: No acute distress  CV: Regular rate  LUNGS: Nonlabored respirations on RA  ABDOMEN: Abdomen soft, appropriately tender, nondistended; serosanguinous drainage from previous colostomy site; dressings replaced  EXTREMITIES: No edema bilateral lower extremities     ---  Rima Shrestha MD  General Surgery PGY-I

## 2024-06-21 NOTE — CASE MANAGEMENT
Case Management Assessment & Discharge Planning Note    Patient name George Chavez  Location [facility] 830/[facility] 830-01 MRN [id number]  : 1960 Date 2024       Current Admission Date: 2024  Current Admission Diagnosis:Colostomy in place (HCC)   Patient Active Problem List    Diagnosis Date Noted Date Diagnosed    Colostomy in place (HCC) 2024     Mixed hyperlipidemia 2023     Urethral disorder 2023     Hyperglycemia 2023     DM (diabetes mellitus) (HCC) 2023     HTN (hypertension) 2023       LOS (days): 1  Geometric Mean LOS (GMLOS) (days): 2.9  Days to GMLOS:2     OBJECTIVE:    Risk of Unplanned Readmission Score: 9.53         Current admission status: Inpatient       Preferred Pharmacy:   [facility]/pharmacy #[id number] - [place] - [address] [facility]  [address] [place] [address]  Phone: [phone] Fax: [phone]    [facility] ([place]) - [place] - [address] [facility]  [address]  [place] [address]  Phone: [phone] Fax: [phone]    Primary Care Provider: DAMIAN Crockett    Primary Insurance: MEDICARE  Secondary Insurance: BLUE CROSS    ASSESSMENT:  Active Health Care Proxies    There are no active Health Care Proxies on file.       Readmission Root Cause  30 Day Readmission: No    Patient Information  Admitted from:: Home  Mental Status: Alert  During Assessment patient was accompanied by: Not accompanied during assessment  Assessment information provided by:: Patient  Primary Caregiver: Self  Support Systems: Self, Spouse/significant other  County of Residence: Other (specify in comment box[place]  Home entry access options. Select all that apply.: Stairs  Number of steps to enter home.: 3  Type of Current Residence: Trailer Home  Living Arrangements: Lives w/ Spouse/significant other  Is patient a ?: No    Activities of Daily Living Prior to Admission  Functional Status: Independent  Completes ADLs  independently?: Yes  Ambulates independently?: Yes  Does patient use assisted devices?: No  Does patient currently own DME?: Yes  What DME does the patient currently own?: Walker  Does patient have a history of Outpatient Therapy (PT/OT)?: No  Does the patient have a history of Short-Term Rehab?: No  Does patient have a history of HHC?: Yes (pt does not remember name)  Does patient currently have HHC?: No         Patient Information Continued  Income Source: Employed  Does patient have prescription coverage?: Yes  Does patient receive dialysis treatments?: No  Does patient have a history of substance abuse?: No  Does patient have a history of Mental Health Diagnosis?: No         Means of Transportation  Means of Transport to Appts:: Drives Self      Social Determinants of Health (SDOH)      Flowsheet Row Most Recent Value   Housing Stability    In the last 12 months, was there a time when you were not able to pay the mortgage or rent on time? N   In the past 12 months, how many times have you moved where you were living? 0   At any time in the past 12 months, were you homeless or living in a shelter (including now)? N   Transportation Needs    In the past 12 months, has lack of transportation kept you from medical appointments or from getting medications? no   In the past 12 months, has lack of transportation kept you from meetings, work, or from getting things needed for daily living? No   Food Insecurity    Within the past 12 months, you worried that your food would run out before you got the money to buy more. Never true   Within the past 12 months, the food you bought just didn't last and you didn't have money to get more. Never true   Utilities    In the past 12 months has the electric, gas, oil, or water company threatened to shut off services in your home? No            DISCHARGE DETAILS:    Discharge planning discussed with:: patient  Freedom of Choice: Yes     CM contacted family/caregiver?: No- see  comments  Were Treatment Team discharge recommendations reviewed with patient/caregiver?: Yes  Did patient/caregiver verbalize understanding of patient care needs?: Yes  Were patient/caregiver advised of the risks associated with not following Treatment Team discharge recommendations?: Yes    Contacts  Patient Contacts: Jerica Chavez (Spouse)  [phone]  Relationship to Patient:: Family  Contact Method: Phone  Phone Number: [phone]  Reason/Outcome: Discharge Planning      Additional Comments: CM met w/ pt at bedside to introduce self and role. Pt lives in mobile home w/ wife who is able to assist if needed. Pt states he ambulates/comlpetes ADLs independently. Pt has HX of HHC previously but does not remember name. Pt has no HX of D&A or MH DX. CM team to continue to follow.     CM reviewed d/c planning process including the following: identifying help at home, patient preference for d/c planning needs, Discharge Lounge, [facility] to [facility] program, availability of treatment team to discuss questions or concerns patient and/or family may have regarding understanding medications and recognizing signs and symptoms once discharged.  CM also encouraged patient to follow up with all recommended appointments after discharge. Patient advised of importance for patient and family to participate in managing patient’s medical well being.

## 2024-06-24 ENCOUNTER — TRANSITIONAL CARE MANAGEMENT (OUTPATIENT)
Dept: FAMILY MEDICINE CLINIC | Facility: CLINIC | Age: 64
End: 2024-06-24

## 2024-06-24 NOTE — PROGRESS NOTES
Called patient and phone rang three times and then nothing, tried again and the same. Will try later

## 2024-10-08 DIAGNOSIS — I10 PRIMARY HYPERTENSION: ICD-10-CM

## 2024-10-08 DIAGNOSIS — E11.628 TYPE 2 DIABETES MELLITUS WITH OTHER SKIN COMPLICATION, WITHOUT LONG-TERM CURRENT USE OF INSULIN (HCC): ICD-10-CM

## 2024-10-08 DIAGNOSIS — Z76.89 ENCOUNTER TO ESTABLISH CARE: ICD-10-CM

## 2024-10-09 RX ORDER — DULAGLUTIDE 0.75 MG/.5ML
0.75 INJECTION, SOLUTION SUBCUTANEOUS
Qty: 6 ML | Refills: 1 | Status: SHIPPED | OUTPATIENT
Start: 2024-10-09

## 2024-10-09 RX ORDER — LISINOPRIL 2.5 MG/1
2.5 TABLET ORAL DAILY
Qty: 90 TABLET | Refills: 1 | Status: SHIPPED | OUTPATIENT
Start: 2024-10-09

## 2024-10-31 DIAGNOSIS — E78.2 MIXED HYPERLIPIDEMIA: ICD-10-CM

## 2024-10-31 RX ORDER — ATORVASTATIN CALCIUM 20 MG/1
20 TABLET, FILM COATED ORAL DAILY
Qty: 90 TABLET | Refills: 1 | Status: SHIPPED | OUTPATIENT
Start: 2024-10-31

## 2024-11-14 ENCOUNTER — APPOINTMENT (OUTPATIENT)
Dept: LAB | Facility: CLINIC | Age: 64
End: 2024-11-14
Payer: MEDICARE

## 2024-12-17 ENCOUNTER — OFFICE VISIT (OUTPATIENT)
Dept: FAMILY MEDICINE CLINIC | Facility: CLINIC | Age: 64
End: 2024-12-17
Payer: MEDICARE

## 2024-12-17 VITALS
HEIGHT: 73 IN | SYSTOLIC BLOOD PRESSURE: 120 MMHG | HEART RATE: 74 BPM | WEIGHT: 224 LBS | OXYGEN SATURATION: 98 % | DIASTOLIC BLOOD PRESSURE: 72 MMHG | TEMPERATURE: 97.6 F | BODY MASS INDEX: 29.69 KG/M2

## 2024-12-17 DIAGNOSIS — Z12.2 ENCOUNTER FOR SCREENING FOR LUNG CANCER: ICD-10-CM

## 2024-12-17 DIAGNOSIS — Z87.891 PERSONAL HISTORY OF NICOTINE DEPENDENCE: ICD-10-CM

## 2024-12-17 DIAGNOSIS — E11.628 TYPE 2 DIABETES MELLITUS WITH OTHER SKIN COMPLICATION, WITHOUT LONG-TERM CURRENT USE OF INSULIN (HCC): Primary | ICD-10-CM

## 2024-12-17 DIAGNOSIS — E78.2 MIXED HYPERLIPIDEMIA: ICD-10-CM

## 2024-12-17 DIAGNOSIS — Z00.00 MEDICARE ANNUAL WELLNESS VISIT, INITIAL: ICD-10-CM

## 2024-12-17 DIAGNOSIS — Z12.11 SCREENING FOR COLON CANCER: ICD-10-CM

## 2024-12-17 PROBLEM — Z93.3 COLOSTOMY IN PLACE (HCC): Status: RESOLVED | Noted: 2024-05-07 | Resolved: 2024-12-17

## 2024-12-17 LAB
LEFT EYE DIABETIC RETINOPATHY: NORMAL
LEFT EYE IMAGE QUALITY: NORMAL
LEFT EYE MACULAR EDEMA: NORMAL
LEFT EYE OTHER RETINOPATHY: NORMAL
RIGHT EYE DIABETIC RETINOPATHY: NORMAL
RIGHT EYE IMAGE QUALITY: NORMAL
RIGHT EYE MACULAR EDEMA: NORMAL
RIGHT EYE OTHER RETINOPATHY: NORMAL
SEVERITY (EYE EXAM): NORMAL

## 2024-12-17 PROCEDURE — 92250 FUNDUS PHOTOGRAPHY W/I&R: CPT | Performed by: FAMILY MEDICINE

## 2024-12-17 PROCEDURE — G0438 PPPS, INITIAL VISIT: HCPCS | Performed by: FAMILY MEDICINE

## 2024-12-17 PROCEDURE — 99214 OFFICE O/P EST MOD 30 MIN: CPT | Performed by: FAMILY MEDICINE

## 2024-12-17 RX ORDER — DULAGLUTIDE 0.75 MG/.5ML
INJECTION, SOLUTION SUBCUTANEOUS
COMMUNITY
Start: 2024-10-11

## 2024-12-17 NOTE — ASSESSMENT & PLAN NOTE
Tolerating statin reviewed lipid profile continue present medications with dietary modifications  Orders:    Lipid Panel with Direct LDL reflex; Future

## 2024-12-17 NOTE — PROGRESS NOTES
Diabetic Foot Exam    Patient's shoes and socks removed.    Right Foot/Ankle   Right Foot Inspection  Skin Exam: skin normal and skin intact. No dry skin, no warmth, no callus, no erythema, no maceration, no abnormal color, no pre-ulcer, no ulcer and no callus.     Toe Exam: ROM and strength within normal limits.     Sensory   Monofilament testing: intact    Vascular  The right DP pulse is 2+.     Left Foot/Ankle  Left Foot Inspection  Skin Exam: skin normal and skin intact. No dry skin, no warmth, no erythema, no maceration, normal color, no pre-ulcer, no ulcer and no callus.     Toe Exam: ROM and strength within normal limits.     Sensory   Monofilament testing: intact    Vascular  The left DP pulse is 2+.     Assign Risk Category  No deformity present  No loss of protective sensation  No weak pulses  Risk: 0  Name: George Chavez      : 1960      MRN: [id number]  Encounter Provider: DAMIAN Crockett  Encounter Date: 2024   Encounter department: [address]    Assessment & Plan  Type 2 diabetes mellitus with other skin complication, without long-term current use of insulin (HCC)  Uncontrolled, continue Trulicity return to metformin 1000 mg p.o. twice daily stressed importance of dietary modifications  We discussed the importance of controlling blood glucose (hemoglobin A1c less than 7.0)Premeal , even better <110  2hr after a meal <170, even better <140  A1C <7%, even better <6.5%.  blood pressure control, and cholesterol to lower the risk for complications. Encouraged advise to check home blood sugars discussed goals in range of therapy. Reviewed recommendations of annual eye exams (ophthalmology) on long foot exam (Podiatry)  Lab Results   Component Value Date    HGBA1C 7.7 (H) 2024       Orders:    IRIS Diabetic eye exam    metFORMIN (GLUCOPHAGE) 1000 MG tablet; Take 1 tablet (1,000 mg total) by mouth 2 (two) times a day with  meals    Albumin / creatinine urine ratio; Future    Comprehensive metabolic panel; Future    Hemoglobin A1C; Future    Albumin / creatinine urine ratio; Future    Lipid Panel with Direct LDL reflex; Future    CBC and Platelet; Future    Screening for colon cancer  Discussed and stressed importance of early detection  Orders:    Cologuard    Encounter for screening for lung cancer  I discussed with him that he is a candidate for lung cancer CT screening.     The following Shared Decision-Making points were covered:  Benefits of screening were discussed, including the rates of reduction in death from lung cancer and other causes.  Harms of screening were reviewed, including false positive tests, radiation exposure levels, risks of invasive procedures, risks of complications of screening, and risk of overdiagnosis.  I counseled on the importance of adherence to annual lung cancer LDCT screening, impact of co-morbidities, and ability or willingness to undergo diagnosis and treatment.  I counseled on the importance of maintaining abstinence as a former smoker or was counseled on the importance of smoking cessation if a current smoker    Review of Eligibility Criteria: He meets all of the criteria for Lung Cancer Screening.   He is 64 y.o.   He has 20 pack year tobacco history and is a current smoker or has quit within the past 2 years  He presents no signs or symptoms of lung cancer    After discussion, the patient decided to elect lung cancer screening.    Orders:    CT lung screening program; Future    Personal history of nicotine dependence  Encourage continued smoking cessation  Orders:    CT lung screening program; Future    Medicare annual wellness visit, initial         Mixed hyperlipidemia  Tolerating statin reviewed lipid profile continue present medications with dietary modifications  Orders:    Lipid Panel with Direct LDL reflex; Future       Preventive health issues were discussed with patient, and age  appropriate screening tests were ordered as noted in patient's After Visit Summary. Personalized health advice and appropriate referrals for health education or preventive services given if needed, as noted in patient's After Visit Summary.    History of Present Illness     Diabetes has noted elevations of blood sugars at home,  Denies any polyuria polydipsia  Negative noted weight gain weight loss negative vision changes  Trulicity taking q. weekly had been off of metformin has since returned to metformin 500 mg   Cholesterol continues with statin denies any issues in regard to myalgias taken daily  Cancer screening has not completed colonoscopy lung CT in the past  Smoking hx 1pk q2 day x 50 yr, quit 2 years ago  Denies any chronic cough shortness of breath difficulty breathing negative history emphysema COPD       Patient Care Team:  DAMIAN Crockett as PCP - General (Family Medicine)    Review of Systems   Constitutional:  Negative for appetite change, chills, fever and unexpected weight change.   HENT:  Negative for congestion, dental problem, ear pain, hearing loss, postnasal drip, rhinorrhea, sinus pressure, sinus pain, sneezing, sore throat, tinnitus and voice change.    Eyes:  Negative for visual disturbance.   Respiratory:  Negative for apnea, cough, chest tightness and shortness of breath.    Cardiovascular:  Negative for chest pain, palpitations and leg swelling.   Gastrointestinal:  Negative for abdominal pain, blood in stool, constipation, diarrhea, nausea and vomiting.   Endocrine: Negative for cold intolerance, heat intolerance, polydipsia, polyphagia and polyuria.   Genitourinary:  Negative for decreased urine volume, difficulty urinating, dysuria, frequency and hematuria.   Musculoskeletal:  Negative for arthralgias, back pain, gait problem, joint swelling and myalgias.   Skin:  Negative for color change, rash and wound.   Allergic/Immunologic: Negative for environmental allergies and food  allergies.   Neurological:  Negative for dizziness, syncope, weakness, light-headedness, numbness and headaches.   Hematological:  Negative for adenopathy. Does not bruise/bleed easily.   Psychiatric/Behavioral:  Negative for sleep disturbance and suicidal ideas. The patient is not nervous/anxious.      Medical History Reviewed by provider this encounter:  Tobacco  Allergies  Meds  Problems  Med Hx  Surg Hx  Fam Hx       Annual Wellness Visit Questionnaire   George is here for his Subsequent Wellness visit. Last Medicare Wellness visit information reviewed, patient interviewed, no change since last AWV.     Health Risk Assessment:   Patient rates overall health as very good. Patient feels that their physical health rating is much better. Patient is very satisfied with their life. Eyesight was rated as same. Hearing was rated as same. Patient feels that their emotional and mental health rating is same. Patients states they are never, rarely angry. Patient states they are sometimes unusually tired/fatigued. Pain experienced in the last 7 days has been none. Patient states that he has experienced no weight loss or gain in last 6 months.     Depression Screening:   PHQ-2 Score: 0      Fall Risk Screening:   In the past year, patient has experienced: no history of falling in past year      Home Safety:  Patient does not have trouble with stairs inside or outside of their home. Patient has working smoke alarms and has working carbon monoxide detector. Home safety hazards include: none.     Nutrition:   Current diet is Diabetic.     Medications:   Patient is not currently taking any over-the-counter supplements. Patient is able to manage medications.     Activities of Daily Living (ADLs)/Instrumental Activities of Daily Living (IADLs):   Walk and transfer into and out of bed and chair?: Yes  Dress and groom yourself?: Yes    Bathe or shower yourself?: Yes    Feed yourself? Yes  Do your laundry/housekeeping?:  Yes  Manage your money, pay your bills and track your expenses?: Yes  Make your own meals?: Yes    Do your own shopping?: Yes    Previous Hospitalizations:   Any hospitalizations or ED visits within the last 12 months?: Yes    How many hospitalizations have you had in the last year?: 1-2    Advance Care Planning:   Living will: No      Cognitive Screening:   Provider or family/friend/caregiver concerned regarding cognition?: No    PREVENTIVE SCREENINGS      Cardiovascular Screening:    General: Screening Not Indicated and History Lipid Disorder      Diabetes Screening:     General: Screening Not Indicated and History Diabetes      Prostate Cancer Screening:    General: Screening Current      Osteoporosis Screening:    General: Screening Not Indicated      Abdominal Aortic Aneurysm (AAA) Screening:    Risk factors include: age between 65-76 yo and tobacco use      Due for: Screening AAA Ultrasound      Lung Cancer Screening:     General: Risks and Benefits Discussed    Due for: Low Dose CT (LDCT)      Hepatitis C Screening:    General: Screening Not Indicated    Screening, Brief Intervention, and Referral to Treatment (SBIRT)    Screening  Typical number of drinks in a day: 0  Typical number of drinks in a week: 0  Interpretation: Low risk drinking behavior.    Social Drivers of Health     Food Insecurity: No Food Insecurity (12/17/2024)    Hunger Vital Sign     Worried About Running Out of Food in the Last Year: Never true     Ran Out of Food in the Last Year: Never true   Transportation Needs: No Transportation Needs (12/17/2024)    PRAPARE - Transportation     Lack of Transportation (Medical): No     Lack of Transportation (Non-Medical): No   Housing Stability: Low Risk  (12/17/2024)    Housing Stability Vital Sign     Unable to Pay for Housing in the Last Year: No     Number of Times Moved in the Last Year: 0     Homeless in the Last Year: No   Utilities: Not At Risk (12/17/2024)    [facility] Utilities     Threatened  "with loss of utilities: No     No results found.    Objective   /72   Pulse 74   Temp 97.6 °F (36.4 °C)   Ht 6' 1\" (1.854 m)   Wt 102 kg (224 lb)   SpO2 98%   BMI 29.55 kg/m²     Physical Exam  Constitutional:       General: He is not in acute distress.     Appearance: He is well-developed. He is not ill-appearing or toxic-appearing.   HENT:      Head: Normocephalic and atraumatic.   Eyes:      General: No scleral icterus.     Conjunctiva/sclera: Conjunctivae normal.   Neck:      Vascular: No carotid bruit.   Cardiovascular:      Rate and Rhythm: Normal rate and regular rhythm.      Pulses: no weak pulses.           Dorsalis pedis pulses are 2+ on the right side and 2+ on the left side.      Heart sounds: Normal heart sounds.   Pulmonary:      Effort: Pulmonary effort is normal.      Breath sounds: Normal breath sounds.   Musculoskeletal:         General: Normal range of motion.      Cervical back: Normal range of motion and neck supple.      Right lower leg: No edema.      Left lower leg: No edema.   Feet:      Right foot:      Skin integrity: No ulcer, skin breakdown, erythema, warmth, callus or dry skin.      Left foot:      Skin integrity: No ulcer, skin breakdown, erythema, warmth, callus or dry skin.   Lymphadenopathy:      Cervical: No cervical adenopathy.   Skin:     General: Skin is warm and dry.   Neurological:      General: No focal deficit present.      Mental Status: He is alert and oriented to person, place, and time.      Deep Tendon Reflexes: Reflexes are normal and symmetric.   Psychiatric:         Behavior: Behavior normal.         Thought Content: Thought content normal.         Judgment: Judgment normal.         "

## 2024-12-17 NOTE — ASSESSMENT & PLAN NOTE
Uncontrolled, continue Trulicity return to metformin 1000 mg p.o. twice daily stressed importance of dietary modifications  We discussed the importance of controlling blood glucose (hemoglobin A1c less than 7.0)Premeal , even better <110  2hr after a meal <170, even better <140  A1C <7%, even better <6.5%.  blood pressure control, and cholesterol to lower the risk for complications. Encouraged advise to check home blood sugars discussed goals in range of therapy. Reviewed recommendations of annual eye exams (ophthalmology) on long foot exam (Podiatry)  Lab Results   Component Value Date    HGBA1C 7.7 (H) 11/14/2024       Orders:    IRIS Diabetic eye exam    metFORMIN (GLUCOPHAGE) 1000 MG tablet; Take 1 tablet (1,000 mg total) by mouth 2 (two) times a day with meals    Albumin / creatinine urine ratio; Future    Comprehensive metabolic panel; Future    Hemoglobin A1C; Future    Albumin / creatinine urine ratio; Future    Lipid Panel with Direct LDL reflex; Future    CBC and Platelet; Future

## 2025-01-11 LAB — COLOGUARD RESULT REPORTABLE: POSITIVE

## 2025-01-13 ENCOUNTER — RESULTS FOLLOW-UP (OUTPATIENT)
Dept: FAMILY MEDICINE CLINIC | Facility: CLINIC | Age: 65
End: 2025-01-13

## 2025-01-13 DIAGNOSIS — R19.5 POSITIVE COLORECTAL CANCER SCREENING USING COLOGUARD TEST: Primary | ICD-10-CM

## 2025-01-23 DIAGNOSIS — Z76.89 ENCOUNTER TO ESTABLISH CARE: ICD-10-CM

## 2025-01-23 DIAGNOSIS — I10 PRIMARY HYPERTENSION: ICD-10-CM

## 2025-01-23 RX ORDER — LISINOPRIL 2.5 MG/1
2.5 TABLET ORAL DAILY
Qty: 90 TABLET | Refills: 1 | Status: SHIPPED | OUTPATIENT
Start: 2025-01-23

## 2025-04-03 ENCOUNTER — APPOINTMENT (OUTPATIENT)
Dept: LAB | Facility: CLINIC | Age: 65
End: 2025-04-03
Payer: MEDICARE

## 2025-04-03 DIAGNOSIS — E11.628 TYPE 2 DIABETES MELLITUS WITH OTHER SKIN COMPLICATION, WITHOUT LONG-TERM CURRENT USE OF INSULIN (HCC): ICD-10-CM

## 2025-04-03 DIAGNOSIS — E78.2 MIXED HYPERLIPIDEMIA: ICD-10-CM

## 2025-04-03 LAB
ALBUMIN SERPL BCG-MCNC: 4.3 G/DL (ref 3.5–5)
ALP SERPL-CCNC: 86 U/L (ref 34–104)
ALT SERPL W P-5'-P-CCNC: 26 U/L (ref 7–52)
ANION GAP SERPL CALCULATED.3IONS-SCNC: 8 MMOL/L (ref 4–13)
AST SERPL W P-5'-P-CCNC: 20 U/L (ref 13–39)
BILIRUB SERPL-MCNC: 0.59 MG/DL (ref 0.2–1)
BUN SERPL-MCNC: 12 MG/DL (ref 5–25)
CALCIUM SERPL-MCNC: 9.4 MG/DL (ref 8.4–10.2)
CHLORIDE SERPL-SCNC: 105 MMOL/L (ref 96–108)
CHOLEST SERPL-MCNC: 137 MG/DL (ref ?–200)
CO2 SERPL-SCNC: 27 MMOL/L (ref 21–32)
CREAT SERPL-MCNC: 0.72 MG/DL (ref 0.6–1.3)
CREAT UR-MCNC: 171.1 MG/DL
ERYTHROCYTE [DISTWIDTH] IN BLOOD BY AUTOMATED COUNT: 12.6 % (ref 11.6–15.1)
EST. AVERAGE GLUCOSE BLD GHB EST-MCNC: 189 MG/DL
GFR SERPL CREATININE-BSD FRML MDRD: 97 ML/MIN/1.73SQ M
GLUCOSE P FAST SERPL-MCNC: 165 MG/DL (ref 65–99)
HBA1C MFR BLD: 8.2 %
HCT VFR BLD AUTO: 42.7 % (ref 36.5–49.3)
HDLC SERPL-MCNC: 53 MG/DL
HGB BLD-MCNC: 14.3 G/DL (ref 12–17)
LDLC SERPL CALC-MCNC: 74 MG/DL (ref 0–100)
MCH RBC QN AUTO: 33.2 PG (ref 26.8–34.3)
MCHC RBC AUTO-ENTMCNC: 33.5 G/DL (ref 31.4–37.4)
MCV RBC AUTO: 99 FL (ref 82–98)
MICROALBUMIN UR-MCNC: 28.1 MG/L
MICROALBUMIN/CREAT 24H UR: 16 MG/G CREATININE (ref 0–30)
PLATELET # BLD AUTO: 235 THOUSANDS/UL (ref 149–390)
PMV BLD AUTO: 9.6 FL (ref 8.9–12.7)
POTASSIUM SERPL-SCNC: 4.3 MMOL/L (ref 3.5–5.3)
PROT SERPL-MCNC: 7.2 G/DL (ref 6.4–8.4)
RBC # BLD AUTO: 4.31 MILLION/UL (ref 3.88–5.62)
SODIUM SERPL-SCNC: 140 MMOL/L (ref 135–147)
TRIGL SERPL-MCNC: 49 MG/DL (ref ?–150)
WBC # BLD AUTO: 7.11 THOUSAND/UL (ref 4.31–10.16)

## 2025-04-03 PROCEDURE — 82570 ASSAY OF URINE CREATININE: CPT

## 2025-04-03 PROCEDURE — 82043 UR ALBUMIN QUANTITATIVE: CPT

## 2025-04-03 PROCEDURE — 80061 LIPID PANEL: CPT

## 2025-04-03 PROCEDURE — 85027 COMPLETE CBC AUTOMATED: CPT

## 2025-04-03 PROCEDURE — 36415 COLL VENOUS BLD VENIPUNCTURE: CPT

## 2025-04-03 PROCEDURE — 83036 HEMOGLOBIN GLYCOSYLATED A1C: CPT

## 2025-04-03 PROCEDURE — 80053 COMPREHEN METABOLIC PANEL: CPT

## 2025-04-08 ENCOUNTER — OFFICE VISIT (OUTPATIENT)
Dept: FAMILY MEDICINE CLINIC | Facility: CLINIC | Age: 65
End: 2025-04-08
Payer: MEDICARE

## 2025-04-08 VITALS
WEIGHT: 224 LBS | SYSTOLIC BLOOD PRESSURE: 122 MMHG | HEART RATE: 69 BPM | HEIGHT: 73 IN | DIASTOLIC BLOOD PRESSURE: 80 MMHG | OXYGEN SATURATION: 99 % | BODY MASS INDEX: 29.69 KG/M2

## 2025-04-08 DIAGNOSIS — I10 PRIMARY HYPERTENSION: ICD-10-CM

## 2025-04-08 DIAGNOSIS — E78.2 MIXED HYPERLIPIDEMIA: ICD-10-CM

## 2025-04-08 DIAGNOSIS — E11.628 TYPE 2 DIABETES MELLITUS WITH OTHER SKIN COMPLICATION, WITHOUT LONG-TERM CURRENT USE OF INSULIN (HCC): Primary | ICD-10-CM

## 2025-04-08 PROCEDURE — 99214 OFFICE O/P EST MOD 30 MIN: CPT | Performed by: FAMILY MEDICINE

## 2025-04-08 PROCEDURE — G2211 COMPLEX E/M VISIT ADD ON: HCPCS | Performed by: FAMILY MEDICINE

## 2025-04-08 RX ORDER — LANCETS 33 GAUGE
EACH MISCELLANEOUS
Qty: 200 EACH | Refills: 3 | Status: SHIPPED | OUTPATIENT
Start: 2025-04-08

## 2025-04-08 RX ORDER — DULAGLUTIDE 1.5 MG/.5ML
1.5 INJECTION, SOLUTION SUBCUTANEOUS WEEKLY
Qty: 6 ML | Refills: 1 | Status: SHIPPED | OUTPATIENT
Start: 2025-04-08

## 2025-04-08 RX ORDER — BLOOD-GLUCOSE METER
KIT MISCELLANEOUS
Qty: 1 KIT | Refills: 0 | Status: SHIPPED | OUTPATIENT
Start: 2025-04-08

## 2025-04-08 RX ORDER — BLOOD SUGAR DIAGNOSTIC
STRIP MISCELLANEOUS
Qty: 200 EACH | Refills: 3 | Status: SHIPPED | OUTPATIENT
Start: 2025-04-08

## 2025-04-08 NOTE — ASSESSMENT & PLAN NOTE
Uncontrolled, not at goal, will titrate Trulicity up to maximize, stressed importance of proper fluid intake, Rx for new glucometer  We discussed the importance of controlling blood glucose (hemoglobin A1c less than 7.0)Premeal , even better <110  2hr after a meal <170, even better <140  A1C <7%, even better <6.5%.  blood pressure control, and cholesterol to lower the risk for complications. Encouraged advise to check home blood sugars discussed goals in range of therapy. Reviewed recommendations of annual eye exams (ophthalmology) on long foot exam (Podiatry)  Lab Results   Component Value Date    HGBA1C 8.2 (H) 04/03/2025       Orders:    Blood Glucose Monitoring Suppl (OneTouch Verio Reflect) w/Device KIT; Check blood sugars twice daily. Please substitute with appropriate alternative as covered by patient's insurance. Dx: E11.65    glucose blood (OneTouch Verio) test strip; Check blood sugars twice daily. Please substitute with appropriate alternative as covered by patient's insurance. Dx: E11.65    OneTouch Delica Lancets 33G MISC; Check blood sugars twice daily. Please substitute with appropriate alternative as covered by patient's insurance. Dx: E11.65    Dulaglutide (Trulicity) 1.5 MG/0.5ML SOAJ; Inject 1.5 mg under the skin once a week    Albumin / creatinine urine ratio; Future    Comprehensive metabolic panel; Future    Hemoglobin A1C; Future

## 2025-04-08 NOTE — PROGRESS NOTES
Name: George Chavez      : 1960      MRN: [id number]  Encounter Provider: DAMIAN Crockett  Encounter Date: 2025   Encounter department: [facility] [address]  :  Assessment & Plan  Type 2 diabetes mellitus with other skin complication, without long-term current use of insulin (HCC)  Uncontrolled, not at goal, will titrate Trulicity up to maximize, stressed importance of proper fluid intake, Rx for new glucometer  We discussed the importance of controlling blood glucose (hemoglobin A1c less than 7.0)Premeal , even better <110  2hr after a meal <170, even better <140  A1C <7%, even better <6.5%.  blood pressure control, and cholesterol to lower the risk for complications. Encouraged advise to check home blood sugars discussed goals in range of therapy. Reviewed recommendations of annual eye exams (ophthalmology) on long foot exam (Podiatry)  Lab Results   Component Value Date    HGBA1C 8.2 (H) 2025       Orders:    Blood Glucose Monitoring Suppl (OneTouch Verio Reflect) w/Device KIT; Check blood sugars twice daily. Please substitute with appropriate alternative as covered by patient's insurance. Dx: E11.65    glucose blood (OneTouch Verio) test strip; Check blood sugars twice daily. Please substitute with appropriate alternative as covered by patient's insurance. Dx: E11.65    OneTouch Delica Lancets 33G MISC; Check blood sugars twice daily. Please substitute with appropriate alternative as covered by patient's insurance. Dx: E11.65    Dulaglutide (Trulicity) 1.5 MG/0.5ML SOAJ; Inject 1.5 mg under the skin once a week    Albumin / creatinine urine ratio; Future    Comprehensive metabolic panel; Future    Hemoglobin A1C; Future    Primary hypertension  Stable within    Current care       Mixed hyperlipidemia  E eating statin reviewed lipid profile continued along with dietary management              History of Present Illness   Get follow-up on  "labs, last seen ?  Lost to follow-up  Denies any current issues or concerns  Has been on Trulicity tolerating well has not titrated  Denies any issues in regards to side effect profile constipation  Continues with metformin  Negative hypoglycemic episodes polyuria polydipsia negative mid  Cholesterol statin negative myalgias        Review of Systems   Constitutional:  Negative for appetite change, chills, fever and unexpected weight change.   HENT:  Negative for congestion, dental problem, ear pain, hearing loss, postnasal drip, rhinorrhea, sinus pressure, sinus pain, sneezing, sore throat, tinnitus and voice change.    Eyes:  Negative for visual disturbance.   Respiratory:  Negative for apnea, cough, chest tightness and shortness of breath.    Cardiovascular:  Negative for chest pain, palpitations and leg swelling.   Gastrointestinal:  Negative for abdominal pain, blood in stool, constipation, diarrhea, nausea and vomiting.   Endocrine: Negative for cold intolerance, heat intolerance, polydipsia, polyphagia and polyuria.   Genitourinary:  Negative for decreased urine volume, difficulty urinating, dysuria, frequency and hematuria.   Musculoskeletal:  Negative for arthralgias, back pain, gait problem, joint swelling and myalgias.   Skin:  Negative for color change, rash and wound.   Allergic/Immunologic: Negative for environmental allergies and food allergies.   Neurological:  Negative for dizziness, syncope, weakness, light-headedness, numbness and headaches.   Hematological:  Negative for adenopathy. Does not bruise/bleed easily.   Psychiatric/Behavioral:  Negative for sleep disturbance and suicidal ideas. The patient is not nervous/anxious.        Objective   /80   Pulse 69   Ht 6' 1\" (1.854 m)   Wt 102 kg (224 lb)   SpO2 99%   BMI 29.55 kg/m²      Physical Exam  Constitutional:       General: He is not in acute distress.     Appearance: He is well-developed. He is not ill-appearing or toxic-appearing. "   HENT:      Head: Normocephalic and atraumatic.   Neck:      Vascular: No carotid bruit.   Cardiovascular:      Rate and Rhythm: Normal rate and regular rhythm.      Heart sounds: Normal heart sounds.   Pulmonary:      Effort: Pulmonary effort is normal.      Breath sounds: Normal breath sounds.   Musculoskeletal:         General: Normal range of motion.      Cervical back: Normal range of motion and neck supple.   Lymphadenopathy:      Cervical: No cervical adenopathy.   Skin:     General: Skin is warm and dry.   Neurological:      Mental Status: He is alert and oriented to person, place, and time.      Deep Tendon Reflexes: Reflexes are normal and symmetric.   Psychiatric:         Behavior: Behavior normal.         Thought Content: Thought content normal.         Judgment: Judgment normal.

## 2025-04-16 DIAGNOSIS — Z76.89 PERSONS ENCOUNTERING HEALTH SERVICES IN OTHER SPECIFIED CIRCUMSTANCES: ICD-10-CM

## 2025-04-16 DIAGNOSIS — E11.628 TYPE 2 DIABETES MELLITUS WITH OTHER SKIN COMPLICATIONS (HCC): ICD-10-CM

## 2025-04-17 RX ORDER — DULAGLUTIDE 0.75 MG/.5ML
INJECTION, SOLUTION SUBCUTANEOUS
Refills: 1 | OUTPATIENT
Start: 2025-04-17

## 2025-06-26 DIAGNOSIS — E11.628 TYPE 2 DIABETES MELLITUS WITH OTHER SKIN COMPLICATION, WITHOUT LONG-TERM CURRENT USE OF INSULIN (HCC): ICD-10-CM

## 2025-07-01 ENCOUNTER — APPOINTMENT (OUTPATIENT)
Dept: LAB | Facility: CLINIC | Age: 65
End: 2025-07-01
Payer: MEDICARE

## 2025-07-01 DIAGNOSIS — E11.628 TYPE 2 DIABETES MELLITUS WITH OTHER SKIN COMPLICATION, WITHOUT LONG-TERM CURRENT USE OF INSULIN (HCC): ICD-10-CM

## 2025-07-01 LAB
ALBUMIN SERPL BCG-MCNC: 4.2 G/DL (ref 3.5–5)
ALP SERPL-CCNC: 78 U/L (ref 34–104)
ALT SERPL W P-5'-P-CCNC: 22 U/L (ref 7–52)
ANION GAP SERPL CALCULATED.3IONS-SCNC: 4 MMOL/L (ref 4–13)
AST SERPL W P-5'-P-CCNC: 23 U/L (ref 13–39)
BACTERIA UR QL AUTO: ABNORMAL /HPF
BILIRUB SERPL-MCNC: 0.54 MG/DL (ref 0.2–1)
BILIRUB UR QL STRIP: NEGATIVE
BUN SERPL-MCNC: 12 MG/DL (ref 5–25)
CALCIUM SERPL-MCNC: 9.1 MG/DL (ref 8.4–10.2)
CHLORIDE SERPL-SCNC: 105 MMOL/L (ref 96–108)
CLARITY UR: CLEAR
CO2 SERPL-SCNC: 29 MMOL/L (ref 21–32)
COLOR UR: YELLOW
CREAT SERPL-MCNC: 0.73 MG/DL (ref 0.6–1.3)
CREAT UR-MCNC: 166.6 MG/DL
EST. AVERAGE GLUCOSE BLD GHB EST-MCNC: 151 MG/DL
GFR SERPL CREATININE-BSD FRML MDRD: 96 ML/MIN/1.73SQ M
GLUCOSE P FAST SERPL-MCNC: 132 MG/DL (ref 65–99)
GLUCOSE UR STRIP-MCNC: NEGATIVE MG/DL
HBA1C MFR BLD: 6.9 %
HGB UR QL STRIP.AUTO: NEGATIVE
KETONES UR STRIP-MCNC: NEGATIVE MG/DL
LEUKOCYTE ESTERASE UR QL STRIP: NEGATIVE
MICROALBUMIN UR-MCNC: 25.1 MG/L
MICROALBUMIN/CREAT 24H UR: 15 MG/G CREATININE (ref 0–30)
MUCOUS THREADS UR QL AUTO: ABNORMAL
NITRITE UR QL STRIP: NEGATIVE
NON-SQ EPI CELLS URNS QL MICRO: ABNORMAL /HPF
PH UR STRIP.AUTO: 5.5 [PH]
POTASSIUM SERPL-SCNC: 4.1 MMOL/L (ref 3.5–5.3)
PROT SERPL-MCNC: 7 G/DL (ref 6.4–8.4)
PROT UR STRIP-MCNC: ABNORMAL MG/DL
RBC #/AREA URNS AUTO: ABNORMAL /HPF
SODIUM SERPL-SCNC: 138 MMOL/L (ref 135–147)
SP GR UR STRIP.AUTO: 1.03 (ref 1–1.03)
UROBILINOGEN UR STRIP-ACNC: <2 MG/DL
WBC #/AREA URNS AUTO: ABNORMAL /HPF

## 2025-07-01 PROCEDURE — 83036 HEMOGLOBIN GLYCOSYLATED A1C: CPT

## 2025-07-01 PROCEDURE — 80053 COMPREHEN METABOLIC PANEL: CPT

## 2025-07-01 PROCEDURE — 36415 COLL VENOUS BLD VENIPUNCTURE: CPT

## (undated) DEVICE — GUIDEWIRE ANGLED TIP 0.035 IN SOLO PLUS

## (undated) DEVICE — HANDPIECE SET WITH RETRACTABLE COAXIAL FAN SPRAY TIP AND SUCTION TUBE: Brand: INTERPULSE

## (undated) DEVICE — GLOVE INDICATOR PI UNDERGLOVE SZ 8 BLUE

## (undated) DEVICE — 60 ML SYRINGE,TOOMEY TYPE: Brand: MONOJECT

## (undated) DEVICE — DERMATOME BLADES: Brand: DERMATOME

## (undated) DEVICE — GLOVE INDICATOR PI UNDERGLOVE SZ 7 BLUE

## (undated) DEVICE — SCD SEQUENTIAL COMPRESSION COMFORT SLEEVE MEDIUM KNEE LENGTH: Brand: KENDALL SCD

## (undated) DEVICE — CURITY NON-ADHERENT STRIPS: Brand: CURITY

## (undated) DEVICE — SYRINGE 50ML LL

## (undated) DEVICE — ABDOMINAL PAD: Brand: DERMACEA

## (undated) DEVICE — INTENDED FOR TISSUE SEPARATION, AND OTHER PROCEDURES THAT REQUIRE A SHARP SURGICAL BLADE TO PUNCTURE OR CUT.: Brand: BARD-PARKER SAFETY BLADES SIZE 11, STERILE

## (undated) DEVICE — DRAPE EQUIPMENT RF WAND

## (undated) DEVICE — BETHLEHEM UNIVERSAL OUTPATIENT: Brand: CARDINAL HEALTH

## (undated) DEVICE — SUT VICRYL PLUS 0 CTB-1 27 IN VCPB260H

## (undated) DEVICE — SUT VICRYL 2-0 REEL 54 IN J286G

## (undated) DEVICE — DRESSING PRISMA MATRIX 19.1IN X 19.1IN

## (undated) DEVICE — CATH FOLEY 14FR 5ML 2 WAY UNCOATED SILICONE

## (undated) DEVICE — GLOVE SRG BIOGEL 6.5

## (undated) DEVICE — CYSTO TUBING SINGLE IRRIGATION

## (undated) DEVICE — 3000CC GUARDIAN II: Brand: GUARDIAN

## (undated) DEVICE — 4-PORT MANIFOLD: Brand: NEPTUNE 2

## (undated) DEVICE — 3 TO 1 DERMACARRIER: Brand: MESHGRAFTTM II TISSUE EXPANSION SYSTEM

## (undated) DEVICE — INVIEW CLEAR LEGGINGS: Brand: CONVERTORS

## (undated) DEVICE — ACE WRAP 6 IN STERILE

## (undated) DEVICE — TROCAR: Brand: KII® SLEEVE

## (undated) DEVICE — PACK UNIVERSAL DRAPES SUB-Q ICD

## (undated) DEVICE — INTENDED FOR TISSUE SEPARATION, AND OTHER PROCEDURES THAT REQUIRE A SHARP SURGICAL BLADE TO PUNCTURE OR CUT.: Brand: BARD-PARKER SAFETY BLADES SIZE 10, STERILE

## (undated) DEVICE — ZIMMER SKIN GRAFT CARRIER 8 INCH LENGTH: Brand: DERMACARRIERS

## (undated) DEVICE — TUBING SUCTION 5MM X 12 FT

## (undated) DEVICE — SPONGE LAP 18 X 18 IN STRL RFD

## (undated) DEVICE — GLOVE SRG LF STRL BGL SKNSNS 6.5 PF

## (undated) DEVICE — DRAPE SHEET THREE QUARTER

## (undated) DEVICE — DRESSING XEROFORM 5 X 9

## (undated) DEVICE — ADHESIVE SKIN HIGH VISCOSITY EXOFIN 1ML

## (undated) DEVICE — BETHLEHEM UNIV MAJOR ORTHO,KIT: Brand: CARDINAL HEALTH

## (undated) DEVICE — SPONGE LAP 18 X 18 IN

## (undated) DEVICE — MEDI-VAC NON-CONDUCTIVE SUCTION TUBING 6MM X 1.8M (6FT.) L: Brand: CARDINAL HEALTH

## (undated) DEVICE — UNDER BUTTOCKS DRAPE W/FLUID CONTROL POUCH: Brand: CONVERTORS

## (undated) DEVICE — PENROSE DRAIN, 18 X 3 8: Brand: CARDINAL HEALTH

## (undated) DEVICE — CATH SUPRAPUBIC PEELAWAY SET  20FR X 12CM

## (undated) DEVICE — INTENDED FOR TISSUE SEPARATION, AND OTHER PROCEDURES THAT REQUIRE A SHARP SURGICAL BLADE TO PUNCTURE OR CUT.: Brand: BARD-PARKER SAFETY BLADES SIZE 15, STERILE

## (undated) DEVICE — KERLIX BANDAGE ROLL: Brand: KERLIX

## (undated) DEVICE — ANTIBACTERIAL UNDYED BRAIDED (POLYGLACTIN 910), SYNTHETIC ABSORBABLE SUTURE: Brand: COATED VICRYL

## (undated) DEVICE — SPONGE STICK WITH PVP-I: Brand: KENDALL

## (undated) DEVICE — BETHLEHEM UNIVERSAL MINOR GEN: Brand: CARDINAL HEALTH

## (undated) DEVICE — X-RAY DETECTABLE SPONGES,16 PLY: Brand: VISTEC

## (undated) DEVICE — SYRINGE 10ML LL

## (undated) DEVICE — MEDI-VAC YANK SUCT HNDL W/TPRD BULBOUS TIP: Brand: CARDINAL HEALTH

## (undated) DEVICE — 3M™ TEGADERM™ TRANSPARENT FILM DRESSING FRAME STYLE, 1628, 6 IN X 8 IN (15 CM X 20 CM), 10/CT 8CT/CASE: Brand: 3M™ TEGADERM™

## (undated) DEVICE — SOFT SILICONE HYDROCELLULAR SACRUM DRESSING WITH LOCK AWAY LAYER: Brand: ALLEVYN LIFE SACRUM (LARGE) PACK OF 10

## (undated) DEVICE — 3M™ IOBAN™ 2 ANTIMICROBIAL INCISE DRAPE 6650EZ: Brand: IOBAN™ 2

## (undated) DEVICE — SUT SILK 0 SH 30 IN K834H

## (undated) DEVICE — PROXIMATE SKIN STAPLERS (35 WIDE) CONTAINS 35 STAINLESS STEEL STAPLES (FIXED HEAD): Brand: PROXIMATE

## (undated) DEVICE — SUT PDS II 4-0 PS-2 18 IN Z496G

## (undated) DEVICE — SUT ETHILON 2-0 PS 18 IN 585H

## (undated) DEVICE — STERILE CYSTO PACK: Brand: CARDINAL HEALTH

## (undated) DEVICE — SPONGE PACKING 4.5 X 22 IN STRL X-RAY DETECT

## (undated) DEVICE — PLUMEPEN PRO 10FT

## (undated) DEVICE — CHLORAPREP HI-LITE 26ML ORANGE

## (undated) DEVICE — PROXIMATE PLUS MD MULTI-DIRECTIONAL RELEASE SKIN STAPLERS CONTAINS 35 STAINLESS STEEL STAPLES APPROXIMATE CLOSED DIMENSIONS: 6.9MM X 3.9MM WIDE: Brand: PROXIMATE

## (undated) DEVICE — GAUZE SPONGES,16 PLY: Brand: CURITY

## (undated) DEVICE — LOOP OSTOMY BRIDGE - STERILE: Brand: HOLLISTER

## (undated) DEVICE — VAC GRANUFOAM DRESSING X-LARGE FEATURING SENSATRAC TECHNOLOGY: Brand: V.A.C.® GRANUFOAM™

## (undated) DEVICE — STRETCH BANDAGE: Brand: CURITY

## (undated) DEVICE — SUT VICRYL PLUS 0 UR-6 27IN VCP603H

## (undated) DEVICE — GLOVE SRG BIOGEL 8

## (undated) DEVICE — BETHLEHEM MAJOR GENERAL PACK: Brand: CARDINAL HEALTH

## (undated) DEVICE — GLOVE SRG BIOGEL ORTHOPEDIC 7.5

## (undated) DEVICE — BAG URINE DRAINAGE URIMETER 350ML LF

## (undated) DEVICE — TROCAR: Brand: KII FIOS FIRST ENTRY

## (undated) DEVICE — GLOVE SRG BIOGEL 7

## (undated) DEVICE — GLOVE INDICATOR UNDERGLOVE SZ 6 BLUE

## (undated) DEVICE — SUT SILK 3-0 18 IN A184H

## (undated) DEVICE — GLOVE INDICATOR PI UNDERGLOVE SZ 7.5 BLUE

## (undated) DEVICE — VAC WHITEFOAM DRESSING LARGE: Brand: V.A.C. WHITEFOAM™

## (undated) DEVICE — DRESSING OPTILOCK 6.5 X 10IN

## (undated) DEVICE — PREMIUM DRY TRAY LF: Brand: MEDLINE INDUSTRIES, INC.

## (undated) DEVICE — GLOVE PI ULTRA TOUCH SZ.7.5

## (undated) DEVICE — Device

## (undated) DEVICE — VAC CANISTER 500ML

## (undated) DEVICE — VAC WHITEFOAM DRESSING SMALL FOAM ONLY: Brand: V.A.C.®

## (undated) DEVICE — GLOVE SRG BIOGEL ECLIPSE 6.5

## (undated) DEVICE — COVER PROBE ULTRASOUND

## (undated) DEVICE — DRAPE SURGIKIT SADDLE BAG

## (undated) DEVICE — NEEDLE 25G X 1 1/2

## (undated) DEVICE — TIBURON SPLIT SHEET: Brand: CONVERTORS

## (undated) DEVICE — MEDI-VAC YANKAUER SUCTION HANDLE W/BULBOUS AND CONTROL VENT: Brand: CARDINAL HEALTH

## (undated) DEVICE — ELECTRODE BLADE MOD E-Z CLEAN 2.5IN 6.4CM -0012M

## (undated) DEVICE — PENCIL ELECTROSURG E-Z CLEAN -0035H

## (undated) DEVICE — SUT SILK 3-0 SH 30 IN K832H

## (undated) DEVICE — MAYO STAND COVER: Brand: CONVERTORS

## (undated) DEVICE — CATH FOLEY COUNCIL 16FR 5ML 2 WAY LUBRICATH

## (undated) DEVICE — GLOVE SRG BIOGEL 5.5

## (undated) DEVICE — BULB SYRINGE,IRRIGATION WITH PROTECTIVE CAP: Brand: DOVER

## (undated) DEVICE — ELECTRODE BLADE MOD  E-Z CLEAN 6.5IN -0014M

## (undated) DEVICE — ELECTRODE BLADE MOD E-Z CLEAN  2.75IN 7CM -0012AM

## (undated) DEVICE — UNIVERSAL PACK: Brand: CONVERTORS

## (undated) DEVICE — ROSEBUD DISSECTORS: Brand: DEROYAL

## (undated) DEVICE — PAD GROUNDING ADULT

## (undated) DEVICE — DISPOSABLE OR TOWEL: Brand: CARDINAL HEALTH

## (undated) DEVICE — CYSTOSCOPY PACK: Brand: CONVERTORS

## (undated) DEVICE — SUT MONOCRYL PLUS 4-0 PS-2 18 IN MCP496G

## (undated) DEVICE — LIGHT HANDLE COVER SLEEVE DISP BLUE STELLAR

## (undated) DEVICE — 4 TANDEM MANIFOLD: Brand: MEDI-VAC

## (undated) DEVICE — STERILE MUSCLE FLAP PACK: Brand: CARDINAL HEALTH

## (undated) DEVICE — BAG URINE DRAINAGE 2000ML ANTI RFLX LF